# Patient Record
Sex: FEMALE | Race: WHITE | NOT HISPANIC OR LATINO | Employment: OTHER | URBAN - NONMETROPOLITAN AREA
[De-identification: names, ages, dates, MRNs, and addresses within clinical notes are randomized per-mention and may not be internally consistent; named-entity substitution may affect disease eponyms.]

---

## 2023-01-31 PROBLEM — R06.02 SOB (SHORTNESS OF BREATH) ON EXERTION: Status: ACTIVE | Noted: 2023-01-31

## 2023-01-31 PROBLEM — M47.816 ARTHRITIS OF LUMBAR SPINE: Status: ACTIVE | Noted: 2023-01-31

## 2023-01-31 PROBLEM — I35.0 MODERATE AORTIC STENOSIS: Status: ACTIVE | Noted: 2023-01-31

## 2023-01-31 PROBLEM — E11.9 TYPE 2 DIABETES MELLITUS (MULTI): Status: ACTIVE | Noted: 2023-01-31

## 2023-01-31 PROBLEM — E78.5 DYSLIPIDEMIA: Status: ACTIVE | Noted: 2023-01-31

## 2023-01-31 PROBLEM — E66.9 CLASS 2 OBESITY WITH BODY MASS INDEX (BMI) OF 37.0 TO 37.9 IN ADULT: Status: ACTIVE | Noted: 2023-01-31

## 2023-01-31 PROBLEM — L30.0 NUMMULAR ECZEMATOUS DERMATITIS: Status: ACTIVE | Noted: 2023-01-31

## 2023-01-31 PROBLEM — R93.1 ABNORMAL SCREENING CARDIAC CT: Status: ACTIVE | Noted: 2023-01-31

## 2023-01-31 PROBLEM — M16.11 OSTEOARTHRITIS OF RIGHT HIP: Status: ACTIVE | Noted: 2023-01-31

## 2023-01-31 PROBLEM — I50.9 CHF (CONGESTIVE HEART FAILURE) (MULTI): Status: ACTIVE | Noted: 2023-01-31

## 2023-01-31 PROBLEM — M75.21 RIGHT BICIPITAL TENOSYNOVITIS: Status: ACTIVE | Noted: 2023-01-31

## 2023-01-31 PROBLEM — M54.16 ACUTE LUMBAR RADICULOPATHY: Status: ACTIVE | Noted: 2023-01-31

## 2023-01-31 PROBLEM — M48.061 SPINAL STENOSIS, LUMBAR: Status: ACTIVE | Noted: 2023-01-31

## 2023-01-31 PROBLEM — Z90.13 HISTORY OF BILATERAL MASTECTOMY: Status: ACTIVE | Noted: 2023-01-31

## 2023-01-31 PROBLEM — I50.32 CHRONIC DIASTOLIC HEART FAILURE (MULTI): Status: ACTIVE | Noted: 2023-01-31

## 2023-01-31 PROBLEM — Z90.11 HISTORY OF TOTAL MASTECTOMY OF RIGHT BREAST: Status: ACTIVE | Noted: 2023-01-31

## 2023-01-31 PROBLEM — R06.81 WITNESSED EPISODE OF APNEA: Status: ACTIVE | Noted: 2023-01-31

## 2023-01-31 PROBLEM — M79.18 MUSCULOSKELETAL PAIN: Status: ACTIVE | Noted: 2023-01-31

## 2023-01-31 PROBLEM — M75.51 SUBDELTOID BURSITIS OF RIGHT SHOULDER JOINT: Status: ACTIVE | Noted: 2023-01-31

## 2023-01-31 PROBLEM — M25.511 RIGHT SHOULDER PAIN: Status: ACTIVE | Noted: 2023-01-31

## 2023-01-31 PROBLEM — N39.0 UTI (URINARY TRACT INFECTION): Status: ACTIVE | Noted: 2023-01-31

## 2023-01-31 PROBLEM — M19.90 DJD (DEGENERATIVE JOINT DISEASE): Status: ACTIVE | Noted: 2023-01-31

## 2023-01-31 PROBLEM — I20.9 ANGINA PECTORIS (CMS-HCC): Status: ACTIVE | Noted: 2023-01-31

## 2023-01-31 PROBLEM — M25.551 RIGHT HIP PAIN: Status: ACTIVE | Noted: 2023-01-31

## 2023-01-31 PROBLEM — K21.9 GERD (GASTROESOPHAGEAL REFLUX DISEASE): Status: ACTIVE | Noted: 2023-01-31

## 2023-01-31 PROBLEM — E11.9 DIABETES MELLITUS TYPE II, CONTROLLED (MULTI): Status: ACTIVE | Noted: 2023-01-31

## 2023-01-31 PROBLEM — M16.10 ARTHRITIS OF HIP: Status: ACTIVE | Noted: 2023-01-31

## 2023-01-31 PROBLEM — M54.16 LUMBAR RADICULITIS: Status: ACTIVE | Noted: 2023-01-31

## 2023-01-31 PROBLEM — M54.2 NECK PAIN: Status: ACTIVE | Noted: 2023-01-31

## 2023-01-31 PROBLEM — K25.9 GASTRIC ULCER: Status: ACTIVE | Noted: 2023-01-31

## 2023-01-31 PROBLEM — J06.9 VIRAL URI WITH COUGH: Status: ACTIVE | Noted: 2023-01-31

## 2023-01-31 PROBLEM — I35.0 AORTIC STENOSIS, SEVERE: Status: ACTIVE | Noted: 2023-01-31

## 2023-01-31 PROBLEM — I25.10 CORONARY ATHEROSCLEROSIS: Status: ACTIVE | Noted: 2023-01-31

## 2023-01-31 PROBLEM — D17.20 LIPOMA OF FOREARM: Status: ACTIVE | Noted: 2023-01-31

## 2023-01-31 PROBLEM — L30.9 DERMATITIS OF PERIANAL REGION: Status: ACTIVE | Noted: 2023-01-31

## 2023-01-31 PROBLEM — M70.71 BURSITIS OF RIGHT HIP: Status: ACTIVE | Noted: 2023-01-31

## 2023-01-31 PROBLEM — E11.11: Status: ACTIVE | Noted: 2023-01-31

## 2023-01-31 PROBLEM — K63.5 COLON POLYPS: Status: ACTIVE | Noted: 2023-01-31

## 2023-01-31 PROBLEM — M10.9 GOUT: Status: ACTIVE | Noted: 2023-01-31

## 2023-01-31 PROBLEM — J32.9 SINUSITIS: Status: ACTIVE | Noted: 2023-01-31

## 2023-01-31 PROBLEM — M19.019 ARTHRITIS OF SHOULDER: Status: ACTIVE | Noted: 2023-01-31

## 2023-01-31 PROBLEM — M17.11 ARTHRITIS OF KNEE, RIGHT: Status: ACTIVE | Noted: 2023-01-31

## 2023-01-31 PROBLEM — R60.0 LEG EDEMA: Status: ACTIVE | Noted: 2023-01-31

## 2023-01-31 PROBLEM — R73.09 ABNORMAL BLOOD SUGAR: Status: ACTIVE | Noted: 2023-01-31

## 2023-01-31 PROBLEM — Z20.822 SUSPECTED COVID-19 VIRUS INFECTION: Status: ACTIVE | Noted: 2023-01-31

## 2023-01-31 PROBLEM — E66.9 CLASS 2 OBESITY WITH BODY MASS INDEX (BMI) OF 35.0 TO 35.9 IN ADULT: Status: ACTIVE | Noted: 2023-01-31

## 2023-01-31 PROBLEM — E66.9 CLASS 2 OBESITY WITH BODY MASS INDEX (BMI) OF 36.0 TO 36.9 IN ADULT: Status: ACTIVE | Noted: 2023-01-31

## 2023-01-31 PROBLEM — Z96.641 STATUS POST TOTAL REPLACEMENT OF RIGHT HIP: Status: ACTIVE | Noted: 2023-01-31

## 2023-01-31 PROBLEM — M70.72 BURSITIS OF LEFT HIP: Status: ACTIVE | Noted: 2023-01-31

## 2023-01-31 PROBLEM — E66.9 OBESITY: Status: ACTIVE | Noted: 2023-01-31

## 2023-01-31 PROBLEM — M65.30 ACQUIRED TRIGGER FINGER: Status: ACTIVE | Noted: 2023-01-31

## 2023-01-31 PROBLEM — E55.9 MILD VITAMIN D DEFICIENCY: Status: ACTIVE | Noted: 2023-01-31

## 2023-01-31 PROBLEM — I95.9 HYPOTENSION: Status: ACTIVE | Noted: 2023-01-31

## 2023-01-31 PROBLEM — I25.10 CORONARY ARTERY CALCIFICATION: Status: ACTIVE | Noted: 2023-01-31

## 2023-01-31 PROBLEM — N18.31 STAGE 3A CHRONIC KIDNEY DISEASE (MULTI): Status: ACTIVE | Noted: 2023-01-31

## 2023-01-31 PROBLEM — M19.90 ARTHRITIS: Status: ACTIVE | Noted: 2023-01-31

## 2023-01-31 PROBLEM — G89.29 HIP PAIN, CHRONIC: Status: ACTIVE | Noted: 2023-01-31

## 2023-01-31 PROBLEM — E66.812 CLASS 2 OBESITY WITH BODY MASS INDEX (BMI) OF 37.0 TO 37.9 IN ADULT: Status: ACTIVE | Noted: 2023-01-31

## 2023-01-31 PROBLEM — I25.84 CORONARY ARTERY CALCIFICATION: Status: ACTIVE | Noted: 2023-01-31

## 2023-01-31 PROBLEM — I35.0 AORTIC STENOSIS: Status: ACTIVE | Noted: 2023-01-31

## 2023-01-31 PROBLEM — I22.2 SUBSEQUENT NON-ST ELEVATION (NSTEMI) MYOCARDIAL INFARCTION (MULTI): Status: ACTIVE | Noted: 2023-01-31

## 2023-01-31 PROBLEM — E66.812 CLASS 2 OBESITY WITH BODY MASS INDEX (BMI) OF 35.0 TO 35.9 IN ADULT: Status: ACTIVE | Noted: 2023-01-31

## 2023-01-31 PROBLEM — Z90.13 STATUS POST BILATERAL MASTECTOMY: Status: ACTIVE | Noted: 2023-01-31

## 2023-01-31 PROBLEM — E66.812 CLASS 2 OBESITY WITH BODY MASS INDEX (BMI) OF 36.0 TO 36.9 IN ADULT: Status: ACTIVE | Noted: 2023-01-31

## 2023-01-31 PROBLEM — C50.919 BREAST CA (MULTI): Status: ACTIVE | Noted: 2023-01-31

## 2023-01-31 PROBLEM — R05.9 COUGH: Status: ACTIVE | Noted: 2023-01-31

## 2023-01-31 PROBLEM — L89.42: Status: ACTIVE | Noted: 2023-01-31

## 2023-01-31 PROBLEM — R07.9 CHEST PAIN: Status: ACTIVE | Noted: 2023-01-31

## 2023-01-31 PROBLEM — E78.00 ELEVATED LDL CHOLESTEROL LEVEL: Status: ACTIVE | Noted: 2023-01-31

## 2023-01-31 PROBLEM — M16.10 ARTHRITIS PAIN, HIP: Status: ACTIVE | Noted: 2023-01-31

## 2023-01-31 PROBLEM — M25.559 HIP PAIN, CHRONIC: Status: ACTIVE | Noted: 2023-01-31

## 2023-01-31 PROBLEM — I10 BENIGN ESSENTIAL HYPERTENSION: Status: ACTIVE | Noted: 2023-01-31

## 2023-01-31 PROBLEM — Z90.12 HISTORY OF TOTAL MASTECTOMY OF LEFT BREAST: Status: ACTIVE | Noted: 2023-01-31

## 2023-01-31 RX ORDER — PANTOPRAZOLE SODIUM 40 MG/1
40 TABLET, DELAYED RELEASE ORAL DAILY
COMMUNITY
Start: 2021-03-04 | End: 2023-05-19 | Stop reason: SDUPTHER

## 2023-01-31 RX ORDER — ALLOPURINOL 100 MG/1
2 TABLET ORAL DAILY
COMMUNITY
Start: 2015-10-22 | End: 2023-05-19 | Stop reason: SDUPTHER

## 2023-01-31 RX ORDER — METFORMIN HYDROCHLORIDE 500 MG/1
1000 TABLET, EXTENDED RELEASE ORAL DAILY
COMMUNITY
Start: 2021-12-01 | End: 2023-05-22

## 2023-01-31 RX ORDER — FUROSEMIDE 40 MG/1
1 TABLET ORAL DAILY
COMMUNITY
Start: 2021-03-17 | End: 2023-05-22

## 2023-01-31 RX ORDER — ROSUVASTATIN CALCIUM 40 MG/1
1 TABLET, COATED ORAL DAILY
COMMUNITY
Start: 2016-09-06 | End: 2023-05-19 | Stop reason: SDUPTHER

## 2023-01-31 RX ORDER — BISOPROLOL FUMARATE 5 MG/1
2.5 TABLET, FILM COATED ORAL DAILY
Status: ON HOLD | COMMUNITY
Start: 2018-07-18 | End: 2024-03-29 | Stop reason: WASHOUT

## 2023-01-31 RX ORDER — LOSARTAN POTASSIUM 25 MG/1
1 TABLET ORAL DAILY
COMMUNITY
Start: 2021-03-18 | End: 2023-05-19 | Stop reason: SDUPTHER

## 2023-01-31 RX ORDER — CLOPIDOGREL BISULFATE 75 MG/1
1 TABLET ORAL DAILY
COMMUNITY
Start: 2022-02-23 | End: 2023-05-19 | Stop reason: SDUPTHER

## 2023-01-31 RX ORDER — ASPIRIN 81 MG/1
1 TABLET ORAL DAILY
COMMUNITY
Start: 2020-09-17 | End: 2023-05-19 | Stop reason: SDUPTHER

## 2023-01-31 RX ORDER — CLOTRIMAZOLE AND BETAMETHASONE DIPROPIONATE 10; .64 MG/G; MG/G
CREAM TOPICAL 3 TIMES DAILY
Status: ON HOLD | COMMUNITY
Start: 2021-03-04 | End: 2024-03-29 | Stop reason: WASHOUT

## 2023-01-31 RX ORDER — GLUCOSAMINE/CHONDR SU A SOD 750-600 MG
TABLET ORAL
Status: ON HOLD | COMMUNITY
End: 2024-03-29 | Stop reason: WASHOUT

## 2023-01-31 RX ORDER — FERROUS GLUCONATE 324(38)MG
1 TABLET ORAL 2 TIMES DAILY
COMMUNITY
Start: 2021-03-18 | End: 2023-05-19 | Stop reason: SDUPTHER

## 2023-01-31 RX ORDER — NITROGLYCERIN 0.4 MG/1
0.4 TABLET SUBLINGUAL AS NEEDED
COMMUNITY
Start: 2018-07-18 | End: 2023-03-14 | Stop reason: SDUPTHER

## 2023-01-31 RX ORDER — CHOLECALCIFEROL (VITAMIN D3) 125 MCG
125 CAPSULE ORAL DAILY
COMMUNITY
Start: 2022-08-11 | End: 2023-05-22

## 2023-03-14 ENCOUNTER — OFFICE VISIT (OUTPATIENT)
Dept: PRIMARY CARE | Facility: CLINIC | Age: 73
End: 2023-03-14
Payer: MEDICARE

## 2023-03-14 VITALS
WEIGHT: 215.8 LBS | BODY MASS INDEX: 40.11 KG/M2 | DIASTOLIC BLOOD PRESSURE: 58 MMHG | OXYGEN SATURATION: 94 % | HEART RATE: 80 BPM | SYSTOLIC BLOOD PRESSURE: 98 MMHG | TEMPERATURE: 96.9 F

## 2023-03-14 DIAGNOSIS — E11.11 TYPE 2 DIABETES MELLITUS WITH KETOACIDOTIC COMA, WITHOUT LONG-TERM CURRENT USE OF INSULIN (MULTI): ICD-10-CM

## 2023-03-14 DIAGNOSIS — I10 BENIGN ESSENTIAL HYPERTENSION: ICD-10-CM

## 2023-03-14 DIAGNOSIS — S90.812A: ICD-10-CM

## 2023-03-14 DIAGNOSIS — E11.8 CONTROLLED DIABETES MELLITUS TYPE 2 WITH COMPLICATIONS, UNSPECIFIED WHETHER LONG TERM INSULIN USE (MULTI): ICD-10-CM

## 2023-03-14 DIAGNOSIS — E78.5 DYSLIPIDEMIA: ICD-10-CM

## 2023-03-14 DIAGNOSIS — I20.9 ANGINA PECTORIS (CMS-HCC): ICD-10-CM

## 2023-03-14 DIAGNOSIS — I50.32 CHRONIC DIASTOLIC HEART FAILURE (MULTI): Primary | ICD-10-CM

## 2023-03-14 LAB
POC FINGERSTICK BLOOD GLUCOSE: 127 MG/DL (ref 70–100)
POC HEMOGLOBIN A1C: 6.8 % (ref 4.2–6.5)

## 2023-03-14 PROCEDURE — 3074F SYST BP LT 130 MM HG: CPT | Performed by: INTERNAL MEDICINE

## 2023-03-14 PROCEDURE — 3066F NEPHROPATHY DOC TX: CPT | Performed by: INTERNAL MEDICINE

## 2023-03-14 PROCEDURE — 99214 OFFICE O/P EST MOD 30 MIN: CPT | Performed by: INTERNAL MEDICINE

## 2023-03-14 PROCEDURE — 83036 HEMOGLOBIN GLYCOSYLATED A1C: CPT | Performed by: INTERNAL MEDICINE

## 2023-03-14 PROCEDURE — 4010F ACE/ARB THERAPY RXD/TAKEN: CPT | Performed by: INTERNAL MEDICINE

## 2023-03-14 PROCEDURE — 82962 GLUCOSE BLOOD TEST: CPT | Performed by: INTERNAL MEDICINE

## 2023-03-14 PROCEDURE — 3078F DIAST BP <80 MM HG: CPT | Performed by: INTERNAL MEDICINE

## 2023-03-14 PROCEDURE — 1036F TOBACCO NON-USER: CPT | Performed by: INTERNAL MEDICINE

## 2023-03-14 RX ORDER — NITROGLYCERIN 0.4 MG/1
0.4 TABLET SUBLINGUAL AS NEEDED
Qty: 25 TABLET | Refills: 0 | Status: SHIPPED | OUTPATIENT
Start: 2023-03-14 | End: 2024-05-01 | Stop reason: HOSPADM

## 2023-03-14 ASSESSMENT — ENCOUNTER SYMPTOMS
SORE THROAT: 0
FEVER: 0
UNEXPECTED WEIGHT CHANGE: 0
WHEEZING: 0
COUGH: 0
ARTHRALGIAS: 0
BRUISES/BLEEDS EASILY: 0
SINUS PAIN: 0
FATIGUE: 0
HEADACHES: 0
WOUND: 1
DIZZINESS: 0
BLOOD IN STOOL: 0
ABDOMINAL PAIN: 0
DIFFICULTY URINATING: 0
PALPITATIONS: 0
DIARRHEA: 0

## 2023-03-14 NOTE — PROGRESS NOTES
Subjective   Patient ID: Saritha Cormier is a 72 y.o. female who presents for Hypertension, Diabetes, Puncture Wound (Lt heel x 1 week, possibly from broken glass), and Chest Pain.    HPI  -  cracking dry skin of the left heel, with superficial cellulitis counseled about daily dressing  Dry skin apply Eucerin cream  -Diabetes controlled counseled about hemoglobin A1c results and sugar results continue on low-carb diet  Counseled about weight loss  - Coronary artery disease angina no recurrence compensated  - Hypertension controlled  - Hyperlipidemia controlled continue with current medication  Review of Systems   Constitutional:  Negative for fatigue, fever and unexpected weight change.   HENT:  Negative for congestion, ear discharge, ear pain, mouth sores, sinus pain and sore throat.    Eyes:  Negative for visual disturbance.   Respiratory:  Negative for cough and wheezing.    Cardiovascular:  Negative for chest pain, palpitations and leg swelling.   Gastrointestinal:  Negative for abdominal pain, blood in stool and diarrhea.   Genitourinary:  Negative for difficulty urinating.   Musculoskeletal:  Negative for arthralgias.   Skin:  Positive for wound. Negative for rash.   Neurological:  Negative for dizziness and headaches.   Hematological:  Does not bruise/bleed easily.   Psychiatric/Behavioral:  Negative for behavioral problems.    All other systems reviewed and are negative.      Objective   BP 98/58   Pulse 80   Temp 36.1 °C (96.9 °F)   Wt 97.9 kg (215 lb 12.8 oz)   SpO2 94%   BMI 40.11 kg/m²     Physical Exam  Vitals and nursing note reviewed.   Constitutional:       Appearance: Normal appearance.   HENT:      Head: Normocephalic.      Nose: Nose normal.   Eyes:      Conjunctiva/sclera: Conjunctivae normal.      Pupils: Pupils are equal, round, and reactive to light.   Cardiovascular:      Rate and Rhythm: Regular rhythm.   Pulmonary:      Effort: Pulmonary effort is normal.      Breath sounds: Normal  breath sounds.   Abdominal:      General: Abdomen is flat.      Palpations: Abdomen is soft.   Musculoskeletal:      Cervical back: Neck supple.   Skin:     General: Skin is warm.      Findings: Lesion present.      Comments: Left heel dry skin and small skin crack with superficial cellulitis severe dry skin   Neurological:      General: No focal deficit present.      Mental Status: She is oriented to person, place, and time.   Psychiatric:         Mood and Affect: Mood normal.         Assessment/Plan   Problem List Items Addressed This Visit          Nervous    Type 2 diabetes mellitus with ketoacidotic coma, without long-term current use of insulin (CMS/[facility])    Relevant Orders    POCT glycosylated hemoglobin (Hb A1C) manually resulted (Completed)       Circulatory    Angina pectoris (CMS/[facility])    Relevant Medications    nitroglycerin (Nitrostat) 0.4 mg SL tablet    Benign essential hypertension    Chronic diastolic heart failure (CMS/[facility]) - Primary    Relevant Medications    nitroglycerin (Nitrostat) 0.4 mg SL tablet       Endocrine/Metabolic    Diabetes mellitus type II, controlled (CMS/[facility])    Relevant Orders    POCT fingerstick glucose manually resulted (Completed)       Other    Dyslipidemia     Other Visit Diagnoses       Heel abrasion, left, initial encounter        Relevant Medications    eucerin cream        -  cracking dry skin of the left heel, with superficial cellulitis counseled about daily dressing  Dry skin apply Eucerin cream  -Diabetes controlled counseled about hemoglobin A1c results and sugar results continue on low-carb diet  Counseled about weight loss  - Coronary artery disease angina no recurrence compensated  - Hypertension controlled  - Hyperlipidemia controlled continue with current medication

## 2023-05-19 DIAGNOSIS — D64.89 OTHER SPECIFIED ANEMIAS: ICD-10-CM

## 2023-05-19 DIAGNOSIS — I50.9 HEART FAILURE, UNSPECIFIED (MULTI): ICD-10-CM

## 2023-05-19 DIAGNOSIS — K21.9 GASTRO-ESOPHAGEAL REFLUX DISEASE WITHOUT ESOPHAGITIS: ICD-10-CM

## 2023-05-19 DIAGNOSIS — M10.9 GOUT, UNSPECIFIED: ICD-10-CM

## 2023-05-19 DIAGNOSIS — E78.00 PURE HYPERCHOLESTEROLEMIA, UNSPECIFIED: ICD-10-CM

## 2023-05-19 DIAGNOSIS — Z00.00 ENCOUNTER FOR GENERAL ADULT MEDICAL EXAMINATION WITHOUT ABNORMAL FINDINGS: ICD-10-CM

## 2023-05-19 RX ORDER — PANTOPRAZOLE SODIUM 40 MG/1
TABLET, DELAYED RELEASE ORAL
Qty: 90 TABLET | Refills: 0 | Status: SHIPPED | OUTPATIENT
Start: 2023-05-19 | End: 2023-08-15 | Stop reason: SDUPTHER

## 2023-05-19 RX ORDER — ROSUVASTATIN CALCIUM 40 MG/1
TABLET, COATED ORAL
Qty: 90 TABLET | Refills: 0 | Status: SHIPPED | OUTPATIENT
Start: 2023-05-19 | End: 2023-08-15 | Stop reason: SDUPTHER

## 2023-05-19 RX ORDER — ALLOPURINOL 100 MG/1
TABLET ORAL
Qty: 180 TABLET | Refills: 0 | Status: SHIPPED | OUTPATIENT
Start: 2023-05-19 | End: 2023-08-15 | Stop reason: SDUPTHER

## 2023-05-19 RX ORDER — FERROUS GLUCONATE 324(38)MG
TABLET ORAL
Qty: 180 TABLET | Refills: 0 | Status: SHIPPED | OUTPATIENT
Start: 2023-05-19 | End: 2023-08-15 | Stop reason: SDUPTHER

## 2023-05-19 RX ORDER — CLOPIDOGREL BISULFATE 75 MG/1
TABLET ORAL
Qty: 90 TABLET | Refills: 0 | Status: SHIPPED | OUTPATIENT
Start: 2023-05-19 | End: 2023-06-14 | Stop reason: SDUPTHER

## 2023-05-19 RX ORDER — LOSARTAN POTASSIUM 25 MG/1
TABLET ORAL
Qty: 90 TABLET | Refills: 0 | Status: SHIPPED | OUTPATIENT
Start: 2023-05-19 | End: 2023-08-15 | Stop reason: SDUPTHER

## 2023-05-19 RX ORDER — ASPIRIN 81 MG/1
TABLET ORAL
Qty: 90 TABLET | Refills: 0 | Status: SHIPPED | OUTPATIENT
Start: 2023-05-19 | End: 2023-08-15 | Stop reason: SDUPTHER

## 2023-05-20 DIAGNOSIS — I25.84 CORONARY ATHEROSCLEROSIS DUE TO CALCIFIED CORONARY LESION (CODE): ICD-10-CM

## 2023-05-20 DIAGNOSIS — E55.9 MILD VITAMIN D DEFICIENCY: ICD-10-CM

## 2023-05-20 DIAGNOSIS — E11.9 TYPE 2 DIABETES MELLITUS WITHOUT COMPLICATIONS (MULTI): ICD-10-CM

## 2023-05-20 DIAGNOSIS — I25.10 ATHEROSCLEROTIC HEART DISEASE OF NATIVE CORONARY ARTERY WITHOUT ANGINA PECTORIS: ICD-10-CM

## 2023-05-22 RX ORDER — CHOLECALCIFEROL (VITAMIN D3) 125 MCG
CAPSULE ORAL
Qty: 23 CAPSULE | Refills: 0 | Status: SHIPPED | OUTPATIENT
Start: 2023-05-22 | End: 2023-06-14 | Stop reason: SDUPTHER

## 2023-05-22 RX ORDER — FUROSEMIDE 40 MG/1
TABLET ORAL
Qty: 23 TABLET | Refills: 0 | Status: SHIPPED | OUTPATIENT
Start: 2023-05-22 | End: 2023-07-06

## 2023-05-22 RX ORDER — METFORMIN HYDROCHLORIDE 500 MG/1
TABLET, EXTENDED RELEASE ORAL
Qty: 46 TABLET | Refills: 0 | Status: SHIPPED | OUTPATIENT
Start: 2023-05-22 | End: 2023-07-10

## 2023-06-14 ENCOUNTER — OFFICE VISIT (OUTPATIENT)
Dept: PRIMARY CARE | Facility: CLINIC | Age: 73
End: 2023-06-14
Payer: MEDICARE

## 2023-06-14 ENCOUNTER — LAB (OUTPATIENT)
Dept: LAB | Facility: LAB | Age: 73
End: 2023-06-14
Payer: MEDICARE

## 2023-06-14 VITALS
SYSTOLIC BLOOD PRESSURE: 126 MMHG | DIASTOLIC BLOOD PRESSURE: 82 MMHG | HEART RATE: 72 BPM | HEIGHT: 62 IN | BODY MASS INDEX: 39.47 KG/M2 | WEIGHT: 214.5 LBS | OXYGEN SATURATION: 95 % | TEMPERATURE: 98.6 F

## 2023-06-14 DIAGNOSIS — C50.912 BILATERAL MALIGNANT NEOPLASM OF BREAST IN FEMALE, UNSPECIFIED ESTROGEN RECEPTOR STATUS, UNSPECIFIED SITE OF BREAST (MULTI): ICD-10-CM

## 2023-06-14 DIAGNOSIS — G89.29 CHRONIC PAIN OF RIGHT HEEL: ICD-10-CM

## 2023-06-14 DIAGNOSIS — I10 BENIGN ESSENTIAL HYPERTENSION: Primary | ICD-10-CM

## 2023-06-14 DIAGNOSIS — N18.31 STAGE 3A CHRONIC KIDNEY DISEASE (MULTI): ICD-10-CM

## 2023-06-14 DIAGNOSIS — Z12.31 ENCOUNTER FOR SCREENING MAMMOGRAM FOR BREAST CANCER: ICD-10-CM

## 2023-06-14 DIAGNOSIS — I50.9 HEART FAILURE, UNSPECIFIED (MULTI): ICD-10-CM

## 2023-06-14 DIAGNOSIS — C50.911 BILATERAL MALIGNANT NEOPLASM OF BREAST IN FEMALE, UNSPECIFIED ESTROGEN RECEPTOR STATUS, UNSPECIFIED SITE OF BREAST (MULTI): ICD-10-CM

## 2023-06-14 DIAGNOSIS — Z11.59 NEED FOR HEPATITIS C SCREENING TEST: ICD-10-CM

## 2023-06-14 DIAGNOSIS — Z90.13 H/O BILATERAL MASTECTOMY: Chronic | ICD-10-CM

## 2023-06-14 DIAGNOSIS — E55.9 MILD VITAMIN D DEFICIENCY: ICD-10-CM

## 2023-06-14 DIAGNOSIS — L89.42 PRESSURE INJURY OF CONTIGUOUS REGION INVOLVING BUTTOCK AND HIP, STAGE 2, UNSPECIFIED LATERALITY (MULTI): ICD-10-CM

## 2023-06-14 DIAGNOSIS — I50.32 CHRONIC DIASTOLIC HEART FAILURE (MULTI): ICD-10-CM

## 2023-06-14 DIAGNOSIS — I35.0 AORTIC STENOSIS, SEVERE: ICD-10-CM

## 2023-06-14 DIAGNOSIS — Z00.00 ROUTINE GENERAL MEDICAL EXAMINATION AT HEALTH CARE FACILITY: ICD-10-CM

## 2023-06-14 DIAGNOSIS — E11.11 TYPE 2 DIABETES MELLITUS WITH KETOACIDOTIC COMA, WITHOUT LONG-TERM CURRENT USE OF INSULIN (MULTI): ICD-10-CM

## 2023-06-14 DIAGNOSIS — M79.671 CHRONIC PAIN OF RIGHT HEEL: ICD-10-CM

## 2023-06-14 DIAGNOSIS — Z13.89 SCREENING FOR MULTIPLE CONDITIONS: ICD-10-CM

## 2023-06-14 DIAGNOSIS — M10.9 GOUT OF RIGHT ANKLE, UNSPECIFIED CAUSE, UNSPECIFIED CHRONICITY: ICD-10-CM

## 2023-06-14 DIAGNOSIS — M16.10 ARTHRITIS OF HIP: ICD-10-CM

## 2023-06-14 DIAGNOSIS — Z12.11 SCREENING FOR COLON CANCER: ICD-10-CM

## 2023-06-14 DIAGNOSIS — Z78.0 ASYMPTOMATIC MENOPAUSAL STATE: ICD-10-CM

## 2023-06-14 PROBLEM — M79.18 MUSCULOSKELETAL PAIN: Status: RESOLVED | Noted: 2023-01-31 | Resolved: 2023-06-14

## 2023-06-14 PROBLEM — M25.551 RIGHT HIP PAIN: Status: RESOLVED | Noted: 2023-01-31 | Resolved: 2023-06-14

## 2023-06-14 PROBLEM — E66.9 OBESITY: Status: RESOLVED | Noted: 2023-01-31 | Resolved: 2023-06-14

## 2023-06-14 PROBLEM — R73.09 ABNORMAL BLOOD SUGAR: Status: RESOLVED | Noted: 2023-01-31 | Resolved: 2023-06-14

## 2023-06-14 PROBLEM — J32.9 SINUSITIS: Status: RESOLVED | Noted: 2023-01-31 | Resolved: 2023-06-14

## 2023-06-14 PROBLEM — L30.0 NUMMULAR ECZEMATOUS DERMATITIS: Status: RESOLVED | Noted: 2023-01-31 | Resolved: 2023-06-14

## 2023-06-14 PROBLEM — I22.2 SUBSEQUENT NON-ST ELEVATION (NSTEMI) MYOCARDIAL INFARCTION (MULTI): Status: RESOLVED | Noted: 2023-01-31 | Resolved: 2023-06-14

## 2023-06-14 PROBLEM — M75.21 RIGHT BICIPITAL TENOSYNOVITIS: Status: RESOLVED | Noted: 2023-01-31 | Resolved: 2023-06-14

## 2023-06-14 PROBLEM — E66.9 CLASS 2 OBESITY WITH BODY MASS INDEX (BMI) OF 36.0 TO 36.9 IN ADULT: Status: RESOLVED | Noted: 2023-01-31 | Resolved: 2023-06-14

## 2023-06-14 PROBLEM — M16.11 OSTEOARTHRITIS OF RIGHT HIP: Status: RESOLVED | Noted: 2023-01-31 | Resolved: 2023-06-14

## 2023-06-14 PROBLEM — M75.51 SUBDELTOID BURSITIS OF RIGHT SHOULDER JOINT: Status: RESOLVED | Noted: 2023-01-31 | Resolved: 2023-06-14

## 2023-06-14 PROBLEM — N39.0 UTI (URINARY TRACT INFECTION): Status: RESOLVED | Noted: 2023-01-31 | Resolved: 2023-06-14

## 2023-06-14 PROBLEM — C50.919 BREAST CA (MULTI): Status: RESOLVED | Noted: 2023-01-31 | Resolved: 2023-06-14

## 2023-06-14 PROBLEM — M65.30 ACQUIRED TRIGGER FINGER: Status: RESOLVED | Noted: 2023-01-31 | Resolved: 2023-06-14

## 2023-06-14 PROBLEM — M17.11 ARTHRITIS OF KNEE, RIGHT: Status: RESOLVED | Noted: 2023-01-31 | Resolved: 2023-06-14

## 2023-06-14 PROBLEM — J06.9 VIRAL URI WITH COUGH: Status: RESOLVED | Noted: 2023-01-31 | Resolved: 2023-06-14

## 2023-06-14 PROBLEM — R60.0 LEG EDEMA: Status: RESOLVED | Noted: 2023-01-31 | Resolved: 2023-06-14

## 2023-06-14 PROBLEM — Z90.11 HISTORY OF TOTAL MASTECTOMY OF RIGHT BREAST: Status: RESOLVED | Noted: 2023-01-31 | Resolved: 2023-06-14

## 2023-06-14 PROBLEM — M25.559 HIP PAIN, CHRONIC: Status: RESOLVED | Noted: 2023-01-31 | Resolved: 2023-06-14

## 2023-06-14 PROBLEM — R06.02 SOB (SHORTNESS OF BREATH) ON EXERTION: Status: RESOLVED | Noted: 2023-01-31 | Resolved: 2023-06-14

## 2023-06-14 PROBLEM — E66.812 CLASS 2 OBESITY WITH BODY MASS INDEX (BMI) OF 35.0 TO 35.9 IN ADULT: Status: RESOLVED | Noted: 2023-01-31 | Resolved: 2023-06-14

## 2023-06-14 PROBLEM — M70.72 BURSITIS OF LEFT HIP: Status: RESOLVED | Noted: 2023-01-31 | Resolved: 2023-06-14

## 2023-06-14 PROBLEM — I25.10 CORONARY ATHEROSCLEROSIS: Status: RESOLVED | Noted: 2023-01-31 | Resolved: 2023-06-14

## 2023-06-14 PROBLEM — R05.9 COUGH: Status: RESOLVED | Noted: 2023-01-31 | Resolved: 2023-06-14

## 2023-06-14 PROBLEM — M70.71 BURSITIS OF RIGHT HIP: Status: RESOLVED | Noted: 2023-01-31 | Resolved: 2023-06-14

## 2023-06-14 PROBLEM — E66.812 CLASS 2 OBESITY WITH BODY MASS INDEX (BMI) OF 36.0 TO 36.9 IN ADULT: Status: RESOLVED | Noted: 2023-01-31 | Resolved: 2023-06-14

## 2023-06-14 PROBLEM — M25.511 RIGHT SHOULDER PAIN: Status: RESOLVED | Noted: 2023-01-31 | Resolved: 2023-06-14

## 2023-06-14 PROBLEM — M19.019 ARTHRITIS OF SHOULDER: Status: RESOLVED | Noted: 2023-01-31 | Resolved: 2023-06-14

## 2023-06-14 PROBLEM — M47.816 ARTHRITIS OF LUMBAR SPINE: Status: RESOLVED | Noted: 2023-01-31 | Resolved: 2023-06-14

## 2023-06-14 PROBLEM — M54.2 NECK PAIN: Status: RESOLVED | Noted: 2023-01-31 | Resolved: 2023-06-14

## 2023-06-14 PROBLEM — E66.9 CLASS 2 OBESITY WITH BODY MASS INDEX (BMI) OF 35.0 TO 35.9 IN ADULT: Status: RESOLVED | Noted: 2023-01-31 | Resolved: 2023-06-14

## 2023-06-14 PROBLEM — E11.9 TYPE 2 DIABETES MELLITUS (MULTI): Status: RESOLVED | Noted: 2023-01-31 | Resolved: 2023-06-14

## 2023-06-14 PROBLEM — Z90.12 HISTORY OF TOTAL MASTECTOMY OF LEFT BREAST: Status: RESOLVED | Noted: 2023-01-31 | Resolved: 2023-06-14

## 2023-06-14 LAB
HEPATITIS C VIRUS AB PRESENCE IN SERUM: NONREACTIVE
URATE (MG/DL) IN SER/PLAS: 5.5 MG/DL (ref 2.3–6.7)

## 2023-06-14 PROCEDURE — 3008F BODY MASS INDEX DOCD: CPT | Performed by: INTERNAL MEDICINE

## 2023-06-14 PROCEDURE — 86803 HEPATITIS C AB TEST: CPT

## 2023-06-14 PROCEDURE — 3079F DIAST BP 80-89 MM HG: CPT | Performed by: INTERNAL MEDICINE

## 2023-06-14 PROCEDURE — 4010F ACE/ARB THERAPY RXD/TAKEN: CPT | Performed by: INTERNAL MEDICINE

## 2023-06-14 PROCEDURE — G0439 PPPS, SUBSEQ VISIT: HCPCS | Performed by: INTERNAL MEDICINE

## 2023-06-14 PROCEDURE — 1036F TOBACCO NON-USER: CPT | Performed by: INTERNAL MEDICINE

## 2023-06-14 PROCEDURE — 3074F SYST BP LT 130 MM HG: CPT | Performed by: INTERNAL MEDICINE

## 2023-06-14 PROCEDURE — 1170F FXNL STATUS ASSESSED: CPT | Performed by: INTERNAL MEDICINE

## 2023-06-14 PROCEDURE — 1160F RVW MEDS BY RX/DR IN RCRD: CPT | Performed by: INTERNAL MEDICINE

## 2023-06-14 PROCEDURE — G0444 DEPRESSION SCREEN ANNUAL: HCPCS | Performed by: INTERNAL MEDICINE

## 2023-06-14 PROCEDURE — 1159F MED LIST DOCD IN RCRD: CPT | Performed by: INTERNAL MEDICINE

## 2023-06-14 PROCEDURE — 99214 OFFICE O/P EST MOD 30 MIN: CPT | Performed by: INTERNAL MEDICINE

## 2023-06-14 PROCEDURE — 84550 ASSAY OF BLOOD/URIC ACID: CPT

## 2023-06-14 PROCEDURE — 36415 COLL VENOUS BLD VENIPUNCTURE: CPT

## 2023-06-14 PROCEDURE — 3066F NEPHROPATHY DOC TX: CPT | Performed by: INTERNAL MEDICINE

## 2023-06-14 RX ORDER — CLOPIDOGREL BISULFATE 75 MG/1
75 TABLET ORAL DAILY
Qty: 90 TABLET | Refills: 0 | Status: SHIPPED | OUTPATIENT
Start: 2023-06-14 | End: 2023-10-16

## 2023-06-14 RX ORDER — CHOLECALCIFEROL (VITAMIN D3) 125 MCG
125 CAPSULE ORAL DAILY
Qty: 23 CAPSULE | Refills: 0 | Status: SHIPPED | OUTPATIENT
Start: 2023-06-14 | End: 2023-07-24

## 2023-06-14 ASSESSMENT — ENCOUNTER SYMPTOMS
BRUISES/BLEEDS EASILY: 0
WHEEZING: 0
ARTHRALGIAS: 1
DIFFICULTY URINATING: 0
ABDOMINAL PAIN: 0
FEVER: 0
BLOOD IN STOOL: 0
BACK PAIN: 1
UNEXPECTED WEIGHT CHANGE: 0
DIZZINESS: 0
DIARRHEA: 0
PALPITATIONS: 0
COUGH: 0
FATIGUE: 0
SORE THROAT: 0
SINUS PAIN: 0
HEADACHES: 0

## 2023-06-14 ASSESSMENT — ACTIVITIES OF DAILY LIVING (ADL)
GROCERY_SHOPPING: INDEPENDENT
TAKING_MEDICATION: INDEPENDENT
DRESSING: INDEPENDENT
BATHING: INDEPENDENT
DOING_HOUSEWORK: INDEPENDENT
MANAGING_FINANCES: INDEPENDENT

## 2023-06-14 ASSESSMENT — PATIENT HEALTH QUESTIONNAIRE - PHQ9
SUM OF ALL RESPONSES TO PHQ9 QUESTIONS 1 AND 2: 0
2. FEELING DOWN, DEPRESSED OR HOPELESS: NOT AT ALL
1. LITTLE INTEREST OR PLEASURE IN DOING THINGS: NOT AT ALL

## 2023-06-14 NOTE — PROGRESS NOTES
Subjective   Reason for Visit: Saritha Cormier is an 72 y.o. female here for a Medicare Wellness visit.     Past Medical, Surgical, and Family History reviewed and updated in chart.    Reviewed all medications by prescribing practitioner or clinical pharmacist (such as prescriptions, OTCs, herbal therapies and supplements) and documented in the medical record.    Annual preventive visit  - Vaccinations reviewed and up-to-date  -Screening for colon cancer history of colon polyps need to repeat screening colon cancer refer patient to gastroenterology in [place]  -Needs to complete blood work  -History of breast cancer status post bilateral mastectomy no need for mammogram  -Screening for depression  I spent 15 minutes obtaining and discussing depression screening using PHQ-2 questions with results documented in the chart.    Medicare screening reviewed    Follow-up  - Right heel pain history of gout need to obtain x-ray of the right calcaneus obtain uric acid for further recommendation follow-up results closely  -Status post right hip replacement doing well now left hip pain worsening proceed with x-ray left hip refer patient to Dr. Angulo orthopedic surgery  -Aortic valve disease stenosis compensated asymptomatic follow-up cardiology  - Diastolic heart failure compensated continue with current medication low-salt diet  - Hypertension controlled continue with current meds  - Chronic gout continue with allopurinol follow-up uric acid level  -Chronic kidney disease compensated follow-up BMP continue low-salt diet maximize blood pressure management  - Hypercholesteremia controlled  Follow-up 3 months        Patient Care Team:  Alva Prince MD as PCP - General  Alva Prince MD as PCP [facility]     Review of Systems   Constitutional:  Negative for fatigue, fever and unexpected weight change.   HENT:  Negative for congestion, ear discharge, ear pain, mouth sores, sinus pain and sore throat.  "   Eyes:  Negative for visual disturbance.   Respiratory:  Negative for cough and wheezing.    Cardiovascular:  Negative for chest pain, palpitations and leg swelling.   Gastrointestinal:  Negative for abdominal pain, blood in stool and diarrhea.   Genitourinary:  Negative for difficulty urinating.   Musculoskeletal:  Positive for arthralgias, back pain and gait problem.   Skin:  Negative for rash.   Neurological:  Negative for dizziness and headaches.   Hematological:  Does not bruise/bleed easily.   Psychiatric/Behavioral:  Negative for behavioral problems.    All other systems reviewed and are negative.      Objective   Vitals:  /82   Pulse 72   Temp 37 °C (98.6 °F)   Ht 1.575 m (5' 2\")   Wt 97.3 kg (214 lb 8 oz)   SpO2 95%   BMI 39.23 kg/m²       Physical Exam  Vitals and nursing note reviewed.   Constitutional:       Appearance: Normal appearance.   HENT:      Head: Normocephalic.      Nose: Nose normal.   Eyes:      Conjunctiva/sclera: Conjunctivae normal.      Pupils: Pupils are equal, round, and reactive to light.   Cardiovascular:      Rate and Rhythm: Regular rhythm.   Pulmonary:      Effort: Pulmonary effort is normal.      Breath sounds: Normal breath sounds.   Abdominal:      General: Abdomen is flat.      Palpations: Abdomen is soft.   Musculoskeletal:         General: Tenderness (Left hip arthritis right heel pain and tenderness) present.      Cervical back: Neck supple.   Skin:     General: Skin is warm.   Neurological:      General: No focal deficit present.      Mental Status: She is oriented to person, place, and time.   Psychiatric:         Mood and Affect: Mood normal.         Assessment/Plan   Problem List Items Addressed This Visit       Aortic stenosis, severe    Relevant Medications    clopidogrel (Plavix) 75 mg tablet    Arthritis of hip    Relevant Orders    XR hip left 2 or 3 views    Benign essential hypertension - Primary    RESOLVED: Breast CA (CMS/HCC)    Chronic diastolic " heart failure (CMS/HCC)    Relevant Medications    clopidogrel (Plavix) 75 mg tablet    Gout    Mild vitamin D deficiency    Relevant Medications    cholecalciferol (Vitamin D-3) 125 MCG (5000 UT) capsule    RESOLVED: Pressure injury of contiguous region involving buttock and hip, stage 2 (CMS/HCC)    Stage 3a chronic kidney disease    Type 2 diabetes mellitus with ketoacidotic coma, without long-term current use of insulin (CMS/HCC)    H/O bilateral mastectomy (Chronic)     Other Visit Diagnoses       Heart failure, unspecified (CMS/HCC)        Relevant Medications    clopidogrel (Plavix) 75 mg tablet    Screening for multiple conditions        Asymptomatic menopausal state        Relevant Orders    XR DEXA bone density    Encounter for screening mammogram for breast cancer        Relevant Orders    BI mammo bilateral screening tomosynthesis    Need for hepatitis C screening test        Relevant Orders    Hepatitis C antibody    Routine general medical examination at health care facility        Chronic pain of right heel        Relevant Orders    XR calcaneus right 2 views    Uric acid    Screening for colon cancer        Relevant Orders    Referral to Gastroenterology        Annual preventive visit  - Vaccinations reviewed and up-to-date  -Screening for colon cancer history of colon polyps need to repeat screening colon cancer refer patient to gastroenterology in [place]  -Needs to complete blood work  -History of breast cancer status post bilateral mastectomy no need for mammogram  -Screening for depression  I spent 15 minutes obtaining and discussing depression screening using PHQ-2 questions with results documented in the chart.    Medicare screening reviewed    Follow-up  - Right heel pain history of gout need to obtain x-ray of the right calcaneus obtain uric acid for further recommendation follow-up results closely  -Status post right hip replacement doing well now left hip pain worsening proceed with x-ray  left hip refer patient to Dr. Angulo orthopedic surgery  -Aortic valve disease stenosis compensated asymptomatic follow-up cardiology  - Diastolic heart failure compensated continue with current medication low-salt diet  - Hypertension controlled continue with current meds  - Chronic gout continue with allopurinol follow-up uric acid level  -Chronic kidney disease compensated follow-up BMP continue low-salt diet maximize blood pressure management  - Hypercholesteremia controlled  Follow-up 3 months

## 2023-06-19 DIAGNOSIS — M79.671 RIGHT FOOT PAIN: ICD-10-CM

## 2023-07-06 DIAGNOSIS — I25.10 ATHEROSCLEROTIC HEART DISEASE OF NATIVE CORONARY ARTERY WITHOUT ANGINA PECTORIS: ICD-10-CM

## 2023-07-06 DIAGNOSIS — I25.84 CORONARY ATHEROSCLEROSIS DUE TO CALCIFIED CORONARY LESION (CODE): ICD-10-CM

## 2023-07-06 RX ORDER — FUROSEMIDE 40 MG/1
TABLET ORAL
Qty: 90 TABLET | Refills: 0 | Status: SHIPPED | OUTPATIENT
Start: 2023-07-06 | End: 2023-10-11

## 2023-07-07 DIAGNOSIS — E11.9 TYPE 2 DIABETES MELLITUS WITHOUT COMPLICATIONS (MULTI): ICD-10-CM

## 2023-07-10 RX ORDER — METFORMIN HYDROCHLORIDE 500 MG/1
TABLET, EXTENDED RELEASE ORAL
Qty: 180 TABLET | Refills: 0 | Status: SHIPPED | OUTPATIENT
Start: 2023-07-10 | End: 2023-10-12

## 2023-07-22 DIAGNOSIS — E55.9 MILD VITAMIN D DEFICIENCY: ICD-10-CM

## 2023-07-24 RX ORDER — CHOLECALCIFEROL (VITAMIN D3) 125 MCG
125 CAPSULE ORAL DAILY
Qty: 90 CAPSULE | Refills: 1 | Status: SHIPPED | OUTPATIENT
Start: 2023-07-24 | End: 2024-01-25

## 2023-08-14 DIAGNOSIS — E78.00 PURE HYPERCHOLESTEROLEMIA, UNSPECIFIED: ICD-10-CM

## 2023-08-14 DIAGNOSIS — K21.9 GASTRO-ESOPHAGEAL REFLUX DISEASE WITHOUT ESOPHAGITIS: ICD-10-CM

## 2023-08-14 DIAGNOSIS — I50.9 HEART FAILURE, UNSPECIFIED (MULTI): ICD-10-CM

## 2023-08-14 DIAGNOSIS — Z00.00 ENCOUNTER FOR GENERAL ADULT MEDICAL EXAMINATION WITHOUT ABNORMAL FINDINGS: ICD-10-CM

## 2023-08-14 DIAGNOSIS — D64.89 OTHER SPECIFIED ANEMIAS: ICD-10-CM

## 2023-08-14 DIAGNOSIS — M10.9 GOUT, UNSPECIFIED: ICD-10-CM

## 2023-08-15 RX ORDER — ASPIRIN 81 MG/1
TABLET ORAL
Qty: 90 TABLET | Refills: 1 | Status: SHIPPED | OUTPATIENT
Start: 2023-08-15 | End: 2024-02-12

## 2023-08-15 RX ORDER — LOSARTAN POTASSIUM 25 MG/1
TABLET ORAL
Qty: 90 TABLET | Refills: 1 | Status: SHIPPED | OUTPATIENT
Start: 2023-08-15 | End: 2024-01-30 | Stop reason: SDUPTHER

## 2023-08-15 RX ORDER — FERROUS GLUCONATE 324(38)MG
TABLET ORAL
Qty: 180 TABLET | Refills: 1 | Status: SHIPPED | OUTPATIENT
Start: 2023-08-15 | End: 2024-01-30 | Stop reason: SDUPTHER

## 2023-08-15 RX ORDER — ALLOPURINOL 100 MG/1
TABLET ORAL
Qty: 180 TABLET | Refills: 1 | Status: SHIPPED | OUTPATIENT
Start: 2023-08-15 | End: 2024-01-30 | Stop reason: SDUPTHER

## 2023-08-15 RX ORDER — ROSUVASTATIN CALCIUM 40 MG/1
TABLET, COATED ORAL
Qty: 90 TABLET | Refills: 1 | Status: SHIPPED | OUTPATIENT
Start: 2023-08-15 | End: 2024-01-30 | Stop reason: SDUPTHER

## 2023-08-15 RX ORDER — PANTOPRAZOLE SODIUM 40 MG/1
40 TABLET, DELAYED RELEASE ORAL DAILY
Qty: 90 TABLET | Refills: 1 | Status: SHIPPED | OUTPATIENT
Start: 2023-08-15 | End: 2023-12-26 | Stop reason: SDUPTHER

## 2023-09-12 PROBLEM — Z90.13 ACQUIRED ABSENCE OF BILATERAL BREASTS AND NIPPLES: Status: ACTIVE | Noted: 2021-01-01

## 2023-09-12 PROBLEM — Z79.84 LONG TERM (CURRENT) USE OF ORAL HYPOGLYCEMIC DRUGS: Status: ACTIVE | Noted: 2021-01-01

## 2023-09-12 PROBLEM — Z95.3 PRESENCE OF XENOGENIC HEART VALVE: Status: ACTIVE | Noted: 2021-01-01

## 2023-09-12 PROBLEM — Z79.891 LONG TERM (CURRENT) USE OF OPIATE ANALGESIC: Status: ACTIVE | Noted: 2021-06-16

## 2023-09-12 PROBLEM — I11.9 HYPERTENSIVE HEART DISEASE WITHOUT HEART FAILURE: Status: ACTIVE | Noted: 2021-01-01

## 2023-09-12 PROBLEM — D64.9 ANEMIA: Status: ACTIVE | Noted: 2023-09-12

## 2023-09-12 PROBLEM — Z99.81 DEPENDENCE ON SUPPLEMENTAL OXYGEN: Status: ACTIVE | Noted: 2021-01-01

## 2023-09-12 PROBLEM — Z96.641 PRESENCE OF RIGHT ARTIFICIAL HIP JOINT: Status: ACTIVE | Noted: 2021-06-16

## 2023-09-12 PROBLEM — K27.9 PEPTIC ULCER WITHOUT HEMORRHAGE OR PERFORATION: Status: ACTIVE | Noted: 2021-01-01

## 2023-09-12 PROBLEM — Z47.1 AFTERCARE FOLLOWING JOINT REPLACEMENT SURGERY: Status: ACTIVE | Noted: 2021-06-16

## 2023-09-12 PROBLEM — C50.919 BREAST CANCER (MULTI): Status: ACTIVE | Noted: 2021-01-01

## 2023-09-12 RX ORDER — CIPROFLOXACIN 500 MG/1
TABLET ORAL
COMMUNITY
End: 2023-09-14 | Stop reason: ALTCHOICE

## 2023-09-14 ENCOUNTER — OFFICE VISIT (OUTPATIENT)
Dept: PRIMARY CARE | Facility: CLINIC | Age: 73
End: 2023-09-14
Payer: MEDICARE

## 2023-09-14 VITALS
DIASTOLIC BLOOD PRESSURE: 72 MMHG | TEMPERATURE: 97 F | SYSTOLIC BLOOD PRESSURE: 114 MMHG | WEIGHT: 210 LBS | HEART RATE: 76 BPM | BODY MASS INDEX: 38.41 KG/M2 | OXYGEN SATURATION: 94 %

## 2023-09-14 DIAGNOSIS — E11.8 CONTROLLED DIABETES MELLITUS TYPE 2 WITH COMPLICATIONS, UNSPECIFIED WHETHER LONG TERM INSULIN USE (MULTI): ICD-10-CM

## 2023-09-14 DIAGNOSIS — Z23 FLU VACCINE NEED: ICD-10-CM

## 2023-09-14 DIAGNOSIS — I70.0 ATHEROSCLEROSIS OF AORTA (CMS-HCC): Primary | ICD-10-CM

## 2023-09-14 DIAGNOSIS — J96.02 ACUTE RESPIRATORY FAILURE WITH HYPERCAPNIA (MULTI): ICD-10-CM

## 2023-09-14 LAB
POC FINGERSTICK BLOOD GLUCOSE: 129 MG/DL (ref 70–100)
POC HEMOGLOBIN A1C: 6.6 % (ref 4.2–6.5)

## 2023-09-14 PROCEDURE — G0008 ADMIN INFLUENZA VIRUS VAC: HCPCS | Performed by: INTERNAL MEDICINE

## 2023-09-14 PROCEDURE — 83036 HEMOGLOBIN GLYCOSYLATED A1C: CPT | Performed by: INTERNAL MEDICINE

## 2023-09-14 PROCEDURE — 99214 OFFICE O/P EST MOD 30 MIN: CPT | Performed by: INTERNAL MEDICINE

## 2023-09-14 PROCEDURE — 3074F SYST BP LT 130 MM HG: CPT | Performed by: INTERNAL MEDICINE

## 2023-09-14 PROCEDURE — 3066F NEPHROPATHY DOC TX: CPT | Performed by: INTERNAL MEDICINE

## 2023-09-14 PROCEDURE — 3008F BODY MASS INDEX DOCD: CPT | Performed by: INTERNAL MEDICINE

## 2023-09-14 PROCEDURE — 1159F MED LIST DOCD IN RCRD: CPT | Performed by: INTERNAL MEDICINE

## 2023-09-14 PROCEDURE — 4010F ACE/ARB THERAPY RXD/TAKEN: CPT | Performed by: INTERNAL MEDICINE

## 2023-09-14 PROCEDURE — 1160F RVW MEDS BY RX/DR IN RCRD: CPT | Performed by: INTERNAL MEDICINE

## 2023-09-14 PROCEDURE — 1036F TOBACCO NON-USER: CPT | Performed by: INTERNAL MEDICINE

## 2023-09-14 PROCEDURE — 82962 GLUCOSE BLOOD TEST: CPT | Performed by: INTERNAL MEDICINE

## 2023-09-14 PROCEDURE — 90662 IIV NO PRSV INCREASED AG IM: CPT | Performed by: INTERNAL MEDICINE

## 2023-09-14 PROCEDURE — 3078F DIAST BP <80 MM HG: CPT | Performed by: INTERNAL MEDICINE

## 2023-09-14 ASSESSMENT — ENCOUNTER SYMPTOMS
WHEEZING: 0
BRUISES/BLEEDS EASILY: 0
FATIGUE: 0
SORE THROAT: 0
ABDOMINAL PAIN: 0
FEVER: 0
DIARRHEA: 0
HEADACHES: 0
DIZZINESS: 0
PALPITATIONS: 0
SINUS PAIN: 0
DIFFICULTY URINATING: 0
COUGH: 0
UNEXPECTED WEIGHT CHANGE: 0
ARTHRALGIAS: 0
HYPERTENSION: 1
BLOOD IN STOOL: 0

## 2023-09-14 NOTE — PROGRESS NOTES
Subjective   Patient ID: Saritha Cormier is a 72 y.o. female who presents for Chronic Kidney Disease, Diabetes, Hypertension, Hypothyroidism, Hyperlipidemia, Shoulder Pain (Back x 2-3 weeks), and Flu Vaccine (given).    - Right heel pain, improved seen by podiatry steroid injection helped with the pain  -Left hip arthritis continue monitoring status post right hip arthroplasty doing well  -Aortic valve disease stenosis compensated asymptomatic follow-up cardiology no palpitation or chest pain  - Diastolic heart failure compensated continue with current medication low-salt diet continue low-salt diet  - Hypertension controlled continue with current meds, controlled  - Chronic gout no flareup continue low uric acid diet  - Diabetes controlled hemoglobin A1c 6.6 continue with diet controlled low-carb diet  - History of respiratory failure no recurrence  -Chronic kidney disease compensated avoid salt food  - Hypercholesteremia controlled continue with lipid profile  Follow-up 3 months         Diabetes  Pertinent negatives for hypoglycemia include no dizziness or headaches. Pertinent negatives for diabetes include no chest pain and no fatigue.   Hypertension  Pertinent negatives include no chest pain, headaches or palpitations.   Hyperlipidemia  Pertinent negatives include no chest pain.   Shoulder Pain   Pertinent negatives include no fever.          Review of Systems   Constitutional:  Negative for fatigue, fever and unexpected weight change.   HENT:  Negative for congestion, ear discharge, ear pain, mouth sores, sinus pain and sore throat.    Eyes:  Negative for visual disturbance.   Respiratory:  Negative for cough and wheezing.    Cardiovascular:  Negative for chest pain, palpitations and leg swelling.   Gastrointestinal:  Negative for abdominal pain, blood in stool and diarrhea.   Genitourinary:  Negative for difficulty urinating.   Musculoskeletal:  Negative for arthralgias.   Skin:  Negative for rash.    Neurological:  Negative for dizziness and headaches.   Hematological:  Does not bruise/bleed easily.   Psychiatric/Behavioral:  Negative for behavioral problems.    All other systems reviewed and are negative.      Objective   Lab Results   Component Value Date    HGBA1C 6.6 (A) 09/14/2023      /72   Pulse 76   Temp 36.1 °C (97 °F)   Wt 95.3 kg (210 lb)   SpO2 94%   BMI 38.41 kg/m²     Physical Exam  Vitals and nursing note reviewed.   Constitutional:       Appearance: Normal appearance.   HENT:      Head: Normocephalic.      Nose: Nose normal.   Eyes:      Conjunctiva/sclera: Conjunctivae normal.      Pupils: Pupils are equal, round, and reactive to light.   Cardiovascular:      Rate and Rhythm: Regular rhythm.   Pulmonary:      Effort: Pulmonary effort is normal.      Breath sounds: Normal breath sounds.   Abdominal:      General: Abdomen is flat.      Palpations: Abdomen is soft.   Musculoskeletal:      Cervical back: Neck supple.   Skin:     General: Skin is warm.   Neurological:      General: No focal deficit present.      Mental Status: She is oriented to person, place, and time.   Psychiatric:         Mood and Affect: Mood normal.         Assessment/Plan   Saritha was seen today for chronic kidney disease, diabetes, hypertension, hypothyroidism, hyperlipidemia, shoulder pain and flu vaccine.  Diagnoses and all orders for this visit:  Atherosclerosis of aorta (CMS/[facility]) (Primary)  Controlled diabetes mellitus type 2 with complications, unspecified whether long term insulin use (CMS/[facility])  -     POCT fingerstick glucose manually resulted  -     POCT glycosylated hemoglobin (Hb A1C) manually resulted  Flu vaccine need  -     Flu vaccine, quadrivalent, high-dose, preservative free, age 65y+ (FLUZONE)  Acute respiratory failure with hypercapnia (CMS/[facility])  Other orders  -     Follow Up In Primary Care   - Right heel pain, improved seen by podiatry steroid injection helped with the pain  -Left hip  arthritis continue monitoring status post right hip arthroplasty doing well  -Aortic valve disease stenosis compensated asymptomatic follow-up cardiology no palpitation or chest pain  - Diastolic heart failure compensated continue with current medication low-salt diet continue low-salt diet  - Hypertension controlled continue with current meds, controlled  - Chronic gout no flareup continue low uric acid diet  - Diabetes controlled hemoglobin A1c 6.6 continue with diet controlled low-carb diet  - History of respiratory failure no recurrence  -Chronic kidney disease compensated avoid salt food  - Hypercholesteremia controlled continue with lipid profile  Follow-up 3 months

## 2023-10-11 DIAGNOSIS — I25.84 CORONARY ATHEROSCLEROSIS DUE TO CALCIFIED CORONARY LESION (CODE): ICD-10-CM

## 2023-10-11 DIAGNOSIS — I25.10 ATHEROSCLEROTIC HEART DISEASE OF NATIVE CORONARY ARTERY WITHOUT ANGINA PECTORIS: ICD-10-CM

## 2023-10-11 RX ORDER — FUROSEMIDE 40 MG/1
TABLET ORAL
Qty: 90 TABLET | Refills: 0 | Status: SHIPPED | OUTPATIENT
Start: 2023-10-11 | End: 2024-01-09

## 2023-10-12 DIAGNOSIS — E11.9 TYPE 2 DIABETES MELLITUS WITHOUT COMPLICATIONS (MULTI): ICD-10-CM

## 2023-10-12 RX ORDER — METFORMIN HYDROCHLORIDE 500 MG/1
TABLET, EXTENDED RELEASE ORAL
Qty: 180 TABLET | Refills: 0 | Status: SHIPPED | OUTPATIENT
Start: 2023-10-12 | End: 2024-01-10

## 2023-10-16 ENCOUNTER — APPOINTMENT (OUTPATIENT)
Dept: CARDIOLOGY | Facility: CLINIC | Age: 73
End: 2023-10-16
Payer: MEDICARE

## 2023-10-16 DIAGNOSIS — I50.9 HEART FAILURE, UNSPECIFIED (MULTI): ICD-10-CM

## 2023-10-16 RX ORDER — CLOPIDOGREL BISULFATE 75 MG/1
75 TABLET ORAL DAILY
Qty: 90 TABLET | Refills: 0 | Status: SHIPPED | OUTPATIENT
Start: 2023-10-16 | End: 2024-01-15

## 2023-10-30 ENCOUNTER — TELEMEDICINE (OUTPATIENT)
Dept: CARDIOLOGY | Facility: CLINIC | Age: 73
End: 2023-10-30
Payer: MEDICARE

## 2023-10-30 DIAGNOSIS — I50.32 CHRONIC DIASTOLIC HEART FAILURE (MULTI): ICD-10-CM

## 2023-10-30 DIAGNOSIS — E11.11 TYPE 2 DIABETES MELLITUS WITH KETOACIDOTIC COMA, WITHOUT LONG-TERM CURRENT USE OF INSULIN (MULTI): ICD-10-CM

## 2023-10-30 DIAGNOSIS — I35.0 NONRHEUMATIC AORTIC VALVE STENOSIS: Primary | ICD-10-CM

## 2023-10-30 DIAGNOSIS — I10 BENIGN ESSENTIAL HYPERTENSION: ICD-10-CM

## 2023-10-30 DIAGNOSIS — Z90.13 H/O BILATERAL MASTECTOMY: Chronic | ICD-10-CM

## 2023-10-30 DIAGNOSIS — E78.5 DYSLIPIDEMIA: ICD-10-CM

## 2023-10-30 PROCEDURE — 99215 OFFICE O/P EST HI 40 MIN: CPT | Performed by: INTERNAL MEDICINE

## 2023-10-30 NOTE — PROGRESS NOTES
DIAGNOSES: AS -Moderate-Severe. Nonobstructive coronary atherosclerosis. HFpEF with volume overload -compensated. NSAID induced GI ulcer with bleed and anemia. HTN. DM. DLP. Obesity. S/p B/L mastectomy/Oophorectomy for CA breast BRCA+ DJD S/p Hip replacement surgery    Dear Alva:    Ms Cormier 73F had a virtual follow up. She continues to feel great after her hip replacement surgery and has been quite active, though she continues to have baseline exertional SOB. She has a tough home situation with her 's neurological status and behavior, taking care of her grandchildren, her daughter having passed away unfortunately due to breast cancer    As you know, she is BRCA gene +ve with CA breast, having undergone B/L mastectomy one after the other, as well as oophorectomy in the remote past. 12 system review was negative except as stated above.         Active Problems   · Abnormal blood sugar (790.29) (R73.09)   · Abnormal screening cardiac CT (793.2) (R93.1)   · Acquired trigger finger (727.03) (M65.30)   · Acute lumbar radiculopathy (724.4) (M54.16)   · Anemia due to other cause (285.8) (D64.89)   · Angina pectoris (413.9) (I20.9)   · Aortic stenosis (424.1) (I35.0)   · Aortic stenosis, severe (424.1) (I35.0)   · Arthritis (716.90) (M19.90)   · Arthritis of hip (716.95) (M16.10)   · Arthritis of knee, right (716.96) (M17.11)   · Arthritis of lumbar spine (721.3) (M47.816)   · Arthritis of shoulder (716.91) (M19.019)   · Arthritis pain, hip (716.95) (M16.10)   · Benign essential hypertension (401.1) (I10)   · Body mass index (BMI) of 37.0 to 37.9 in adult (V85.37) (Z68.37)   · Body mass index (BMI) of 40.0 to 44.9 in adult (V85.41) (Z68.41)   · Breast CA (174.9) (C50.919)   · Bursitis of left hip (726.5) (M70.72)   · Bursitis of right hip (726.5) (M70.71)   · Chest pain (786.50) (R07.9)   · CHF (congestive heart failure) (428.0) (I50.9)   · Chronic diastolic heart failure (428.32) (I50.32)   · Class 2 obesity  with body mass index (BMI) of 35.0 to 35.9 in adult (278.00,V85.35)  (E66.9,Z68.35)   · Class 2 obesity with body mass index (BMI) of 36.0 to 36.9 in adult (278.00,V85.36)  (E66.9,Z68.36)   · Class 2 obesity with body mass index (BMI) of 37.0 to 37.9 in adult (278.00,V85.37)  (E66.9,Z68.37)   · Colon polyps (211.3) (K63.5)   · Coronary artery calcification (414.00,414.4) (I25.10,I25.84)   · Coronary atherosclerosis (414.00) (I25.10)   · Cough (786.2) (R05.9)   · Dermatitis of perianal region (692.9) (L30.9)   · Diabetes mellitus type II, controlled (250.00) (E11.9)   · DJD (degenerative joint disease) (715.90) (M19.90)   · Dyslipidemia (272.4) (E78.5)   · Elevated LDL cholesterol level (272.0) (E78.00)   · Gastric ulcer (531.90) (K25.9)   · GERD (gastroesophageal reflux disease) (530.81) (K21.9)   · Gout (274.9) (M10.9)   · Hip pain, chronic (719.45,338.29) (M25.559,G89.29)   · History of bilateral mastectomy (V45.71) (Z90.13)   · History of total mastectomy of left breast (V15.29) (Z90.12)   · History of total mastectomy of right breast (V15.29) (Z90.11)   · Hypotension (458.9) (I95.9)   · Leg edema (782.3) (R60.0)   · Lipoma of forearm (214.1) (D17.20)   · Lumbar radiculitis (724.4) (M54.16)   · Medicare annual wellness visit, subsequent (V70.0) (Z00.00)   · Mild vitamin D deficiency (268.9) (E55.9)   · Moderate aortic stenosis (424.1) (I35.0)   · Musculoskeletal pain (729.1) (M79.18)   · Neck pain (723.1) (M54.2)   · Nummular eczematous dermatitis (692.9) (L30.0)   · Obesity (278.00) (E66.9)   · Osteoarthritis of right hip (715.95) (M16.11)   · Pre-operative cardiovascular examination (V72.81) (Z01.810)   · Pressure injury of contiguous region involving buttock and hip, stage 2, unspecified  laterality (707.09,707.22) (L89.42)   · Right bicipital tenosynovitis (726.12) (M75.21)   · Right hip pain (719.45) (M25.551)   · Right shoulder pain (719.41) (M25.511)   · Sinusitis (473.9) (J32.9)   · SOB (shortness of  breath) on exertion (786.05) (R06.02)   · Spinal stenosis, lumbar (724.02) (M48.061)   · Stage 3a chronic kidney disease (585.3) (N18.31)   · Status post bilateral mastectomy (V45.71) (Z90.13)   · Status post total replacement of right hip (V43.64) (Z96.641)   · Subdeltoid bursitis of right shoulder joint (726.19) (M75.51)   · Subsequent non-ST elevation (NSTEMI) myocardial infarction (410.70) (I22.2)   · Suspected COVID-19 virus infection (V01.79) (Z20.822)   · Type 2 diabetes mellitus (250.00) (E11.9)   · Type 2 diabetes mellitus with ketoacidotic coma, without long-term current use of insulin  (250.30) (E11.11)   · UTI (urinary tract infection) (599.0) (N39.0)   · Viral URI with cough (465.9) (J06.9)   · Welcome to Medicare preventive visit (V70.0) (Z00.00)   · Witnessed episode of apnea (786.03) (R06.81)    Benign essential hypertension (401.1) (I10)      Arthritis of hip (716.95) (M16.10)      Aortic stenosis (424.1) (I35.0)      Anemia due to other cause (285.8) (D64.89)      Coronary atherosclerosis (414.00) (I25.10)      Benign essential hypertension (401.1) (I10)      Pure hypercholesterolemia (272.0) (E78.00)      Angina pectoris (413.9) (I20.9)      Aortic stenosis, severe (424.1) (I35.0)      Anemia due to other cause (285.8) (D64.89)      Dermatitis of perianal region (692.9) (L30.9)      Gastric ulcer (531.90) (K25.9)        Benign essential hypertension (401.1) (I10)      Arthritis of hip (716.95) (M16.10)      Aortic stenosis (424.1) (I35.0)      Anemia due to other cause (285.8) (D64.89)      Coronary atherosclerosis (414.00) (I25.10)      Benign essential hypertension (401.1) (I10)      Pure hypercholesterolemia (272.0) (E78.00)      Angina pectoris (413.9) (I20.9)      Aortic stenosis, severe (424.1) (I35.0)      Anemia due to other cause (285.8) (D64.89)      Dermatitis of perianal region (692.9) (L30.9)      Gastric ulcer (531.90) (K25.9)     Past Medical History   · History of Acquired trigger  finger (727.03) (M65.30)   · Resolved Date: 22 Jun 2018   · Arthritis of knee, right (716.96) (M17.11)   · History of Bursitis of right hip, unspecified bursa (726.5) (M70.71)   · History of bilateral mastectomy (V45.71) (Z90.13)   · Resolved Date: 31 Jan 2022   · History of chronic sinusitis (V12.69) (Z87.09)   · Resolved Date: 29 Mar 2016   · History of Skin growth (239.2) (D49.2)   · Resolved Date: 22 Dec 2015    Surgical History   · History of Breast Surgery Mastectomy   · History of Breast Surgery Reconstruction   · History of Hysterectomy    Social History   · Denied: History of Alcohol use (V49.89) (Z78.9)   · Denied: History of Caffeine use   · Employed in professional specialty position   · teacher    Allergies   · No Known Drug Allergies  Recorded By: Mitali Silva; 9/21/2015 10:33:08 AM    Current Meds    Medication Name Instruction   Allopurinol 100 MG Oral Tablet take 2 tablets by mouth every day   Aspirin Low Dose 81 MG Oral Tablet Delayed Release TAKE 1 TABLET BY MOUTH EVERY DAY   Azithromycin 250 MG Oral Tablet TAKE 2 TABLETS ON DAY 1 THEN TAKE 1 TABLET A DAY FOR 4 DAYS.   Bisoprolol Fumarate 5 MG Oral Tablet TAKE 0.5 TABLET Daily   Centrum TABS    Clopidogrel Bisulfate 75 MG Oral Tablet TAKE 1 TABLET BY MOUTH EVERY DAY   Clotrimazole-Betamethasone 1-0.05 % External Cream APPLY SPARINGLY TO AFFECTED AREA(S) 3 TIMES A DAY   Ferrous Gluconate 324 (38 Fe) MG Oral Tablet take 1 tablet by mouth twice a day   Furosemide 40 MG Oral Tablet TAKE 1 TABLET BY MOUTH EVERY DAY   Losartan Potassium 25 MG Oral Tablet TAKE 1 TABLET BY MOUTH EVERY DAY AS DIRECTED   metFORMIN HCl  MG Oral Tablet Extended Release 24 Hour TAKE 2 TABLETS BY MOUTH EVERY DAY**REPLACES OSM   methylPREDNISolone 4 MG Oral Tablet Therapy Pack Take as directed Qty 1 x therapy pack   Milk Thistle 175 MG Oral Capsule    Mupirocin 2 % External Ointment Apply to nares 2x daily for 3 days prior to joint replacement surgery QTY: 22 GM  DO NOT  APPLY BEFORE COVID TESTING   Nitroglycerin 0.4 MG Sublingual Tablet Sublingual DISSOLVE 1 TABLET UNDER THE TONGUE AS NEEDED FOR CHEST PAIN.   Pantoprazole Sodium 40 MG Oral Tablet Delayed Release THEN 1 TAB ORALLY ONCE DAILY   Rosuvastatin Calcium 40 MG Oral Tablet TAKE 1 TABLET BY MOUTH EVERY DAY   Shingrix 50 MCG Intramuscular Suspension Reconstituted INJECT 50 MCG Once   Triamcinolone Acetonide 40 MG/ML Injection Suspension IMPLANT 1  ML Intra-articular   Triamcinolone Acetonide 40 MG/ML Injection Suspension INJECT 0.5  ML Intra-articular   Triamcinolone Acetonide 40 MG/ML Injection Suspension INJECT 0.5  ML Intra-articular   Vitamin D3 125 MCG (5000 UT) Oral Capsule TAKE 1 CAPSULE Daily     Results/Data    All available data were personally reviewed by me    DIAGNOSES: AS -Moderate-Severe. Nonobstructive coronary atherosclerosis. HFpEF with volume overload -compensated. NSAID induced GI ulcer with bleed and anemia. HTN. DM. DLP. Obesity. S/p B/L mastectomy/Oophorectomy for CA breast BRCA+ DJD S/p Hip replacement surgery      I am pleased to see that she has been remaining well and active.  We will continue to follow her conservatively with another echocardiogram in 6 months time.  I advised her to continue with activities as tolerated, with positive lifestyle changes and dietary modifications as well as medications as prescribed.      Thank you so much for the opportunity to participate in the care of this very pleasant lady. Please do not hesitate to contact me if I could be of any assistance in her future care.    Sincerely        Frank Reilly M.D.

## 2023-12-23 DIAGNOSIS — K21.9 GASTRO-ESOPHAGEAL REFLUX DISEASE WITHOUT ESOPHAGITIS: ICD-10-CM

## 2023-12-26 RX ORDER — PANTOPRAZOLE SODIUM 40 MG/1
40 TABLET, DELAYED RELEASE ORAL DAILY
Qty: 90 TABLET | Refills: 0 | Status: SHIPPED | OUTPATIENT
Start: 2023-12-26 | End: 2024-01-30 | Stop reason: SDUPTHER

## 2024-01-09 DIAGNOSIS — I25.10 ATHEROSCLEROTIC HEART DISEASE OF NATIVE CORONARY ARTERY WITHOUT ANGINA PECTORIS: ICD-10-CM

## 2024-01-09 DIAGNOSIS — I25.84 CORONARY ATHEROSCLEROSIS DUE TO CALCIFIED CORONARY LESION (CODE): ICD-10-CM

## 2024-01-09 RX ORDER — FUROSEMIDE 40 MG/1
TABLET ORAL
Qty: 90 TABLET | Refills: 0 | Status: SHIPPED | OUTPATIENT
Start: 2024-01-09 | End: 2024-04-16

## 2024-01-10 DIAGNOSIS — E11.9 TYPE 2 DIABETES MELLITUS WITHOUT COMPLICATIONS (MULTI): ICD-10-CM

## 2024-01-10 RX ORDER — METFORMIN HYDROCHLORIDE 500 MG/1
TABLET, EXTENDED RELEASE ORAL
Qty: 180 TABLET | Refills: 0 | Status: SHIPPED | OUTPATIENT
Start: 2024-01-10 | End: 2024-04-15

## 2024-01-15 ENCOUNTER — APPOINTMENT (OUTPATIENT)
Dept: PRIMARY CARE | Facility: CLINIC | Age: 74
End: 2024-01-15
Payer: MEDICARE

## 2024-01-15 DIAGNOSIS — I50.9 HEART FAILURE, UNSPECIFIED (MULTI): ICD-10-CM

## 2024-01-15 RX ORDER — CLOPIDOGREL BISULFATE 75 MG/1
75 TABLET ORAL DAILY
Qty: 90 TABLET | Refills: 0 | Status: SHIPPED | OUTPATIENT
Start: 2024-01-15 | End: 2024-04-16

## 2024-01-25 DIAGNOSIS — E55.9 MILD VITAMIN D DEFICIENCY: ICD-10-CM

## 2024-01-25 RX ORDER — CHOLECALCIFEROL (VITAMIN D3) 125 MCG
125 CAPSULE ORAL DAILY
Qty: 90 CAPSULE | Refills: 1 | Status: SHIPPED | OUTPATIENT
Start: 2024-01-25

## 2024-01-30 ENCOUNTER — OFFICE VISIT (OUTPATIENT)
Dept: PRIMARY CARE | Facility: CLINIC | Age: 74
End: 2024-01-30
Payer: MEDICARE

## 2024-01-30 VITALS
OXYGEN SATURATION: 98 % | WEIGHT: 205 LBS | TEMPERATURE: 96.2 F | BODY MASS INDEX: 37.49 KG/M2 | HEART RATE: 84 BPM | SYSTOLIC BLOOD PRESSURE: 110 MMHG | DIASTOLIC BLOOD PRESSURE: 60 MMHG

## 2024-01-30 DIAGNOSIS — J96.02 ACUTE RESPIRATORY FAILURE WITH HYPERCAPNIA (MULTI): ICD-10-CM

## 2024-01-30 DIAGNOSIS — I50.33 ACUTE ON CHRONIC DIASTOLIC (CONGESTIVE) HEART FAILURE (MULTI): ICD-10-CM

## 2024-01-30 DIAGNOSIS — C50.911 BILATERAL MALIGNANT NEOPLASM OF BREAST IN FEMALE, UNSPECIFIED ESTROGEN RECEPTOR STATUS, UNSPECIFIED SITE OF BREAST (MULTI): ICD-10-CM

## 2024-01-30 DIAGNOSIS — Z78.0 MENOPAUSE: ICD-10-CM

## 2024-01-30 DIAGNOSIS — E55.9 VITAMIN D DEFICIENCY, UNSPECIFIED: ICD-10-CM

## 2024-01-30 DIAGNOSIS — N18.31 STAGE 3A CHRONIC KIDNEY DISEASE (MULTI): ICD-10-CM

## 2024-01-30 DIAGNOSIS — M17.12 ARTHRITIS OF KNEE, LEFT: Primary | ICD-10-CM

## 2024-01-30 DIAGNOSIS — E78.00 PURE HYPERCHOLESTEROLEMIA, UNSPECIFIED: ICD-10-CM

## 2024-01-30 DIAGNOSIS — I50.9 HEART FAILURE, UNSPECIFIED (MULTI): ICD-10-CM

## 2024-01-30 DIAGNOSIS — I10 HYPERTENSION, UNSPECIFIED TYPE: ICD-10-CM

## 2024-01-30 DIAGNOSIS — R53.83 OTHER FATIGUE: ICD-10-CM

## 2024-01-30 DIAGNOSIS — C50.912 BILATERAL MALIGNANT NEOPLASM OF BREAST IN FEMALE, UNSPECIFIED ESTROGEN RECEPTOR STATUS, UNSPECIFIED SITE OF BREAST (MULTI): ICD-10-CM

## 2024-01-30 DIAGNOSIS — E53.8 VITAMIN B12 DEFICIENCY: ICD-10-CM

## 2024-01-30 DIAGNOSIS — Z79.899 HIGH RISK MEDICATION USE: ICD-10-CM

## 2024-01-30 DIAGNOSIS — E78.00 HYPERCHOLESTEREMIA: ICD-10-CM

## 2024-01-30 DIAGNOSIS — E11.8 CONTROLLED DIABETES MELLITUS TYPE 2 WITH COMPLICATIONS, UNSPECIFIED WHETHER LONG TERM INSULIN USE (MULTI): ICD-10-CM

## 2024-01-30 DIAGNOSIS — N18.31 TYPE 2 DIABETES MELLITUS WITH STAGE 3A CHRONIC KIDNEY DISEASE, WITHOUT LONG-TERM CURRENT USE OF INSULIN (MULTI): ICD-10-CM

## 2024-01-30 DIAGNOSIS — I70.0 ATHEROSCLEROSIS OF AORTA (CMS-HCC): ICD-10-CM

## 2024-01-30 DIAGNOSIS — K21.9 GASTRO-ESOPHAGEAL REFLUX DISEASE WITHOUT ESOPHAGITIS: ICD-10-CM

## 2024-01-30 DIAGNOSIS — M10.9 GOUT, UNSPECIFIED: ICD-10-CM

## 2024-01-30 DIAGNOSIS — D64.89 OTHER SPECIFIED ANEMIAS: ICD-10-CM

## 2024-01-30 DIAGNOSIS — E11.22 TYPE 2 DIABETES MELLITUS WITH STAGE 3A CHRONIC KIDNEY DISEASE, WITHOUT LONG-TERM CURRENT USE OF INSULIN (MULTI): ICD-10-CM

## 2024-01-30 DIAGNOSIS — I20.9 ANGINA PECTORIS (CMS-HCC): ICD-10-CM

## 2024-01-30 LAB
POC FINGERSTICK BLOOD GLUCOSE: 125 MG/DL (ref 70–100)
POC HEMOGLOBIN A1C: 7.2 % (ref 4.2–6.5)

## 2024-01-30 PROCEDURE — 1159F MED LIST DOCD IN RCRD: CPT | Performed by: INTERNAL MEDICINE

## 2024-01-30 PROCEDURE — 1036F TOBACCO NON-USER: CPT | Performed by: INTERNAL MEDICINE

## 2024-01-30 PROCEDURE — 3078F DIAST BP <80 MM HG: CPT | Performed by: INTERNAL MEDICINE

## 2024-01-30 PROCEDURE — 20610 DRAIN/INJ JOINT/BURSA W/O US: CPT | Performed by: INTERNAL MEDICINE

## 2024-01-30 PROCEDURE — 82962 GLUCOSE BLOOD TEST: CPT | Performed by: INTERNAL MEDICINE

## 2024-01-30 PROCEDURE — 3008F BODY MASS INDEX DOCD: CPT | Performed by: INTERNAL MEDICINE

## 2024-01-30 PROCEDURE — 99214 OFFICE O/P EST MOD 30 MIN: CPT | Performed by: INTERNAL MEDICINE

## 2024-01-30 PROCEDURE — 83036 HEMOGLOBIN GLYCOSYLATED A1C: CPT | Performed by: INTERNAL MEDICINE

## 2024-01-30 PROCEDURE — 3074F SYST BP LT 130 MM HG: CPT | Performed by: INTERNAL MEDICINE

## 2024-01-30 PROCEDURE — 1160F RVW MEDS BY RX/DR IN RCRD: CPT | Performed by: INTERNAL MEDICINE

## 2024-01-30 PROCEDURE — 3066F NEPHROPATHY DOC TX: CPT | Performed by: INTERNAL MEDICINE

## 2024-01-30 PROCEDURE — 4010F ACE/ARB THERAPY RXD/TAKEN: CPT | Performed by: INTERNAL MEDICINE

## 2024-01-30 RX ORDER — ALLOPURINOL 100 MG/1
200 TABLET ORAL DAILY
Qty: 180 TABLET | Refills: 1 | Status: SHIPPED | OUTPATIENT
Start: 2024-01-30

## 2024-01-30 RX ORDER — TRIAMCINOLONE ACETONIDE 40 MG/ML
40 INJECTION, SUSPENSION INTRA-ARTICULAR; INTRAMUSCULAR ONCE
Status: COMPLETED | OUTPATIENT
Start: 2024-01-30 | End: 2024-01-30

## 2024-01-30 RX ORDER — PANTOPRAZOLE SODIUM 40 MG/1
40 TABLET, DELAYED RELEASE ORAL DAILY
Qty: 90 TABLET | Refills: 1 | Status: SHIPPED | OUTPATIENT
Start: 2024-01-30

## 2024-01-30 RX ORDER — FERROUS GLUCONATE 324(38)MG
1 TABLET ORAL 2 TIMES DAILY
Qty: 180 TABLET | Refills: 1 | Status: SHIPPED | OUTPATIENT
Start: 2024-01-30

## 2024-01-30 RX ORDER — ROSUVASTATIN CALCIUM 40 MG/1
40 TABLET, COATED ORAL DAILY
Qty: 90 TABLET | Refills: 1 | Status: SHIPPED | OUTPATIENT
Start: 2024-01-30

## 2024-01-30 RX ORDER — LOSARTAN POTASSIUM 25 MG/1
25 TABLET ORAL DAILY
Qty: 90 TABLET | Refills: 1 | Status: SHIPPED | OUTPATIENT
Start: 2024-01-30

## 2024-01-30 RX ADMIN — TRIAMCINOLONE ACETONIDE 40 MG: 40 INJECTION, SUSPENSION INTRA-ARTICULAR; INTRAMUSCULAR at 10:37

## 2024-01-30 ASSESSMENT — ENCOUNTER SYMPTOMS
WHEEZING: 0
SINUS PAIN: 0
FATIGUE: 0
BRUISES/BLEEDS EASILY: 0
ABDOMINAL PAIN: 0
BLOOD IN STOOL: 0
HYPERTENSION: 1
UNEXPECTED WEIGHT CHANGE: 0
FEVER: 0
DIZZINESS: 0
DIFFICULTY URINATING: 0
PALPITATIONS: 0
SORE THROAT: 0
COUGH: 0
HEADACHES: 0
ARTHRALGIAS: 1
DIARRHEA: 0

## 2024-01-30 NOTE — PROGRESS NOTES
Subjective   Patient ID: Saritha Cormier is a 73 y.o. female who presents for GERD, Diabetes, Hypertension, Hyperlipidemia, Med Refill, Anemia, Gout, and Knee Pain (Lt knee pain, requests cortisone inj).     - Diabetes controlled hemoglobin A1c 7.2 counseled about low-carb diet exercise and weight loss  Left knee pain arthritis patient given steroid injection today follow-up results closely  - Need to proceed with blood work before next appointment and bone densitometry  -Left hip arthritis continue monitoring status post right hip arthroplasty doing well  -Aortic valve disease stenosis compensated asymptomatic follow-up cardiology no palpitation or chest pain  - Diastolic heart failure compensated continue with current medication low-salt diet continue low-salt diet  - Hypertension controlled continue with current meds, controlled  - Chronic gout no flareup continue low uric acid diet  - History of respiratory failure no recurrence  -Chronic kidney disease compensated avoid salt food repeat renal function albumin urine spot  - Hypercholesteremia controlled continue with lipid profile  Follow-up 6 months blood work and Medicare physical      GERD  She reports no abdominal pain, no chest pain, no coughing, no sore throat or no wheezing. Pertinent negatives include no fatigue.   Diabetes  Pertinent negatives for hypoglycemia include no dizziness or headaches. Pertinent negatives for diabetes include no chest pain and no fatigue.   Hypertension  Pertinent negatives include no chest pain, headaches or palpitations.   Hyperlipidemia  Pertinent negatives include no chest pain.   Med Refill  Associated symptoms include arthralgias. Pertinent negatives include no abdominal pain, chest pain, congestion, coughing, fatigue, fever, headaches, rash or sore throat.   Anemia  There has been no abdominal pain, bruising/bleeding easily, fever or palpitations.   Knee Pain            Review of Systems   Constitutional:  Negative  for fatigue, fever and unexpected weight change.   HENT:  Negative for congestion, ear discharge, ear pain, mouth sores, sinus pain and sore throat.    Eyes:  Negative for visual disturbance.   Respiratory:  Negative for cough and wheezing.    Cardiovascular:  Negative for chest pain, palpitations and leg swelling.   Gastrointestinal:  Negative for abdominal pain, blood in stool and diarrhea.   Genitourinary:  Negative for difficulty urinating.   Musculoskeletal:  Positive for arthralgias.   Skin:  Negative for rash.   Neurological:  Negative for dizziness and headaches.   Hematological:  Does not bruise/bleed easily.   Psychiatric/Behavioral:  Negative for behavioral problems.    All other systems reviewed and are negative.      Objective   Lab Results   Component Value Date    HGBA1C 7.2 (A) 01/30/2024      /60   Pulse 84   Temp 35.7 °C (96.2 °F)   Wt 93 kg (205 lb)   SpO2 98%   BMI 37.49 kg/m²   Lab Results   Component Value Date    WBC 8.8 08/08/2022    HGB 11.2 (L) 08/08/2022    HCT 38.7 08/08/2022     08/08/2022    CHOL 165 08/08/2022    TRIG 176 (H) 08/08/2022    HDL 40.0 08/08/2022    ALT 11 08/08/2022    AST 17 08/08/2022     08/08/2022    K 4.2 08/08/2022     08/08/2022    CREATININE 1.29 (H) 08/08/2022    BUN 23 08/08/2022    CO2 32 08/08/2022    TSH 3.24 08/08/2022    INR 1.2 (H) 03/16/2021    HGBA1C 7.2 (A) 01/30/2024     par   Physical Exam  Vitals and nursing note reviewed.   Constitutional:       Appearance: Normal appearance.   HENT:      Head: Normocephalic.      Nose: Nose normal.   Eyes:      Conjunctiva/sclera: Conjunctivae normal.      Pupils: Pupils are equal, round, and reactive to light.   Cardiovascular:      Rate and Rhythm: Regular rhythm.   Pulmonary:      Effort: Pulmonary effort is normal.      Breath sounds: Normal breath sounds.   Abdominal:      General: Abdomen is flat.      Palpations: Abdomen is soft.   Musculoskeletal:         General: Tenderness  (Left knee arthritis and swelling) present.      Cervical back: Neck supple.   Skin:     General: Skin is warm.   Neurological:      General: No focal deficit present.      Mental Status: She is oriented to person, place, and time.   Psychiatric:         Mood and Affect: Mood normal.     Procedure note  Left knee steroid injection  After patient verbal consent, risks and benefits of procedure explained, including infection bleeding, patient understood  Ethyl alcohol used for sterilization  Ethyl chloride spray used for  local anesthesia  40 mg Kenalog  and 1 cc lidocaine injected to         left knee medial approach     , used  1 inch needle  Patient tolerated procedure,   No complications   Band aid used for dressing  Patient instructed no heavy exertion or exercise for the next 48 hours    Assessment/Plan   Saritha was seen today for gerd, diabetes, hypertension, hyperlipidemia, med refill, anemia, gout and knee pain.  Diagnoses and all orders for this visit:  Arthritis of knee, left (Primary)  Gout, unspecified  -     allopurinol (Zyloprim) 100 mg tablet; Take 2 tablets (200 mg) by mouth once daily.  Heart failure, unspecified (CMS/[facility])  -     losartan (Cozaar) 25 mg tablet; Take 1 tablet (25 mg) by mouth once daily. as directed  Other specified anemias  -     ferrous gluconate 324 (38 Fe) mg tablet; Take 1 tablet (324 mg) by mouth 2 times a day.  Gastro-esophageal reflux disease without esophagitis  -     pantoprazole (ProtoNix) 40 mg EC tablet; Take 1 tablet (40 mg) by mouth once daily.  Pure hypercholesterolemia, unspecified  -     rosuvastatin (Crestor) 40 mg tablet; Take 1 tablet (40 mg) by mouth once daily.  Controlled diabetes mellitus type 2 with complications, unspecified whether long term insulin use (CMS/[facility])  -     POCT fingerstick glucose manually resulted  -     POCT glycosylated hemoglobin (Hb A1C) manually resulted  Acute respiratory failure with hypercapnia (CMS/[facility])  Atherosclerosis of aorta  (CMS/[facility])  Body mass index (BMI) 40.0-44.9, adult (CMS/[facility])  Bilateral malignant neoplasm of breast in female, unspecified estrogen receptor status, unspecified site of breast (CMS/[facility])  Angina pectoris (CMS/[facility])  Stage 3a chronic kidney disease (CMS/[facility])  Acute on chronic diastolic (congestive) heart failure (CMS/[facility])  Type 2 diabetes mellitus with stage 3a chronic kidney disease, without long-term current use of insulin (CMS/[facility])  -     Albumin , Urine Random; Future  High risk medication use  -     CBC and Auto Differential; Future  Hypertension, unspecified type  -     Comprehensive Metabolic Panel; Future  Hypercholesteremia  -     Lipid Panel; Future  Other fatigue  -     TSH with reflex to Free T4 if abnormal; Future  Vitamin B12 deficiency  -     Vitamin B12; Future  Vitamin D deficiency, unspecified  -     Vitamin D 25-Hydroxy,Total (for eval of Vitamin D levels); Future  Menopause  -     XR DEXA bone density; Future    Diabetes controlled hemoglobin A1c 7.2 counseled about low-carb diet exercise and weight loss  Left knee pain arthritis patient given steroid injection today follow-up results closely  - Need to proceed with blood work before next appointment and bone densitometry  -Left hip arthritis continue monitoring status post right hip arthroplasty doing well  -Aortic valve disease stenosis compensated asymptomatic follow-up cardiology no palpitation or chest pain  - Diastolic heart failure compensated continue with current medication low-salt diet continue low-salt diet  - Hypertension controlled continue with current meds, controlled  - Chronic gout no flareup continue low uric acid diet  - History of respiratory failure no recurrence  -Chronic kidney disease compensated avoid salt food repeat renal function albumin urine spot  - Hypercholesteremia controlled continue with lipid profile  Follow-up 6 months blood work and Medicare physical

## 2024-02-10 DIAGNOSIS — Z00.00 ENCOUNTER FOR GENERAL ADULT MEDICAL EXAMINATION WITHOUT ABNORMAL FINDINGS: ICD-10-CM

## 2024-02-12 RX ORDER — ASPIRIN 81 MG/1
TABLET ORAL
Qty: 90 TABLET | Refills: 1 | Status: SHIPPED | OUTPATIENT
Start: 2024-02-12

## 2024-03-06 ENCOUNTER — OFFICE VISIT (OUTPATIENT)
Dept: CARDIOLOGY | Facility: CLINIC | Age: 74
End: 2024-03-06
Payer: MEDICARE

## 2024-03-06 ENCOUNTER — HOSPITAL ENCOUNTER (OUTPATIENT)
Dept: CARDIOLOGY | Facility: HOSPITAL | Age: 74
Discharge: HOME | End: 2024-03-06
Payer: MEDICARE

## 2024-03-06 VITALS — HEIGHT: 62 IN | BODY MASS INDEX: 36.8 KG/M2 | WEIGHT: 200 LBS

## 2024-03-06 DIAGNOSIS — Z01.810 PREOP CARDIOVASCULAR EXAM: ICD-10-CM

## 2024-03-06 DIAGNOSIS — I50.32 CHRONIC DIASTOLIC HEART FAILURE (MULTI): ICD-10-CM

## 2024-03-06 DIAGNOSIS — I25.10 CORONARY ARTERY CALCIFICATION: ICD-10-CM

## 2024-03-06 DIAGNOSIS — I35.0 NONRHEUMATIC AORTIC VALVE STENOSIS: ICD-10-CM

## 2024-03-06 DIAGNOSIS — Z01.810 PREOP CARDIOVASCULAR EXAM: Primary | ICD-10-CM

## 2024-03-06 DIAGNOSIS — I25.84 CORONARY ARTERY CALCIFICATION: ICD-10-CM

## 2024-03-06 PROCEDURE — 3008F BODY MASS INDEX DOCD: CPT | Performed by: STUDENT IN AN ORGANIZED HEALTH CARE EDUCATION/TRAINING PROGRAM

## 2024-03-06 PROCEDURE — 93005 ELECTROCARDIOGRAM TRACING: CPT

## 2024-03-06 PROCEDURE — 1159F MED LIST DOCD IN RCRD: CPT | Performed by: STUDENT IN AN ORGANIZED HEALTH CARE EDUCATION/TRAINING PROGRAM

## 2024-03-06 PROCEDURE — 4010F ACE/ARB THERAPY RXD/TAKEN: CPT | Performed by: STUDENT IN AN ORGANIZED HEALTH CARE EDUCATION/TRAINING PROGRAM

## 2024-03-06 PROCEDURE — 1160F RVW MEDS BY RX/DR IN RCRD: CPT | Performed by: STUDENT IN AN ORGANIZED HEALTH CARE EDUCATION/TRAINING PROGRAM

## 2024-03-06 PROCEDURE — 93010 ELECTROCARDIOGRAM REPORT: CPT | Performed by: STUDENT IN AN ORGANIZED HEALTH CARE EDUCATION/TRAINING PROGRAM

## 2024-03-06 PROCEDURE — 1036F TOBACCO NON-USER: CPT | Performed by: STUDENT IN AN ORGANIZED HEALTH CARE EDUCATION/TRAINING PROGRAM

## 2024-03-06 PROCEDURE — 99215 OFFICE O/P EST HI 40 MIN: CPT | Performed by: STUDENT IN AN ORGANIZED HEALTH CARE EDUCATION/TRAINING PROGRAM

## 2024-03-06 NOTE — PROGRESS NOTES
Primary Care Physician: Alva Prince MD   Date of Visit: 03/06/2024 11:00 AM EST  Type of Visit: follow up       Chief Complaint:  No chief complaint on file.       HPI  Saritha Cormier 73 y.o. female with AS -Moderate-Severe. Nonobstructive coronary atherosclerosis. HFpEF with volume overload -compensated. NSAID induced GI ulcer with bleed and anemia. HTN. DM. DLP. Obesity. S/p B/L mastectomy/Oophorectomy for CA breast BRCA+ DJD S/p Hip replacement surgery   Here today for preop risk assessment prior to hip replacement  She follows with Dr. Reilly, new to me    She had hip replacement 2 years ago with no issues   No dizziness or syncope  No chest pain, angina  She gets mild sob with exertion. She walks slow given her hip.  Nothing in her symptoms have changed since last time she saw Dr. Reilly  Does not measure her bp at home  No le edema  Never used SLN        Review of Systems   Review of Systems   12 points review of systems are negative expect for the above    Social History:  Social History     Socioeconomic History    Marital status:      Spouse name: Not on file    Number of children: Not on file    Years of education: Not on file    Highest education level: Not on file   Occupational History    Not on file   Tobacco Use    Smoking status: Never    Smokeless tobacco: Never   Vaping Use    Vaping Use: Never used   Substance and Sexual Activity    Alcohol use: Never    Drug use: Never    Sexual activity: Not on file   Other Topics Concern    Not on file   Social History Narrative    Not on file     Social Determinants of Health     Financial Resource Strain: Not on file   Food Insecurity: Not on file   Transportation Needs: Not on file   Physical Activity: Not on file   Stress: Not on file   Social Connections: Not on file   Intimate Partner Violence: Not on file   Housing Stability: Not on file        Past Medical History:  Past Medical History:   Diagnosis Date    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 01/31/2022    History of bilateral mastectomy    Neoplasm of unspecified behavior of bone, soft tissue, 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/2015    Skin growth    Other bursitis of hip, right hip     Bursitis of right hip, unspecified bursa    Personal history of other diseases of the respiratory system 03/14/2016    History of chronic sinusitis    Trigger finger, unspecified finger 06/22/2018    Acquired trigger finger    Unilateral primary osteoarthritis, right knee 02/19/2021    Arthritis of knee, right       Past Surgical History:  Past Surgical History:   Procedure Laterality Date    HYSTERECTOMY  09/21/2015    Hysterectomy    MASTECTOMY  09/21/2015    Breast Surgery Mastectomy    OTHER SURGICAL HISTORY  09/21/2015    Breast Surgery Reconstruction       Family History:  Family History   Problem Relation Name Age of Onset    Gout Mother      Hypertension Mother      Arthritis Father      Other (cardiac disorder) Father      Cancer Father      Tongue cancer Brother      Other (jaw neoplasm) Brother      Breast cancer Sibling      Pancreatic cancer Sibling          Objective:       8/11/2022    10:28 AM 12/7/2022     2:10 PM 3/14/2023    11:13 AM 3/20/2023    12:17 PM 6/14/2023    10:28 AM 9/14/2023    10:20 AM 1/30/2024     9:40 AM   Vitals   Systolic 116 114 98  126 114 110   Diastolic 76 64 58  82 72 60   Heart Rate 78 83 80  72 76 84   Temp 36.1 °C (97 °F) 35.9 °C (96.6 °F) 36.1 °C (96.9 °F)  37 °C (98.6 °F) 36.1 °C (97 °F) 35.7 °C (96.2 °F)   Height (in) 1.562 m (5' 1.5\")   1.574 m (5' 1.97\") 1.575 m (5' 2\")     Weight (lb) 203.5 211.25 215.8 210.32 214.5 210 205   BMI 37.83 kg/m2 39.27 kg/m2 40.11 kg/m2 38.51 kg/m2 39.23 kg/m2 38.41 kg/m2 37.49 kg/m2   BSA (m2) 2 m2 2.04 m2 2.06 m2 2.04 m2 2.06 m2 2.04 m2 2.02 m2   Visit Report   Report  Report Report Report      Constitutional:       Appearance: Healthy appearance. Not in distress.   Neck:      Vascular: No JVR. JVD normal.   Pulmonary:      Effort: " Pulmonary effort is normal.      Breath sounds: Normal breath sounds. No wheezing. No rhonchi. No rales.   Chest:      Chest wall: Not tender to palpatation.   Cardiovascular:      PMI at left midclavicular line. Normal rate. Regular rhythm. Normal S1. Normal S2.       Murmurs: There is +ve ESM      No gallop.  No click. No rub.   Pulses:     Intact distal pulses.   Edema:     Peripheral edema absent.   Abdominal:      General: Bowel sounds are normal.      Palpations: Abdomen is soft.      Tenderness: There is no abdominal tenderness.   Musculoskeletal: Normal range of motion.         General: No tenderness.   Skin:     General: Skin is warm and dry.   Neurological:      General: No focal deficit present.      Mental Status: Alert and oriented to person, place and time.     Allergies:  No Known Allergies    Medications:       Labs and Imaging:     Lab Results   Component Value Date    WBC 8.8 08/08/2022    HGB 11.2 (L) 08/08/2022    HCT 38.7 08/08/2022     08/08/2022    CHOL 165 08/08/2022    TRIG 176 (H) 08/08/2022    HDL 40.0 08/08/2022    ALT 11 08/08/2022    AST 17 08/08/2022     08/08/2022    K 4.2 08/08/2022     08/08/2022    CREATININE 1.29 (H) 08/08/2022    BUN 23 08/08/2022    CO2 32 08/08/2022    TSH 3.24 08/08/2022    INR 1.2 (H) 03/16/2021    HGBA1C 7.2 (A) 01/30/2024         Echocardiogram:   ECHOCARDIOGRAM     Narrative  Ordered by an unspecified provider.    Stress Testing: No results found for this or any previous visit from the past 1825 days.    Cardiac Catheterization:   Adult Cath     Narrative  [facility], Cath Lab, [address] [place] [address]    Cardiovascular Catheterization Report    Patient Name:     RAVIN FUENTES Performing Physician:  [id number] Javier Reilly MD  Study Date:       2/18/2022         Verifying Physician:   [id number] Reilly MD  MRN/PID:          [id number]          Cardiologist/Co-scrub:  Accession/Order#:  [id number]         Fellow:                Roberto Cox MD  YOB: 1950        Fellow:                [id number] Kael Mayen MD  Gender:           F                 Referring Physician:   Yuliana Rolon MD  Admit Date:                         Referring Physician:   [id number] Javier Reilly MD  Surgeon:                            Referring Physician:   [id number] Javier Reilly MD      Study: Left Heart Catheterization      Indications:  RAVIN FUENTES is a 71 year old female who presents with dyslipidemia and hypertension. Acute coronary syndrome <=24 hrs, with a chest pain assessment of typical angina. Study performed as an elective cath procedure.    Medical History:  Stress test performed: No. CTA performed: No. Agatston accessed: No. LVEF Assessed: Yes. LVEF = 60%.    Procedure Description:  After infiltration with 2% Lidocaine, the right radial artery was cannulated with a modified Seldinger technique. Subsequently a 6 English sheath was placed in the right radial artery. Selective coronary catheterization was performed using a 5 Fr catheter(s) exchanged over a guide wire to cannulate the coronary arteries.  Multiple injections of contrast were made into the left and right coronary arteries with angiograms recorded in multiple projections.  LV-Ao pull back showed 45 mm Hg peak to peak gradient.    Coronary Angiography:  The coronary circulation is right dominant.    Left Main Coronary Artery:  The left main coronary artery is a normal caliber vessel. The left main arises normally from the left coronary sinus of Valsalva and bifurcates into the LAD and circumflex coronary arteries. The left main coronary artery showed no significant disease or stenosis greater than 30%.    Left Anterior Descending Coronary Artery Distribution:  The left anterior descending coronary artery is a normal caliber vessel. The LAD arises normally from the left main coronary artery. The LAD demonstrated no  significant disease or stenosis greater than 30%.    Circumflex Coronary Artery Distribution:  The circumflex coronary artery is a normal caliber vessel. The circumflex arises normally from the left main coronary artery and terminates in the AV groove. The circumflex revealed no significant disease or stenosis greater than 30%. The 2nd obtuse marginal branch is a small caliber vessel. This lesion was thrombotic.    Right Coronary Artery Distribution:    The right coronary artery is a normal caliber vessel. The RCA arises normally from the right sinus of Valsalva. The RCA showed no significant disease or stenosis greater than 30%.  The right posterolateral branch is a normal caliber vessel. The right posterolateral branch showed moderate atherosclerotic disease.    Coronary Interventions:    Coronary Lesion Summary:  Vessel   Stenosis  OM 2   100% stenosis  RPL    50% stenosis      Hemo Personnel:  +--------------------------+-------------+  Name                      Duty           +--------------------------+-------------+  Frank Reilly MD, MD 1  +--------------------------+-------------+  Rin Clement RN         PROC SCRUB 1  +--------------------------+-------------+  Arnaldo Tyler Staff Nurse  PROC CIRC 1  +--------------------------+-------------+  Kapil Guadarrama RN         PROC RECORD 1  +--------------------------+-------------+  Arnaldo Tyler Staff Nurse PROC NURSE 1  +--------------------------+-------------+  Kael Mayen MD        FELLOW PHYS 1  +--------------------------+-------------+  Roberto Cox MD         FELLOW PHYS 2  +--------------------------+-------------+      Sedation Time:  +------------------------+----------------------------------------+  Sedation Start/End TimesTime                                      +------------------------+----------------------------------------+  Start                   2/18/2022 08:13:27                         +------------------------+----------------------------------------+  Drugs                   Fentanyl 50 mcg IV per physician for sed  +------------------------+----------------------------------------+  End                     2/18/2022 08:48:23                        +------------------------+----------------------------------------+      Equipment Used:  +--------------------+---------------------------------------------------------+             Date/Time                                              Description  +--------------------+---------------------------------------------------------+  2/18/2022 8:15:19 AM - Omnipaque 350 mgl/ml 100ml Bottle Contrast -                                                        Qty: 1 Each Part #: 840  +--------------------+---------------------------------------------------------+  2/18/2022 8:15:27 AM   - Glidesheath Radial Sheath 6 Fr x 10cm -                                                        Qty: 1 Each Part #: 254  +--------------------+---------------------------------------------------------+  2/18/2022 8:15:36 AM [facility] InQwire Guidewire .035mm                                        x 210cm J-Tip - Qty: 1 Each Part #: 941  +--------------------+---------------------------------------------------------+  2/18/2022 8:15:40 AM           Terumo TR Band Radial Artery                              Compression Device 24cm - Qty: 1 Each Part #: 828  +--------------------+---------------------------------------------------------+  2/18/2022 8:15:49 AM  Terumo 5 Fr Tig Radial 4.0 x 110 cm -                                                    Qty: 1 Each Part #:   +--------------------+---------------------------------------------------------+  2/18/2022 8:20:25 AM     Teleflex Guardian - Qty: 1 Each Part #:                                                                            3245  +--------------------+---------------------------------------------------------+  2/18/2022 8:20:27 AM   - Fast Patches - Qty: 1 Each Part #: 2700  +--------------------+---------------------------------------------------------+  2/18/2022 8:20:33 AM   - Rad rest foam pad - Qty: 1 Each Part #:                                                                           [id number]  +--------------------+---------------------------------------------------------+  2/18/2022 8:28:40 AM Terumo 5 Fr Ikari Left 3.5 x 100cm Heartrail                                       III Guider - Qty: 1 Each Part #:   +--------------------+---------------------------------------------------------+  2/18/2022 8:41:28 AM  - Inflation kit - Qty: 1 Each Part #: [id number]  +--------------------+---------------------------------------------------------+  2/18/2022 8:44:39 AM   - RunThrough NS .014mm x 180cm -                                                       Qty: 1 Each Part #: [id number]  +--------------------+---------------------------------------------------------+      Fluoroscopy Time:  +--------------------------+--------+  X-Ray Summary Fluoro Time:7.90 min  +--------------------------+--------+      +----------+--------+  Contrast: Dose:     +----------+--------+  Omnipaque:50.00 ml  +----------+--------+      Hemodynamic Pressures:    +----+-------------------+---------+------------+-------------+------+---------+  Site     Date Time       Phase    Systolic    Diastolic    ED  Mean mmHg                           Name       mmHg        mmHg      mmHg            +----+-------------------+---------+------------+-------------+------+---------+    AO  2/18/2022 8:21:12     Rest         118           68             89                       AM                                                   +----+-------------------+---------+------------+-------------+------+---------+     LV  2/18/2022 8:27:01     Rest         187            0    27                                AM                                                   +----+-------------------+---------+------------+-------------+------+---------+    LV  2/18/2022 8:27:09     Rest         185            4    30                                AM                                                   +----+-------------------+---------+------------+-------------+------+---------+   LVp  2/18/2022 8:27:33     Rest         198            9    33                                AM                                                   +----+-------------------+---------+------------+-------------+------+---------+   AOp  2/18/2022 8:27:40     Rest         139           76            104                       AM                                                   +----+-------------------+---------+------------+-------------+------+---------+        Oxygen Saturation %:  +-----------+------------+  Sample SiteHB (g/100ml)  +-----------+------------+      SYS ART        11.2  +-----------+------------+      SYS ANJALI        11.2  +-----------+------------+      PUL ART        11.2  +-----------+------------+      PUL ANJALI        11.2  +-----------+------------+      Cardiac Cath Transition of Care Summary:  Post Procedure Diagnosis: Small Single vessel occlusion  non obstructive coronary artery diseae in LM, LAD, RCA  and LCX.  Findings:                 Severe single vessel coronary disease.  Blood Loss:               Estimated blood loss during the procedure was 0 mls.  Specimens Removed:        Number of specimen(s) removed: 0.      Recommendations:  Optimize medical therapy.  Agressive risk factor modification efforts.  Telemetry monitoring.  Follow up with Cardiology outpatient - periodic assessment of AoV disease  GI consultation for Upper and Lower endoscopy to rule out GI  bleeding.    ____________________________________________________________________________________  CONCLUSIONS:  1. ACS -NSTEMI.  2. Culprit vessel: Small caliber OM2  3. Rest of Coronary arteries show non obstructive disease.  4. Moderate to severe Aortic stenosis with 45 mm Hg peak to peak pullback gradient    ____________________________________________________________________________________  CPT Codes:  Right Heart Cath O2/Cardiac output without biopsy ([facility])-50263    ICD 10 Codes:  I21.4-Non ST elevation (NSTEMI) myocardial infarction    [id number] Javier Reilly MD  Performing Physician  Electronically signed by [id number] Javier Reilly MD on 2/18/2022 at 3:58:00  PM      cc Report to: Yuliana Rolon MD    cc Report to: [id number] Javier Reilly MD    cc Report to: [id number] Javier Reilly MD           Final     Cardiac Scoring: No results found for this or any previous visit from the past 1825 days.    AAA : No results found for this or any previous visit from the past 1825 days.    OTHER: No results found for this or any previous visit from the past 1825 days.      The ASCVD Risk score (Humberto DK, et al., 2019) failed to calculate for the following reasons:    The patient has a prior MI or stroke diagnosis     Assessment/Plan:   No diagnosis found.   Saritha Cormier 73 y.o. female ith AS -Moderate-Severe. Nonobstructive coronary atherosclerosis. HFpEF with volume overload -compensated. NSAID induced GI ulcer with bleed and anemia. HTN. DM. DLP. Obesity. S/p B/L mastectomy/Oophorectomy for CA breast BRCA+ DJD S/p Hip replacement surgery   Here today for preop risk assessment prior to hip replacement  Asymptomatic and euvolemic  NYHA class II, limited given her hip  EKG with NSR, incomplete RBBB, LAFB  She is intermediate risk     Plan  Get an echo for AS progression, last one > a year ago   Plavix can be interrupted for surgery, can be held 7 days before surgery and restart once safe from  surgical perspective   Continue ASA and statin, uninterrupted   Avoid hypotension   Follow up with Dr. Reilly 6 m, if echo shows severe aortic stenosis, she will need to follow up with him prior to that for TAVR eval      Time Spent: I spent 40 minutes reviewing medical testing, obtaining medical history and counselling and educating on diagnosis and documenting clinical encounter.         ____________________________________________________________  Kim Mari MD   of Medicine  Division of Cardiovascular Medicine   [facility] [place]  [facility]

## 2024-03-12 ENCOUNTER — APPOINTMENT (OUTPATIENT)
Dept: RADIOLOGY | Facility: HOSPITAL | Age: 74
End: 2024-03-12
Payer: MEDICARE

## 2024-03-15 ENCOUNTER — HOSPITAL ENCOUNTER (OUTPATIENT)
Dept: RADIOLOGY | Facility: HOSPITAL | Age: 74
Discharge: HOME | End: 2024-03-15
Payer: MEDICARE

## 2024-03-15 ENCOUNTER — HOSPITAL ENCOUNTER (OUTPATIENT)
Dept: CARDIOLOGY | Facility: HOSPITAL | Age: 74
Discharge: HOME | End: 2024-03-15
Payer: MEDICARE

## 2024-03-15 DIAGNOSIS — I25.84 CORONARY ARTERY CALCIFICATION: ICD-10-CM

## 2024-03-15 DIAGNOSIS — I35.0 NONRHEUMATIC AORTIC VALVE STENOSIS: ICD-10-CM

## 2024-03-15 DIAGNOSIS — Z78.0 MENOPAUSE: ICD-10-CM

## 2024-03-15 DIAGNOSIS — I25.10 CORONARY ARTERY CALCIFICATION: ICD-10-CM

## 2024-03-15 DIAGNOSIS — Z01.810 PREOP CARDIOVASCULAR EXAM: ICD-10-CM

## 2024-03-15 PROCEDURE — 2500000004 HC RX 250 GENERAL PHARMACY W/ HCPCS (ALT 636 FOR OP/ED): Performed by: STUDENT IN AN ORGANIZED HEALTH CARE EDUCATION/TRAINING PROGRAM

## 2024-03-15 PROCEDURE — 93306 TTE W/DOPPLER COMPLETE: CPT

## 2024-03-15 PROCEDURE — 77080 DXA BONE DENSITY AXIAL: CPT | Performed by: RADIOLOGY

## 2024-03-15 PROCEDURE — 93306 TTE W/DOPPLER COMPLETE: CPT | Performed by: STUDENT IN AN ORGANIZED HEALTH CARE EDUCATION/TRAINING PROGRAM

## 2024-03-15 PROCEDURE — 77080 DXA BONE DENSITY AXIAL: CPT

## 2024-03-15 RX ADMIN — PERFLUTREN 2 ML OF DILUTION: 6.52 INJECTION, SUSPENSION INTRAVENOUS at 11:48

## 2024-03-18 LAB
AORTIC VALVE MEAN GRADIENT: 51 MMHG
AORTIC VALVE PEAK VELOCITY: 4.77 M/S
AV PEAK GRADIENT: 91 MMHG
AVA (PEAK VEL): 0.59 CM2
AVA (VTI): 0.61 CM2
EJECTION FRACTION APICAL 4 CHAMBER: 68.8
EJECTION FRACTION: 69 %
LEFT ATRIUM VOLUME AREA LENGTH INDEX BSA: 22.9 ML/M2
LEFT VENTRICLE INTERNAL DIMENSION DIASTOLE: 4.25 CM (ref 3.5–6)
LEFT VENTRICULAR OUTFLOW TRACT DIAMETER: 1.9 CM
MITRAL VALVE E/A RATIO: 0.65
MITRAL VALVE E/E' RATIO: 11.52
RIGHT VENTRICLE FREE WALL PEAK S': 12.6 CM/S
TRICUSPID ANNULAR PLANE SYSTOLIC EXCURSION: 1.3 CM

## 2024-03-19 ENCOUNTER — TELEMEDICINE (OUTPATIENT)
Dept: CARDIOLOGY | Facility: CLINIC | Age: 74
End: 2024-03-19
Payer: MEDICARE

## 2024-03-19 DIAGNOSIS — R94.31 ABNORMAL EKG: ICD-10-CM

## 2024-03-19 DIAGNOSIS — R94.31 ABNORMAL EKG: Primary | ICD-10-CM

## 2024-03-19 DIAGNOSIS — E11.11 TYPE 2 DIABETES MELLITUS WITH KETOACIDOTIC COMA, WITHOUT LONG-TERM CURRENT USE OF INSULIN (MULTI): ICD-10-CM

## 2024-03-19 DIAGNOSIS — I10 BENIGN ESSENTIAL HYPERTENSION: ICD-10-CM

## 2024-03-19 DIAGNOSIS — I50.32 CHRONIC DIASTOLIC HEART FAILURE (MULTI): ICD-10-CM

## 2024-03-19 DIAGNOSIS — R06.02 SHORTNESS OF BREATH: ICD-10-CM

## 2024-03-19 DIAGNOSIS — I35.0 NONRHEUMATIC AORTIC VALVE STENOSIS: ICD-10-CM

## 2024-03-19 DIAGNOSIS — I35.0 AORTIC STENOSIS, SEVERE: Primary | ICD-10-CM

## 2024-03-19 PROCEDURE — 1160F RVW MEDS BY RX/DR IN RCRD: CPT | Performed by: INTERNAL MEDICINE

## 2024-03-19 PROCEDURE — 4010F ACE/ARB THERAPY RXD/TAKEN: CPT | Performed by: INTERNAL MEDICINE

## 2024-03-19 PROCEDURE — 99215 OFFICE O/P EST HI 40 MIN: CPT | Performed by: INTERNAL MEDICINE

## 2024-03-19 PROCEDURE — 1036F TOBACCO NON-USER: CPT | Performed by: INTERNAL MEDICINE

## 2024-03-19 PROCEDURE — 3008F BODY MASS INDEX DOCD: CPT | Performed by: INTERNAL MEDICINE

## 2024-03-19 PROCEDURE — 1159F MED LIST DOCD IN RCRD: CPT | Performed by: INTERNAL MEDICINE

## 2024-03-19 NOTE — PROGRESS NOTES
DIAGNOSES: AS -Severe. Nonobstructive coronary atherosclerosis. HFpEF with volume overload -compensated. NSAID induced GI ulcer with bleed and anemia. HTN. DM. DLP. Obesity. S/p B/L mastectomy/Oophorectomy for CA breast BRCA+ DJD S/p Hip replacement surgery. Awaiting TKR - Preop evluation.      Dear Alva:     Ms Cormier 73F had a virtual follow up, after her echo had shown clear progression of AS. She is bothered by knee pain currently, after she had felt. She has a tough home situation with her 's neurological status and behavior, taking care of her grandchildren, her daughter having passed away unfortunately due to breast cancer     As you know, she is BRCA gene +ve with CA breast, having undergone B/L mastectomy one after the other, as well as oophorectomy in the remote past. 12 system review was negative except as stated above.           Active Problems   · Abnormal blood sugar (790.29) (R73.09)   · Abnormal screening cardiac CT (793.2) (R93.1)   · Acquired trigger finger (727.03) (M65.30)   · Acute lumbar radiculopathy (724.4) (M54.16)   · Anemia due to other cause (285.8) (D64.89)   · Angina pectoris (413.9) (I20.9)   · Aortic stenosis (424.1) (I35.0)   · Aortic stenosis, severe (424.1) (I35.0)   · Arthritis (716.90) (M19.90)   · Arthritis of hip (716.95) (M16.10)   · Arthritis of knee, right (716.96) (M17.11)   · Arthritis of lumbar spine (721.3) (M47.816)   · Arthritis of shoulder (716.91) (M19.019)   · Arthritis pain, hip (716.95) (M16.10)   · Benign essential hypertension (401.1) (I10)   · Body mass index (BMI) of 37.0 to 37.9 in adult (V85.37) (Z68.37)   · Body mass index (BMI) of 40.0 to 44.9 in adult (V85.41) (Z68.41)   · Breast CA (174.9) (C50.919)   · Bursitis of left hip (726.5) (M70.72)   · Bursitis of right hip (726.5) (M70.71)   · Chest pain (786.50) (R07.9)   · CHF (congestive heart failure) (428.0) (I50.9)   · Chronic diastolic heart failure (428.32) (I50.32)   · Class 2 obesity with  body mass index (BMI) of 35.0 to 35.9 in adult (278.00,V85.35)  (E66.9,Z68.35)   · Class 2 obesity with body mass index (BMI) of 36.0 to 36.9 in adult (278.00,V85.36)  (E66.9,Z68.36)   · Class 2 obesity with body mass index (BMI) of 37.0 to 37.9 in adult (278.00,V85.37)  (E66.9,Z68.37)   · Colon polyps (211.3) (K63.5)   · Coronary artery calcification (414.00,414.4) (I25.10,I25.84)   · Coronary atherosclerosis (414.00) (I25.10)   · Cough (786.2) (R05.9)   · Dermatitis of perianal region (692.9) (L30.9)   · Diabetes mellitus type II, controlled (250.00) (E11.9)   · DJD (degenerative joint disease) (715.90) (M19.90)   · Dyslipidemia (272.4) (E78.5)   · Elevated LDL cholesterol level (272.0) (E78.00)   · Gastric ulcer (531.90) (K25.9)   · GERD (gastroesophageal reflux disease) (530.81) (K21.9)   · Gout (274.9) (M10.9)   · Hip pain, chronic (719.45,338.29) (M25.559,G89.29)   · History of bilateral mastectomy (V45.71) (Z90.13)   · History of total mastectomy of left breast (V15.29) (Z90.12)   · History of total mastectomy of right breast (V15.29) (Z90.11)   · Hypotension (458.9) (I95.9)   · Leg edema (782.3) (R60.0)   · Lipoma of forearm (214.1) (D17.20)   · Lumbar radiculitis (724.4) (M54.16)   · Medicare annual wellness visit, subsequent (V70.0) (Z00.00)   · Mild vitamin D deficiency (268.9) (E55.9)   · Moderate aortic stenosis (424.1) (I35.0)   · Musculoskeletal pain (729.1) (M79.18)   · Neck pain (723.1) (M54.2)   · Nummular eczematous dermatitis (692.9) (L30.0)   · Obesity (278.00) (E66.9)   · Osteoarthritis of right hip (715.95) (M16.11)   · Pre-operative cardiovascular examination (V72.81) (Z01.810)   · Pressure injury of contiguous region involving buttock and hip, stage 2, unspecified  laterality (707.09,707.22) (L89.42)   · Right bicipital tenosynovitis (726.12) (M75.21)   · Right hip pain (719.45) (M25.551)   · Right shoulder pain (719.41) (M25.511)   · Sinusitis (473.9) (J32.9)   · SOB (shortness of breath)  on exertion (786.05) (R06.02)   · Spinal stenosis, lumbar (724.02) (M48.061)   · Stage 3a chronic kidney disease (585.3) (N18.31)   · Status post bilateral mastectomy (V45.71) (Z90.13)   · Status post total replacement of right hip (V43.64) (Z96.641)   · Subdeltoid bursitis of right shoulder joint (726.19) (M75.51)   · Subsequent non-ST elevation (NSTEMI) myocardial infarction (410.70) (I22.2)   · Suspected COVID-19 virus infection (V01.79) (Z20.822)   · Type 2 diabetes mellitus (250.00) (E11.9)   · Type 2 diabetes mellitus with ketoacidotic coma, without long-term current use of insulin  (250.30) (E11.11)   · UTI (urinary tract infection) (599.0) (N39.0)   · Viral URI with cough (465.9) (J06.9)   · Welcome to Medicare preventive visit (V70.0) (Z00.00)   · Witnessed episode of apnea (786.03) (R06.81)     Benign essential hypertension (401.1) (I10)      Arthritis of hip (716.95) (M16.10)      Aortic stenosis (424.1) (I35.0)      Anemia due to other cause (285.8) (D64.89)      Coronary atherosclerosis (414.00) (I25.10)      Benign essential hypertension (401.1) (I10)      Pure hypercholesterolemia (272.0) (E78.00)      Angina pectoris (413.9) (I20.9)      Aortic stenosis, severe (424.1) (I35.0)      Anemia due to other cause (285.8) (D64.89)      Dermatitis of perianal region (692.9) (L30.9)      Gastric ulcer (531.90) (K25.9)         Benign essential hypertension (401.1) (I10)      Arthritis of hip (716.95) (M16.10)      Aortic stenosis (424.1) (I35.0)      Anemia due to other cause (285.8) (D64.89)      Coronary atherosclerosis (414.00) (I25.10)      Benign essential hypertension (401.1) (I10)      Pure hypercholesterolemia (272.0) (E78.00)      Angina pectoris (413.9) (I20.9)      Aortic stenosis, severe (424.1) (I35.0)      Anemia due to other cause (285.8) (D64.89)      Dermatitis of perianal region (692.9) (L30.9)      Gastric ulcer (531.90) (K25.9)      Past Medical History   · History of Acquired trigger  finger (727.03) (M65.30)   · Resolved Date: 22 Jun 2018   · Arthritis of knee, right (716.96) (M17.11)   · History of Bursitis of right hip, unspecified bursa (726.5) (M70.71)   · History of bilateral mastectomy (V45.71) (Z90.13)   · Resolved Date: 31 Jan 2022   · History of chronic sinusitis (V12.69) (Z87.09)   · Resolved Date: 29 Mar 2016   · History of Skin growth (239.2) (D49.2)   · Resolved Date: 22 Dec 2015     Surgical History   · History of Breast Surgery Mastectomy   · History of Breast Surgery Reconstruction   · History of Hysterectomy     Social History   · Denied: History of Alcohol use (V49.89) (Z78.9)   · Denied: History of Caffeine use   · Employed in professional specialty position   · teacher     Allergies   · No Known Drug Allergies  Recorded By: Mitali Silva; 9/21/2015 10:33:08 AM     Current Meds     Medication Name Instruction   Allopurinol 100 MG Oral Tablet take 2 tablets by mouth every day   Aspirin Low Dose 81 MG Oral Tablet Delayed Release TAKE 1 TABLET BY MOUTH EVERY DAY   Azithromycin 250 MG Oral Tablet TAKE 2 TABLETS ON DAY 1 THEN TAKE 1 TABLET A DAY FOR 4 DAYS.   Bisoprolol Fumarate 5 MG Oral Tablet TAKE 0.5 TABLET Daily   Centrum TABS     Clopidogrel Bisulfate 75 MG Oral Tablet TAKE 1 TABLET BY MOUTH EVERY DAY   Clotrimazole-Betamethasone 1-0.05 % External Cream APPLY SPARINGLY TO AFFECTED AREA(S) 3 TIMES A DAY   Ferrous Gluconate 324 (38 Fe) MG Oral Tablet take 1 tablet by mouth twice a day   Furosemide 40 MG Oral Tablet TAKE 1 TABLET BY MOUTH EVERY DAY   Losartan Potassium 25 MG Oral Tablet TAKE 1 TABLET BY MOUTH EVERY DAY AS DIRECTED   metFORMIN HCl  MG Oral Tablet Extended Release 24 Hour TAKE 2 TABLETS BY MOUTH EVERY DAY**REPLACES OSM   methylPREDNISolone 4 MG Oral Tablet Therapy Pack Take as directed Qty 1 x therapy pack   Milk Thistle 175 MG Oral Capsule     Mupirocin 2 % External Ointment Apply to nares 2x daily for 3 days prior to joint replacement surgery QTY: 22  GM  DO NOT APPLY BEFORE COVID TESTING   Nitroglycerin 0.4 MG Sublingual Tablet Sublingual DISSOLVE 1 TABLET UNDER THE TONGUE AS NEEDED FOR CHEST PAIN.   Pantoprazole Sodium 40 MG Oral Tablet Delayed Release THEN 1 TAB ORALLY ONCE DAILY   Rosuvastatin Calcium 40 MG Oral Tablet TAKE 1 TABLET BY MOUTH EVERY DAY   Shingrix 50 MCG Intramuscular Suspension Reconstituted INJECT 50 MCG Once   Triamcinolone Acetonide 40 MG/ML Injection Suspension IMPLANT 1  ML Intra-articular   Triamcinolone Acetonide 40 MG/ML Injection Suspension INJECT 0.5  ML Intra-articular   Triamcinolone Acetonide 40 MG/ML Injection Suspension INJECT 0.5  ML Intra-articular   Vitamin D3 125 MCG (5000 UT) Oral Capsule TAKE 1 CAPSULE Daily      Results/Data     All available data were personally reviewed by me     DIAGNOSES: AS -Severe. Nonobstructive coronary atherosclerosis. HFpEF with volume overload -compensated. NSAID induced GI ulcer with bleed and anemia. HTN. DM. DLP. Obesity. S/p B/L mastectomy/Oophorectomy for CA breast BRCA+ DJD S/p Hip replacement surgery. Awaiting TKR - Preop evluation.     In view of her worsening artery stenosis with a dimensionless index of 0.21 and peak aortic valve velocity reaching 4.5 m/s, it would be prudent to proceed with TAVR procedure first before going for TKR.  We will make the necessary arrangements for the same and keep you updated, regarding the clearance for surgery.        Thank you so much for the opportunity to participate in the care of this very pleasant lady. Please do not hesitate to contact me if I could be of any assistance in her future care.     Sincerely           Frank Reilly M.D.

## 2024-03-20 PROBLEM — R06.02 SHORTNESS OF BREATH: Status: ACTIVE | Noted: 2024-03-19

## 2024-03-20 PROBLEM — R94.31 ABNORMAL EKG: Status: ACTIVE | Noted: 2024-03-19

## 2024-03-20 LAB
ATRIAL RATE: 83 BPM
P AXIS: 30 DEGREES
P OFFSET: 170 MS
P ONSET: 123 MS
PR INTERVAL: 166 MS
Q ONSET: 206 MS
QRS COUNT: 14 BEATS
QRS DURATION: 96 MS
QT INTERVAL: 394 MS
QTC CALCULATION(BAZETT): 462 MS
QTC FREDERICIA: 439 MS
R AXIS: -66 DEGREES
T AXIS: 80 DEGREES
T OFFSET: 403 MS
VENTRICULAR RATE: 83 BPM

## 2024-03-28 ENCOUNTER — HOSPITAL ENCOUNTER (OUTPATIENT)
Dept: RADIOLOGY | Facility: CLINIC | Age: 74
Discharge: HOME | End: 2024-03-28
Payer: MEDICARE

## 2024-03-28 ENCOUNTER — OFFICE VISIT (OUTPATIENT)
Dept: ORTHOPEDIC SURGERY | Facility: CLINIC | Age: 74
End: 2024-03-28
Payer: MEDICARE

## 2024-03-28 DIAGNOSIS — G89.29 CHRONIC PAIN OF LEFT KNEE: ICD-10-CM

## 2024-03-28 DIAGNOSIS — M16.10 ARTHRITIS OF HIP: ICD-10-CM

## 2024-03-28 DIAGNOSIS — M16.10 ARTHRITIS OF HIP: Primary | ICD-10-CM

## 2024-03-28 DIAGNOSIS — M25.562 CHRONIC PAIN OF LEFT KNEE: ICD-10-CM

## 2024-03-28 PROCEDURE — 1159F MED LIST DOCD IN RCRD: CPT | Performed by: ORTHOPAEDIC SURGERY

## 2024-03-28 PROCEDURE — 73502 X-RAY EXAM HIP UNI 2-3 VIEWS: CPT | Mod: LEFT SIDE | Performed by: STUDENT IN AN ORGANIZED HEALTH CARE EDUCATION/TRAINING PROGRAM

## 2024-03-28 PROCEDURE — 99214 OFFICE O/P EST MOD 30 MIN: CPT | Performed by: ORTHOPAEDIC SURGERY

## 2024-03-28 PROCEDURE — 73564 X-RAY EXAM KNEE 4 OR MORE: CPT | Mod: LT

## 2024-03-28 PROCEDURE — 4010F ACE/ARB THERAPY RXD/TAKEN: CPT | Performed by: ORTHOPAEDIC SURGERY

## 2024-03-28 PROCEDURE — 1036F TOBACCO NON-USER: CPT | Performed by: ORTHOPAEDIC SURGERY

## 2024-03-28 PROCEDURE — 1160F RVW MEDS BY RX/DR IN RCRD: CPT | Performed by: ORTHOPAEDIC SURGERY

## 2024-03-28 PROCEDURE — 3008F BODY MASS INDEX DOCD: CPT | Performed by: ORTHOPAEDIC SURGERY

## 2024-03-28 PROCEDURE — 73502 X-RAY EXAM HIP UNI 2-3 VIEWS: CPT | Mod: LT

## 2024-03-28 NOTE — PROGRESS NOTES
This is a consultation from Dr. Alva Prince MD for left hip pain    This is a 73 y.o. female who presents for left hip pain.  Patient has had left hip pain over the last couple years, this is chronic issue but is been recently exacerbated.  Is gradual getting worse, she complains of sharp pain over the anterior hip and groin into the anterior thigh.  She uses a lot of pain around the knee.  No numbness or tingling no fevers no chills no shooting pain down the leg.  She is never had surgery or injections on the left hip.  I did a right total hip a few years ago which is doing great.    Physical Exam    There has been no interval change in this patient's past medical, surgical, medications, allergies, family history or social history since the most recent visit to a provider within our department. 14 point review of systems was performed, reviewed, and negative except for pertinent positives documented in the history of present illness.     Constitutional: well developed, well nourished female in no acute distress  Psychiatric: normal mood, appropriate affect  Eyes: sclera anicteric  HENT: normocephalic/atraumatic  CV: regular rate and rhythm   Respiratory: non labored breathing  Integumentary: no rash  Neurological: moves all extremities    Left hip exam: skin normal, no abrasions, wounds, or lacerations. nttp over greater trochanter. negative log roll, negative norma's test. flexion to 90 degrees without pain. no pain with flexion abduction and external rotation, reproduction of hip and thigh pain with flexion adduction and internal rotation. neurovascularly intact distally        Xrays were ordered by me, they were reviewed and independently interpreted by me today, they show severe degenerative disease bone-on-bone arthritis left hip    Procedures      Impression/Plan: This is a 73 y.o. female with severe left hip arthritis.  I had an in depth discussion with the patient regarding treatment options for  "arthritis and their relative risks and benefits. We reviewed surgical and nonsurgical option for treatment. Treatments include anti inflammatory medications, physical therapy, weight loss, activity modification, use of assistive devices, injection therapies. We discussed current prescriptions and risks and benefits of continuation of prescription medication as apporpriate. We discussed that arthritis is often progressive over time, an in end stage arthritis surgical interventions can be considered, including arthroplasty. All questions were answered and the patient voiced their understanding.  Although she is having some pain around the knee, I think this is mainly related to her hip.  Her knee x-rays are benign.  She is scheduled to undergo an aortic valve procedure for aortic stenosis, we will plan to treat nonsurgically in the meantime.  Will plan for guided cortisone injection I will see her back as she is recovering from her other surgery    BMI Readings from Last 1 Encounters:   03/06/24 36.58 kg/m²      Lab Results   Component Value Date    CREATININE 1.29 (H) 08/08/2022     Tobacco Use: Low Risk  (3/28/2024)    Patient History     Smoking Tobacco Use: Never     Smokeless Tobacco Use: Never     Passive Exposure: Not on file      Computed MELD 3.0 unavailable. Necessary lab results were not found in the last year.  Computed MELD-Na unavailable. Necessary lab results were not found in the last year.       Lab Results   Component Value Date    HGBA1C 7.2 (A) 01/30/2024     No results found for: \"STAPHMRSASCR\"  "

## 2024-03-29 ENCOUNTER — HOSPITAL ENCOUNTER (OUTPATIENT)
Facility: HOSPITAL | Age: 74
Setting detail: OBSERVATION
Discharge: HOME | End: 2024-03-30
Attending: INTERNAL MEDICINE | Admitting: INTERNAL MEDICINE
Payer: MEDICARE

## 2024-03-29 DIAGNOSIS — I21.9 MYOCARDIAL INFARCTION, UNSPECIFIED MI TYPE, UNSPECIFIED ARTERY (MULTI): ICD-10-CM

## 2024-03-29 DIAGNOSIS — I10 HYPERTENSION, UNSPECIFIED TYPE: ICD-10-CM

## 2024-03-29 DIAGNOSIS — K21.9 GASTROESOPHAGEAL REFLUX DISEASE, UNSPECIFIED WHETHER ESOPHAGITIS PRESENT: ICD-10-CM

## 2024-03-29 DIAGNOSIS — I35.0 AORTIC STENOSIS, SEVERE: ICD-10-CM

## 2024-03-29 DIAGNOSIS — E78.00 ELEVATED LDL CHOLESTEROL LEVEL: ICD-10-CM

## 2024-03-29 DIAGNOSIS — R94.31 ABNORMAL EKG: ICD-10-CM

## 2024-03-29 DIAGNOSIS — M16.12 OSTEOARTHRITIS OF LEFT HIP, UNSPECIFIED OSTEOARTHRITIS TYPE: ICD-10-CM

## 2024-03-29 DIAGNOSIS — R06.02 SHORTNESS OF BREATH: Primary | ICD-10-CM

## 2024-03-29 LAB
ANION GAP SERPL CALC-SCNC: 19 MMOL/L (ref 10–20)
BUN SERPL-MCNC: 29 MG/DL (ref 6–23)
CALCIUM SERPL-MCNC: 10.1 MG/DL (ref 8.6–10.6)
CHLORIDE SERPL-SCNC: 100 MMOL/L (ref 98–107)
CO2 SERPL-SCNC: 30 MMOL/L (ref 21–32)
CREAT SERPL-MCNC: 1.58 MG/DL (ref 0.5–1.05)
EGFRCR SERPLBLD CKD-EPI 2021: 35 ML/MIN/1.73M*2
ERYTHROCYTE [DISTWIDTH] IN BLOOD BY AUTOMATED COUNT: 15.1 % (ref 11.5–14.5)
GLUCOSE BLD MANUAL STRIP-MCNC: 108 MG/DL (ref 74–99)
GLUCOSE SERPL-MCNC: 97 MG/DL (ref 74–99)
HCT VFR BLD AUTO: 46.2 % (ref 36–46)
HGB BLD-MCNC: 14 G/DL (ref 12–16)
MCH RBC QN AUTO: 28.5 PG (ref 26–34)
MCHC RBC AUTO-ENTMCNC: 30.3 G/DL (ref 32–36)
MCV RBC AUTO: 94 FL (ref 80–100)
NRBC BLD-RTO: 0 /100 WBCS (ref 0–0)
PLATELET # BLD AUTO: 164 X10*3/UL (ref 150–450)
POTASSIUM SERPL-SCNC: 4.5 MMOL/L (ref 3.5–5.3)
RBC # BLD AUTO: 4.91 X10*6/UL (ref 4–5.2)
SODIUM SERPL-SCNC: 144 MMOL/L (ref 136–145)
WBC # BLD AUTO: 8.3 X10*3/UL (ref 4.4–11.3)

## 2024-03-29 PROCEDURE — 2500000004 HC RX 250 GENERAL PHARMACY W/ HCPCS (ALT 636 FOR OP/ED): Performed by: INTERNAL MEDICINE

## 2024-03-29 PROCEDURE — 85027 COMPLETE CBC AUTOMATED: CPT | Performed by: INTERNAL MEDICINE

## 2024-03-29 PROCEDURE — C1894 INTRO/SHEATH, NON-LASER: HCPCS | Performed by: INTERNAL MEDICINE

## 2024-03-29 PROCEDURE — 99153 MOD SED SAME PHYS/QHP EA: CPT | Performed by: INTERNAL MEDICINE

## 2024-03-29 PROCEDURE — C1887 CATHETER, GUIDING: HCPCS | Performed by: INTERNAL MEDICINE

## 2024-03-29 PROCEDURE — 80048 BASIC METABOLIC PNL TOTAL CA: CPT | Performed by: INTERNAL MEDICINE

## 2024-03-29 PROCEDURE — C1760 CLOSURE DEV, VASC: HCPCS | Performed by: INTERNAL MEDICINE

## 2024-03-29 PROCEDURE — 93454 CORONARY ARTERY ANGIO S&I: CPT | Performed by: INTERNAL MEDICINE

## 2024-03-29 PROCEDURE — 99152 MOD SED SAME PHYS/QHP 5/>YRS: CPT | Performed by: INTERNAL MEDICINE

## 2024-03-29 PROCEDURE — 36415 COLL VENOUS BLD VENIPUNCTURE: CPT | Performed by: INTERNAL MEDICINE

## 2024-03-29 PROCEDURE — 2720000007 HC OR 272 NO HCPCS: Performed by: INTERNAL MEDICINE

## 2024-03-29 PROCEDURE — 2550000001 HC RX 255 CONTRASTS: Performed by: INTERNAL MEDICINE

## 2024-03-29 PROCEDURE — 2780000003 HC OR 278 NO HCPCS: Performed by: INTERNAL MEDICINE

## 2024-03-29 PROCEDURE — G0378 HOSPITAL OBSERVATION PER HR: HCPCS

## 2024-03-29 PROCEDURE — 96373 THER/PROPH/DIAG INJ IA: CPT | Mod: 59 | Performed by: INTERNAL MEDICINE

## 2024-03-29 PROCEDURE — 2500000005 HC RX 250 GENERAL PHARMACY W/O HCPCS: Performed by: INTERNAL MEDICINE

## 2024-03-29 PROCEDURE — 82947 ASSAY GLUCOSE BLOOD QUANT: CPT | Mod: 59

## 2024-03-29 RX ORDER — VERAPAMIL HYDROCHLORIDE 2.5 MG/ML
INJECTION, SOLUTION INTRAVENOUS AS NEEDED
Status: DISCONTINUED | OUTPATIENT
Start: 2024-03-29 | End: 2024-03-29 | Stop reason: HOSPADM

## 2024-03-29 RX ORDER — ACETAMINOPHEN 325 MG/1
650 TABLET ORAL EVERY 4 HOURS PRN
Status: CANCELLED | OUTPATIENT
Start: 2024-03-29

## 2024-03-29 RX ORDER — NITROGLYCERIN 40 MG/100ML
INJECTION INTRAVENOUS AS NEEDED
Status: DISCONTINUED | OUTPATIENT
Start: 2024-03-29 | End: 2024-03-29 | Stop reason: HOSPADM

## 2024-03-29 RX ORDER — MIDAZOLAM HYDROCHLORIDE 1 MG/ML
INJECTION INTRAMUSCULAR; INTRAVENOUS AS NEEDED
Status: DISCONTINUED | OUTPATIENT
Start: 2024-03-29 | End: 2024-03-29 | Stop reason: HOSPADM

## 2024-03-29 RX ORDER — SODIUM CHLORIDE 9 MG/ML
75 INJECTION, SOLUTION INTRAVENOUS CONTINUOUS
Status: ACTIVE | OUTPATIENT
Start: 2024-03-29 | End: 2024-03-30

## 2024-03-29 RX ORDER — HEPARIN SODIUM 5000 [USP'U]/ML
5000 INJECTION, SOLUTION INTRAVENOUS; SUBCUTANEOUS EVERY 8 HOURS
Status: DISCONTINUED | OUTPATIENT
Start: 2024-03-30 | End: 2024-03-30 | Stop reason: HOSPADM

## 2024-03-29 RX ORDER — HEPARIN SODIUM 1000 [USP'U]/ML
INJECTION, SOLUTION INTRAVENOUS; SUBCUTANEOUS AS NEEDED
Status: DISCONTINUED | OUTPATIENT
Start: 2024-03-29 | End: 2024-03-29 | Stop reason: HOSPADM

## 2024-03-29 RX ORDER — PANTOPRAZOLE SODIUM 40 MG/1
40 TABLET, DELAYED RELEASE ORAL DAILY
Status: DISCONTINUED | OUTPATIENT
Start: 2024-03-30 | End: 2024-03-30 | Stop reason: HOSPADM

## 2024-03-29 RX ORDER — ROSUVASTATIN CALCIUM 20 MG/1
40 TABLET, COATED ORAL DAILY
Status: DISCONTINUED | OUTPATIENT
Start: 2024-03-30 | End: 2024-03-30 | Stop reason: HOSPADM

## 2024-03-29 RX ORDER — ASPIRIN 81 MG/1
81 TABLET ORAL DAILY
Status: DISCONTINUED | OUTPATIENT
Start: 2024-03-30 | End: 2024-03-30 | Stop reason: HOSPADM

## 2024-03-29 RX ORDER — ACETAMINOPHEN 650 MG/1
650 SUPPOSITORY RECTAL EVERY 4 HOURS PRN
Status: CANCELLED | OUTPATIENT
Start: 2024-03-29

## 2024-03-29 RX ORDER — FENTANYL CITRATE 50 UG/ML
INJECTION, SOLUTION INTRAMUSCULAR; INTRAVENOUS AS NEEDED
Status: DISCONTINUED | OUTPATIENT
Start: 2024-03-29 | End: 2024-03-29 | Stop reason: HOSPADM

## 2024-03-29 RX ORDER — CLOPIDOGREL BISULFATE 75 MG/1
75 TABLET ORAL DAILY
Status: DISCONTINUED | OUTPATIENT
Start: 2024-03-30 | End: 2024-03-30 | Stop reason: HOSPADM

## 2024-03-29 RX ORDER — LIDOCAINE HYDROCHLORIDE 10 MG/ML
INJECTION, SOLUTION EPIDURAL; INFILTRATION; INTRACAUDAL; PERINEURAL AS NEEDED
Status: DISCONTINUED | OUTPATIENT
Start: 2024-03-29 | End: 2024-03-29 | Stop reason: HOSPADM

## 2024-03-29 RX ORDER — ACETAMINOPHEN 325 MG/1
650 TABLET ORAL EVERY 6 HOURS PRN
Status: DISCONTINUED | OUTPATIENT
Start: 2024-03-29 | End: 2024-03-30 | Stop reason: HOSPADM

## 2024-03-29 RX ORDER — ACETAMINOPHEN 160 MG/5ML
650 SOLUTION ORAL EVERY 4 HOURS PRN
Status: CANCELLED | OUTPATIENT
Start: 2024-03-29

## 2024-03-29 RX ORDER — POLYETHYLENE GLYCOL 3350 17 G/17G
17 POWDER, FOR SOLUTION ORAL DAILY
Status: DISCONTINUED | OUTPATIENT
Start: 2024-03-30 | End: 2024-03-30 | Stop reason: HOSPADM

## 2024-03-29 RX ORDER — ENOXAPARIN SODIUM 100 MG/ML
40 INJECTION SUBCUTANEOUS EVERY 24 HOURS
Status: CANCELLED | OUTPATIENT
Start: 2024-03-29

## 2024-03-29 RX ORDER — LOSARTAN POTASSIUM 25 MG/1
25 TABLET ORAL DAILY
Status: DISCONTINUED | OUTPATIENT
Start: 2024-03-30 | End: 2024-03-30 | Stop reason: HOSPADM

## 2024-03-29 RX ADMIN — ACETAMINOPHEN 650 MG: 325 TABLET ORAL at 22:00

## 2024-03-29 SDOH — SOCIAL STABILITY: SOCIAL INSECURITY: ARE THERE ANY APPARENT SIGNS OF INJURIES/BEHAVIORS THAT COULD BE RELATED TO ABUSE/NEGLECT?: NO

## 2024-03-29 SDOH — SOCIAL STABILITY: SOCIAL INSECURITY: DO YOU FEEL ANYONE HAS EXPLOITED OR TAKEN ADVANTAGE OF YOU FINANCIALLY OR OF YOUR PERSONAL PROPERTY?: NO

## 2024-03-29 SDOH — SOCIAL STABILITY: SOCIAL INSECURITY: ABUSE: ADULT

## 2024-03-29 SDOH — SOCIAL STABILITY: SOCIAL INSECURITY: DOES ANYONE TRY TO KEEP YOU FROM HAVING/CONTACTING OTHER FRIENDS OR DOING THINGS OUTSIDE YOUR HOME?: NO

## 2024-03-29 SDOH — SOCIAL STABILITY: SOCIAL INSECURITY: ARE YOU OR HAVE YOU BEEN THREATENED OR ABUSED PHYSICALLY, EMOTIONALLY, OR SEXUALLY BY ANYONE?: NO

## 2024-03-29 SDOH — SOCIAL STABILITY: SOCIAL INSECURITY: HAS ANYONE EVER THREATENED TO HURT YOUR FAMILY OR YOUR PETS?: NO

## 2024-03-29 SDOH — SOCIAL STABILITY: SOCIAL INSECURITY: DO YOU FEEL UNSAFE GOING BACK TO THE PLACE WHERE YOU ARE LIVING?: NO

## 2024-03-29 SDOH — SOCIAL STABILITY: SOCIAL INSECURITY: HAVE YOU HAD THOUGHTS OF HARMING ANYONE ELSE?: NO

## 2024-03-29 SDOH — SOCIAL STABILITY: SOCIAL INSECURITY: WERE YOU ABLE TO COMPLETE ALL THE BEHAVIORAL HEALTH SCREENINGS?: YES

## 2024-03-29 ASSESSMENT — ENCOUNTER SYMPTOMS
FEVER: 0
DIAPHORESIS: 0
NAUSEA: 0
DYSURIA: 0
LIGHT-HEADEDNESS: 0
SORE THROAT: 0
CHEST TIGHTNESS: 0
VOMITING: 0
WHEEZING: 0
SHORTNESS OF BREATH: 0
DIZZINESS: 0
DIARRHEA: 0
PALPITATIONS: 0
AGITATION: 0
CHILLS: 0
CONSTIPATION: 0

## 2024-03-29 ASSESSMENT — COGNITIVE AND FUNCTIONAL STATUS - GENERAL
CLIMB 3 TO 5 STEPS WITH RAILING: A LITTLE
CLIMB 3 TO 5 STEPS WITH RAILING: A LITTLE
MOVING TO AND FROM BED TO CHAIR: A LITTLE
MOVING FROM LYING ON BACK TO SITTING ON SIDE OF FLAT BED WITH BEDRAILS: A LITTLE
STANDING UP FROM CHAIR USING ARMS: A LITTLE
WALKING IN HOSPITAL ROOM: A LITTLE
MOVING TO AND FROM BED TO CHAIR: A LITTLE
MOBILITY SCORE: 18
MOBILITY SCORE: 18
WALKING IN HOSPITAL ROOM: A LITTLE
PATIENT BASELINE BEDBOUND: NO
MOVING FROM LYING ON BACK TO SITTING ON SIDE OF FLAT BED WITH BEDRAILS: A LITTLE
DAILY ACTIVITIY SCORE: 24
STANDING UP FROM CHAIR USING ARMS: A LITTLE
TURNING FROM BACK TO SIDE WHILE IN FLAT BAD: A LITTLE
DAILY ACTIVITIY SCORE: 24
TURNING FROM BACK TO SIDE WHILE IN FLAT BAD: A LITTLE

## 2024-03-29 ASSESSMENT — ACTIVITIES OF DAILY LIVING (ADL)
HEARING - RIGHT EAR: FUNCTIONAL
GROOMING: INDEPENDENT
ADEQUATE_TO_COMPLETE_ADL: YES
FEEDING YOURSELF: INDEPENDENT
PATIENT'S MEMORY ADEQUATE TO SAFELY COMPLETE DAILY ACTIVITIES?: YES
DRESSING YOURSELF: INDEPENDENT
BATHING: INDEPENDENT
WALKS IN HOME: INDEPENDENT
TOILETING: INDEPENDENT
HEARING - LEFT EAR: FUNCTIONAL
LACK_OF_TRANSPORTATION: NO
JUDGMENT_ADEQUATE_SAFELY_COMPLETE_DAILY_ACTIVITIES: YES

## 2024-03-29 ASSESSMENT — COLUMBIA-SUICIDE SEVERITY RATING SCALE - C-SSRS
6. HAVE YOU EVER DONE ANYTHING, STARTED TO DO ANYTHING, OR PREPARED TO DO ANYTHING TO END YOUR LIFE?: NO
1. IN THE PAST MONTH, HAVE YOU WISHED YOU WERE DEAD OR WISHED YOU COULD GO TO SLEEP AND NOT WAKE UP?: NO
2. HAVE YOU ACTUALLY HAD ANY THOUGHTS OF KILLING YOURSELF?: NO

## 2024-03-29 ASSESSMENT — LIFESTYLE VARIABLES
AUDIT-C TOTAL SCORE: 0
AUDIT-C TOTAL SCORE: 0
HOW OFTEN DO YOU HAVE 6 OR MORE DRINKS ON ONE OCCASION: NEVER
SKIP TO QUESTIONS 9-10: 1
HOW MANY STANDARD DRINKS CONTAINING ALCOHOL DO YOU HAVE ON A TYPICAL DAY: PATIENT DOES NOT DRINK
HOW OFTEN DO YOU HAVE A DRINK CONTAINING ALCOHOL: NEVER

## 2024-03-29 ASSESSMENT — PATIENT HEALTH QUESTIONNAIRE - PHQ9
2. FEELING DOWN, DEPRESSED OR HOPELESS: NOT AT ALL
SUM OF ALL RESPONSES TO PHQ9 QUESTIONS 1 & 2: 0
1. LITTLE INTEREST OR PLEASURE IN DOING THINGS: NOT AT ALL

## 2024-03-29 ASSESSMENT — PAIN SCALES - GENERAL
PAINLEVEL_OUTOF10: 5 - MODERATE PAIN
PAINLEVEL_OUTOF10: 0 - NO PAIN
PAINLEVEL_OUTOF10: 5 - MODERATE PAIN

## 2024-03-29 ASSESSMENT — PAIN - FUNCTIONAL ASSESSMENT
PAIN_FUNCTIONAL_ASSESSMENT: 0-10
PAIN_FUNCTIONAL_ASSESSMENT: 0-10

## 2024-03-29 NOTE — PROGRESS NOTES
Pharmacy Medication History Review    Saritha Cormier is a 73 y.o. female admitted for Shortness of breath. Pharmacy reviewed the patient's [id number] medications and allergies for accuracy.          The list below reflects the updated PTA list. Comments regarding how patient may be taking medications differently can be found in the Admit Orders Activity  Prior to Admission Medications   Prescriptions Last Dose Informant Patient Reported?   allopurinol (Zyloprim) 100 mg tablet 3/29/2024 Self No   Sig: Take 2 tablets (200 mg) by mouth once daily.   aspirin 81 mg EC tablet 3/28/2024 Self No   Sig: TAKE 1 TABLET BY MOUTH EVERY DAY   cholecalciferol (Vitamin D-3) 125 MCG (5000 UT) capsule 3/28/2024 Self No   Sig: TAKE 1 CAPSULE (125 MCG) BY MOUTH ONCE DAILY.   clopidogrel (Plavix) 75 mg tablet 3/28/2024 Self No   Sig: TAKE 1 TABLET BY MOUTH EVERY DAY   ferrous gluconate 324 (38 Fe) mg tablet 3/29/2024 Self No   Sig: Take 1 tablet (324 mg) by mouth 2 times a day.   furosemide (Lasix) 40 mg tablet 3/28/2024 Self No   Sig: TAKE 1 TABLET BY MOUTH EVERY DAY   losartan (Cozaar) 25 mg tablet 3/29/2024 Self No   Sig: Take 1 tablet (25 mg) by mouth once daily. as directed   metFORMIN  mg 24 hr tablet 3/28/2024 Self No   Sig: TAKE 2 TABLETS BY MOUTH EVERY DAY   nitroglycerin (Nitrostat) 0.4 mg SL tablet Unknown Self No   Sig: Place 1 tablet (0.4 mg) under the tongue if needed for chest pain.   pantoprazole (ProtoNix) 40 mg EC tablet 3/29/2024 Self No   Sig: Take 1 tablet (40 mg) by mouth once daily.   rosuvastatin (Crestor) 40 mg tablet 3/29/2024 Self No   Sig: Take 1 tablet (40 mg) by mouth once daily.      Facility-Administered Medications: None        The list below reflects the updated allergy list. Please review each documented allergy for additional clarification and justification.  Allergies  Reviewed by Carl B Gillombardo, MD on 3/29/2024   Not on File         Patient declines M2B at  discharge.    Sources used to complete the med history include out patient fill history, OARRS, and patient interview along with 3/28/24 office visit Dr. PATTI Angulo in orthopedic surgery.       Below are additional concerns with the patient's PTA list.      Jose Perrin [facility]  Transitions of Care Clinical Pharmacist  Please reach out via Epic Chat for questions, if no response call  [phone] or [facility]

## 2024-03-29 NOTE — PRE-SEDATION DOCUMENTATION
Sedation Plan    ASA 3     Mallampati class: III.    Risks, benefits, and alternatives discussed with patient.       36.8

## 2024-03-29 NOTE — H&P
History Of Present Illness  Saritha Cormier is a 73 y.o. female with PMH of HFpEF, Aortic stenosis, breast CA who is being admitted overnight for observation to complete CT-TAVR in the morning.     Patient being evaluated for a hip replacement and was found to have aortic stenosis so she is undergoing the workup currently. Patient denies any shortness of breath, chest pains, lightheadedness or dizziness however, she is unable to move around much due to the pain in her hip.     Presented from home for elective R/LHC as part of the TAVR eval; to have CT TAVR done in AM.     Full Code     Past Medical History  Past Medical History:   Diagnosis Date    Acquired absence of bilateral breasts and nipples 01/31/2022    History of bilateral mastectomy    Neoplasm of unspecified behavior of bone, soft tissue, 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/2015    Skin growth    Other bursitis of hip, right hip     Bursitis of right hip, unspecified bursa    Personal history of other diseases of the respiratory system 03/14/2016    History of chronic sinusitis    Trigger finger, unspecified finger 06/22/2018    Acquired trigger finger    Unilateral primary osteoarthritis, right knee 02/19/2021    Arthritis of knee, right       Surgical History  Past Surgical History:   Procedure Laterality Date    HYSTERECTOMY  09/21/2015    Hysterectomy    MASTECTOMY  09/21/2015    Breast Surgery Mastectomy    OTHER SURGICAL HISTORY  09/21/2015    Breast Surgery Reconstruction        Social History  She reports that she has never smoked. She has never used smokeless tobacco. She reports that she does not drink alcohol and does not use drugs.    Family History  Family History   Problem Relation Name Age of Onset    Gout Mother      Hypertension Mother      Arthritis Father      Other (cardiac disorder) Father      Cancer Father      Tongue cancer Brother      Other (jaw neoplasm) Brother      Breast cancer Sibling      Pancreatic cancer Sibling         "  Allergies  Patient has no known allergies.    Review of Systems   Constitutional:  Negative for chills, diaphoresis and fever.   HENT:  Negative for ear pain and sore throat.    Respiratory:  Negative for chest tightness, shortness of breath and wheezing.    Cardiovascular:  Negative for chest pain, palpitations and leg swelling.   Gastrointestinal:  Negative for constipation, diarrhea, nausea and vomiting.   Genitourinary:  Negative for dysuria and urgency.   Skin:  Negative for rash.   Neurological:  Negative for dizziness and light-headedness.   Psychiatric/Behavioral:  Negative for agitation.         Physical Exam  Constitutional:       Appearance: Normal appearance.   HENT:      Mouth/Throat:      Mouth: Mucous membranes are moist.   Cardiovascular:      Rate and Rhythm: Normal rate and regular rhythm.      Heart sounds: Murmur heard.   Pulmonary:      Effort: Pulmonary effort is normal.      Breath sounds: Normal breath sounds.   Abdominal:      Palpations: Abdomen is soft.   Musculoskeletal:      Comments: Trace bilateral edema    Skin:     General: Skin is warm and dry.   Neurological:      General: No focal deficit present.      Mental Status: She is alert and oriented to person, place, and time.   Psychiatric:         Mood and Affect: Mood normal.          Last Recorded Vitals  Blood pressure 138/72, pulse 76, temperature 36.4 °C (97.5 °F), resp. rate 18, height 1.575 m (5' 2\"), weight 94.4 kg (208 lb 1.8 oz), SpO2 94 %.    Relevant Results             Assessment/Plan   Principal Problem:    Shortness of breath  Active Problems:    Aortic stenosis, severe    Abnormal EKG      Saritha Cormier is a 73 y.o. female with PMH of HFpEF, Aortic stenosis, breast CA who is being admitted overnight for observation after L/RHC to complete CT-TAVR in the morning.      Severe AS  - R/LHC as part of TAVR workup  - CT-MILVIA in the morning   - cont home medications     Full Code  I spent 45 minutes in the professional " and overall care of this patient.    Lolis Limon CNP  Interventional Cardiology     General Cardiology Consult Pager: [phone] (weekday 7AM-6PM and weekend 7AM-2PM)and other: [phone]  EP Consult Pager: [phone] (weekday 7AM-6PM and weekend 7AM-2PM) and other: [phone]  EP Device Nurse Pager: [phone] (weekday 7AM-4PM)  Advanced Heart Failure Consult Pager: [phone] anytime  CICU Fellow Pager: [phone] anytime  Endovascular /Limb Salvage Team Pager: [phone] for day coverage (8am-5pm)  Interventional Cardiology Fellow Pager: [phone] (7AM-5PM) Night coverage: [facility] Cross Cover [id number]  Structural Heart Team Pager: [phone] anytime  Night coverage: [facility] [id number]     Addendum by  Liliana Levine MD

## 2024-03-29 NOTE — POST-PROCEDURE NOTE
Physician Transition of Care Summary  Invasive Cardiovascular Lab    Procedure Date: 3/29/2024  Attending:    * Javier Reilly - Primary  Resident/Fellow/Other Assistant: Surgeon(s) and Role:     * Carl B Gillombardo, MD - Fellow    Indications:   Pre-op Diagnosis     * Shortness of breath [R06.02]     * Abnormal EKG [R94.31]     * Aortic stenosis, severe [I35.0]    Post-procedure diagnosis:   Post-op Diagnosis     * Shortness of breath [R06.02]     * Abnormal EKG [R94.31]     * Aortic stenosis, severe [I35.0]    Procedure(s):   Left And Right Heart Cath, With LV  41484 - OK R & L HRT CATH WINJX HRT ART& L VENTR IMG        Procedure Findings:   Coronary angiogram:   LM: Patent  LAD with no evidence of obstructive CAD  OM1 with prox 60% stenosis  LCX with no evidence of obstructive CAD  RCA no evidence of obstructive CAD    Description of the Procedure:   Access: Right radial artery; 6 Fr  Closure: TR band 15    Complications:   None    Stents/Implants:   N/A    Anticoagulation/Antiplatelet Plan:   Continue home Aspirin.    Estimated Blood Loss:   5 mL    Anesthesia: Moderate Sedation Anesthesia Staff: No anesthesia staff entered.    Any Specimen(s) Removed:   Order Name Source Comment Collection Info Order Time   BASIC METABOLIC PANEL Blood, Venous  Collected By: Mag Blair RN 3/29/2024  4:36 PM     Release result to [facility]   Immediate        CBC Blood, Venous  Collected By: Mag Blair RN 3/29/2024  4:36 PM     Release result to MyChart   Immediate            Disposition:   Home      Electronically signed by: Chava Chambers MD, 3/29/2024 6:42 PM

## 2024-03-30 VITALS
BODY MASS INDEX: 38.3 KG/M2 | TEMPERATURE: 97.5 F | OXYGEN SATURATION: 93 % | WEIGHT: 208.11 LBS | SYSTOLIC BLOOD PRESSURE: 152 MMHG | HEART RATE: 74 BPM | RESPIRATION RATE: 18 BRPM | HEIGHT: 62 IN | DIASTOLIC BLOOD PRESSURE: 95 MMHG

## 2024-03-30 LAB
ANION GAP SERPL CALC-SCNC: 14 MMOL/L (ref 10–20)
BUN SERPL-MCNC: 25 MG/DL (ref 6–23)
CALCIUM SERPL-MCNC: 9 MG/DL (ref 8.6–10.6)
CHLORIDE SERPL-SCNC: 105 MMOL/L (ref 98–107)
CO2 SERPL-SCNC: 27 MMOL/L (ref 21–32)
CREAT SERPL-MCNC: 1.27 MG/DL (ref 0.5–1.05)
EGFRCR SERPLBLD CKD-EPI 2021: 45 ML/MIN/1.73M*2
ERYTHROCYTE [DISTWIDTH] IN BLOOD BY AUTOMATED COUNT: 14.8 % (ref 11.5–14.5)
GLUCOSE SERPL-MCNC: 103 MG/DL (ref 74–99)
HCT VFR BLD AUTO: 39.6 % (ref 36–46)
HGB BLD-MCNC: 11.9 G/DL (ref 12–16)
MAGNESIUM SERPL-MCNC: 2.1 MG/DL (ref 1.6–2.4)
MCH RBC QN AUTO: 28.7 PG (ref 26–34)
MCHC RBC AUTO-ENTMCNC: 30.1 G/DL (ref 32–36)
MCV RBC AUTO: 96 FL (ref 80–100)
NRBC BLD-RTO: 0 /100 WBCS (ref 0–0)
PLATELET # BLD AUTO: 141 X10*3/UL (ref 150–450)
POTASSIUM SERPL-SCNC: 4.4 MMOL/L (ref 3.5–5.3)
RBC # BLD AUTO: 4.14 X10*6/UL (ref 4–5.2)
SODIUM SERPL-SCNC: 142 MMOL/L (ref 136–145)
WBC # BLD AUTO: 7.4 X10*3/UL (ref 4.4–11.3)

## 2024-03-30 PROCEDURE — 99239 HOSP IP/OBS DSCHRG MGMT >30: CPT | Performed by: INTERNAL MEDICINE

## 2024-03-30 PROCEDURE — 2500000004 HC RX 250 GENERAL PHARMACY W/ HCPCS (ALT 636 FOR OP/ED): Performed by: STUDENT IN AN ORGANIZED HEALTH CARE EDUCATION/TRAINING PROGRAM

## 2024-03-30 PROCEDURE — 36415 COLL VENOUS BLD VENIPUNCTURE: CPT | Performed by: STUDENT IN AN ORGANIZED HEALTH CARE EDUCATION/TRAINING PROGRAM

## 2024-03-30 PROCEDURE — 80048 BASIC METABOLIC PNL TOTAL CA: CPT | Performed by: STUDENT IN AN ORGANIZED HEALTH CARE EDUCATION/TRAINING PROGRAM

## 2024-03-30 PROCEDURE — 2500000002 HC RX 250 W HCPCS SELF ADMINISTERED DRUGS (ALT 637 FOR MEDICARE OP, ALT 636 FOR OP/ED): Performed by: STUDENT IN AN ORGANIZED HEALTH CARE EDUCATION/TRAINING PROGRAM

## 2024-03-30 PROCEDURE — 96372 THER/PROPH/DIAG INJ SC/IM: CPT | Performed by: STUDENT IN AN ORGANIZED HEALTH CARE EDUCATION/TRAINING PROGRAM

## 2024-03-30 PROCEDURE — 85027 COMPLETE CBC AUTOMATED: CPT | Performed by: STUDENT IN AN ORGANIZED HEALTH CARE EDUCATION/TRAINING PROGRAM

## 2024-03-30 PROCEDURE — 2500000001 HC RX 250 WO HCPCS SELF ADMINISTERED DRUGS (ALT 637 FOR MEDICARE OP): Performed by: STUDENT IN AN ORGANIZED HEALTH CARE EDUCATION/TRAINING PROGRAM

## 2024-03-30 PROCEDURE — 83735 ASSAY OF MAGNESIUM: CPT | Performed by: STUDENT IN AN ORGANIZED HEALTH CARE EDUCATION/TRAINING PROGRAM

## 2024-03-30 PROCEDURE — G0378 HOSPITAL OBSERVATION PER HR: HCPCS

## 2024-03-30 RX ORDER — ROSUVASTATIN CALCIUM 40 MG/1
40 TABLET, COATED ORAL DAILY
Start: 2024-03-31 | End: 2024-03-30 | Stop reason: HOSPADM

## 2024-03-30 RX ORDER — LOSARTAN POTASSIUM 25 MG/1
25 TABLET ORAL DAILY
Start: 2024-03-30 | End: 2024-03-30 | Stop reason: HOSPADM

## 2024-03-30 RX ORDER — ACETAMINOPHEN 325 MG/1
650 TABLET ORAL EVERY 6 HOURS PRN
Qty: 30 TABLET | Refills: 0 | Status: SHIPPED | OUTPATIENT
Start: 2024-03-30

## 2024-03-30 RX ORDER — CLOPIDOGREL BISULFATE 75 MG/1
75 TABLET ORAL DAILY
Start: 2024-03-30 | End: 2024-03-30 | Stop reason: HOSPADM

## 2024-03-30 RX ORDER — PANTOPRAZOLE SODIUM 40 MG/1
40 TABLET, DELAYED RELEASE ORAL DAILY
Start: 2024-03-31 | End: 2024-03-30 | Stop reason: HOSPADM

## 2024-03-30 RX ORDER — ASPIRIN 81 MG/1
81 TABLET ORAL DAILY
Start: 2024-03-31 | End: 2024-03-30 | Stop reason: HOSPADM

## 2024-03-30 RX ADMIN — ASPIRIN 81 MG: 81 TABLET, COATED ORAL at 08:42

## 2024-03-30 RX ADMIN — ACETAMINOPHEN 650 MG: 325 TABLET ORAL at 14:06

## 2024-03-30 RX ADMIN — ROSUVASTATIN CALCIUM 40 MG: 20 TABLET, COATED ORAL at 08:57

## 2024-03-30 RX ADMIN — HEPARIN SODIUM 5000 UNITS: 5000 INJECTION INTRAVENOUS; SUBCUTANEOUS at 08:42

## 2024-03-30 RX ADMIN — SODIUM CHLORIDE 75 ML/HR: 9 INJECTION, SOLUTION INTRAVENOUS at 02:03

## 2024-03-30 RX ADMIN — HEPARIN SODIUM 5000 UNITS: 5000 INJECTION INTRAVENOUS; SUBCUTANEOUS at 17:05

## 2024-03-30 RX ADMIN — ACETAMINOPHEN 650 MG: 325 TABLET ORAL at 06:06

## 2024-03-30 RX ADMIN — PANTOPRAZOLE SODIUM 40 MG: 40 TABLET, DELAYED RELEASE ORAL at 08:42

## 2024-03-30 ASSESSMENT — COGNITIVE AND FUNCTIONAL STATUS - GENERAL
WALKING IN HOSPITAL ROOM: A LITTLE
CLIMB 3 TO 5 STEPS WITH RAILING: A LITTLE
DAILY ACTIVITIY SCORE: 24
MOVING FROM LYING ON BACK TO SITTING ON SIDE OF FLAT BED WITH BEDRAILS: A LITTLE
MOVING TO AND FROM BED TO CHAIR: A LITTLE
TURNING FROM BACK TO SIDE WHILE IN FLAT BAD: A LITTLE
STANDING UP FROM CHAIR USING ARMS: A LITTLE
MOBILITY SCORE: 18

## 2024-03-30 ASSESSMENT — PAIN SCALES - GENERAL
PAINLEVEL_OUTOF10: 6
PAINLEVEL_OUTOF10: 1
PAINLEVEL_OUTOF10: 3
PAINLEVEL_OUTOF10: 3

## 2024-03-30 ASSESSMENT — PAIN - FUNCTIONAL ASSESSMENT: PAIN_FUNCTIONAL_ASSESSMENT: 0-10

## 2024-03-30 NOTE — HOSPITAL COURSE
Saritha Cormier is a 73 y.o. female with PMH of HFpEF, HTN, T2DM,  aortic stenosis, MI?, breast CA s/p lumpectomy followed by double mastectomy, and left hip replacement who is being admitted overnight for observation to complete CT-TAVR in the morning.   Patient is currently being evaluated for a hip replacement and was found to have worsening now severe aortic stenosis so she is undergoing the workup currently for TAVR.  She has not experienced SOB, lightheadness, dizziness, chest pain or edema.     On 3/29 she presented from home for elective R/LHC as part of the TAVR evaluation.  After RHC/LHC pt had some bleeding at left radial site which was contained overnight.  LHC did not show obstructive CAD except OM1 (prox 60% stenosis).  She denied any shortness of breath, chest pains, lightheadedness or dizziness,   Her activity though has been limited due to the pain in her hip.  She was ordered CT TAVR but specialty imaging is not done over the weekend so she is being discharged home.    Her VS are stable; PE unremarkable      She is being discharged home today with plan for continuation of outpatient TAVR workup.

## 2024-03-30 NOTE — CARE PLAN
The patient's goals for the shift include      The clinical goals for the shift include pt will remain HDS      Problem: Pain  Goal: My pain/discomfort is manageable  Outcome: Progressing     Problem: Safety  Goal: Patient will be injury free during hospitalization  Outcome: Progressing  Goal: I will remain free of falls  Outcome: Progressing     Problem: Daily Care  Goal: Daily care needs are met  Outcome: Progressing     Problem: Psychosocial Needs  Goal: Demonstrates ability to cope with hospitalization/illness  Outcome: Progressing  Goal: Collaborate with me, my family, and caregiver to identify my specific goals  Outcome: Progressing  Flowsheets (Taken 3/29/2024 2001)  Cultural Requests During Hospitalization: none  Spiritual Requests During Hospitalization: none     Problem: Discharge Barriers  Goal: My discharge needs are met  Outcome: Progressing

## 2024-03-30 NOTE — NURSING NOTE
Nursing Discharge Note: Pt discharging to home from [facility]. Discharge instructions reviewed, pt verbalizes understanding. IV and tele removed.

## 2024-03-30 NOTE — DISCHARGE SUMMARY
Discharge Diagnosis  Shortness of breath    Issues Requiring Follow-Up  Continued TAVR workup for severe AS    Hospital Course  Saritha Cormier is a 73 y.o. female with PMH of HFpEF, HTN, T2DM,  aortic stenosis, MI?, breast CA s/p lumpectomy followed by double mastectomy, and left hip replacement who is being admitted overnight for observation to complete CT-TAVR in the morning.   Patient is currently being evaluated for a hip replacement and was found to have worsening now severe aortic stenosis so she is undergoing the workup currently for TAVR.  She has not experienced SOB, lightheadness, dizziness, chest pain or edema.     On 3/29 she presented from home for elective R/LHC as part of the TAVR evaluation.  After RHC/LHC pt had some bleeding at left radial site which was contained overnight.  She denied any shortness of breath, chest pains, lightheadedness or dizziness,   Her activity though has been limited due to the pain in her hip.  She was ordered CT TAVR but specialty imaging is not done over the weekend so she is being discharged home.    Her VS are stable; PE unremarkable      She is being discharged home today with plan for continuation of outpatient TAVR workup.        Home Medications  Allopurinol 200 mg po daily  ASA 81 ng po daily  Vit D 125 mcg po daily  Plavix 75 mg po daily  Ferrous gluconate 324 mg po BID  Lasix 40 mg po daily  Losartan 25 mg po daily  Metformin  mg po daily  Protonix 40 mg po daily  Rosuvastatin 40 mg po daily       PRN medications  PRN medications: acetaminophen   NTG 0.4 SL as needed    Outpatient Follow-Up  Future Appointments   Date Time Provider Department Center   6/17/2024 10:30 AM Alva Prince MD [id number] [facility]   9/9/2024 10:20 AM Javier Reilly MD [id number] [facility]       Nivia Joshi, APRN-CNP

## 2024-03-30 NOTE — DISCHARGE INSTRUCTIONS
DISCHARGE INSTRUCTIONS FOR RADIAL (WRIST) ACCESS AFTER HEART CATHETERIZATION    Next 24 hrs.  -DO NOT drive a car, operate machinery or power tools.  It is recommended that a responsible adult be with you for the first 24 hours.   -DO NOT drink any alcoholic drinks or take any non-prescriptive medications that contain alcohol for the first 24 hours.   -DO NOT make any important decisions for the first 24 hours.  -You are advised to go directly home from the hospital    Wound Care:  -You may experience some tenderness, bruising or minimal inflammation.  If you have any concerns, you may contact the Cath Lab or if any of these symptoms become excessive, contact your cardiologist or go to the emergency room.   -No tub baths, soaking, or swimming for one week.   -May shower the next day after your procedure.    Radial site Care:   -DO NOT lift anything with your affected arm for the next 24 hours, then no lifting greater than 5 pounds with your affected arm for 5 more days. No bending or flexing the affected wrist.   -Avoid immersion in water of the affected site for the next 3 days.   -Apply manual pressure and call physician immediately if bleeding or hematoma occurs at the site.   -Remove dressing the next day and keep site clean, dry, and covered with a new band aid daily until healed.   -Report any symptoms other than slight tenderness at the site or tingling of the fingers and hand for up to 3 days.    Diet:   -Heart healthy diet; Low cholesterol, fat and sodium    Other Instructions:  -If you develop difficulty breathing, rash, hives, fever, chills, severe nausea, vomiting, light-headedness or any signs of infection, immediately contact your doctor and go to the nearest emergency room.      You will be contacted by cardiology regarding scheduling an outpatient CT TAVR

## 2024-04-02 ENCOUNTER — TELEPHONE (OUTPATIENT)
Dept: PRIMARY CARE | Facility: CLINIC | Age: 74
End: 2024-04-02
Payer: MEDICARE

## 2024-04-02 ENCOUNTER — APPOINTMENT (OUTPATIENT)
Dept: RADIOLOGY | Facility: HOSPITAL | Age: 74
End: 2024-04-02
Payer: COMMERCIAL

## 2024-04-02 DIAGNOSIS — M16.10 ARTHRITIS OF HIP: Primary | ICD-10-CM

## 2024-04-02 NOTE — TELEPHONE ENCOUNTER
Transition of Care    Inpatient facility: [facility]  Discharge diagnosis: Shortness of breath  Discharged to: home  Discharge date: 3/30/2024  Initial Call date: 4/2/2024  Spoke with patient/caregiver: patient                                                                     Do you need assistance  visits prior to your PCP visit: No  Home health care ordered: No  Have you been contacted by home care and have a start of care date: No  Are you taking medications as prescribed at discharge: Yes    Referral to [facility] Pharmacist: No  Patient advised to bring all medications to PCP follow-up appointment.  Patient advised to follow discharge instructions until provider follow-up.  TCM visit date: 2/4/2024  TCM provider visit with: Dr. Alva Prince    TCM visit must be scheduled to occur within 14 days of discharge (included DC date).  When possible TCM visit should be scheduled within 7 days.: visit scheduled within the 7 days

## 2024-04-03 ENCOUNTER — APPOINTMENT (OUTPATIENT)
Dept: RADIOLOGY | Facility: HOSPITAL | Age: 74
End: 2024-04-03
Payer: COMMERCIAL

## 2024-04-03 ENCOUNTER — HOSPITAL ENCOUNTER (OUTPATIENT)
Dept: RADIOLOGY | Facility: HOSPITAL | Age: 74
Discharge: HOME | End: 2024-04-03
Payer: MEDICARE

## 2024-04-03 VITALS
SYSTOLIC BLOOD PRESSURE: 131 MMHG | DIASTOLIC BLOOD PRESSURE: 74 MMHG | RESPIRATION RATE: 18 BRPM | HEART RATE: 84 BPM | OXYGEN SATURATION: 94 %

## 2024-04-03 DIAGNOSIS — M16.10 ARTHRITIS OF HIP: ICD-10-CM

## 2024-04-03 PROCEDURE — 2500000004 HC RX 250 GENERAL PHARMACY W/ HCPCS (ALT 636 FOR OP/ED): Performed by: STUDENT IN AN ORGANIZED HEALTH CARE EDUCATION/TRAINING PROGRAM

## 2024-04-03 PROCEDURE — 20610 DRAIN/INJ JOINT/BURSA W/O US: CPT | Mod: LEFT SIDE | Performed by: STUDENT IN AN ORGANIZED HEALTH CARE EDUCATION/TRAINING PROGRAM

## 2024-04-03 PROCEDURE — 77002 NEEDLE LOCALIZATION BY XRAY: CPT | Mod: LEFT SIDE | Performed by: STUDENT IN AN ORGANIZED HEALTH CARE EDUCATION/TRAINING PROGRAM

## 2024-04-03 PROCEDURE — 2550000001 HC RX 255 CONTRASTS: Performed by: ORTHOPAEDIC SURGERY

## 2024-04-03 PROCEDURE — 77002 NEEDLE LOCALIZATION BY XRAY: CPT | Mod: LT

## 2024-04-03 RX ORDER — BUPIVACAINE HYDROCHLORIDE 2.5 MG/ML
INJECTION, SOLUTION EPIDURAL; INFILTRATION; INTRACAUDAL
Status: DISCONTINUED
Start: 2024-04-03 | End: 2024-04-04 | Stop reason: HOSPADM

## 2024-04-03 RX ORDER — TRIAMCINOLONE ACETONIDE 40 MG/ML
INJECTION, SUSPENSION INTRA-ARTICULAR; INTRAMUSCULAR
Status: COMPLETED | OUTPATIENT
Start: 2024-04-03 | End: 2024-04-03

## 2024-04-03 RX ORDER — BUPIVACAINE HYDROCHLORIDE 2.5 MG/ML
INJECTION, SOLUTION EPIDURAL; INFILTRATION; INTRACAUDAL
Status: COMPLETED | OUTPATIENT
Start: 2024-04-03 | End: 2024-04-03

## 2024-04-03 RX ORDER — TRIAMCINOLONE ACETONIDE 40 MG/ML
INJECTION, SUSPENSION INTRA-ARTICULAR; INTRAMUSCULAR
Status: DISCONTINUED
Start: 2024-04-03 | End: 2024-04-04 | Stop reason: HOSPADM

## 2024-04-03 RX ORDER — LIDOCAINE HYDROCHLORIDE 10 MG/ML
4 INJECTION, SOLUTION EPIDURAL; INFILTRATION; INTRACAUDAL; PERINEURAL ONCE
Status: CANCELLED | OUTPATIENT
Start: 2024-04-03 | End: 2024-04-03

## 2024-04-03 RX ADMIN — TRIAMCINOLONE ACETONIDE 40 MG: 40 INJECTION, SUSPENSION INTRA-ARTICULAR; INTRAMUSCULAR at 13:21

## 2024-04-03 RX ADMIN — TRIAMCINOLONE ACETONIDE 40 MG: 40 INJECTION, SUSPENSION INTRA-ARTICULAR; INTRAMUSCULAR at 13:22

## 2024-04-03 RX ADMIN — IOHEXOL 1 ML: 240 INJECTION, SOLUTION INTRATHECAL; INTRAVASCULAR; INTRAVENOUS; ORAL at 13:32

## 2024-04-03 RX ADMIN — BUPIVACAINE HYDROCHLORIDE 5 ML: 2.5 INJECTION, SOLUTION EPIDURAL; INFILTRATION; INTRACAUDAL; PERINEURAL at 13:22

## 2024-04-04 ENCOUNTER — OFFICE VISIT (OUTPATIENT)
Dept: PRIMARY CARE | Facility: CLINIC | Age: 74
End: 2024-04-04
Payer: MEDICARE

## 2024-04-04 VITALS — HEART RATE: 101 BPM | OXYGEN SATURATION: 94 % | TEMPERATURE: 96.9 F

## 2024-04-04 DIAGNOSIS — N18.31 STAGE 3A CHRONIC KIDNEY DISEASE (MULTI): ICD-10-CM

## 2024-04-04 DIAGNOSIS — M17.12 ARTHRITIS OF KNEE, LEFT: ICD-10-CM

## 2024-04-04 DIAGNOSIS — M16.12 ARTHRITIS OF LEFT HIP: Primary | ICD-10-CM

## 2024-04-04 DIAGNOSIS — N18.31 TYPE 2 DIABETES MELLITUS WITH STAGE 3A CHRONIC KIDNEY DISEASE, WITHOUT LONG-TERM CURRENT USE OF INSULIN (MULTI): ICD-10-CM

## 2024-04-04 DIAGNOSIS — E11.22 TYPE 2 DIABETES MELLITUS WITH STAGE 3A CHRONIC KIDNEY DISEASE, WITHOUT LONG-TERM CURRENT USE OF INSULIN (MULTI): ICD-10-CM

## 2024-04-04 DIAGNOSIS — I10 HYPERTENSION, UNSPECIFIED TYPE: ICD-10-CM

## 2024-04-04 DIAGNOSIS — I35.0 AORTIC VALVE STENOSIS, ETIOLOGY OF CARDIAC VALVE DISEASE UNSPECIFIED: ICD-10-CM

## 2024-04-04 PROCEDURE — 1159F MED LIST DOCD IN RCRD: CPT | Performed by: INTERNAL MEDICINE

## 2024-04-04 PROCEDURE — 1036F TOBACCO NON-USER: CPT | Performed by: INTERNAL MEDICINE

## 2024-04-04 PROCEDURE — 99496 TRANSJ CARE MGMT HIGH F2F 7D: CPT | Performed by: INTERNAL MEDICINE

## 2024-04-04 PROCEDURE — 1160F RVW MEDS BY RX/DR IN RCRD: CPT | Performed by: INTERNAL MEDICINE

## 2024-04-04 PROCEDURE — 3008F BODY MASS INDEX DOCD: CPT | Performed by: INTERNAL MEDICINE

## 2024-04-04 PROCEDURE — 4010F ACE/ARB THERAPY RXD/TAKEN: CPT | Performed by: INTERNAL MEDICINE

## 2024-04-04 RX ORDER — TRAMADOL HYDROCHLORIDE 50 MG/1
50 TABLET ORAL 2 TIMES DAILY
Qty: 28 TABLET | Refills: 0 | Status: SHIPPED | OUTPATIENT
Start: 2024-04-04 | End: 2024-04-18 | Stop reason: SDUPTHER

## 2024-04-04 ASSESSMENT — ENCOUNTER SYMPTOMS
SORE THROAT: 0
COUGH: 0
BLOOD IN STOOL: 0
PALPITATIONS: 0
ABDOMINAL PAIN: 0
HEADACHES: 0
DIZZINESS: 0
FATIGUE: 0
BRUISES/BLEEDS EASILY: 0
SINUS PAIN: 0
ARTHRALGIAS: 1
DIARRHEA: 0
FEVER: 0
WHEEZING: 0
DIFFICULTY URINATING: 0
UNEXPECTED WEIGHT CHANGE: 0
BACK PAIN: 1

## 2024-04-04 NOTE — PROGRESS NOTES
Subjective   Patient ID: Saritha Cormier is a 73 y.o. female who presents for Hospital Follow-up (TCM).    Transition of care  Patient admission history and physical, hospital course, medications, verified and reviewed  Patient contacted after discharge, medications verified comes today for follow-up    Inpatient facility: [facility]  Discharge diagnosis: Shortness of breath  Discharged to: home  Discharge date: 3/30/2024  Initial Call date: 4/2/2024  Spoke with patient/caregiver: patient                                                                      Do you need assistance  visits prior to your PCP visit: No  Home health care ordered: No  Have you been contacted by home care and have a start of care date: No  Are you taking medications as prescribed at discharge: Yes     Referral to [facility] Pharmacist: No  Patient advised to bring all medications to PCP follow-up appointment.  Patient advised to follow discharge instructions until provider follow-up.  TCM visit date: 2/4/2024  TCM provider visit with: Dr. Alva Prince       Hospital Course  Saritha Cormier is a 73 y.o. female with PMH of HFpEF, HTN, T2DM,  aortic stenosis, MI?, breast CA s/p lumpectomy followed by double mastectomy, and left hip replacement who is being admitted overnight for observation to complete CT-TAVR in the morning.   Patient is currently being evaluated for a hip replacement and was found to have worsening now severe aortic stenosis so she is undergoing the workup currently for TAVR.  She has not experienced SOB, lightheadness, dizziness, chest pain or edema.     On 3/29 she presented from home for elective R/LHC as part of the TAVR evaluation.  After RHC/LHC pt had some bleeding at left radial site which was contained overnight.  She denied any shortness of breath, chest pains, lightheadedness or dizziness,   Her activity though has been limited due to the pain in her hip.  She was ordered  CT TAVR but specialty imaging is not done over the weekend so she is being discharged home    -April 3 patient underwent guided steroid injection to the left hip    - Status post left hip steroid injection severe pain patient will be started on tramadol 50 mg twice a day and reevaluate patient in 2 weeks for further recommendation  Patient will need hip arthroplasty after 6 weeks from the TAVR  Patient still in severe pain will monitor patient pain closely until surgical intervention  -Severe aortic stenosis patient evaluated by cardiac cath for TAVR awaiting surgical appointment  -Diabetes controlled hemoglobin A1c 7.2 counseled about low-carb diet exercise and weight loss  -Left knee pain arthritis secondary to hip arthritis continue monitor conservatively  -- Diastolic heart failure compensated continue with current medication low-salt diet continue low-salt diet  - Hypertension controlled continue with current meds, controlled  - Chronic gout no flareup continue low uric acid diet  - History of respiratory failure no recurrence  -Chronic kidney disease compensated avoid salt food repeat renal function albumin urine spot  - Hypercholesteremia controlled continue with lipid profile  Follow-up 2 weeks                Review of Systems   Constitutional:  Negative for fatigue, fever and unexpected weight change.   HENT:  Negative for congestion, ear discharge, ear pain, mouth sores, sinus pain and sore throat.    Eyes:  Negative for visual disturbance.   Respiratory:  Negative for cough and wheezing.    Cardiovascular:  Negative for chest pain, palpitations and leg swelling.   Gastrointestinal:  Negative for abdominal pain, blood in stool and diarrhea.   Genitourinary:  Negative for difficulty urinating.   Musculoskeletal:  Positive for arthralgias, back pain and gait problem.   Skin:  Negative for rash.   Neurological:  Negative for dizziness and headaches.   Hematological:  Does not bruise/bleed easily.    Psychiatric/Behavioral:  Negative for behavioral problems.    All other systems reviewed and are negative.      Objective   Lab Results   Component Value Date    HGBA1C 7.2 (A) 01/30/2024      Pulse 101   Temp 36.1 °C (96.9 °F)   SpO2 94%     Physical Exam  Vitals and nursing note reviewed.   Constitutional:       Appearance: Normal appearance.   HENT:      Head: Normocephalic.      Nose: Nose normal.   Eyes:      Conjunctiva/sclera: Conjunctivae normal.      Pupils: Pupils are equal, round, and reactive to light.   Cardiovascular:      Rate and Rhythm: Regular rhythm.   Pulmonary:      Effort: Pulmonary effort is normal.      Breath sounds: Normal breath sounds.   Abdominal:      General: Abdomen is flat.      Palpations: Abdomen is soft.   Musculoskeletal:         General: Tenderness (Severe left hip tenderness) present.      Cervical back: Neck supple.   Skin:     General: Skin is warm.   Neurological:      General: No focal deficit present.      Mental Status: She is oriented to person, place, and time.   Psychiatric:         Mood and Affect: Mood normal.         Assessment/Plan   Saritha was seen today for hospital follow-up.  Diagnoses and all orders for this visit:  Arthritis of left hip (Primary)  Arthritis of knee, left  Aortic valve stenosis, etiology of cardiac valve disease unspecified  Stage 3a chronic kidney disease (CMS/[facility])  Type 2 diabetes mellitus with stage 3a chronic kidney disease, without long-term current use of insulin (CMS/[facility])  Hypertension, unspecified type   Transition of care  Patient admission history and physical, hospital course, medications, verified and reviewed  Patient contacted after discharge, medications verified comes today for follow-up    Inpatient facility: [facility]  Discharge diagnosis: Shortness of breath  Discharged to: home  Discharge date: 3/30/2024  Initial Call date: 4/2/2024  Spoke with patient/caregiver: patient                                                                       Do you need assistance  visits prior to your PCP visit: No  Home health care ordered: No  Have you been contacted by home care and have a start of care date: No  Are you taking medications as prescribed at discharge: Yes     Referral to [facility] Pharmacist: No  Patient advised to bring all medications to PCP follow-up appointment.  Patient advised to follow discharge instructions until provider follow-up.  TCM visit date: 2/4/2024  TCM provider visit with: Dr. Alva Prince       Hospital Course  Saritha Cormier is a 73 y.o. female with PMH of HFpEF, HTN, T2DM,  aortic stenosis, MI?, breast CA s/p lumpectomy followed by double mastectomy, and left hip replacement who is being admitted overnight for observation to complete CT-TAVR in the morning.   Patient is currently being evaluated for a hip replacement and was found to have worsening now severe aortic stenosis so she is undergoing the workup currently for TAVR.  She has not experienced SOB, lightheadness, dizziness, chest pain or edema.     On 3/29 she presented from home for elective R/LHC as part of the TAVR evaluation.  After RHC/LHC pt had some bleeding at left radial site which was contained overnight.  She denied any shortness of breath, chest pains, lightheadedness or dizziness,   Her activity though has been limited due to the pain in her hip.  She was ordered CT TAVR but specialty imaging is not done over the weekend so she is being discharged home  OARRS:  Alva Prince MD on 4/4/2024  1:57 PM  I have personally reviewed the OARRS report for Saritha Cormier. I have considered the risks of abuse, dependence, addiction and diversion    Is the patient prescribed a combination of a benzodiazepine and opioid?  Yes, I feel it is clincially indicated to continue the medication and have discussed with the patient risks/benefits/alternatives.    Last Urine Drug Screen / ordered today: No  No  results found for this or any previous visit (from the past 8760 hour(s)).  N/A    Clinical rationale for not completing a Urine Drug Screen: Acute pain      Controlled Substance Agreement:  Date of the Last Agreement: 4/4/2024  Reviewed Controlled Substance Agreement including but not limited to the benefits, risks, and alternatives to treatment with a Controlled Substance medication(s).    Opioids:  What is the patient's goal of therapy?  Pain control until surgery  Is this being achieved with current treatment?  No    I have calculated the patient's Morphine Dose Equivalent (MED):   I have considered referral to Pain Management and/or a specialist, and do not feel it is necessary at this time.    I feel that it is clinically indicated to continue this current medication regimen after consideration of alternative therapies, and other non-opioid treatment.    Opioid Risk Screening:  No data recorded    Pain Assessment:  No data recorded

## 2024-04-08 DIAGNOSIS — I35.0 AORTIC STENOSIS, SEVERE: Primary | ICD-10-CM

## 2024-04-08 DIAGNOSIS — E11.22 TYPE 2 DIABETES MELLITUS WITH STAGE 3A CHRONIC KIDNEY DISEASE, WITHOUT LONG-TERM CURRENT USE OF INSULIN (MULTI): ICD-10-CM

## 2024-04-08 DIAGNOSIS — N18.31 TYPE 2 DIABETES MELLITUS WITH STAGE 3A CHRONIC KIDNEY DISEASE, WITHOUT LONG-TERM CURRENT USE OF INSULIN (MULTI): ICD-10-CM

## 2024-04-08 DIAGNOSIS — I35.0 AORTIC VALVE STENOSIS, ETIOLOGY OF CARDIAC VALVE DISEASE UNSPECIFIED: Primary | ICD-10-CM

## 2024-04-08 DIAGNOSIS — Z01.810 PREOP CARDIOVASCULAR EXAM: Primary | ICD-10-CM

## 2024-04-08 DIAGNOSIS — I50.32 CHRONIC DIASTOLIC HEART FAILURE (MULTI): Primary | ICD-10-CM

## 2024-04-09 ENCOUNTER — LAB (OUTPATIENT)
Dept: LAB | Facility: LAB | Age: 74
End: 2024-04-09
Payer: MEDICARE

## 2024-04-09 DIAGNOSIS — I35.0 AORTIC STENOSIS, SEVERE: ICD-10-CM

## 2024-04-09 DIAGNOSIS — E11.22 TYPE 2 DIABETES MELLITUS WITH STAGE 3A CHRONIC KIDNEY DISEASE, WITHOUT LONG-TERM CURRENT USE OF INSULIN (MULTI): ICD-10-CM

## 2024-04-09 DIAGNOSIS — R06.02 SHORTNESS OF BREATH: ICD-10-CM

## 2024-04-09 DIAGNOSIS — R53.83 OTHER FATIGUE: ICD-10-CM

## 2024-04-09 DIAGNOSIS — E53.8 VITAMIN B12 DEFICIENCY: ICD-10-CM

## 2024-04-09 DIAGNOSIS — Z79.899 HIGH RISK MEDICATION USE: ICD-10-CM

## 2024-04-09 DIAGNOSIS — I10 HYPERTENSION, UNSPECIFIED TYPE: ICD-10-CM

## 2024-04-09 DIAGNOSIS — N18.31 TYPE 2 DIABETES MELLITUS WITH STAGE 3A CHRONIC KIDNEY DISEASE, WITHOUT LONG-TERM CURRENT USE OF INSULIN (MULTI): ICD-10-CM

## 2024-04-09 DIAGNOSIS — E55.9 VITAMIN D DEFICIENCY, UNSPECIFIED: ICD-10-CM

## 2024-04-09 DIAGNOSIS — E78.00 HYPERCHOLESTEREMIA: ICD-10-CM

## 2024-04-09 LAB
ANION GAP SERPL CALC-SCNC: 15 MMOL/L (ref 10–20)
BASOPHILS # BLD AUTO: 0.01 X10*3/UL (ref 0–0.1)
BASOPHILS NFR BLD AUTO: 0.1 %
BUN SERPL-MCNC: 36 MG/DL (ref 6–23)
CALCIUM SERPL-MCNC: 10 MG/DL (ref 8.6–10.3)
CHLORIDE SERPL-SCNC: 101 MMOL/L (ref 98–107)
CO2 SERPL-SCNC: 25 MMOL/L (ref 21–32)
CREAT SERPL-MCNC: 1.59 MG/DL (ref 0.5–1.05)
EGFRCR SERPLBLD CKD-EPI 2021: 35 ML/MIN/1.73M*2
EOSINOPHIL # BLD AUTO: 0.13 X10*3/UL (ref 0–0.4)
EOSINOPHIL NFR BLD AUTO: 1.2 %
ERYTHROCYTE [DISTWIDTH] IN BLOOD BY AUTOMATED COUNT: 15.3 % (ref 11.5–14.5)
GLUCOSE SERPL-MCNC: 123 MG/DL (ref 74–99)
HCT VFR BLD AUTO: 46 % (ref 36–46)
HGB BLD-MCNC: 14.2 G/DL (ref 12–16)
IMM GRANULOCYTES # BLD AUTO: 0.03 X10*3/UL (ref 0–0.5)
IMM GRANULOCYTES NFR BLD AUTO: 0.3 % (ref 0–0.9)
LYMPHOCYTES # BLD AUTO: 1.31 X10*3/UL (ref 0.8–3)
LYMPHOCYTES NFR BLD AUTO: 11.7 %
MCH RBC QN AUTO: 29.2 PG (ref 26–34)
MCHC RBC AUTO-ENTMCNC: 30.9 G/DL (ref 32–36)
MCV RBC AUTO: 95 FL (ref 80–100)
MONOCYTES # BLD AUTO: 0.7 X10*3/UL (ref 0.05–0.8)
MONOCYTES NFR BLD AUTO: 6.2 %
NEUTROPHILS # BLD AUTO: 9.04 X10*3/UL (ref 1.6–5.5)
NEUTROPHILS NFR BLD AUTO: 80.5 %
NRBC BLD-RTO: 0 /100 WBCS (ref 0–0)
PLATELET # BLD AUTO: 175 X10*3/UL (ref 150–450)
POTASSIUM SERPL-SCNC: 4.2 MMOL/L (ref 3.5–5.3)
RBC # BLD AUTO: 4.86 X10*6/UL (ref 4–5.2)
SODIUM SERPL-SCNC: 137 MMOL/L (ref 136–145)
WBC # BLD AUTO: 11.2 X10*3/UL (ref 4.4–11.3)

## 2024-04-09 PROCEDURE — 36415 COLL VENOUS BLD VENIPUNCTURE: CPT

## 2024-04-12 ENCOUNTER — HOSPITAL ENCOUNTER (OUTPATIENT)
Dept: RADIOLOGY | Facility: HOSPITAL | Age: 74
Discharge: HOME | End: 2024-04-12
Payer: MEDICARE

## 2024-04-12 DIAGNOSIS — E11.9 TYPE 2 DIABETES MELLITUS WITHOUT COMPLICATIONS (MULTI): ICD-10-CM

## 2024-04-12 DIAGNOSIS — Z01.810 PREOP CARDIOVASCULAR EXAM: ICD-10-CM

## 2024-04-12 PROCEDURE — 71275 CT ANGIOGRAPHY CHEST: CPT | Performed by: RADIOLOGY

## 2024-04-12 PROCEDURE — 74174 CTA ABD&PLVS W/CONTRAST: CPT

## 2024-04-12 PROCEDURE — 2550000001 HC RX 255 CONTRASTS: Performed by: INTERNAL MEDICINE

## 2024-04-12 PROCEDURE — 2500000004 HC RX 250 GENERAL PHARMACY W/ HCPCS (ALT 636 FOR OP/ED): Performed by: NURSE PRACTITIONER

## 2024-04-12 PROCEDURE — 74174 CTA ABD&PLVS W/CONTRAST: CPT | Performed by: RADIOLOGY

## 2024-04-12 RX ADMIN — SODIUM CHLORIDE 300 ML: 9 INJECTION, SOLUTION INTRAVENOUS at 11:30

## 2024-04-12 RX ADMIN — IOHEXOL 50 ML: 350 INJECTION, SOLUTION INTRAVENOUS at 14:34

## 2024-04-15 RX ORDER — METFORMIN HYDROCHLORIDE 500 MG/1
TABLET, EXTENDED RELEASE ORAL
Qty: 180 TABLET | Refills: 0 | Status: SHIPPED | OUTPATIENT
Start: 2024-04-15 | End: 2024-04-22

## 2024-04-16 ENCOUNTER — TELEPHONE (OUTPATIENT)
Dept: PRIMARY CARE | Facility: CLINIC | Age: 74
End: 2024-04-16
Payer: MEDICARE

## 2024-04-16 ENCOUNTER — APPOINTMENT (OUTPATIENT)
Dept: CARDIOLOGY | Facility: HOSPITAL | Age: 74
End: 2024-04-16
Payer: MEDICARE

## 2024-04-16 DIAGNOSIS — I25.84 CORONARY ATHEROSCLEROSIS DUE TO CALCIFIED CORONARY LESION (CODE): ICD-10-CM

## 2024-04-16 DIAGNOSIS — I50.9 HEART FAILURE, UNSPECIFIED (MULTI): ICD-10-CM

## 2024-04-16 DIAGNOSIS — I25.10 ATHEROSCLEROTIC HEART DISEASE OF NATIVE CORONARY ARTERY WITHOUT ANGINA PECTORIS: ICD-10-CM

## 2024-04-16 RX ORDER — FUROSEMIDE 40 MG/1
TABLET ORAL
Qty: 90 TABLET | Refills: 0 | Status: SHIPPED | OUTPATIENT
Start: 2024-04-16

## 2024-04-16 RX ORDER — CLOPIDOGREL BISULFATE 75 MG/1
75 TABLET ORAL DAILY
Qty: 90 TABLET | Refills: 0 | Status: SHIPPED | OUTPATIENT
Start: 2024-04-16

## 2024-04-17 NOTE — PROGRESS NOTES
Referred by Dr. Frank Reilly     History Of Present Illness:    Saritha Cormier is a 73 y.o. female presenting with severe aortic stenosis    Being evaluated for hip replacement, noted to have severe aortic stenosis which represented a progression from previous known moderate. Although her mobility is limited by her hip degenerative joint disease, she reports increased fatigue that affects her ability to care for her grandkids who she has to care for after the     Takes care of grandkids after daughter passed from breast cancer.      NYHA II  EKG  Frailty 1/5 - Hgb 14.2, Alb, cognition, mobility impaired by degenerative joint disease  TTE 3/15/2024 -Normal LVEF, peak velocity 4.8m/s , MG 51mmHg, CARLOS 0.6, DI 0.21. No other significant valve abnormalities  CTA TAVR - Anatomically suitable for 26 Evolut FX. 62.5/29.7, 28.2 x 27, STJ 23.6 x 22.3. Adequate iliofemoral access bilaterally L>R  Coronary angio 3/29/2024 - No obstructive CAD      PAST MEDICAL HISTORY  Severe aortic stenosis  HFpEF  Breast cancer BRCA + s/p lumpectomy and double mastectomy and oophorectomy  NSAID induced gastric ulcer  HTN  Bilateral hip arthroplasty s/p R. Hemiarthroplasty 2 years ago. 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    Past Surgical History:  She has a past surgical history that includes Mastectomy (09/21/2015); Hysterectomy (09/21/2015); Other surgical history (09/21/2015); and Cardiac catheterization (N/A, 3/29/2024).      Social History:  She reports that she has never smoked. She has never used smokeless tobacco. She reports that  she does not drink alcohol and does not use drugs.    Family History:  Family History   Problem Relation Name Age of Onset    Gout Mother      Hypertension Mother      Arthritis Father      Other (cardiac disorder) Father      Cancer Father      Tongue cancer Brother      Other (jaw neoplasm) Brother      Breast cancer Sibling      Pancreatic cancer Sibling          Allergies:  Patient has no known allergies.    Last Recorded Vitals:  There were no vitals filed for this visit.    Physical Exam:  VIRTUAL    Last Labs:  CBC -  Lab Results   Component Value Date    WBC 11.2 04/09/2024    HGB 14.2 04/09/2024    HCT 46.0 04/09/2024    MCV 95 04/09/2024     04/09/2024       CMP -  Lab Results   Component Value Date    CALCIUM 10.0 04/09/2024    PHOS 4.1 02/19/2022    PROT 7.6 08/08/2022    ALBUMIN 3.9 08/08/2022    AST 17 08/08/2022    ALT 11 08/08/2022    ALKPHOS 103 08/08/2022    BILITOT 0.4 08/08/2022       LIPID PANEL -   Lab Results   Component Value Date    CHOL 165 08/08/2022    TRIG 176 (H) 08/08/2022    HDL 40.0 08/08/2022    CHHDL 4.1 08/08/2022    LDLF 90 08/08/2022    VLDL 35 08/08/2022       RENAL FUNCTION PANEL -   Lab Results   Component Value Date    GLUCOSE 123 (H) 04/09/2024     04/09/2024    K 4.2 04/09/2024     04/09/2024    CO2 25 04/09/2024    ANIONGAP 15 04/09/2024    BUN 36 (H) 04/09/2024    CREATININE 1.59 (H) 04/09/2024    CALCIUM 10.0 04/09/2024    PHOS 4.1 02/19/2022    ALBUMIN 3.9 08/08/2022        Lab Results   Component Value Date    HGBA1C 7.2 (A) 01/30/2024     ASSESSMENT AND PLAN  Severe aortic stenosis  Degenerative joint disease   DM II on OHAs    Mrs Cormier has symptomatic severe aortic stenosis. The rationale for replacement and risks were discussed. She is anatomically suitable for transfemoral transcatheter aortic replacement.    This will be planned to precede her hip replacement.    Thanks for the opportunity to participate in her care.          Jessica BANDA  MD Bas[facility]

## 2024-04-18 ENCOUNTER — OFFICE VISIT (OUTPATIENT)
Dept: PRIMARY CARE | Facility: CLINIC | Age: 74
End: 2024-04-18
Payer: MEDICARE

## 2024-04-18 VITALS
SYSTOLIC BLOOD PRESSURE: 130 MMHG | HEART RATE: 106 BPM | OXYGEN SATURATION: 94 % | DIASTOLIC BLOOD PRESSURE: 78 MMHG | TEMPERATURE: 96.6 F

## 2024-04-18 DIAGNOSIS — I10 HYPERTENSION, UNSPECIFIED TYPE: ICD-10-CM

## 2024-04-18 DIAGNOSIS — N18.31 STAGE 3A CHRONIC KIDNEY DISEASE (MULTI): ICD-10-CM

## 2024-04-18 DIAGNOSIS — N18.31 TYPE 2 DIABETES MELLITUS WITH STAGE 3A CHRONIC KIDNEY DISEASE, WITHOUT LONG-TERM CURRENT USE OF INSULIN (MULTI): ICD-10-CM

## 2024-04-18 DIAGNOSIS — E11.22 TYPE 2 DIABETES MELLITUS WITH STAGE 3A CHRONIC KIDNEY DISEASE, WITHOUT LONG-TERM CURRENT USE OF INSULIN (MULTI): ICD-10-CM

## 2024-04-18 DIAGNOSIS — M16.12 ARTHRITIS OF LEFT HIP: Primary | ICD-10-CM

## 2024-04-18 PROCEDURE — 99214 OFFICE O/P EST MOD 30 MIN: CPT | Performed by: INTERNAL MEDICINE

## 2024-04-18 PROCEDURE — 3008F BODY MASS INDEX DOCD: CPT | Performed by: INTERNAL MEDICINE

## 2024-04-18 PROCEDURE — 4010F ACE/ARB THERAPY RXD/TAKEN: CPT | Performed by: INTERNAL MEDICINE

## 2024-04-18 PROCEDURE — 1159F MED LIST DOCD IN RCRD: CPT | Performed by: INTERNAL MEDICINE

## 2024-04-18 PROCEDURE — 3075F SYST BP GE 130 - 139MM HG: CPT | Performed by: INTERNAL MEDICINE

## 2024-04-18 PROCEDURE — 1160F RVW MEDS BY RX/DR IN RCRD: CPT | Performed by: INTERNAL MEDICINE

## 2024-04-18 PROCEDURE — 1036F TOBACCO NON-USER: CPT | Performed by: INTERNAL MEDICINE

## 2024-04-18 PROCEDURE — 3078F DIAST BP <80 MM HG: CPT | Performed by: INTERNAL MEDICINE

## 2024-04-18 RX ORDER — TRAMADOL HYDROCHLORIDE 50 MG/1
50 TABLET ORAL 2 TIMES DAILY
Qty: 28 TABLET | Refills: 0 | Status: SHIPPED | OUTPATIENT
Start: 2024-04-18 | End: 2024-05-07 | Stop reason: ALTCHOICE

## 2024-04-18 ASSESSMENT — ENCOUNTER SYMPTOMS
BLOOD IN STOOL: 0
UNEXPECTED WEIGHT CHANGE: 0
DIARRHEA: 0
ARTHRALGIAS: 1
DIZZINESS: 0
HIP PAIN: 1
WHEEZING: 0
ABDOMINAL PAIN: 0
PALPITATIONS: 0
HEADACHES: 0
HYPERTENSION: 1
BRUISES/BLEEDS EASILY: 0
SINUS PAIN: 0
DIFFICULTY URINATING: 0
COUGH: 0
FEVER: 0
FATIGUE: 0
SORE THROAT: 0

## 2024-04-18 NOTE — PROGRESS NOTES
Subjective   Patient ID: Saritha Cormier is a 73 y.o. female who presents for Hypertension, Hyperlipidemia, and Hip Pain (Tramadol is not helping, constant pain ).    - Severe uncontrolled left hip pain and left knee pain unable to ambulate given tramadol twice a day last visit for prior did not touch her pain  Patient counseled about the need to follow-up pain medicines since pain is not controlled and she may need to stay on painkillers until TAVR surgery and then rehab until to get to hip replacement unknown time possible 3 to 4 months  Patient will be referred to pain medicine for better pain control further recommendation  Refer patient to [facility]  - Given another prescription for 14 days 28th of tramadol.  From pain medicine  I will follow-up with patient closely as needed  - PMH of HFpEF, HTN, T2DM,  aortic stenosis, MI?, breast CA s/p lumpectomy followed by double mastectomy, and left hip replacement who is being admitted overnight for observation to complete CT-TAVR in the morning.   Patient is currently being evaluated for a hip replacement and was found to have worsening now severe aortic stenosis so she is undergoing the workup currently for TAVR.  She has not experienced SOB, lightheadness, dizziness, chest pain or edema.-Diabetes controlled  - Chronic kidney disease avoid any NSAIDs  -Hypertension controlled  Follow-up closely as needed    Hypertension  Pertinent negatives include no chest pain, headaches or palpitations.   Hyperlipidemia  Pertinent negatives include no chest pain.   Hip Pain            Review of Systems   Constitutional:  Negative for fatigue, fever and unexpected weight change.   HENT:  Negative for congestion, ear discharge, ear pain, mouth sores, sinus pain and sore throat.    Eyes:  Negative for visual disturbance.   Respiratory:  Negative for cough and wheezing.    Cardiovascular:  Negative for chest pain, palpitations and leg swelling.   Gastrointestinal:   Negative for abdominal pain, blood in stool and diarrhea.   Genitourinary:  Negative for difficulty urinating.   Musculoskeletal:  Positive for arthralgias.   Skin:  Negative for rash.   Neurological:  Negative for dizziness and headaches.   Hematological:  Does not bruise/bleed easily.   Psychiatric/Behavioral:  Negative for behavioral problems.    All other systems reviewed and are negative.      Objective   Lab Results   Component Value Date    HGBA1C 7.2 (A) 01/30/2024      /78   Pulse 106   Temp 35.9 °C (96.6 °F)   SpO2 94%     Physical Exam  Vitals and nursing note reviewed.   Constitutional:       Appearance: Normal appearance.   HENT:      Head: Normocephalic.      Nose: Nose normal.   Eyes:      Conjunctiva/sclera: Conjunctivae normal.      Pupils: Pupils are equal, round, and reactive to light.   Cardiovascular:      Rate and Rhythm: Regular rhythm.   Pulmonary:      Effort: Pulmonary effort is normal.      Breath sounds: Normal breath sounds.   Abdominal:      General: Abdomen is flat.      Palpations: Abdomen is soft.   Musculoskeletal:         General: Tenderness (Severe left hip pain) present.      Cervical back: Neck supple.   Skin:     General: Skin is warm.   Neurological:      General: No focal deficit present.      Mental Status: She is oriented to person, place, and time.   Psychiatric:         Mood and Affect: Mood normal.         Assessment/Plan   Saritha was seen today for hypertension, hyperlipidemia and hip pain.  Diagnoses and all orders for this visit:  Arthritis of left hip (Primary)  -     Referral to Pain Medicine; Future  -     traMADol (Ultram) 50 mg tablet; Take 1 tablet (50 mg) by mouth 2 times a day for 14 days.  Type 2 diabetes mellitus with stage 3a chronic kidney disease, without long-term current use of insulin (Multi)  Hypertension, unspecified type  Stage 3a chronic kidney disease (Multi)  Other orders  -     Follow Up In Primary Care - Established   - Severe  uncontrolled left hip pain and left knee pain unable to ambulate given tramadol twice a day last visit for prior did not touch her pain  Patient counseled about the need to follow-up pain medicines since pain is not controlled and she may need to stay on painkillers until TAVR surgery and then rehab until to get to hip replacement unknown time possible 3 to 4 months  Patient will be referred to pain medicine for better pain control further recommendation  Refer patient to [facility]  - Given another prescription for 14 days 28th of tramadol.  From pain medicine  I will follow-up with patient closely as needed  - PMH of HFpEF, HTN, T2DM,  aortic stenosis, MI?, breast CA s/p lumpectomy followed by double mastectomy, and left hip replacement who is being admitted overnight for observation to complete CT-TAVR in the morning.   Patient is currently being evaluated for a hip replacement and was found to have worsening now severe aortic stenosis so she is undergoing the workup currently for TAVR.  She has not experienced SOB, lightheadness, dizziness, chest pain or edema.-Diabetes controlled  - Chronic kidney disease avoid any NSAIDs  -Hypertension controlled  Follow-up closely as needed

## 2024-04-19 ENCOUNTER — TELEMEDICINE (OUTPATIENT)
Dept: CARDIOLOGY | Facility: HOSPITAL | Age: 74
End: 2024-04-19
Payer: MEDICARE

## 2024-04-19 ENCOUNTER — TELEMEDICINE (OUTPATIENT)
Dept: CARDIAC SURGERY | Facility: HOSPITAL | Age: 74
End: 2024-04-19
Payer: MEDICARE

## 2024-04-19 DIAGNOSIS — M16.9 HIP OSTEOARTHRITIS: ICD-10-CM

## 2024-04-19 DIAGNOSIS — I35.0 NONRHEUMATIC AORTIC VALVE STENOSIS: Primary | ICD-10-CM

## 2024-04-19 DIAGNOSIS — I35.0 SEVERE AORTIC STENOSIS: Primary | ICD-10-CM

## 2024-04-19 PROCEDURE — 4010F ACE/ARB THERAPY RXD/TAKEN: CPT | Performed by: THORACIC SURGERY (CARDIOTHORACIC VASCULAR SURGERY)

## 2024-04-19 PROCEDURE — 1160F RVW MEDS BY RX/DR IN RCRD: CPT | Performed by: THORACIC SURGERY (CARDIOTHORACIC VASCULAR SURGERY)

## 2024-04-19 PROCEDURE — 3008F BODY MASS INDEX DOCD: CPT | Performed by: THORACIC SURGERY (CARDIOTHORACIC VASCULAR SURGERY)

## 2024-04-19 PROCEDURE — 1159F MED LIST DOCD IN RCRD: CPT | Performed by: THORACIC SURGERY (CARDIOTHORACIC VASCULAR SURGERY)

## 2024-04-19 PROCEDURE — 1160F RVW MEDS BY RX/DR IN RCRD: CPT | Performed by: INTERNAL MEDICINE

## 2024-04-19 PROCEDURE — 99213 OFFICE O/P EST LOW 20 MIN: CPT | Performed by: INTERNAL MEDICINE

## 2024-04-19 PROCEDURE — 4010F ACE/ARB THERAPY RXD/TAKEN: CPT | Performed by: INTERNAL MEDICINE

## 2024-04-19 PROCEDURE — 1159F MED LIST DOCD IN RCRD: CPT | Performed by: INTERNAL MEDICINE

## 2024-04-19 PROCEDURE — 99204 OFFICE O/P NEW MOD 45 MIN: CPT | Performed by: THORACIC SURGERY (CARDIOTHORACIC VASCULAR SURGERY)

## 2024-04-19 PROCEDURE — 3008F BODY MASS INDEX DOCD: CPT | Performed by: INTERNAL MEDICINE

## 2024-04-22 ENCOUNTER — OFFICE VISIT (OUTPATIENT)
Dept: PAIN MEDICINE | Facility: HOSPITAL | Age: 74
End: 2024-04-22
Payer: MEDICARE

## 2024-04-22 VITALS
SYSTOLIC BLOOD PRESSURE: 121 MMHG | HEIGHT: 62 IN | WEIGHT: 186 LBS | DIASTOLIC BLOOD PRESSURE: 85 MMHG | HEART RATE: 105 BPM | TEMPERATURE: 97.8 F | BODY MASS INDEX: 34.23 KG/M2 | RESPIRATION RATE: 16 BRPM

## 2024-04-22 DIAGNOSIS — M25.552 PAIN OF LEFT HIP: ICD-10-CM

## 2024-04-22 DIAGNOSIS — M70.62 TROCHANTERIC BURSITIS OF LEFT HIP: ICD-10-CM

## 2024-04-22 DIAGNOSIS — M47.817 FACET ARTHROPATHY, LUMBOSACRAL: ICD-10-CM

## 2024-04-22 DIAGNOSIS — E11.9 TYPE 2 DIABETES MELLITUS WITHOUT COMPLICATIONS (MULTI): ICD-10-CM

## 2024-04-22 DIAGNOSIS — Z79.899 MEDICATION MANAGEMENT: ICD-10-CM

## 2024-04-22 DIAGNOSIS — M79.18 MYOFASCIAL PAIN: Primary | ICD-10-CM

## 2024-04-22 PROCEDURE — 3060F POS MICROALBUMINURIA REV: CPT | Performed by: NURSE PRACTITIONER

## 2024-04-22 PROCEDURE — 1036F TOBACCO NON-USER: CPT | Performed by: NURSE PRACTITIONER

## 2024-04-22 PROCEDURE — 3079F DIAST BP 80-89 MM HG: CPT | Performed by: NURSE PRACTITIONER

## 2024-04-22 PROCEDURE — 4010F ACE/ARB THERAPY RXD/TAKEN: CPT | Performed by: NURSE PRACTITIONER

## 2024-04-22 PROCEDURE — 1125F AMNT PAIN NOTED PAIN PRSNT: CPT | Performed by: NURSE PRACTITIONER

## 2024-04-22 PROCEDURE — 3008F BODY MASS INDEX DOCD: CPT | Performed by: NURSE PRACTITIONER

## 2024-04-22 PROCEDURE — 80307 DRUG TEST PRSMV CHEM ANLYZR: CPT | Mod: MUE | Performed by: NURSE PRACTITIONER

## 2024-04-22 PROCEDURE — 80346 BENZODIAZEPINES1-12: CPT | Mod: 91 | Performed by: NURSE PRACTITIONER

## 2024-04-22 PROCEDURE — 1159F MED LIST DOCD IN RCRD: CPT | Performed by: NURSE PRACTITIONER

## 2024-04-22 PROCEDURE — 3074F SYST BP LT 130 MM HG: CPT | Performed by: NURSE PRACTITIONER

## 2024-04-22 PROCEDURE — 99204 OFFICE O/P NEW MOD 45 MIN: CPT | Performed by: NURSE PRACTITIONER

## 2024-04-22 PROCEDURE — 99214 OFFICE O/P EST MOD 30 MIN: CPT | Mod: ZK | Performed by: NURSE PRACTITIONER

## 2024-04-22 PROCEDURE — 1160F RVW MEDS BY RX/DR IN RCRD: CPT | Performed by: NURSE PRACTITIONER

## 2024-04-22 RX ORDER — METFORMIN HYDROCHLORIDE 500 MG/1
TABLET, EXTENDED RELEASE ORAL
Qty: 180 TABLET | Refills: 0 | Status: SHIPPED | OUTPATIENT
Start: 2024-04-22

## 2024-04-22 RX ORDER — GABAPENTIN 300 MG/1
300 CAPSULE ORAL NIGHTLY
Qty: 30 CAPSULE | Refills: 1 | Status: SHIPPED | OUTPATIENT
Start: 2024-04-22 | End: 2024-05-07 | Stop reason: SDUPTHER

## 2024-04-22 ASSESSMENT — ENCOUNTER SYMPTOMS
RESPIRATORY NEGATIVE: 1
CONSTITUTIONAL NEGATIVE: 1
GASTROINTESTINAL NEGATIVE: 1
CARDIOVASCULAR NEGATIVE: 1
NEUROLOGICAL NEGATIVE: 1
MYALGIAS: 1
NECK STIFFNESS: 0
BACK PAIN: 1
EYES NEGATIVE: 1
ARTHRALGIAS: 1
ALLERGIC/IMMUNOLOGIC NEGATIVE: 1
PSYCHIATRIC NEGATIVE: 1
NECK PAIN: 0
JOINT SWELLING: 0
ENDOCRINE NEGATIVE: 1

## 2024-04-22 ASSESSMENT — PAIN SCALES - GENERAL: PAINLEVEL: 10-WORST PAIN EVER

## 2024-04-22 NOTE — PROGRESS NOTES
Seen with Dr Stallworth    In summary she is a 73 y.o. female presenting with severe aortic stenosis.  She was being evaluated for hip replacement, and noted to have severe aortic stenosis which represented a progression from previous known moderate. Although her mobility is limited by her hip degenerative joint disease, she reports increased fatigue that affects her ability to care for her grandkids     TTE 3/15/2024 -Normal LVEF, peak velocity 4.8m/s , MG 51mmHg, CARLOS 0.6, DI 0.21. No other significant valve abnormalities    CTA TAVR - Anatomically suitable for 26 Evolut FX. 62.5/29.7, 28.2 x 27, STJ 23.6 x 22.3. Adequate iliofemoral access bilaterally L>R    Coronary angio 3/29/2024 - No obstructive CAD    PAST MEDICAL HISTORY  Severe aortic stenosis  HFpEF  Breast cancer BRCA + s/p lumpectomy and double mastectomy and oophorectomy  NSAID induced gastric ulcer  HTN  Bilateral hip arthroplasty s/p R. Hemiarthroplasty 2 years ago. left is pending       PLAN  Severe aortic stenosis  We discussed the risks and benefits and the different approaches with her in detail at the clinic.  She is at anatomically suitable for transfemoral transcatheter approaches.  The structural team will be in contact with her in due course.    Thank you for involving me in her care    Damian Cedeno MD

## 2024-04-22 NOTE — PROGRESS NOTES
Subjective   Patient ID: Saritha Cormier is a 73 y.o. female who presents for New Patient Visit (Patient states she is here for left hip pain).    Saritha is a pleasant 73-year-old  female who is here to establish care with pain management.  Patient presents in a wheelchair.  She is accompanied by her neighbor.  Patient has extreme left hip pain prompting her to be seen sooner.  She was referred to us by her PCP.    Patient has chronic pain to her left hip.  She rates her pain as 10 out of 10.  Pain is constant.  She describes it as aching, burning, sharp, shooting, stabbing and throbbing kind of pain.  Pain to the left hip travels down to the front of her thigh affecting the knee.  She reports striking electric jolt kind of pain to the left leg.  She denies numbness or tingling sensation.  She has no pain, numbness or tingling sensation to her right leg.   She denies leg weakness or change in balance.  However left leg pain is aggravated with standing, walking or sitting position.  She is in acute distress due to this pain.  She denies bowel or bladder incontinence.  She denies recent falls, injuries, or inciting events.  She denies back injury or surgery.    Patient had left hip injection under fluoroscopy done by Dr. Angulo on 4/2/2024.  She reports it did not work as she continues to have pain.  She was prescribed tramadol and is taking this twice a day and last dose was this morning.  She reports tramadol is not touching her pain.      Patient had heart catheterization 3 weeks ago and she reports left hip pain was aggravated from that.  She is taking Plavix and aspirin.  She has history of right total hip done by Dr. Angulo.    Patient had x-ray to her lumbar spine that was done on 6/8/2021.  She had left hip x-ray on 3/8/2024 which shows severe osteoarthritis.  I discussed the plan of care including pharmacologic and joint interventional procedure.  She is in agreement to proceed with left  femoral trochanteric trigger point injection.  She would like this done as soon as possible.  Questions were answered during this encounter.        OARRS:  No data recorded  I have personally reviewed the OARRS report for Saritha oCrmier. I have considered the risks of abuse, dependence, addiction and diversion    Past Medical History  She has a past medical history of Acquired absence of bilateral breasts and nipples (01/31/2022), Neoplasm of unspecified behavior of bone, soft tissue, and skin (12/22/2015), Other bursitis of hip, right hip, Personal history of other diseases of the respiratory system (03/14/2016), Trigger finger, unspecified finger (06/22/2018), and Unilateral primary osteoarthritis, right knee (02/19/2021).      Past Surgical History:   Procedure Laterality Date    CARDIAC CATHETERIZATION N/A 03/29/2024    Procedure: Left And Right Heart Cath, With LV;  Surgeon: Javier Reilly MD;  Location: [place] [address] [facility];  Service: Cardiovascular;  Laterality: N/A;    HYSTERECTOMY  09/21/2015    Hysterectomy    JOINT REPLACEMENT Right     Right total hip    MASTECTOMY  09/21/2015    Breast Surgery Mastectomy    OTHER SURGICAL HISTORY  09/21/2015    Breast Surgery Reconstruction        Social History  She reports that she has never smoked. She has never used smokeless tobacco. She reports that she does not drink alcohol and does not use drugs.    Family History  Family History   Problem Relation Name Age of Onset    Gout Mother      Hypertension Mother      Arthritis Father      Other (cardiac disorder) Father      Cancer Father      Tongue cancer Brother      Other (jaw neoplasm) Brother      Breast cancer Sibling      Pancreatic cancer Sibling          Allergies  Patient has no known allergies.        Current Outpatient Medications:     acetaminophen (Tylenol) 325 mg tablet, Take 2 tablets (650 mg) by mouth every 6 hours if needed for mild pain (1 - 3) or moderate pain (4 - 6)., Disp: 30  tablet, Rfl: 0    allopurinol (Zyloprim) 100 mg tablet, Take 2 tablets (200 mg) by mouth once daily., Disp: 180 tablet, Rfl: 1    aspirin 81 mg EC tablet, TAKE 1 TABLET BY MOUTH EVERY DAY, Disp: 90 tablet, Rfl: 1    cholecalciferol (Vitamin D-3) 125 MCG (5000 UT) capsule, TAKE 1 CAPSULE (125 MCG) BY MOUTH ONCE DAILY., Disp: 90 capsule, Rfl: 1    clopidogrel (Plavix) 75 mg tablet, TAKE 1 TABLET BY MOUTH EVERY DAY, Disp: 90 tablet, Rfl: 0    ferrous gluconate 324 (38 Fe) mg tablet, Take 1 tablet (324 mg) by mouth 2 times a day., Disp: 180 tablet, Rfl: 1    furosemide (Lasix) 40 mg tablet, TAKE 1 TABLET BY MOUTH EVERY DAY, Disp: 90 tablet, Rfl: 0    losartan (Cozaar) 25 mg tablet, Take 1 tablet (25 mg) by mouth once daily. as directed, Disp: 90 tablet, Rfl: 1    nitroglycerin (Nitrostat) 0.4 mg SL tablet, Place 1 tablet (0.4 mg) under the tongue if needed for chest pain., Disp: 25 tablet, Rfl: 0    pantoprazole (ProtoNix) 40 mg EC tablet, Take 1 tablet (40 mg) by mouth once daily., Disp: 90 tablet, Rfl: 1    rosuvastatin (Crestor) 40 mg tablet, Take 1 tablet (40 mg) by mouth once daily., Disp: 90 tablet, Rfl: 1    traMADol (Ultram) 50 mg tablet, Take 1 tablet (50 mg) by mouth 2 times a day for 14 days., Disp: 28 tablet, Rfl: 0    gabapentin (Neurontin) 300 mg capsule, Take 1 capsule (300 mg) by mouth once daily at bedtime., Disp: 30 capsule, Rfl: 1    metFORMIN  mg 24 hr tablet, TAKE 2 TABLETS BY MOUTH EVERY DAY**REPLACES OSM, Disp: 180 tablet, Rfl: 0         Review of Systems   Constitutional: Negative.    HENT: Negative.     Eyes: Negative.    Respiratory: Negative.     Cardiovascular: Negative.    Gastrointestinal: Negative.    Endocrine: Negative.    Genitourinary: Negative.    Musculoskeletal:  Positive for arthralgias, back pain and myalgias. Negative for gait problem, joint swelling, neck pain and neck stiffness.   Skin: Negative.    Allergic/Immunologic: Negative.    Neurological: Negative.     Psychiatric/Behavioral: Negative.          Physical Exam  Vitals and nursing note reviewed.   HENT:      Head: Normocephalic.      Nose: Nose normal.   Eyes:      Extraocular Movements: Extraocular movements intact.      Conjunctiva/sclera: Conjunctivae normal.      Pupils: Pupils are equal, round, and reactive to light.   Cardiovascular:      Rate and Rhythm: Normal rate and regular rhythm.   Pulmonary:      Effort: Pulmonary effort is normal.      Breath sounds: Normal breath sounds.   Musculoskeletal:         General: Tenderness present. No swelling, deformity or signs of injury.      Cervical back: No rigidity or tenderness.      Lumbar back: Tenderness present.      Right lower leg: No edema.      Left lower leg: No edema.      Comments: No neck rigidity.  Full range of motion with lateral flexion.  Negative Spurling test bilaterally.  Negative for paraspinal tenderness at the cervical region.  Positive for minimal paraspinal tenderness at the lumbar region bilaterally at L4-L5, L5-S1 with rotation.  No radicular symptoms.  Positive for diffuse pain to left hip.  Limited exam due to the pain.  Positive left leg raise eliciting hip pain.  BUE 4/5, RLE 4/5, LLE 3/5.   Skin:     General: Skin is warm and dry.   Neurological:      General: No focal deficit present.      Mental Status: She is alert and oriented to person, place, and time.   Psychiatric:         Mood and Affect: Mood normal.         Behavior: Behavior normal.          Last Recorded Vitals  /85   Pulse 105   Temp 36.6 °C (97.8 °F) (Temporal)   Resp 16   Wt 84.4 kg (186 lb)     Relevant Results      Pain Management Panel           No data to display                 Narrative & Impression   Interpreted By:  Moustapha Spears,   STUDY:  XR HIP LEFT WITH PELVIS WHEN PERFORMED 2 OR 3 VIEWS; ;  3/28/2024  11:11 am      INDICATION:  Signs/Symptoms:hip pain.      COMPARISON:  None.      ACCESSION NUMBER(S):  [id number]      ORDERING  CLINICIAN:  BAHMAN HAYWARD      FINDINGS:  AP pelvis along with two views of the left hip.      Status post total right hip arthroplasty. Severe left hip  osteoarthrosis with joint space narrowing and subchondral sclerosis.  Lower lumbar degenerative change. The soft tissues are  unremarkable.No acute fracture.      IMPRESSION:  Severe left hip osteoarthrosis.                1. Myofascial pain  - Inject Trigger Point, 1 or 2; Future    2. Trochanteric bursitis of left hip  - Inject Trigger Point, 1 or 2; Future    3. Pain of left hip  - gabapentin (Neurontin) 300 mg capsule; Take 1 capsule (300 mg) by mouth once daily at bedtime.  Dispense: 30 capsule; Refill: 1  - Inject Trigger Point, 1 or 2; Future    4. Facet arthropathy, lumbosacral  - gabapentin (Neurontin) 300 mg capsule; Take 1 capsule (300 mg) by mouth once daily at bedtime.  Dispense: 30 capsule; Refill: 1    5. Medication management  - Opiate/Opioid/Benzo Prescription Compliance; Future  - Opiate/Opioid/Benzo Prescription Compliance  - OOB Internal Tracking       Plan/Follow-up Instructions:     I started you on gabapentin and you may take 300 mg at bedtime.  Do not take sedating medications together.    You are scheduled for left femoral trochanteric trigger point injection on 4/30/2024 in [place] with Dr. Bautista.  Stop Plavix for 5 days.  Please call your cardiologist for clearance and have that note faxed to our office at [phone].  Continue taking aspirin and stop on 4/30/2024.  If getting sedation then you must not eat or drink 6 hours before the procedure and you must have a  with you.  The office will call you for further instructions.  You will receive Cipro before the procedure due to your history of right total hip replacement.  You will then be seen for a postprocedure follow-up in 4 to 6 weeks.      Disclaimer: This note was created using voice recognition software. It was not corrected for typographical or grammatical errors,  inadvertent word insertion, or any unintended errors. Please feel free to contact me for clarification.        Debra Christian DNP, APRN, FNP-C    [facility]  Office #: [phone]  Fax # [phone]

## 2024-04-22 NOTE — PROGRESS NOTES
I have personally reviewed the OARRS report for Saritha Cormier    Last urine drug screening date/ordered today: 04/22/24  Results of last screen: Results as expected.       Opioid Risk Screening:   THE OPIOID RISK TOOL (ORT)                                                                      Female                     Male     1. Family History of Substance Abuse:                              Alcohol                                                   [1]=0                           [3]  =     Illicit Drugs                                             [2] = 0                         [3]   =    Prescription Drugs                                [4]= 0                          [4]   =    2. Personal History of Substance Abuse:     Alcohol                                                    [3] = 0                         [3] =     Illegal Drugs                                           [4] =0                           [4]  =     Prescription Drugs                                [5]= 0                           [5]   =    3. Age (If between 16 to 45):               [1]= 0                          [1]   =    4. History of Preadolescent Sexual Abuse                                                                  [3]= 0                           [0]   =    5. Psychological Disease:    ADD, OCD, Bipolar, Schizophrenia    [2]=  0                          [2]   =    Depression                                          [1]=  0                           [1]   =      TOTAL Score =  0     Last opioid risk screening date/ordered today: 4/22/2024  Patient's total score is 0    Reference :  Low Score = 0 to 3  Moderate Score = 4 to 7  High Score = =8       Pain Scale Screening:   Pain Assessment and Documentation Tool (PADT)   Date of Assessment: 4/22/2024  Analgesia:   Patient reports her pain level on average during the past week is 10on a 0 - 10 scale.

## 2024-04-22 NOTE — PATIENT INSTRUCTIONS
I started you on gabapentin and you may take 300 mg at bedtime.  Do not take sedating medications together.    You are scheduled for left femoral trochanteric trigger point injection on 4/30/2024 in [place] with Dr. Bautista.  Stop Plavix for 5 days.  Please call your cardiologist for clearance and have that note faxed to our office at [phone].  Continue taking aspirin and stop on 4/30/2024.  If getting sedation then you must not eat or drink 6 hours before the procedure and you must have a  with you.  The office will call you for further instructions.  You will receive Cipro before the procedure due to your history of right total hip replacement.  You will then be seen for a postprocedure follow-up in 4 to 6 weeks.

## 2024-04-23 ENCOUNTER — CARDIOLOGY CONFERENCE (OUTPATIENT)
Dept: CARDIOLOGY | Facility: HOSPITAL | Age: 74
End: 2024-04-23
Payer: MEDICARE

## 2024-04-23 ENCOUNTER — TELEPHONE (OUTPATIENT)
Dept: CARDIOLOGY | Facility: HOSPITAL | Age: 74
End: 2024-04-23
Payer: MEDICARE

## 2024-04-23 DIAGNOSIS — I35.0 NONRHEUMATIC AORTIC VALVE STENOSIS: Primary | ICD-10-CM

## 2024-04-23 PROBLEM — M70.62 TROCHANTERIC BURSITIS OF LEFT HIP: Status: ACTIVE | Noted: 2023-01-31

## 2024-04-23 LAB
AMPHETAMINES UR QL SCN: NORMAL
BARBITURATES UR QL SCN: NORMAL
BZE UR QL SCN: NORMAL
CANNABINOIDS UR QL SCN: NORMAL
CREAT UR-MCNC: 173.2 MG/DL (ref 20–320)
PCP UR QL SCN: NORMAL

## 2024-04-23 NOTE — TELEPHONE ENCOUNTER
[facility]Q Questionnaire      1  Heart failure affects different people in different ways. Some feel shortness of breath while others feel fatigue. Please indicate how much you are limited by heart failure (shortness of breath or fatigue) in your ability to do the following activities over the past 2 weeks. 1 month Structural Heart NP follow-up     A.) Showering/bathing  5. Not at All  B.) Walking 1 block on level ground 6. Limited for other reastons  C.) Hurrying or Jogging   6. Limited for other reastons    2.  Over the past 2 weeks, how many times did you have swelling in your feet, ankles or legs when you woke up in the morning? 5. Never    3.  Over the past 2 weeks, on average, how many times has fatigue limited your ability to do what you wanted? 6. Less than once a week    4.  Over the past 2 weeks, on average, how many times has shortness of breath limited your ability to do what you wanted? 7. Never    5.  Over the past 2 weeks, on average, how many times have you been forced to sleep sitting up in a chair or with at least 3 pillows to prop you up because of shortness of breath? Never    6. Over the past 2 weeks, how much has your heart failure limited your enjoyment of life? It has not limited my enjoyment of life    7. If you had to spend the rest of your life with your heart failure the way it is right now, how would you feel about this? 3. Somewhat satisfied    8. How much does your heart failure affect your lifestyle? Please indicate how your heart failure may have limited yourparticipation in the following activities over the past 2 weeks    A.)  Hobbies, recreational activities  6. Does not apply for other reasons    B.) Working or doing household chores  6. Does not apply for other reasons    C.) Visiting family or friends out of your home  6. Does not apply for other reasons    5 meter walk completed: 10.2 seconds

## 2024-04-23 NOTE — PROGRESS NOTES
Patient's case reviewed with Valve and Structural Heart team.  Proceed with TAVR.  CT read:  Area/Perimeter - 313/64.9  LVOT diameter- min 16.4, Ave 19.1. Isaac under LCC SOV d (L/R/N) - 27.3 / 25.6 / 28.9 STJ h - all > 15 (17.6/19.3/16.2) SOV d - 20.4 x 19.3 (calcified) CH - L 14.1, R 15    Coplanar Ecuadorean 14, CRA 2  Cusp overlap LYNN 13, CAU 23  Near Cusp overlap LYNN 4 CAU 16    Evolut 26 FX, Predil 18mm Balloon  Access LCFA primary, RCFA secondary. Pacer RIJV.

## 2024-04-25 ENCOUNTER — APPOINTMENT (OUTPATIENT)
Dept: RADIOLOGY | Facility: HOSPITAL | Age: 74
End: 2024-04-25
Payer: COMMERCIAL

## 2024-04-25 ENCOUNTER — TELEPHONE (OUTPATIENT)
Dept: PAIN MEDICINE | Facility: HOSPITAL | Age: 74
End: 2024-04-25
Payer: MEDICARE

## 2024-04-29 PROBLEM — I35.0 NONRHEUMATIC AORTIC VALVE STENOSIS: Status: ACTIVE | Noted: 2024-04-29

## 2024-04-30 ENCOUNTER — APPOINTMENT (OUTPATIENT)
Dept: CARDIOLOGY | Facility: HOSPITAL | Age: 74
DRG: 267 | End: 2024-04-30
Payer: MEDICARE

## 2024-04-30 ENCOUNTER — APPOINTMENT (OUTPATIENT)
Dept: PAIN MEDICINE | Facility: HOSPITAL | Age: 74
End: 2024-04-30
Payer: MEDICARE

## 2024-04-30 ENCOUNTER — HOSPITAL ENCOUNTER (INPATIENT)
Facility: HOSPITAL | Age: 74
LOS: 1 days | Discharge: HOME | DRG: 267 | End: 2024-05-01
Attending: INTERNAL MEDICINE | Admitting: INTERNAL MEDICINE
Payer: MEDICARE

## 2024-04-30 DIAGNOSIS — I35.0 NONRHEUMATIC AORTIC VALVE STENOSIS: Primary | ICD-10-CM

## 2024-04-30 DIAGNOSIS — I35.0 AORTIC STENOSIS, SEVERE: ICD-10-CM

## 2024-04-30 DIAGNOSIS — Z95.2 S/P TAVR (TRANSCATHETER AORTIC VALVE REPLACEMENT): ICD-10-CM

## 2024-04-30 DIAGNOSIS — I97.89 AV BLOCK, POSTOPERATIVE: ICD-10-CM

## 2024-04-30 DIAGNOSIS — I44.30 AV BLOCK, POSTOPERATIVE: ICD-10-CM

## 2024-04-30 LAB
ABO GROUP (TYPE) IN BLOOD: NORMAL
ACT BLD: 294 SEC (ref 83–199)
ANION GAP SERPL CALC-SCNC: 13 MMOL/L (ref 10–20)
ANTIBODY SCREEN: NORMAL
AORTIC VALVE MEAN GRADIENT: 35.8 MMHG
AORTIC VALVE PEAK VELOCITY: 3.75 M/S
AV PEAK GRADIENT: 56.3 MMHG
AVA (PEAK VEL): 0.67 CM2
AVA (VTI): 0.59 CM2
BASOPHILS # BLD AUTO: 0.01 X10*3/UL (ref 0–0.1)
BASOPHILS NFR BLD AUTO: 0.1 %
BODY SURFACE AREA: 1.98 M2
BUN SERPL-MCNC: 23 MG/DL (ref 6–23)
CALCIUM SERPL-MCNC: 8.8 MG/DL (ref 8.6–10.6)
CHLORIDE SERPL-SCNC: 104 MMOL/L (ref 98–107)
CO2 SERPL-SCNC: 26 MMOL/L (ref 21–32)
CREAT SERPL-MCNC: 1.4 MG/DL (ref 0.5–1.05)
EGFRCR SERPLBLD CKD-EPI 2021: 40 ML/MIN/1.73M*2
EJECTION FRACTION APICAL 4 CHAMBER: 69.9
EOSINOPHIL # BLD AUTO: 0.16 X10*3/UL (ref 0–0.4)
EOSINOPHIL NFR BLD AUTO: 1.9 %
ERYTHROCYTE [DISTWIDTH] IN BLOOD BY AUTOMATED COUNT: 14.5 % (ref 11.5–14.5)
GLUCOSE BLD MANUAL STRIP-MCNC: 108 MG/DL (ref 74–99)
GLUCOSE BLD MANUAL STRIP-MCNC: 138 MG/DL (ref 74–99)
GLUCOSE BLD MANUAL STRIP-MCNC: 178 MG/DL (ref 74–99)
GLUCOSE SERPL-MCNC: 105 MG/DL (ref 74–99)
HCT VFR BLD AUTO: 37.7 % (ref 36–46)
HGB BLD-MCNC: 12.1 G/DL (ref 12–16)
IMM GRANULOCYTES # BLD AUTO: 0.03 X10*3/UL (ref 0–0.5)
IMM GRANULOCYTES NFR BLD AUTO: 0.3 % (ref 0–0.9)
LEFT ATRIUM VOLUME AREA LENGTH INDEX BSA: 35.8 ML/M2
LEFT VENTRICLE INTERNAL DIMENSION DIASTOLE: 4.7 CM (ref 3.5–6)
LEFT VENTRICULAR OUTFLOW TRACT DIAMETER: 1.9 CM
LV EJECTION FRACTION BIPLANE: 67 %
LYMPHOCYTES # BLD AUTO: 1.26 X10*3/UL (ref 0.8–3)
LYMPHOCYTES NFR BLD AUTO: 14.6 %
MAGNESIUM SERPL-MCNC: 2.02 MG/DL (ref 1.6–2.4)
MCH RBC QN AUTO: 29.2 PG (ref 26–34)
MCHC RBC AUTO-ENTMCNC: 32.1 G/DL (ref 32–36)
MCV RBC AUTO: 91 FL (ref 80–100)
MONOCYTES # BLD AUTO: 0.66 X10*3/UL (ref 0.05–0.8)
MONOCYTES NFR BLD AUTO: 7.7 %
NEUTROPHILS # BLD AUTO: 6.49 X10*3/UL (ref 1.6–5.5)
NEUTROPHILS NFR BLD AUTO: 75.4 %
NRBC BLD-RTO: 0 /100 WBCS (ref 0–0)
PLATELET # BLD AUTO: 118 X10*3/UL (ref 150–450)
POTASSIUM SERPL-SCNC: 4.2 MMOL/L (ref 3.5–5.3)
RBC # BLD AUTO: 4.15 X10*6/UL (ref 4–5.2)
RH FACTOR (ANTIGEN D): NORMAL
SODIUM SERPL-SCNC: 139 MMOL/L (ref 136–145)
WBC # BLD AUTO: 8.6 X10*3/UL (ref 4.4–11.3)

## 2024-04-30 PROCEDURE — 33361 REPLACE AORTIC VALVE PERQ: CPT | Performed by: INTERNAL MEDICINE

## 2024-04-30 PROCEDURE — C1760 CLOSURE DEV, VASC: HCPCS | Performed by: INTERNAL MEDICINE

## 2024-04-30 PROCEDURE — 83735 ASSAY OF MAGNESIUM: CPT | Performed by: NURSE PRACTITIONER

## 2024-04-30 PROCEDURE — 99153 MOD SED SAME PHYS/QHP EA: CPT | Performed by: INTERNAL MEDICINE

## 2024-04-30 PROCEDURE — 80048 BASIC METABOLIC PNL TOTAL CA: CPT | Performed by: NURSE PRACTITIONER

## 2024-04-30 PROCEDURE — 85025 COMPLETE CBC W/AUTO DIFF WBC: CPT | Performed by: NURSE PRACTITIONER

## 2024-04-30 PROCEDURE — 99152 MOD SED SAME PHYS/QHP 5/>YRS: CPT | Performed by: INTERNAL MEDICINE

## 2024-04-30 PROCEDURE — 82947 ASSAY GLUCOSE BLOOD QUANT: CPT

## 2024-04-30 PROCEDURE — 93308 TTE F-UP OR LMTD: CPT | Performed by: INTERNAL MEDICINE

## 2024-04-30 PROCEDURE — 2550000001 HC RX 255 CONTRASTS: Performed by: INTERNAL MEDICINE

## 2024-04-30 PROCEDURE — 2500000004 HC RX 250 GENERAL PHARMACY W/ HCPCS (ALT 636 FOR OP/ED): Performed by: INTERNAL MEDICINE

## 2024-04-30 PROCEDURE — 2780000003 HC OR 278 NO HCPCS: Performed by: INTERNAL MEDICINE

## 2024-04-30 PROCEDURE — 93325 DOPPLER ECHO COLOR FLOW MAPG: CPT | Performed by: INTERNAL MEDICINE

## 2024-04-30 PROCEDURE — 93321 DOPPLER ECHO F-UP/LMTD STD: CPT | Performed by: INTERNAL MEDICINE

## 2024-04-30 PROCEDURE — 93325 DOPPLER ECHO COLOR FLOW MAPG: CPT

## 2024-04-30 PROCEDURE — 36415 COLL VENOUS BLD VENIPUNCTURE: CPT | Performed by: NURSE PRACTITIONER

## 2024-04-30 PROCEDURE — 2500000005 HC RX 250 GENERAL PHARMACY W/O HCPCS: Performed by: NURSE PRACTITIONER

## 2024-04-30 PROCEDURE — 2500000006 HC RX 250 W HCPCS SELF ADMINISTERED DRUGS (ALT 637 FOR ALL PAYERS): Performed by: NURSE PRACTITIONER

## 2024-04-30 PROCEDURE — 02RF38Z REPLACEMENT OF AORTIC VALVE WITH ZOOPLASTIC TISSUE, PERCUTANEOUS APPROACH: ICD-10-PCS | Performed by: THORACIC SURGERY (CARDIOTHORACIC VASCULAR SURGERY)

## 2024-04-30 PROCEDURE — 2720000007 HC OR 272 NO HCPCS: Performed by: INTERNAL MEDICINE

## 2024-04-30 PROCEDURE — 86901 BLOOD TYPING SEROLOGIC RH(D): CPT | Performed by: NURSE PRACTITIONER

## 2024-04-30 PROCEDURE — C1894 INTRO/SHEATH, NON-LASER: HCPCS | Performed by: INTERNAL MEDICINE

## 2024-04-30 PROCEDURE — 93005 ELECTROCARDIOGRAM TRACING: CPT

## 2024-04-30 PROCEDURE — 33361 REPLACE AORTIC VALVE PERQ: CPT | Performed by: THORACIC SURGERY (CARDIOTHORACIC VASCULAR SURGERY)

## 2024-04-30 PROCEDURE — 85347 COAGULATION TIME ACTIVATED: CPT | Performed by: INTERNAL MEDICINE

## 2024-04-30 PROCEDURE — 2500000001 HC RX 250 WO HCPCS SELF ADMINISTERED DRUGS (ALT 637 FOR MEDICARE OP): Performed by: NURSE PRACTITIONER

## 2024-04-30 PROCEDURE — 2500000001 HC RX 250 WO HCPCS SELF ADMINISTERED DRUGS (ALT 637 FOR MEDICARE OP): Performed by: STUDENT IN AN ORGANIZED HEALTH CARE EDUCATION/TRAINING PROGRAM

## 2024-04-30 PROCEDURE — C1769 GUIDE WIRE: HCPCS | Performed by: INTERNAL MEDICINE

## 2024-04-30 PROCEDURE — 85347 COAGULATION TIME ACTIVATED: CPT

## 2024-04-30 PROCEDURE — C1756 CATH, PACING, TRANSESOPH: HCPCS | Performed by: INTERNAL MEDICINE

## 2024-04-30 PROCEDURE — 2500000005 HC RX 250 GENERAL PHARMACY W/O HCPCS: Performed by: INTERNAL MEDICINE

## 2024-04-30 PROCEDURE — 1200000002 HC GENERAL ROOM WITH TELEMETRY DAILY

## 2024-04-30 PROCEDURE — 2500000004 HC RX 250 GENERAL PHARMACY W/ HCPCS (ALT 636 FOR OP/ED): Mod: JZ | Performed by: NURSE PRACTITIONER

## 2024-04-30 PROCEDURE — G0269 OCCLUSIVE DEVICE IN VEIN ART: HCPCS | Mod: 50,TC,59 | Performed by: INTERNAL MEDICINE

## 2024-04-30 PROCEDURE — C1725 CATH, TRANSLUMIN NON-LASER: HCPCS | Performed by: INTERNAL MEDICINE

## 2024-04-30 PROCEDURE — C1889 IMPLANT/INSERT DEVICE, NOC: HCPCS | Performed by: INTERNAL MEDICINE

## 2024-04-30 DEVICE — VLV EVOLUTFX-26 COMM US
Type: IMPLANTABLE DEVICE | Site: HEART | Status: FUNCTIONAL
Brand: EVOLUT™ FX

## 2024-04-30 DEVICE — LOADING SYS L-EVOLUTFX-2329
Type: IMPLANTABLE DEVICE | Site: HEART | Status: NON-FUNCTIONAL
Brand: EVOLUT™ FX

## 2024-04-30 RX ORDER — PROTAMINE SULFATE 10 MG/ML
INJECTION, SOLUTION INTRAVENOUS CONTINUOUS PRN
Status: COMPLETED | OUTPATIENT
Start: 2024-04-30 | End: 2024-04-30

## 2024-04-30 RX ORDER — NAPROXEN SODIUM 220 MG/1
81 TABLET, FILM COATED ORAL DAILY
Status: DISCONTINUED | OUTPATIENT
Start: 2024-05-01 | End: 2024-05-01 | Stop reason: HOSPADM

## 2024-04-30 RX ORDER — PANTOPRAZOLE SODIUM 40 MG/10ML
40 INJECTION, POWDER, LYOPHILIZED, FOR SOLUTION INTRAVENOUS
Status: DISCONTINUED | OUTPATIENT
Start: 2024-05-01 | End: 2024-05-01 | Stop reason: HOSPADM

## 2024-04-30 RX ORDER — ROSUVASTATIN CALCIUM 40 MG/1
40 TABLET, COATED ORAL NIGHTLY
Status: DISCONTINUED | OUTPATIENT
Start: 2024-04-30 | End: 2024-05-01 | Stop reason: HOSPADM

## 2024-04-30 RX ORDER — HEPARIN SODIUM 1000 [USP'U]/ML
INJECTION, SOLUTION INTRAVENOUS; SUBCUTANEOUS AS NEEDED
Status: DISCONTINUED | OUTPATIENT
Start: 2024-04-30 | End: 2024-04-30 | Stop reason: HOSPADM

## 2024-04-30 RX ORDER — OXYCODONE HYDROCHLORIDE 5 MG/1
5 TABLET ORAL EVERY 6 HOURS PRN
Status: DISCONTINUED | OUTPATIENT
Start: 2024-04-30 | End: 2024-05-01 | Stop reason: HOSPADM

## 2024-04-30 RX ORDER — FENTANYL CITRATE 50 UG/ML
INJECTION, SOLUTION INTRAMUSCULAR; INTRAVENOUS AS NEEDED
Status: DISCONTINUED | OUTPATIENT
Start: 2024-04-30 | End: 2024-04-30 | Stop reason: HOSPADM

## 2024-04-30 RX ORDER — DEXTROSE 50 % IN WATER (D50W) INTRAVENOUS SYRINGE
25
Status: DISCONTINUED | OUTPATIENT
Start: 2024-04-30 | End: 2024-05-01 | Stop reason: HOSPADM

## 2024-04-30 RX ORDER — SENNOSIDES 8.6 MG/1
2 TABLET ORAL 2 TIMES DAILY
Status: DISCONTINUED | OUTPATIENT
Start: 2024-04-30 | End: 2024-05-01 | Stop reason: HOSPADM

## 2024-04-30 RX ORDER — ONDANSETRON 4 MG/1
4 TABLET, FILM COATED ORAL EVERY 8 HOURS PRN
Status: DISCONTINUED | OUTPATIENT
Start: 2024-04-30 | End: 2024-05-01 | Stop reason: HOSPADM

## 2024-04-30 RX ORDER — MIDAZOLAM HYDROCHLORIDE 1 MG/ML
INJECTION INTRAMUSCULAR; INTRAVENOUS AS NEEDED
Status: DISCONTINUED | OUTPATIENT
Start: 2024-04-30 | End: 2024-04-30 | Stop reason: HOSPADM

## 2024-04-30 RX ORDER — CEFAZOLIN SODIUM 2 G/100ML
2 INJECTION, SOLUTION INTRAVENOUS ONCE
Status: COMPLETED | OUTPATIENT
Start: 2024-04-30 | End: 2024-04-30

## 2024-04-30 RX ORDER — CLOPIDOGREL BISULFATE 75 MG/1
75 TABLET ORAL DAILY
Status: DISCONTINUED | OUTPATIENT
Start: 2024-04-30 | End: 2024-05-01 | Stop reason: HOSPADM

## 2024-04-30 RX ORDER — TRAMADOL HYDROCHLORIDE 50 MG/1
50 TABLET ORAL EVERY 6 HOURS PRN
Status: DISCONTINUED | OUTPATIENT
Start: 2024-04-30 | End: 2024-05-01 | Stop reason: HOSPADM

## 2024-04-30 RX ORDER — DEXTROSE 50 % IN WATER (D50W) INTRAVENOUS SYRINGE
12.5
Status: DISCONTINUED | OUTPATIENT
Start: 2024-04-30 | End: 2024-05-01 | Stop reason: HOSPADM

## 2024-04-30 RX ORDER — SODIUM CHLORIDE, SODIUM LACTATE, POTASSIUM CHLORIDE, CALCIUM CHLORIDE 600; 310; 30; 20 MG/100ML; MG/100ML; MG/100ML; MG/100ML
75 INJECTION, SOLUTION INTRAVENOUS CONTINUOUS
Status: ACTIVE | OUTPATIENT
Start: 2024-04-30 | End: 2024-04-30

## 2024-04-30 RX ORDER — INSULIN LISPRO 100 [IU]/ML
0-10 INJECTION, SOLUTION INTRAVENOUS; SUBCUTANEOUS
Status: DISCONTINUED | OUTPATIENT
Start: 2024-04-30 | End: 2024-05-01 | Stop reason: HOSPADM

## 2024-04-30 RX ORDER — LIDOCAINE HYDROCHLORIDE 10 MG/ML
INJECTION, SOLUTION EPIDURAL; INFILTRATION; INTRACAUDAL; PERINEURAL AS NEEDED
Status: DISCONTINUED | OUTPATIENT
Start: 2024-04-30 | End: 2024-04-30 | Stop reason: HOSPADM

## 2024-04-30 RX ORDER — ACETAMINOPHEN 325 MG/1
650 TABLET ORAL EVERY 6 HOURS PRN
Status: DISCONTINUED | OUTPATIENT
Start: 2024-04-30 | End: 2024-05-01 | Stop reason: HOSPADM

## 2024-04-30 RX ORDER — ONDANSETRON HYDROCHLORIDE 2 MG/ML
4 INJECTION, SOLUTION INTRAVENOUS EVERY 8 HOURS PRN
Status: DISCONTINUED | OUTPATIENT
Start: 2024-04-30 | End: 2024-05-01 | Stop reason: HOSPADM

## 2024-04-30 RX ORDER — NAPROXEN SODIUM 220 MG/1
324 TABLET, FILM COATED ORAL ONCE
Status: COMPLETED | OUTPATIENT
Start: 2024-04-30 | End: 2024-04-30

## 2024-04-30 RX ORDER — PANTOPRAZOLE SODIUM 40 MG/1
40 TABLET, DELAYED RELEASE ORAL
Status: DISCONTINUED | OUTPATIENT
Start: 2024-05-01 | End: 2024-05-01 | Stop reason: HOSPADM

## 2024-04-30 RX ORDER — GABAPENTIN 100 MG/1
200 CAPSULE ORAL ONCE
Status: COMPLETED | OUTPATIENT
Start: 2024-04-30 | End: 2024-04-30

## 2024-04-30 RX ADMIN — CEFAZOLIN SODIUM 2 G: 2 INJECTION, SOLUTION INTRAVENOUS at 07:30

## 2024-04-30 RX ADMIN — SODIUM CHLORIDE, POTASSIUM CHLORIDE, SODIUM LACTATE AND CALCIUM CHLORIDE 75 ML/HR: 600; 310; 30; 20 INJECTION, SOLUTION INTRAVENOUS at 07:30

## 2024-04-30 RX ADMIN — Medication 1 L/MIN: at 10:15

## 2024-04-30 RX ADMIN — GABAPENTIN 200 MG: 100 CAPSULE ORAL at 23:19

## 2024-04-30 RX ADMIN — OXYCODONE HYDROCHLORIDE 5 MG: 5 TABLET ORAL at 21:06

## 2024-04-30 RX ADMIN — TRAMADOL HYDROCHLORIDE 50 MG: 50 TABLET, COATED ORAL at 19:12

## 2024-04-30 RX ADMIN — ACETAMINOPHEN 650 MG: 325 TABLET ORAL at 19:12

## 2024-04-30 RX ADMIN — ACETAMINOPHEN 650 MG: 325 TABLET ORAL at 13:38

## 2024-04-30 RX ADMIN — ASPIRIN 81 MG CHEWABLE TABLET 324 MG: 81 TABLET CHEWABLE at 09:35

## 2024-04-30 RX ADMIN — Medication 2 L/MIN: at 20:00

## 2024-04-30 RX ADMIN — CLOPIDOGREL BISULFATE 75 MG: 75 TABLET ORAL at 11:03

## 2024-04-30 RX ADMIN — ROSUVASTATIN 40 MG: 40 TABLET, FILM COATED ORAL at 21:06

## 2024-04-30 SDOH — SOCIAL STABILITY: SOCIAL INSECURITY: ARE YOU OR HAVE YOU BEEN THREATENED OR ABUSED PHYSICALLY, EMOTIONALLY, OR SEXUALLY BY ANYONE?: NO

## 2024-04-30 SDOH — SOCIAL STABILITY: SOCIAL INSECURITY: DO YOU FEEL UNSAFE GOING BACK TO THE PLACE WHERE YOU ARE LIVING?: NO

## 2024-04-30 SDOH — SOCIAL STABILITY: SOCIAL INSECURITY: HAS ANYONE EVER THREATENED TO HURT YOUR FAMILY OR YOUR PETS?: NO

## 2024-04-30 SDOH — SOCIAL STABILITY: SOCIAL INSECURITY: WERE YOU ABLE TO COMPLETE ALL THE BEHAVIORAL HEALTH SCREENINGS?: YES

## 2024-04-30 SDOH — SOCIAL STABILITY: SOCIAL INSECURITY: ARE THERE ANY APPARENT SIGNS OF INJURIES/BEHAVIORS THAT COULD BE RELATED TO ABUSE/NEGLECT?: NO

## 2024-04-30 SDOH — SOCIAL STABILITY: SOCIAL INSECURITY: ABUSE: ADULT

## 2024-04-30 SDOH — SOCIAL STABILITY: SOCIAL INSECURITY: DO YOU FEEL ANYONE HAS EXPLOITED OR TAKEN ADVANTAGE OF YOU FINANCIALLY OR OF YOUR PERSONAL PROPERTY?: NO

## 2024-04-30 SDOH — SOCIAL STABILITY: SOCIAL INSECURITY: HAVE YOU HAD ANY THOUGHTS OF HARMING ANYONE ELSE?: NO

## 2024-04-30 SDOH — SOCIAL STABILITY: SOCIAL INSECURITY: HAVE YOU HAD THOUGHTS OF HARMING ANYONE ELSE?: NO

## 2024-04-30 SDOH — SOCIAL STABILITY: SOCIAL INSECURITY: DOES ANYONE TRY TO KEEP YOU FROM HAVING/CONTACTING OTHER FRIENDS OR DOING THINGS OUTSIDE YOUR HOME?: NO

## 2024-04-30 ASSESSMENT — COLUMBIA-SUICIDE SEVERITY RATING SCALE - C-SSRS
2. HAVE YOU ACTUALLY HAD ANY THOUGHTS OF KILLING YOURSELF?: NO
1. IN THE PAST MONTH, HAVE YOU WISHED YOU WERE DEAD OR WISHED YOU COULD GO TO SLEEP AND NOT WAKE UP?: NO
6. HAVE YOU EVER DONE ANYTHING, STARTED TO DO ANYTHING, OR PREPARED TO DO ANYTHING TO END YOUR LIFE?: NO

## 2024-04-30 ASSESSMENT — ACTIVITIES OF DAILY LIVING (ADL)
HEARING - LEFT EAR: FUNCTIONAL
JUDGMENT_ADEQUATE_SAFELY_COMPLETE_DAILY_ACTIVITIES: YES
PATIENT'S MEMORY ADEQUATE TO SAFELY COMPLETE DAILY ACTIVITIES?: YES
DRESSING YOURSELF: INDEPENDENT
LACK_OF_TRANSPORTATION: NO
BATHING: INDEPENDENT
WALKS IN HOME: INDEPENDENT
FEEDING YOURSELF: INDEPENDENT
ADEQUATE_TO_COMPLETE_ADL: YES
TOILETING: INDEPENDENT
GROOMING: INDEPENDENT
HEARING - RIGHT EAR: FUNCTIONAL

## 2024-04-30 ASSESSMENT — COGNITIVE AND FUNCTIONAL STATUS - GENERAL
MOBILITY SCORE: 24
PATIENT BASELINE BEDBOUND: NO
DAILY ACTIVITIY SCORE: 24
MOBILITY SCORE: 24
DAILY ACTIVITIY SCORE: 24

## 2024-04-30 ASSESSMENT — PAIN - FUNCTIONAL ASSESSMENT
PAIN_FUNCTIONAL_ASSESSMENT: 0-10

## 2024-04-30 ASSESSMENT — PATIENT HEALTH QUESTIONNAIRE - PHQ9
1. LITTLE INTEREST OR PLEASURE IN DOING THINGS: NOT AT ALL
SUM OF ALL RESPONSES TO PHQ9 QUESTIONS 1 & 2: 0
2. FEELING DOWN, DEPRESSED OR HOPELESS: NOT AT ALL

## 2024-04-30 ASSESSMENT — PAIN DESCRIPTION - LOCATION
LOCATION: HIP
LOCATION: HIP

## 2024-04-30 ASSESSMENT — LIFESTYLE VARIABLES
HOW OFTEN DO YOU HAVE 6 OR MORE DRINKS ON ONE OCCASION: NEVER
HOW OFTEN DO YOU HAVE A DRINK CONTAINING ALCOHOL: NEVER
AUDIT-C TOTAL SCORE: 0
AUDIT-C TOTAL SCORE: 0
HOW MANY STANDARD DRINKS CONTAINING ALCOHOL DO YOU HAVE ON A TYPICAL DAY: PATIENT DOES NOT DRINK
SKIP TO QUESTIONS 9-10: 1

## 2024-04-30 ASSESSMENT — PAIN SCALES - GENERAL
PAINLEVEL_OUTOF10: 0 - NO PAIN
PAINLEVEL_OUTOF10: 7
PAINLEVEL_OUTOF10: 0 - NO PAIN
PAINLEVEL_OUTOF10: 8
PAINLEVEL_OUTOF10: 0 - NO PAIN
PAINLEVEL_OUTOF10: 6
PAINLEVEL_OUTOF10: 3

## 2024-04-30 ASSESSMENT — PAIN DESCRIPTION - DESCRIPTORS: DESCRIPTORS: BURNING;STABBING;SHOOTING

## 2024-04-30 ASSESSMENT — PAIN DESCRIPTION - ORIENTATION
ORIENTATION: LEFT
ORIENTATION: LEFT

## 2024-04-30 NOTE — H&P
HPI  Saritha Cormier is a 73 y.o. female presenting with severe aortic stenosis     Being evaluated for hip replacement, noted to have severe aortic stenosis which represented a progression from previous known moderate. Although her mobility is limited by her hip degenerative joint disease, she reports increased fatigue that affects her ability to care for her grandkids who she has to care for after the      Takes care of grandkids after daughter passed from breast cancer.        NYHA II  EKG  Frailty 1/5 - Hgb 14.2, Alb, cognition, mobility impaired by degenerative joint disease  TTE 3/15/2024 -Normal LVEF, peak velocity 4.8m/s , MG 51mmHg, CARLOS 0.6, DI 0.21. No other significant valve abnormalities  CTA TAVR - Anatomically suitable for 26 Evolut FX. 62.5/29.7, 28.2 x 27, STJ 23.6 x 22.3. Adequate iliofemoral access bilaterally L>R  Coronary angio 3/29/2024 - No obstructive CAD        PAST MEDICAL HISTORY  Severe aortic stenosis  HFpEF  Breast cancer BRCA + s/p lumpectomy and double mastectomy and oophorectomy  NSAID induced gastric ulcer  HTN  Bilateral hip arthroplasty s/p R. Hemiarthroplasty 2 years ago. left is pending        Past Medical History:  She has a past medical history of Acquired absence of bilateral breasts and nipples (01/31/2022), Neoplasm of unspecified behavior of bone, soft tissue, and skin (12/22/2015), Other bursitis of hip, right hip, Personal history of other diseases of the respiratory system (03/14/2016), Trigger finger, unspecified finger (06/22/2018), and Unilateral primary osteoarthritis, right knee (02/19/2021).     Past Surgical History:  She has a past surgical history that includes Mastectomy (09/21/2015); Hysterectomy (09/21/2015); Other surgical history (09/21/2015); and Cardiac catheterization (N/A, 3/29/2024).      Social History:  She reports that she has never smoked. She has never used smokeless tobacco. She reports that she does not drink alcohol and does not use  drugs.     Family History:  Family History          Family History   Problem Relation Name Age of Onset    Gout Mother        Hypertension Mother        Arthritis Father        Other (cardiac disorder) Father        Cancer Father        Tongue cancer Brother        Other (jaw neoplasm) Brother        Breast cancer Sibling        Pancreatic cancer Sibling                Allergies:  Patient has no known allergies.     Last Recorded Vitals:  Vitals   There were no vitals filed for this visit.        Physical Exam:  VS: reviewed  Gen: Alert, awake, O x 3  Head: No evidence of trauma  Neck: No JVD  Heart: RRR, 3/6 HARJINDER RUSB  Lungs: Bilateral CTA  Abd: Soft, NT, ND  Ext: No edema, no cellulitis  Neuro: CN II-XII intact, 5/5 bilateral         -     ASSESSMENT AND PLAN  Severe aortic stenosis  Degenerative joint disease   DM II on OHAs     Mrs Cormier has symptomatic severe aortic stenosis. The rationale for replacement and risks were discussed. She is anatomically suitable for transfemoral transcatheter aortic replacement.     Proceed with TAVR    We discussed all the risks associated with the procedure, including but not limited to stroke, MI, pericardial tamponade, vascular complications, infection and death with the patient. The risk of needing a permament pacemaker was also discussed in detail. The patient verbalized understanding and decided to proceed with the procedure.

## 2024-04-30 NOTE — PROGRESS NOTES
Pharmacy Medication History Review    Saritha Cormier is a 73 y.o. female admitted for Aortic stenosis. Pharmacy reviewed the patient's [id number] medications and allergies for accuracy.    The list below reflects the updated PTA list. Comments regarding how patient may be taking medications differently can be found in the Admit Orders Activity  Prior to Admission Medications   Prescriptions Last Dose Informant   acetaminophen (Tylenol) 325 mg tablet 4/30/2024 Self   Sig: Take 2 tablets (650 mg) by mouth every 6 hours if needed for mild pain (1 - 3) or moderate pain (4 - 6).   allopurinol (Zyloprim) 100 mg tablet 4/29/2024 Self   Sig: Take 2 tablets (200 mg) by mouth once daily.   aspirin 81 mg EC tablet 4/29/2024 Self   Sig: TAKE 1 TABLET BY MOUTH EVERY DAY   cholecalciferol (Vitamin D-3) 125 MCG (5000 UT) capsule 4/29/2024 Self   Sig: TAKE 1 CAPSULE (125 MCG) BY MOUTH ONCE DAILY.   clopidogrel (Plavix) 75 mg tablet 4/29/2024 Self   Sig: TAKE 1 TABLET BY MOUTH EVERY DAY   ferrous gluconate 324 (38 Fe) mg tablet 4/29/2024 Self   Sig: Take 1 tablet (324 mg) by mouth 2 times a day.   furosemide (Lasix) 40 mg tablet 4/28/2024 Self   Sig: TAKE 1 TABLET BY MOUTH EVERY DAY   gabapentin (Neurontin) 300 mg capsule 4/29/2024 Self   Sig: Take 1 capsule (300 mg) by mouth once daily at bedtime.   losartan (Cozaar) 25 mg tablet 4/29/2024 Self   Sig: Take 1 tablet (25 mg) by mouth once daily. as directed   metFORMIN  mg 24 hr tablet 4/28/2024 Self   Sig: TAKE 2 TABLETS BY MOUTH EVERY DAY**REPLACES OSM   nitroglycerin (Nitrostat) 0.4 mg SL tablet  Self   Sig: Place 1 tablet (0.4 mg) under the tongue if needed for chest pain.   pantoprazole (ProtoNix) 40 mg EC tablet 4/29/2024 Self   Sig: Take 1 tablet (40 mg) by mouth once daily.   rosuvastatin (Crestor) 40 mg tablet 4/29/2024 Self   Sig: Take 1 tablet (40 mg) by mouth once daily.   traMADol (Ultram) 50 mg tablet 4/30/2024 Self   Sig: Take 1 tablet (50 mg) by  mouth 2 times a day for 14 days.      Facility-Administered Medications: None        The list below reflects the updated allergy list. Please review each documented allergy for additional clarification and justification.  Allergies  Reviewed by Samantha Purcell, PharmD on 4/30/2024   No Known Allergies         Patient declines M2B at discharge. Pharmacy has been updated to [facility] in [place].    Sources used: Pharmacy dispense history, OARRs, patient interview (ok historian- did not know doses, but knew drug and frequency), and Cardio office visit note from 4/19    Below are additional concerns with the patient's PTA list.  -Pt states that she is almost out of tramadol and would like a refill. Will have a pain management follow up in the near future for her left femoral trochanteric trigger point injection     Samantha Purcell PharmD, [facility]  Transitions of Care Pharmacist  [facility]  Please reach out via Secure Chat for questions, or if no response call [phone] or [facility]era [facility]

## 2024-04-30 NOTE — CARE PLAN
Pt admitted to LT5 s/p TAVR. TVP line kept in d/t RBBB & widening QRS. Pt had no bleeding from bilateral groin site incisions. Pt been off bedrest since 1330. Pt is potential discharge tomorrow.    Problem: Pain  Goal: My pain/discomfort is manageable  Outcome: Progressing     Problem: Safety  Goal: Patient will be injury free during hospitalization  Outcome: Progressing  Goal: I will remain free of falls  Outcome: Progressing     Problem: Daily Care  Goal: Daily care needs are met  Outcome: Progressing     Problem: Psychosocial Needs  Goal: Demonstrates ability to cope with hospitalization/illness  Outcome: Progressing  Goal: Collaborate with me, my family, and caregiver to identify my specific goals  Outcome: Progressing     Problem: Discharge Barriers  Goal: My discharge needs are met  Outcome: Progressing

## 2024-04-30 NOTE — DISCHARGE INSTRUCTIONS
No change in medications      ####  POST VALVE PROCEDURE DISCHARGE INSTRUCTIONS   ####    - Please weigh yourself daily (first thing in the morning) and record reading  - Please take and record your blood pressure 2 times a day (at least 60-90 mins AFTER you take your meds)  PLEASE HAVE THESE READINGS AVAILABLE DURING YOUR FOLLOW-UP APPOINTMENTS!!    - NO DRIVING OR LIFTING/PULLING/PUSHING GREATER THAN 10 POUNDS FOR 1 WEEK FROM YOUR PROCEDURE DATE.    - A lab requisition has been provided for you to draw a CBC, BMP, and PT/INR.  Please take this rec to your local lab to have your labs drawn between 4-7 days post discharge.     - You have been given the order requisition for the 1 month ECHO at the time of your discharge.  Please call and schedule this ECHO at your LOCAL center (no sooner than 23 days after your procedure date).    - You will have 3 appointments that are vital to your post procedure care, these will be scheduled for you IF YOU NEED TO CHANGE YOUR APPOINTMENT TIME/DATE, PLEASE CALL [phone]   - Please follow up with your PCP within 7-14 days of discharge  - You will follow up with your primary cardiologist in 6-10 weeks    **** No elective dental procedures or cleanings for 3 months post procedure - You will need dental prophylaxis (oral antibiotic) prior to dental work/cleanings for life *****    Please call the STRUCTURAL HEART TEAM LINE if you have any questions or concerns - [phone]     ****   CALL PROVIDER IF:   ****  - Breathing faster than normal.   - Breathing harder than normal or having retractions.   - Fever of 100.4 F (38 C) or higher.   - Chills.   - Drinking less than normal.   - Urinating less than normal, over 1 day.   - Acting very sleepy and difficult to awaken.   - Vomiting (throwing up) and not able to eat or drink for 12 hours.   - 3 or more loose, watery bowel movements in 24 hours (diarrhea).    -Any new concerning symptoms.    - If you develop difficulty breathing,  rash, hives, severe nausea, vomiting, light-headedness or any signs of infection, immediately contact your doctor and go to the nearest emergency room.      #####   MISC. HOME-GOING INFO   #####  - DO NOT drink any alcoholic drinks or take any non-prescriptive medications that contain alcohol for the first 24 hours.   - DO NOT make any important decisions for the first 24 hours.      ACTIVITY:  - You are advised to go directly home from the hospital.   - DO NOT lift anything heavier than 10 pounds for one week, this allows for proper healing of the groin.   - No excessive exercise or treadmill use for one week. You may walk and do stairs, slowly.   - No sexual activities for 24 hours after you arrive home.      WOUND CARE:  - If slight bleeding should occur at site, lie down and have someone apply firm pressure just above the puncture site for 5 minutes.  If it continues or is profuse, call 911. Always notify your doctor if bleeding occurs.   - Keep site clean and dry. Let air dry or you may use a simple bandaid.   - Gently cleanse the puncture site in your groin with soap and water only.   - You may experience some tenderness, bruising or minimal inflammation.  If you have any concerns, you may contact the Cath Lab or if any of these symptoms become excessive, contact your cardiologist or go to the emergency room.   - No tub baths, soaking, or swimming for one week.   - May shower the next day after your procedure.      DIET:  - You may resume your normal diet. However, be mindful of your sodium intake.  Ideally you should try to limit your daily intake of sodium to 2-3g a day      HEART FAILURE SPECIFIC INSTRUCTIONS:   - CALL 911 IF YOU HAVE ANY OF THE SIGNS AND SYMPTOMS OF HEART FAILURE:   1. Chest pain   2. Significant Shortness of breath   3. Fainting.   - Notify your physician immediately if you have shortness of breath; weight gain of 3 lbs. or more; fatigue and loss of energy; swelling of lower extremities  or abdomen; dizziness or fainting; change of appetite; and frequent coughing.   - Daily weight on the same scale, same time after voiding and before eating.   - Maintain daily weight log.

## 2024-04-30 NOTE — OP NOTE
Date of the Operation:24   Pt Name:Saritha Cormier   MRN:[id number]   Pre Op D. - Severe aortic valve stenosis  Post Op D. - Severe aortic valve stenosis  Operation/Procedure:  1. - Pre-dilatation aortic Balloon plasty with a 18x45 mm True Balloon.  2. - TAVR: Evolute FX26mm  Surgeon: SOLO Romano  Cardiologist: BÁRBARA Stallworth  Anesthesia Type: Conscious sedation and local 2% lidocaine  Complications: None  Estimated Blood loss: 150 cc  Operative indications: The patient was well diagnosed with severe aortic valve stenosis. The heart team recommended to perform a TAVR.   Operative Findings: Per fluoroscopy severe calcification of the aortic valve.  Operative procedure: The patient was placed on the surgical table in a supine position. Prepped and draped as usual.  2% lidocaine for all the percutaneous access.  TPMW through the RIJ. Secondary access: R femoral artery with a 6Fr introducer sheath. Heparin is given for ACT>300s. Primary access on the L femoral artery was prepared with 2 perclose and a 14 Fr introducer sheath.  Pigtails are placed in the NCS and the LV Mckenna. Pre implant hemodynamics are obtained. GW exchange to a Safari preformed wire. A pre-implant balloon-plasty with a 18 mm balloon. The valve previously chosen and prepared is now pushed under fluoroscopy across the aortic arch and then across the valve. Rapid pacing, and the valve is deployed 80%, we think is too shallow so we partial recaptured, redeployed deeper. Delivery system is removed. Post implants hemodynamics are obtained. Post implant surface echo is done showing minimal to no PVL. The procedure is considered successful. The Protamine is given. Perclose are tied down and an Angioseal is applied to the other FA. Pulses are checked. The patient has no new electrical disturbances so the TPMW is pulled out and the patient is transferred for postoperative management.

## 2024-04-30 NOTE — Clinical Note
Sheath was exchanged with INTRODUCER SHEATH, SENTRANT ENSURE SEAL 14FR 28CM. LFA; over the supracore wire

## 2024-04-30 NOTE — POST-PROCEDURE NOTE
Physician Transition of Care Summary  Invasive Cardiovascular Lab    Procedure Date: 4/30/2024  Attending:    * Jessica Stallworth - Primary  Resident/Fellow/Other Assistant: Surgeons and Role:     * Kemal Romano MD - Assisting     * Malena Evans MD - Fellow    Indications:   Pre-op Diagnosis     * Nonrheumatic aortic valve stenosis [I35.0]    Post-procedure diagnosis:   Post-op Diagnosis     * Nonrheumatic aortic valve stenosis [I35.0]    Procedure(s):   TVP for TAVR    TAVR (Transcatheter AV Replacement)  03674 - NH REPLACE AORTIC VALVE PERQ FEMORAL ARTRY APPROACH    NH REPLACE AORTIC VALVE PERQ FEMORAL ARTRY APPROACH [58775]        Description of the Procedure:   Indication: Severe Aortic Stenosis    Valve used: 26 mm Evolut FX (pre-dil 18 mnm TD)    Access  Primary: Left CFA s/p 2 proglide  Secondary: Right CFA 6 Lao s/p 1 proglide    TVP: right IJ vein, 7 Lao, left in place -- new LBBB    Pre LVEDP: 22  Post LVEDP: 25    Post gradient TTE: 5  mild PVL  No effusion    Conclusion  Monitor in SDU with pacer in place  Monitor access sites for bleeding  TTE tomorrow  Bed rest  Structural team will continue to follow.   page structural team for any concerning clinical events.          Stents/Implants:   Implants       Heart Valve Repair    Loading System, Evolut Fx, 23-29mm - [id number] - Used, Not Implanted        Inventory item: LOADING SYSTEM, EVOLUT FX,  23-29MM Model/Cat number: [id number]    : [employer] Lot number: [id number]    Device identifier: [id number]        As of 4/30/2024       Status: Used, Not Implanted                      Valve, Aortic, 26mm, Fx Transcatheter - [id number] - Implanted        Inventory item: VALVE, AORTIC, 26MM, FX TRANSCATHETER Model/Cat number: [id number]    Serial number: [id number] : [employer]    Lot number: [id number] Device identifier: [id number]      As of 4/30/2024       Status: Implanted                              Estimated  Blood Loss:   10 mL    Anesthesia: Moderate Sedation Anesthesia Staff: No anesthesia staff entered.    Any Specimen(s) Removed:   Order Name Source Comment Collection Info Order Time   TYPE AND SCREEN Blood, Venous  Collected By: Eber Hayes RN 4/30/2024  7:11 AM     Release result to [facility]   Immediate        [facility]OR[facility] Blood, Venous   4/30/2024  7:11 AM     Release result to [facility]   Immediate                  Electronically signed by: Malena Evans MD, 4/30/2024 9:29 AM

## 2024-05-01 ENCOUNTER — APPOINTMENT (OUTPATIENT)
Dept: CARDIOLOGY | Facility: HOSPITAL | Age: 74
DRG: 267 | End: 2024-05-01
Payer: MEDICARE

## 2024-05-01 ENCOUNTER — APPOINTMENT (OUTPATIENT)
Dept: RADIOLOGY | Facility: HOSPITAL | Age: 74
DRG: 267 | End: 2024-05-01
Payer: MEDICARE

## 2024-05-01 VITALS
HEART RATE: 70 BPM | HEIGHT: 66 IN | WEIGHT: 185 LBS | OXYGEN SATURATION: 93 % | SYSTOLIC BLOOD PRESSURE: 109 MMHG | TEMPERATURE: 97 F | BODY MASS INDEX: 29.73 KG/M2 | DIASTOLIC BLOOD PRESSURE: 71 MMHG | RESPIRATION RATE: 19 BRPM

## 2024-05-01 LAB
1OH-MIDAZOLAM UR CFM-MCNC: <25 NG/ML
6MAM UR CFM-MCNC: <25 NG/ML
7AMINOCLONAZEPAM UR CFM-MCNC: <25 NG/ML
A-OH ALPRAZ UR CFM-MCNC: <25 NG/ML
ALPRAZ UR CFM-MCNC: <25 NG/ML
ANION GAP SERPL CALC-SCNC: 11 MMOL/L (ref 10–20)
AORTIC VALVE MEAN GRADIENT: 8 MMHG
AORTIC VALVE PEAK VELOCITY: 2.12 M/S
ATRIAL RATE: 76 BPM
AV PEAK GRADIENT: 18 MMHG
AVA (PEAK VEL): 1.54 CM2
AVA (VTI): 1.57 CM2
BASOPHILS # BLD AUTO: 0.01 X10*3/UL (ref 0–0.1)
BASOPHILS NFR BLD AUTO: 0.1 %
BUN SERPL-MCNC: 24 MG/DL (ref 6–23)
CALCIUM SERPL-MCNC: 8.3 MG/DL (ref 8.6–10.6)
CHLORDIAZEP UR CFM-MCNC: <25 NG/ML
CHLORIDE SERPL-SCNC: 106 MMOL/L (ref 98–107)
CLONAZEPAM UR CFM-MCNC: <25 NG/ML
CO2 SERPL-SCNC: 28 MMOL/L (ref 21–32)
CODEINE UR CFM-MCNC: <50 NG/ML
CREAT SERPL-MCNC: 1.63 MG/DL (ref 0.5–1.05)
DIAZEPAM UR CFM-MCNC: <25 NG/ML
EDDP UR CFM-MCNC: <25 NG/ML
EGFRCR SERPLBLD CKD-EPI 2021: 34 ML/MIN/1.73M*2
EJECTION FRACTION APICAL 4 CHAMBER: 58
EOSINOPHIL # BLD AUTO: 0.15 X10*3/UL (ref 0–0.4)
EOSINOPHIL NFR BLD AUTO: 1.8 %
ERYTHROCYTE [DISTWIDTH] IN BLOOD BY AUTOMATED COUNT: 14.6 % (ref 11.5–14.5)
FENTANYL UR CFM-MCNC: <2.5 NG/ML
GLUCOSE BLD MANUAL STRIP-MCNC: 128 MG/DL (ref 74–99)
GLUCOSE BLD MANUAL STRIP-MCNC: 129 MG/DL (ref 74–99)
GLUCOSE SERPL-MCNC: 128 MG/DL (ref 74–99)
HCT VFR BLD AUTO: 33.9 % (ref 36–46)
HGB BLD-MCNC: 10.4 G/DL (ref 12–16)
HYDROCODONE CTO UR CFM-MCNC: <25 NG/ML
HYDROMORPHONE UR CFM-MCNC: <25 NG/ML
IMM GRANULOCYTES # BLD AUTO: 0.02 X10*3/UL (ref 0–0.5)
IMM GRANULOCYTES NFR BLD AUTO: 0.2 % (ref 0–0.9)
LEFT VENTRICLE INTERNAL DIMENSION DIASTOLE: 4.1 CM (ref 3.5–6)
LEFT VENTRICULAR OUTFLOW TRACT DIAMETER: 1.9 CM
LORAZEPAM UR CFM-MCNC: <25 NG/ML
LV EJECTION FRACTION BIPLANE: 65 %
LYMPHOCYTES # BLD AUTO: 0.9 X10*3/UL (ref 0.8–3)
LYMPHOCYTES NFR BLD AUTO: 10.9 %
MAGNESIUM SERPL-MCNC: 2.1 MG/DL (ref 1.6–2.4)
MCH RBC QN AUTO: 29.1 PG (ref 26–34)
MCHC RBC AUTO-ENTMCNC: 30.7 G/DL (ref 32–36)
MCV RBC AUTO: 95 FL (ref 80–100)
METHADONE UR CFM-MCNC: <25 NG/ML
MIDAZOLAM UR CFM-MCNC: <25 NG/ML
MITRAL VALVE E/A RATIO: 0.85
MITRAL VALVE E/E' RATIO: 17.31
MONOCYTES # BLD AUTO: 0.84 X10*3/UL (ref 0.05–0.8)
MONOCYTES NFR BLD AUTO: 10.2 %
MORPHINE UR CFM-MCNC: <50 NG/ML
NEUTROPHILS # BLD AUTO: 6.33 X10*3/UL (ref 1.6–5.5)
NEUTROPHILS NFR BLD AUTO: 76.8 %
NORDIAZEPAM UR CFM-MCNC: <25 NG/ML
NORFENTANYL UR CFM-MCNC: <2.5 NG/ML
NORHYDROCODONE UR CFM-MCNC: <25 NG/ML
NOROXYCODONE UR CFM-MCNC: <25 NG/ML
NORTRAMADOL UR-MCNC: >1000 NG/ML
NRBC BLD-RTO: 0 /100 WBCS (ref 0–0)
OXAZEPAM UR CFM-MCNC: <25 NG/ML
OXYCODONE UR CFM-MCNC: <25 NG/ML
OXYMORPHONE UR CFM-MCNC: <25 NG/ML
P AXIS: 40 DEGREES
P OFFSET: 166 MS
P ONSET: 116 MS
PLATELET # BLD AUTO: 103 X10*3/UL (ref 150–450)
POTASSIUM SERPL-SCNC: 4.5 MMOL/L (ref 3.5–5.3)
PR INTERVAL: 182 MS
Q ONSET: 207 MS
QRS COUNT: 13 BEATS
QRS DURATION: 94 MS
QT INTERVAL: 404 MS
QTC CALCULATION(BAZETT): 454 MS
QTC FREDERICIA: 436 MS
R AXIS: -39 DEGREES
RBC # BLD AUTO: 3.57 X10*6/UL (ref 4–5.2)
RIGHT VENTRICLE FREE WALL PEAK S': 9.36 CM/S
RIGHT VENTRICLE PEAK SYSTOLIC PRESSURE: 27 MMHG
SODIUM SERPL-SCNC: 140 MMOL/L (ref 136–145)
T AXIS: 103 DEGREES
T OFFSET: 409 MS
TEMAZEPAM UR CFM-MCNC: <25 NG/ML
TRAMADOL UR CFM-MCNC: >1000 NG/ML
TRICUSPID ANNULAR PLANE SYSTOLIC EXCURSION: 1.5 CM
VENTRICULAR RATE: 76 BPM
WBC # BLD AUTO: 8.3 X10*3/UL (ref 4.4–11.3)
ZOLPIDEM UR CFM-MCNC: <25 NG/ML
ZOLPIDEM UR-MCNC: <25 NG/ML

## 2024-05-01 PROCEDURE — 82374 ASSAY BLOOD CARBON DIOXIDE: CPT | Performed by: NURSE PRACTITIONER

## 2024-05-01 PROCEDURE — 93325 DOPPLER ECHO COLOR FLOW MAPG: CPT

## 2024-05-01 PROCEDURE — 93270 REMOTE 30 DAY ECG REV/REPORT: CPT

## 2024-05-01 PROCEDURE — 93308 TTE F-UP OR LMTD: CPT | Performed by: INTERNAL MEDICINE

## 2024-05-01 PROCEDURE — 2500000005 HC RX 250 GENERAL PHARMACY W/O HCPCS: Performed by: NURSE PRACTITIONER

## 2024-05-01 PROCEDURE — 71045 X-RAY EXAM CHEST 1 VIEW: CPT

## 2024-05-01 PROCEDURE — 71045 X-RAY EXAM CHEST 1 VIEW: CPT | Performed by: RADIOLOGY

## 2024-05-01 PROCEDURE — 99238 HOSP IP/OBS DSCHRG MGMT 30/<: CPT | Performed by: NURSE PRACTITIONER

## 2024-05-01 PROCEDURE — 83735 ASSAY OF MAGNESIUM: CPT | Performed by: NURSE PRACTITIONER

## 2024-05-01 PROCEDURE — 82947 ASSAY GLUCOSE BLOOD QUANT: CPT

## 2024-05-01 PROCEDURE — 93321 DOPPLER ECHO F-UP/LMTD STD: CPT | Performed by: INTERNAL MEDICINE

## 2024-05-01 PROCEDURE — 93005 ELECTROCARDIOGRAM TRACING: CPT

## 2024-05-01 PROCEDURE — 2500000004 HC RX 250 GENERAL PHARMACY W/ HCPCS (ALT 636 FOR OP/ED): Performed by: NURSE PRACTITIONER

## 2024-05-01 PROCEDURE — 85025 COMPLETE CBC W/AUTO DIFF WBC: CPT | Performed by: NURSE PRACTITIONER

## 2024-05-01 PROCEDURE — 93325 DOPPLER ECHO COLOR FLOW MAPG: CPT | Performed by: INTERNAL MEDICINE

## 2024-05-01 PROCEDURE — 2500000001 HC RX 250 WO HCPCS SELF ADMINISTERED DRUGS (ALT 637 FOR MEDICARE OP): Performed by: NURSE PRACTITIONER

## 2024-05-01 PROCEDURE — 93010 ELECTROCARDIOGRAM REPORT: CPT | Performed by: INTERNAL MEDICINE

## 2024-05-01 RX ADMIN — CLOPIDOGREL BISULFATE 75 MG: 75 TABLET ORAL at 08:28

## 2024-05-01 RX ADMIN — Medication 1 L/MIN: at 08:00

## 2024-05-01 RX ADMIN — TRAMADOL HYDROCHLORIDE 50 MG: 50 TABLET, COATED ORAL at 06:04

## 2024-05-01 RX ADMIN — ACETAMINOPHEN 650 MG: 325 TABLET ORAL at 13:07

## 2024-05-01 RX ADMIN — PERFLUTREN 2 ML OF DILUTION: 6.52 INJECTION, SUSPENSION INTRAVENOUS at 11:06

## 2024-05-01 RX ADMIN — ACETAMINOPHEN 650 MG: 325 TABLET ORAL at 06:04

## 2024-05-01 RX ADMIN — ASPIRIN 81 MG 81 MG: 81 TABLET ORAL at 08:28

## 2024-05-01 RX ADMIN — PANTOPRAZOLE SODIUM 40 MG: 40 TABLET, DELAYED RELEASE ORAL at 06:04

## 2024-05-01 ASSESSMENT — COGNITIVE AND FUNCTIONAL STATUS - GENERAL
MOBILITY SCORE: 23
CLIMB 3 TO 5 STEPS WITH RAILING: A LITTLE
DAILY ACTIVITIY SCORE: 24

## 2024-05-01 ASSESSMENT — PAIN SCALES - GENERAL
PAINLEVEL_OUTOF10: 0 - NO PAIN
PAINLEVEL_OUTOF10: 3
PAINLEVEL_OUTOF10: 5 - MODERATE PAIN
PAINLEVEL_OUTOF10: 0 - NO PAIN

## 2024-05-01 ASSESSMENT — PAIN - FUNCTIONAL ASSESSMENT
PAIN_FUNCTIONAL_ASSESSMENT: 0-10

## 2024-05-01 ASSESSMENT — PAIN DESCRIPTION - LOCATION: LOCATION: HIP

## 2024-05-01 ASSESSMENT — PAIN DESCRIPTION - ORIENTATION: ORIENTATION: LEFT

## 2024-05-01 ASSESSMENT — ACTIVITIES OF DAILY LIVING (ADL): LACK_OF_TRANSPORTATION: NO

## 2024-05-01 ASSESSMENT — PAIN DESCRIPTION - DESCRIPTORS: DESCRIPTORS: SORE

## 2024-05-01 NOTE — PROGRESS NOTES
05/01/24 1436   Discharge Planning   Living Arrangements Spouse/significant other   Support Systems Spouse/significant other   Assistance Needed None   Type of Residence Private residence   Number of Stairs to Enter Residence 5   Number of Stairs Within Residence 0   Do you have animals or pets at home? No   Who is requesting discharge planning? Provider   Home or Post Acute Services None   Patient expects to be discharged to: Home   Does the patient need discharge transport arranged? No   Financial Resource Strain   How hard is it for you to pay for the very basics like food, housing, medical care, and heating? Not very   Housing Stability   In the last 12 months, was there a time when you were not able to pay the mortgage or rent on time? N   In the last 12 months, how many places have you lived? 1   In the last 12 months, was there a time when you did not have a steady place to sleep or slept in a shelter (including now)? N   Transportation Needs   In the past 12 months, has lack of transportation kept you from medical appointments or from getting medications? no   In the past 12 months, has lack of transportation kept you from meetings, work, or from getting things needed for daily living? No   Patient Choice   Provider Choice list and CMS website (https://medicare.gov/care-compare#search) for post-acute Quality and Resource Measure Data were provided and reviewed with: Family   Patient / Family choosing to utilize agency / facility established prior to hospitalization No

## 2024-05-01 NOTE — DISCHARGE SUMMARY
"Discharge Diagnosis  Aortic stenosis    Issues Requiring Follow-Up  None      Hospital Course   Patient Vitals for the past 24 hrs:   BP Temp Temp src Pulse Resp SpO2 Height Weight   05/01/24 0809 109/71 36.1 °C (97 °F) Temporal 70 -- 93 % -- --   05/01/24 0503 120/73 36.2 °C (97.2 °F) Temporal 73 -- 96 % -- --   05/01/24 0018 123/76 36.5 °C (97.7 °F) Temporal -- 19 -- -- --   04/30/24 2003 103/66 36.6 °C (97.9 °F) Temporal -- 20 -- -- --   04/30/24 1600 102/64 35.9 °C (96.6 °F) Temporal 80 -- 93 % -- --   04/30/24 1500 135/68 -- -- 84 20 94 % -- --   04/30/24 1400 108/72 -- -- 70 18 93 % -- --   04/30/24 1300 124/58 -- -- 82 19 95 % -- --   04/30/24 1200 130/71 -- -- 70 15 92 % -- --   04/30/24 1130 131/60 -- -- 74 14 97 % -- --   04/30/24 1100 108/56 -- -- 66 14 95 % -- --   04/30/24 1045 119/56 -- -- 76 18 94 % -- --   04/30/24 1030 116/78 -- -- 75 21 93 % -- --   04/30/24 1017 -- -- -- -- -- -- 1.676 m (5' 6\") 83.9 kg (185 lb)   04/30/24 1015 122/56 -- -- 73 12 94 % -- --   04/30/24 1000 118/73 36 °C (96.8 °F) Temporal 76 15 95 % -- --   04/30/24 0920 130/67 -- -- 76 14 100 % -- --       Results for orders placed or performed during the hospital encounter of 04/30/24 (from the past 96 hour(s))   ECG 12 lead   Result Value Ref Range    Ventricular Rate 76 BPM    Atrial Rate 76 BPM    NM Interval 182 ms    QRS Duration 94 ms    QT Interval 404 ms    QTC Calculation(Bazett) 454 ms    P Axis 40 degrees    R Axis -39 degrees    T Axis 103 degrees    QRS Count 13 beats    Q Onset 207 ms    P Onset 116 ms    P Offset 166 ms    T Offset 409 ms    QTC Fredericia 436 ms   Type And Screen   Result Value Ref Range    ABO TYPE A     Rh TYPE NEG     ANTIBODY SCREEN NEG    ACTIVATED CLOTTING TIME LOW   Result Value Ref Range    POCT Activated Clotting Time Low Range 294 (H) 83 - 199 sec   Transthoracic Echo (TTE) Limited   Result Value Ref Range    AV pk collins 3.75 m/s    AV mn grad 35.8 mmHg    LVOT diam 1.90 cm    LV Biplane " EF 67 %    LA vol index A/L 35.8 ml/m2    LVIDd 4.70 cm    Aortic Valve Area by Continuity of VTI 0.59 cm2    Aortic Valve Area by Continuity of Peak Velocity 0.67 cm2    AV pk grad 56.3 mmHg    LV A4C EF 69.9     BSA 1.98 m2   Basic metabolic panel   Result Value Ref Range    Glucose 105 (H) 74 - 99 mg/dL    Sodium 139 136 - 145 mmol/L    Potassium 4.2 3.5 - 5.3 mmol/L    Chloride 104 98 - 107 mmol/L    Bicarbonate 26 21 - 32 mmol/L    Anion Gap 13 10 - 20 mmol/L    Urea Nitrogen 23 6 - 23 mg/dL    Creatinine 1.40 (H) 0.50 - 1.05 mg/dL    eGFR 40 (L) >60 mL/min/1.73m*2    Calcium 8.8 8.6 - 10.6 mg/dL   Magnesium   Result Value Ref Range    Magnesium 2.02 1.60 - 2.40 mg/dL   CBC and Auto Differential   Result Value Ref Range    WBC 8.6 4.4 - 11.3 x10*3/uL    nRBC 0.0 0.0 - 0.0 /100 WBCs    RBC 4.15 4.00 - 5.20 x10*6/uL    Hemoglobin 12.1 12.0 - 16.0 g/dL    Hematocrit 37.7 36.0 - 46.0 %    MCV 91 80 - 100 fL    MCH 29.2 26.0 - 34.0 pg    MCHC 32.1 32.0 - 36.0 g/dL    RDW 14.5 11.5 - 14.5 %    Platelets 118 (L) 150 - 450 x10*3/uL    Neutrophils % 75.4 40.0 - 80.0 %    Immature Granulocytes %, Automated 0.3 0.0 - 0.9 %    Lymphocytes % 14.6 13.0 - 44.0 %    Monocytes % 7.7 2.0 - 10.0 %    Eosinophils % 1.9 0.0 - 6.0 %    Basophils % 0.1 0.0 - 2.0 %    Neutrophils Absolute 6.49 (H) 1.60 - 5.50 x10*3/uL    Immature Granulocytes Absolute, Automated 0.03 0.00 - 0.50 x10*3/uL    Lymphocytes Absolute 1.26 0.80 - 3.00 x10*3/uL    Monocytes Absolute 0.66 0.05 - 0.80 x10*3/uL    Eosinophils Absolute 0.16 0.00 - 0.40 x10*3/uL    Basophils Absolute 0.01 0.00 - 0.10 x10*3/uL   POCT GLUCOSE   Result Value Ref Range    POCT Glucose 108 (H) 74 - 99 mg/dL   POCT GLUCOSE   Result Value Ref Range    POCT Glucose 138 (H) 74 - 99 mg/dL   POCT GLUCOSE   Result Value Ref Range    POCT Glucose 178 (H) 74 - 99 mg/dL   CBC and Auto Differential   Result Value Ref Range    WBC 8.3 4.4 - 11.3 x10*3/uL    nRBC 0.0 0.0 - 0.0 /100 WBCs    RBC 3.57  (L) 4.00 - 5.20 x10*6/uL    Hemoglobin 10.4 (L) 12.0 - 16.0 g/dL    Hematocrit 33.9 (L) 36.0 - 46.0 %    MCV 95 80 - 100 fL    MCH 29.1 26.0 - 34.0 pg    MCHC 30.7 (L) 32.0 - 36.0 g/dL    RDW 14.6 (H) 11.5 - 14.5 %    Platelets 103 (L) 150 - 450 x10*3/uL    Neutrophils % 76.8 40.0 - 80.0 %    Immature Granulocytes %, Automated 0.2 0.0 - 0.9 %    Lymphocytes % 10.9 13.0 - 44.0 %    Monocytes % 10.2 2.0 - 10.0 %    Eosinophils % 1.8 0.0 - 6.0 %    Basophils % 0.1 0.0 - 2.0 %    Neutrophils Absolute 6.33 (H) 1.60 - 5.50 x10*3/uL    Immature Granulocytes Absolute, Automated 0.02 0.00 - 0.50 x10*3/uL    Lymphocytes Absolute 0.90 0.80 - 3.00 x10*3/uL    Monocytes Absolute 0.84 (H) 0.05 - 0.80 x10*3/uL    Eosinophils Absolute 0.15 0.00 - 0.40 x10*3/uL    Basophils Absolute 0.01 0.00 - 0.10 x10*3/uL   Basic Metabolic Panel   Result Value Ref Range    Glucose 128 (H) 74 - 99 mg/dL    Sodium 140 136 - 145 mmol/L    Potassium 4.5 3.5 - 5.3 mmol/L    Chloride 106 98 - 107 mmol/L    Bicarbonate 28 21 - 32 mmol/L    Anion Gap 11 10 - 20 mmol/L    Urea Nitrogen 24 (H) 6 - 23 mg/dL    Creatinine 1.63 (H) 0.50 - 1.05 mg/dL    eGFR 34 (L) >60 mL/min/1.73m*2    Calcium 8.3 (L) 8.6 - 10.6 mg/dL   Magnesium   Result Value Ref Range    Magnesium 2.10 1.60 - 2.40 mg/dL   POCT GLUCOSE   Result Value Ref Range    POCT Glucose 129 (H) 74 - 99 mg/dL       Saritha Cormier is a 73 y.o. year old female. PMHx AS -Severe. Nonobstructive coronary atherosclerosis. HFpEF with volume overload -compensated. NSAID induced GI ulcer with bleed and anemia. HTN. DM. DLP. Obesity. S/p B/L mastectomy/Oophorectomy for CA breast BRCA+ DJD S/p Hip replacement surgery. Awaiting TKR - Preop evluation. S/p Evolut FX 26mm (pre-dil with 18mm TD) via LFP on 4/30/24    patient with   Past Medical History:   Diagnosis Date    Acquired absence of bilateral breasts and nipples 01/31/2022    History of bilateral mastectomy    Neoplasm of unspecified behavior of bone,  soft tissue, 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/2015    Skin growth    Other bursitis of hip, right hip     Bursitis of right hip, unspecified bursa    Personal history of other diseases of the respiratory system 03/14/2016    History of chronic sinusitis    Trigger finger, unspecified finger 06/22/2018    Acquired trigger finger    Unilateral primary osteoarthritis, right knee 02/19/2021    Arthritis of knee, right         EKG pre shows NSR iRBBB Uma 182 QRS 94  EKG post shows NSR Uma 196 QRS 94    Doing well clinically, euvolemic on exam. Bilat groins with DSD no evidence of bleeding, oozing, hematoma or ecchymosis.  Developed transient LBBB intra procedure which has resolved.  Will send out with Arrogene monitor.  Patient would likely be able to undergo surgical replacement of hip >30 days post TAVR      Plan  -Ambulate and reassess groins  -Echo per protocol   -Cont DAPT   -Cont home medications   -D/C home   -Follow up with PCP in 1-2 weeks  -Follow up with Primary cards in 6-10 weeks  -Follow up with Structural NP in virtual clinic at 1 week, 1 month and 1 year with echo at 1 mo and year    D/w Dr. Stallworth    I personally spent 55 minutes with this patient, of which >50% of time was spent counseling and coordination of care            Pertinent Physical Exam At Time of Discharge  Physical Exam  Constitutional: Well developed, awake/alert/oriented x3, no distress,  cooperative  Eyes: PERRL, EOMI, clear sclera  ENMT: mucous membranes moist, no apparent injury, no lesions seen  Head/Neck: Neck supple, no apparent injury, thyroid without mass or tenderness, No JVD, trachea midline, no bruits  Respiratory/Thorax: Patent airways,  good chest expansion, thorax symmetric, CTA   Cardiovascular: Regular rate and rhythm, no murmurs, normal S 1and S 2  Gastrointestinal: Nondistended, soft, non-tender, no rebound tenderness or guarding, no masses palpable, no organomegaly, +BS, no bruits  Extremities: normal extremities, no cyanosis,  bilat  groins c/d/i with dsd no s/s of hematoma  Neurological: alert and oriented x3, intact senses, motor, response and reflexes, normal strength  Psychological: Appropriate mood and behavior   Skin: Warm and dry, intact   Home Medications     Medication List      CONTINUE taking these medications     acetaminophen 325 mg tablet; Commonly known as: Tylenol; Take 2 tablets   (650 mg) by mouth every 6 hours if needed for mild pain (1 - 3) or   moderate pain (4 - 6).   allopurinol 100 mg tablet; Commonly known as: Zyloprim; Take 2 tablets   (200 mg) by mouth once daily.   aspirin 81 mg EC tablet; TAKE 1 TABLET BY MOUTH EVERY DAY   cholecalciferol 125 MCG (5000 UT) capsule; Commonly known as: Vitamin   D-3; TAKE 1 CAPSULE (125 MCG) BY MOUTH ONCE DAILY.   clopidogrel 75 mg tablet; Commonly known as: Plavix; TAKE 1 TABLET BY   MOUTH EVERY DAY   ferrous gluconate 324 (38 Fe) mg tablet; Commonly known as: Fergon; Take   1 tablet (324 mg) by mouth 2 times a day.   furosemide 40 mg tablet; Commonly known as: Lasix; TAKE 1 TABLET BY   MOUTH EVERY DAY   gabapentin 300 mg capsule; Commonly known as: Neurontin; Take 1 capsule   (300 mg) by mouth once daily at bedtime.   losartan 25 mg tablet; Commonly known as: Cozaar; Take 1 tablet (25 mg)   by mouth once daily. as directed   metFORMIN  mg 24 hr tablet; Commonly known as: Glucophage-XR; TAKE   2 TABLETS BY MOUTH EVERY DAY**REPLACES OSM   pantoprazole 40 mg EC tablet; Commonly known as: ProtoNix; Take 1 tablet   (40 mg) by mouth once daily.   rosuvastatin 40 mg tablet; Commonly known as: Crestor; Take 1 tablet (40   mg) by mouth once daily.   traMADol 50 mg tablet; Commonly known as: Ultram; Take 1 tablet (50 mg)   by mouth 2 times a day for 14 days.     STOP taking these medications     nitroglycerin 0.4 mg SL tablet; Commonly known as: Nitrostat       Outpatient Follow-Up  Future Appointments   Date Time Provider Department Center   5/1/2024 10:00 AM [facility] ECHO  [id number] [facility]   5/30/2024 10:30 AM  FIORELLA [id number] CARD1 [id number] [facility]   6/17/2024 10:30 AM Alva Prince MD [id number] [facility]   9/9/2024 10:20 AM Javier Reilly MD [id number] [facility]   4/25/2025 10:30 AM  FIORELLA [id number] CARD1 [id number] [facility]       Jose M Pendleton, APRN-CNP

## 2024-05-01 NOTE — CARE PLAN
Pt remained HDS and free of injury this shift. Pt had TVP pulled this morning without any complications. Bilateral groin incisions clean, dry, intact. Pt to be discharged home today with holter monitor. Pt's discharge instructions, medications and follow-up appointments/labs reviewed. Pt has no questions or concerns at this time. Pt awaiting holter monitor placement and transportation.     Problem: Pain  Goal: My pain/discomfort is manageable  Outcome: Progressing     Problem: Safety  Goal: Patient will be injury free during hospitalization  Outcome: Progressing  Goal: I will remain free of falls  Outcome: Progressing     Problem: Daily Care  Goal: Daily care needs are met  Outcome: Progressing     Problem: Psychosocial Needs  Goal: Demonstrates ability to cope with hospitalization/illness  Outcome: Progressing  Goal: Collaborate with me, my family, and caregiver to identify my specific goals  Outcome: Progressing     Problem: Discharge Barriers  Goal: My discharge needs are met  Outcome: Progressing

## 2024-05-01 NOTE — CARE PLAN
Patient remained in stable condition overnight. Vitals within range. No complaints of chest pain, dizziness, or diaphoresis. No bleeding from groin sites or TVP. No reports of pain from sites. Medicated for chronic L hip pain. NSR noted throughout the night. Labs, EKG, CXR completed this AM.    Problem: Pain  Goal: My pain/discomfort is manageable  Outcome: Progressing     Problem: Safety  Goal: Patient will be injury free during hospitalization  Outcome: Progressing  Goal: I will remain free of falls  Outcome: Progressing     Problem: Daily Care  Goal: Daily care needs are met  Outcome: Progressing     Problem: Psychosocial Needs  Goal: Demonstrates ability to cope with hospitalization/illness  Outcome: Progressing  Goal: Collaborate with me, my family, and caregiver to identify my specific goals  Outcome: Progressing     Problem: Discharge Barriers  Goal: My discharge needs are met  Outcome: Progressing

## 2024-05-02 ENCOUNTER — TELEPHONE (OUTPATIENT)
Dept: OPERATING ROOM | Facility: HOSPITAL | Age: 74
End: 2024-05-02
Payer: MEDICARE

## 2024-05-03 ENCOUNTER — TELEPHONE (OUTPATIENT)
Dept: PRIMARY CARE | Facility: CLINIC | Age: 74
End: 2024-05-03
Payer: MEDICARE

## 2024-05-03 NOTE — TELEPHONE ENCOUNTER
Transition of Care    Inpatient facility: [facility]   Discharge diagnosis: Aortic Stenosis   Discharged to: home  Discharge date: 5/1/24  Initial Call date: 5/3/24  Spoke with patient/caregiver: patient                                                                      Do you need assistance  visits prior to your PCP visit: No  Home health care ordered: No  Have you been contacted by home care and have a start of care date: No  Are you taking medications as prescribed at discharge: Yes  If not taking medication as prescribed refer to [facility] Pharmacist: .  Referral to [facility] Pharmacist: No  Patient advised to bring all medications to PCP follow-up appointment.  Patient advised to follow discharge instructions until provider follow-up.  TCM visit date: 5/8/24  TCM provider visit with: Dr. Alva Prince     TCM visit must be scheduled to occur within 14 days of discharge (included DC date).  When possible TCM visit should be scheduled within 7 days.: . patient scheduled within 7 days of discharge

## 2024-05-06 ENCOUNTER — TELEPHONE (OUTPATIENT)
Dept: PRIMARY CARE | Facility: CLINIC | Age: 74
End: 2024-05-06

## 2024-05-06 ENCOUNTER — APPOINTMENT (OUTPATIENT)
Dept: PRIMARY CARE | Facility: CLINIC | Age: 74
End: 2024-05-06
Payer: MEDICARE

## 2024-05-06 RX ORDER — SENNOSIDES 8.6 MG/1
2 TABLET ORAL EVERY 12 HOURS
COMMUNITY
Start: 2024-04-30

## 2024-05-06 RX ORDER — DEXTROSE 50 % IN WATER (D50W) INTRAVENOUS SYRINGE
12.5
COMMUNITY
Start: 2024-04-30

## 2024-05-06 RX ORDER — INSULIN LISPRO 100 [IU]/ML
INJECTION, SOLUTION INTRAVENOUS; SUBCUTANEOUS
COMMUNITY
Start: 2024-04-30

## 2024-05-06 NOTE — TELEPHONE ENCOUNTER
Transition of Care    Inpatient facility: [facility]  Discharge diagnosis: Aortic Stenosis  Discharged to: Home  Discharge date: 5/1/24  Initial Call date: 5/6/24  Spoke with patient/caregiver: patient                                                                     Do you need assistance  visits prior to your PCP visit: No  Home health care ordered: No  Have you been contacted by home care and have a start of care date: No  Are you taking medications as prescribed at discharge: Yes    Referral to [facility] Pharmacist: No  Patient advised to bring all medications to PCP follow-up appointment.  Patient advised to follow discharge instructions until provider follow-up.  TCM visit date: 5/8/24  TCM provider visit with: Alva Prince MD

## 2024-05-07 ENCOUNTER — OFFICE VISIT (OUTPATIENT)
Dept: PAIN MEDICINE | Facility: HOSPITAL | Age: 74
End: 2024-05-07
Payer: MEDICARE

## 2024-05-07 VITALS
DIASTOLIC BLOOD PRESSURE: 69 MMHG | TEMPERATURE: 97.5 F | RESPIRATION RATE: 17 BRPM | SYSTOLIC BLOOD PRESSURE: 116 MMHG | HEART RATE: 81 BPM | HEIGHT: 62 IN | OXYGEN SATURATION: 93 % | WEIGHT: 185 LBS | BODY MASS INDEX: 34.04 KG/M2

## 2024-05-07 DIAGNOSIS — Z79.899 MEDICATION MANAGEMENT: ICD-10-CM

## 2024-05-07 DIAGNOSIS — M25.552 PAIN OF LEFT HIP: ICD-10-CM

## 2024-05-07 DIAGNOSIS — M16.12 ARTHRITIS OF LEFT HIP: ICD-10-CM

## 2024-05-07 DIAGNOSIS — M47.817 FACET ARTHROPATHY, LUMBOSACRAL: Primary | ICD-10-CM

## 2024-05-07 DIAGNOSIS — Z02.9 ENCOUNTER FOR OPIATE ANALGESIC USE AGREEMENT: ICD-10-CM

## 2024-05-07 PROCEDURE — 1160F RVW MEDS BY RX/DR IN RCRD: CPT | Performed by: NURSE PRACTITIONER

## 2024-05-07 PROCEDURE — 1159F MED LIST DOCD IN RCRD: CPT | Performed by: NURSE PRACTITIONER

## 2024-05-07 PROCEDURE — 1036F TOBACCO NON-USER: CPT | Performed by: NURSE PRACTITIONER

## 2024-05-07 PROCEDURE — 3060F POS MICROALBUMINURIA REV: CPT | Performed by: NURSE PRACTITIONER

## 2024-05-07 PROCEDURE — 99214 OFFICE O/P EST MOD 30 MIN: CPT | Performed by: NURSE PRACTITIONER

## 2024-05-07 PROCEDURE — 1111F DSCHRG MED/CURRENT MED MERGE: CPT | Performed by: NURSE PRACTITIONER

## 2024-05-07 PROCEDURE — 1125F AMNT PAIN NOTED PAIN PRSNT: CPT | Performed by: NURSE PRACTITIONER

## 2024-05-07 PROCEDURE — 3008F BODY MASS INDEX DOCD: CPT | Performed by: NURSE PRACTITIONER

## 2024-05-07 PROCEDURE — 3074F SYST BP LT 130 MM HG: CPT | Performed by: NURSE PRACTITIONER

## 2024-05-07 PROCEDURE — 3078F DIAST BP <80 MM HG: CPT | Performed by: NURSE PRACTITIONER

## 2024-05-07 PROCEDURE — 4010F ACE/ARB THERAPY RXD/TAKEN: CPT | Performed by: NURSE PRACTITIONER

## 2024-05-07 RX ORDER — NALOXONE HYDROCHLORIDE 4 MG/.1ML
4 SPRAY NASAL AS NEEDED
Qty: 2 EACH | Refills: 0 | Status: SHIPPED | OUTPATIENT
Start: 2024-05-07 | End: 2025-05-07

## 2024-05-07 RX ORDER — TRAMADOL HYDROCHLORIDE 50 MG/1
50 TABLET ORAL 2 TIMES DAILY
Qty: 60 TABLET | Refills: 1 | Status: SHIPPED | OUTPATIENT
Start: 2024-05-07

## 2024-05-07 RX ORDER — GABAPENTIN 300 MG/1
300 CAPSULE ORAL 2 TIMES DAILY
Qty: 60 CAPSULE | Refills: 1 | Status: SHIPPED | OUTPATIENT
Start: 2024-05-07 | End: 2024-07-06

## 2024-05-07 ASSESSMENT — PAIN SCALES - GENERAL: PAINLEVEL: 9

## 2024-05-07 NOTE — PROGRESS NOTES
Subjective   Saritha Cormier is a pleasant 73 y.o. female who is here for medication refill.  Patient presents in a wheelchair.  She is ambulatory at short distances.  Gait is slow but steady.  Patient ambulates at home with walker when out and about.  She arrives with her friend who is in the waiting area.    Patient continues to have chronic left hip pain.  She rates her pain as 9-10 out of 10.  Although she does not appear to be at this level.  She reports pain is constant.  She describes it as aching, burning, stabbing, tender and throbbing kind of pain.  She has pins-and-needles sensation to the side of her left leg that ends just at the knee.  She denies increasing leg weakness or change in balance.  She denies bowel or bladder incontinence.  She denies recent falls or injuries.    Patient was scheduled for left trochanteric trigger point injection however she canceled this due to surgical procedure.  She had TAVR on 4/30/2024.  Patient reports she is doing well.  Patient also looks much better since I last saw her.    Patient is taking gabapentin 300 mg at night.  She reports that this medication made a difference.  She takes tramadol as prescribed by her PCP and is out of that prescription.  She is here for refill.  She denies side effects from her medications.  I discussed the plan of care.  I will see her for her medication refill.  Questions were answered during this encounter.    I have reviewed OARRS report.  Last fill date of tramadol was on 4/18/2024 where she received 28 tabs from her PCP.  No suspicious activities.  Toxicology reports were consistent with no compliance issues.      OARRS:  Debra Christian, CATHERINE-MISAEL, DNP on 5/7/2024 10:12 AM  I have personally reviewed the OARRS report for Saritha Cormier. I have considered the risks of abuse, dependence, addiction and diversion and I believe that it is clinically appropriate for Saritha Cormier to be prescribed this medication    ROS: No  changes except for what was noted in HPI.      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    Surgical History  Past Surgical History:   Procedure Laterality Date    AORTIC VALVE REPLACEMENT      CARDIAC CATHETERIZATION N/A 03/29/2024    Procedure: Left And Right Heart Cath, With LV;  Surgeon: Javier Reilly MD;  Location: [place] [address] Cardiac [facility];  Service: Cardiovascular;  Laterality: N/A;    HYSTERECTOMY  09/21/2015    Hysterectomy    JOINT REPLACEMENT Right     Right total hip    MASTECTOMY  09/21/2015    Breast Surgery Mastectomy    OTHER SURGICAL HISTORY  09/21/2015    Breast Surgery Reconstruction        Social History  She reports that she has never smoked. She has never used smokeless tobacco. She reports that she does not drink alcohol and does not use drugs.    Family History  Family History   Problem Relation Name Age of Onset    Gout Mother      Hypertension Mother      Arthritis Father      Other (cardiac disorder) Father      Cancer Father      Tongue cancer Brother      Other (jaw neoplasm) Brother      Breast cancer Sibling      Pancreatic cancer Sibling            Current Outpatient Medications:     acetaminophen (Tylenol) 325 mg tablet, Take 2 tablets (650 mg) by mouth every 6 hours if needed for mild pain (1 - 3) or moderate pain (4 - 6)., Disp: 30 tablet, Rfl: 0    allopurinol (Zyloprim) 100 mg tablet, Take 2 tablets (200 mg)  by mouth once daily., Disp: 180 tablet, Rfl: 1    aspirin 81 mg EC tablet, TAKE 1 TABLET BY MOUTH EVERY DAY, Disp: 90 tablet, Rfl: 1    cholecalciferol (Vitamin D-3) 125 MCG (5000 UT) capsule, TAKE 1 CAPSULE (125 MCG) BY MOUTH ONCE DAILY., Disp: 90 capsule, Rfl: 1    clopidogrel (Plavix) 75 mg tablet, TAKE 1 TABLET BY MOUTH EVERY DAY, Disp: 90 tablet, Rfl: 0    ferrous gluconate 324 (38 Fe) mg tablet, Take 1 tablet (324 mg) by mouth 2 times a day., Disp: 180 tablet, Rfl: 1    furosemide (Lasix) 40 mg tablet, TAKE 1 TABLET BY MOUTH EVERY DAY, Disp: 90 tablet, Rfl: 0    losartan (Cozaar) 25 mg tablet, Take 1 tablet (25 mg) by mouth once daily. as directed, Disp: 90 tablet, Rfl: 1    metFORMIN  mg 24 hr tablet, TAKE 2 TABLETS BY MOUTH EVERY DAY**REPLACES OSM, Disp: 180 tablet, Rfl: 0    pantoprazole (ProtoNix) 40 mg EC tablet, Take 1 tablet (40 mg) by mouth once daily., Disp: 90 tablet, Rfl: 1    rosuvastatin (Crestor) 40 mg tablet, Take 1 tablet (40 mg) by mouth once daily., Disp: 90 tablet, Rfl: 1    sennosides (Senokot) 8.6 mg tablet, Take 2 tablets (17.2 mg) by mouth every 12 hours., Disp: , Rfl:     dextrose 50 % injection, Infuse 25 mL (12.5 g) into a venous catheter., Disp: , Rfl:     gabapentin (Neurontin) 300 mg capsule, Take 1 capsule (300 mg) by mouth 2 times a day., Disp: 60 capsule, Rfl: 1    glucagon 1 mg injection, Inject 1 mg into the muscle., Disp: , Rfl:     insulin lispro (HumaLOG) 100 unit/mL injection, Inject under the skin., Disp: , Rfl:     naloxone (Narcan) 4 mg/0.1 mL nasal spray, Administer 1 spray (4 mg) into affected nostril(s) if needed for opioid reversal or respiratory depression. May repeat every 2-3 minutes if needed, alternating nostrils, until medical assistance becomes available., Disp: 2 each, Rfl: 0    traMADol (Ultram) 50 mg tablet, Take 1 tablet (50 mg) by mouth 2 times a day., Disp: 60 tablet, Rfl: 1    Allergies  Patient has no known allergies.      Physical Exam  Vitals  and nursing note reviewed.   HENT:      Head: Normocephalic.      Nose: Nose normal.   Eyes:      Extraocular Movements: Extraocular movements intact.      Conjunctiva/sclera: Conjunctivae normal.      Pupils: Pupils are equal, round, and reactive to light.   Cardiovascular:      Rate and Rhythm: Normal rate and regular rhythm.   Pulmonary:      Effort: Pulmonary effort is normal.      Breath sounds: Normal breath sounds.   Musculoskeletal:         General: Tenderness present. No swelling, deformity or signs of injury.      Cervical back: No rigidity or tenderness.      Lumbar back: Tenderness present.      Right lower leg: No edema.      Left lower leg: No edema.      Comments: Negative leg raise.  Positive for paraspinal tenderness at the lumbar region bilaterally at L4-L5, L5-S1 with rotation.  With nonspecific radicular symptoms.  Positive for left trochanteric hip pain on palpation.  BUE 4/5, RLE 4/5, LLE 3/5.    Skin:     General: Skin is warm and dry.   Neurological:      General: No focal deficit present.      Mental Status: She is alert and oriented to person, place, and time.   Psychiatric:         Mood and Affect: Mood normal.         Behavior: Behavior normal.            Pain Management Panel          Latest Ref Rng & Units 4/22/2024   Pain Management Panel   Amphetamine Screen, Urine Presumptive Negative Presumptive Negative    Barbiturate Screen, Urine Presumptive Negative Presumptive Negative    Codeine IgE <50 ng/mL <50    Hydromorphone Urine <25 ng/mL <25    Morphine  <50 ng/mL <50           Assessment/Plan   Problem List Items Addressed This Visit             ICD-10-CM    Arthritis of left hip M16.12    Relevant Medications    gabapentin (Neurontin) 300 mg capsule    traMADol (Ultram) 50 mg tablet    Pain of left hip M25.552    Relevant Medications    gabapentin (Neurontin) 300 mg capsule    traMADol (Ultram) 50 mg tablet    Facet arthropathy, lumbosacral - Primary M47.817    Relevant Medications     gabapentin (Neurontin) 300 mg capsule    traMADol (Ultram) 50 mg tablet    Encounter for opiate analgesic use agreement Z02.9    Relevant Medications    traMADol (Ultram) 50 mg tablet     Other Visit Diagnoses         Codes    Medication management     Z79.899    Relevant Medications    naloxone (Narcan) 4 mg/0.1 mL nasal spray                Plan/Follow-up Instructions:    I sent 60 tabs of tramadol to your pharmacy with 1 refill.  Continue taking 1 pill twice a day as needed for pain relief.  Do not take sedating medications together.  I also included Narcan to prevent drug overdose.    I increase your gabapentin.  Start taking 300 mg twice a day.  Do not take sedating medications together.    I will see you for your follow-up visit in 2 months.    ---Risks and side effects of chronic opioid therapy, including but not limited to tolerance, dependence, constipation, hyperalgesia, cognitive side effects, addiction, and possible death due to overuse and or misuse, were discussed.   Medications, when co-administered with other sedative agents, including but not limited to alcohol, benzodiazepines, sedatives or hypnotics, and illegal drugs, could pose life-threatening consequences, including death.   Such medication could impact if you have obstructive sleep apnea. I recommend that you continue using apnea devices if ordered by other physicians.   Being compliant with the treatment plan and the terms of the opioid agreement to effectively and safely treat the pain is important.  Being responsible with the medications and taking these only as prescribed, never in excess, and never for reasons other than pain reduction.   Keep the medications safe and locked away from children and other adults, as well as disposal methods and options. The patient understood the risks and instructions. ---      Disclaimer: This note was created using voice recognition software. It was not corrected for typographical or grammatical  errors, inadvertent word insertion, or any unintended errors. Please feel free to contact me for clarification.      Debra Christian DNP, APRN, FNP-C   [facility]  Office #: [phone]  Fax # [phone]

## 2024-05-07 NOTE — PATIENT INSTRUCTIONS
I sent 60 tabs of tramadol to your pharmacy with 1 refill.  Continue taking 1 pill twice a day as needed for pain relief.  Do not take sedating medications together.  I also included Narcan to prevent drug overdose.    I increase your gabapentin.  Start taking 300 mg twice a day.  Do not take sedating medications together.    I will see you for your follow-up visit in 2 months.    ---Risks and side effects of chronic opioid therapy, including but not limited to tolerance, dependence, constipation, hyperalgesia, cognitive side effects, addiction, and possible death due to overuse and or misuse, were discussed.   Medications, when co-administered with other sedative agents, including but not limited to alcohol, benzodiazepines, sedatives or hypnotics, and illegal drugs, could pose life-threatening consequences, including death.   Such medication could impact if you have obstructive sleep apnea. I recommend that you continue using apnea devices if ordered by other physicians.   Being compliant with the treatment plan and the terms of the opioid agreement to effectively and safely treat the pain is important.  Being responsible with the medications and taking these only as prescribed, never in excess, and never for reasons other than pain reduction.   Keep the medications safe and locked away from children and other adults, as well as disposal methods and options. The patient understood the risks and instructions. ---

## 2024-05-07 NOTE — PROGRESS NOTES
I have personally reviewed the OARRS report for Saritha Cormier    Date of the last Controlled Substance Agreement:  5/7/2024       Last urine drug screening date/ordered today: 04/22/2024     Results of last screen:  as expected      Last opioid risk screening date/ordered today: 04/22/2024      Pain Scale Screening:   Pain Assessment and Documentation Tool (PADT)   Date of Assessment: 05/07/2024  Analgesia:   Patient reports her pain level on average during the past week is 9on a 0 - 10 scale.   Patient reports that her pain level at its worst during the past week was 10 on a 0 -10 scale.     Activities of Daily Living:   Physical functioning: unchanged  Family relationships: worse  Social relationships: unchanged  Mood: unchanged  Sleep patterns: unchanged  Overall functioning: unchanged  Adverse Events: N/A, Saritha Cormier is not experiencing side effects from current pain reliever.  Patients overall severity of side effect:none  Specific Analgesic Plan: Continue present regimen.

## 2024-05-08 ENCOUNTER — OFFICE VISIT (OUTPATIENT)
Dept: PRIMARY CARE | Facility: CLINIC | Age: 74
End: 2024-05-08
Payer: MEDICARE

## 2024-05-08 ENCOUNTER — LAB (OUTPATIENT)
Dept: LAB | Facility: LAB | Age: 74
End: 2024-05-08
Payer: MEDICARE

## 2024-05-08 VITALS
BODY MASS INDEX: 34.57 KG/M2 | SYSTOLIC BLOOD PRESSURE: 136 MMHG | OXYGEN SATURATION: 95 % | HEART RATE: 89 BPM | TEMPERATURE: 97 F | WEIGHT: 189 LBS | DIASTOLIC BLOOD PRESSURE: 62 MMHG

## 2024-05-08 DIAGNOSIS — Z95.2 S/P TAVR (TRANSCATHETER AORTIC VALVE REPLACEMENT): Primary | ICD-10-CM

## 2024-05-08 DIAGNOSIS — I10 HYPERTENSION, UNSPECIFIED TYPE: ICD-10-CM

## 2024-05-08 DIAGNOSIS — N18.31 TYPE 2 DIABETES MELLITUS WITH STAGE 3A CHRONIC KIDNEY DISEASE, WITHOUT LONG-TERM CURRENT USE OF INSULIN (MULTI): ICD-10-CM

## 2024-05-08 DIAGNOSIS — I70.0 ATHEROSCLEROSIS OF AORTA (CMS-HCC): ICD-10-CM

## 2024-05-08 DIAGNOSIS — E11.22 TYPE 2 DIABETES MELLITUS WITH STAGE 3A CHRONIC KIDNEY DISEASE, WITHOUT LONG-TERM CURRENT USE OF INSULIN (MULTI): ICD-10-CM

## 2024-05-08 DIAGNOSIS — Z95.2 S/P TAVR (TRANSCATHETER AORTIC VALVE REPLACEMENT): ICD-10-CM

## 2024-05-08 DIAGNOSIS — M16.12 ARTHRITIS OF LEFT HIP: ICD-10-CM

## 2024-05-08 DIAGNOSIS — N18.31 STAGE 3A CHRONIC KIDNEY DISEASE (MULTI): ICD-10-CM

## 2024-05-08 LAB
ANION GAP SERPL CALC-SCNC: 13 MMOL/L (ref 10–20)
BUN SERPL-MCNC: 32 MG/DL (ref 6–23)
CALCIUM SERPL-MCNC: 9.7 MG/DL (ref 8.6–10.3)
CHLORIDE SERPL-SCNC: 101 MMOL/L (ref 98–107)
CO2 SERPL-SCNC: 27 MMOL/L (ref 21–32)
CREAT SERPL-MCNC: 1.65 MG/DL (ref 0.5–1.05)
EGFRCR SERPLBLD CKD-EPI 2021: 33 ML/MIN/1.73M*2
ERYTHROCYTE [DISTWIDTH] IN BLOOD BY AUTOMATED COUNT: 14.6 % (ref 11.5–14.5)
GLUCOSE SERPL-MCNC: 123 MG/DL (ref 74–99)
HCT VFR BLD AUTO: 36.5 % (ref 36–46)
HGB BLD-MCNC: 11.4 G/DL (ref 12–16)
MCH RBC QN AUTO: 30.2 PG (ref 26–34)
MCHC RBC AUTO-ENTMCNC: 31.2 G/DL (ref 32–36)
MCV RBC AUTO: 97 FL (ref 80–100)
NRBC BLD-RTO: 0 /100 WBCS (ref 0–0)
PLATELET # BLD AUTO: 178 X10*3/UL (ref 150–450)
POTASSIUM SERPL-SCNC: 4.3 MMOL/L (ref 3.5–5.3)
RBC # BLD AUTO: 3.78 X10*6/UL (ref 4–5.2)
SODIUM SERPL-SCNC: 137 MMOL/L (ref 136–145)
WBC # BLD AUTO: 10.5 X10*3/UL (ref 4.4–11.3)

## 2024-05-08 PROCEDURE — 3075F SYST BP GE 130 - 139MM HG: CPT | Performed by: INTERNAL MEDICINE

## 2024-05-08 PROCEDURE — 3060F POS MICROALBUMINURIA REV: CPT | Performed by: INTERNAL MEDICINE

## 2024-05-08 PROCEDURE — 1160F RVW MEDS BY RX/DR IN RCRD: CPT | Performed by: INTERNAL MEDICINE

## 2024-05-08 PROCEDURE — 36415 COLL VENOUS BLD VENIPUNCTURE: CPT

## 2024-05-08 PROCEDURE — 1036F TOBACCO NON-USER: CPT | Performed by: INTERNAL MEDICINE

## 2024-05-08 PROCEDURE — 4010F ACE/ARB THERAPY RXD/TAKEN: CPT | Performed by: INTERNAL MEDICINE

## 2024-05-08 PROCEDURE — 3008F BODY MASS INDEX DOCD: CPT | Performed by: INTERNAL MEDICINE

## 2024-05-08 PROCEDURE — 99496 TRANSJ CARE MGMT HIGH F2F 7D: CPT | Performed by: INTERNAL MEDICINE

## 2024-05-08 PROCEDURE — 1159F MED LIST DOCD IN RCRD: CPT | Performed by: INTERNAL MEDICINE

## 2024-05-08 PROCEDURE — 1111F DSCHRG MED/CURRENT MED MERGE: CPT | Performed by: INTERNAL MEDICINE

## 2024-05-08 PROCEDURE — 3078F DIAST BP <80 MM HG: CPT | Performed by: INTERNAL MEDICINE

## 2024-05-08 ASSESSMENT — ENCOUNTER SYMPTOMS
ARTHRALGIAS: 0
ABDOMINAL PAIN: 0
DIFFICULTY URINATING: 0
BLOOD IN STOOL: 0
DIARRHEA: 0
FATIGUE: 0
COUGH: 0
BRUISES/BLEEDS EASILY: 0
PALPITATIONS: 0
SINUS PAIN: 0
HEADACHES: 0
FEVER: 0
UNEXPECTED WEIGHT CHANGE: 0
SORE THROAT: 0
DIZZINESS: 0
WHEEZING: 0

## 2024-05-08 NOTE — PROGRESS NOTES
Subjective   Patient ID: Saritha Cormier is a 73 y.o. female who presents for Hospital Follow-up (TCM, aortic stenosis, discharged on 5/1) and foreign body in toe (Left great toe x 6 weeks).    Transition of care  Patient admission history and physical, hospital course, medications, verified and reviewed  Patient contacted after discharge, medications verified comes today for follow-up    Inpatient facility: [facility]  Discharge diagnosis: Aortic Stenosis  Discharged to: Home  Discharge date: 5/1/24  Initial Call date: 5/6/24  Spoke with patient/caregiver: patient                                                                      Do you need assistance  visits prior to your PCP visit: No  Home health care ordered: No  Have you been contacted by home care and have a start of care date: No  Are you taking medications as prescribed at discharge: Yes     Referral to [facility] Pharmacist: No  Patient advised to bring all medications to PCP follow-up appointment.  Patient advised to follow discharge instructions until provider follow-up.  TCM visit date: 5/8/24  TCM provider visit with: Alva Prince MD  Patient underwent uneventful surgery for TAVR required monitoring postoperatively for possible dysrhythmias otherwise patient doing well continue all medications breathing improving  -Severe aortic stenosis status post TAVR doing well continue with aspirin Plavix  - Severe left hip arthritis patient scheduled surgery now for total hip replacement with Dr. Angulo in June  -Pain control continue with gabapentin and tramadol from pain management  - History of bilateral mastectomy compensated  - Diabetes continue with metformin  - Chronic gout compensated continue with allopurinol  -CHF compensated continue with Lasix  Counseled about low-salt diet  - Coronary artery disease compensated follow-up cardiology  - Dysrhythmia patient having Zio patch monitor follow-up cardiology for further  monitoring recommendation  Will see patient as needed or as scheduled           Review of Systems   Constitutional:  Negative for fatigue, fever and unexpected weight change.   HENT:  Negative for congestion, ear discharge, ear pain, mouth sores, sinus pain and sore throat.    Eyes:  Negative for visual disturbance.   Respiratory:  Negative for cough and wheezing.    Cardiovascular:  Negative for chest pain, palpitations and leg swelling.   Gastrointestinal:  Negative for abdominal pain, blood in stool and diarrhea.   Genitourinary:  Negative for difficulty urinating.   Musculoskeletal:  Negative for arthralgias.   Skin:  Negative for rash.   Neurological:  Negative for dizziness and headaches.   Hematological:  Does not bruise/bleed easily.   Psychiatric/Behavioral:  Negative for behavioral problems.    All other systems reviewed and are negative.      Objective   Lab Results   Component Value Date    HGBA1C 7.2 (A) 01/30/2024      /62   Pulse 89   Temp 36.1 °C (97 °F)   Wt 85.7 kg (189 lb)   SpO2 95%   BMI 34.57 kg/m²     Physical Exam  Vitals and nursing note reviewed.   Constitutional:       Appearance: Normal appearance.   HENT:      Head: Normocephalic.      Nose: Nose normal.   Eyes:      Conjunctiva/sclera: Conjunctivae normal.      Pupils: Pupils are equal, round, and reactive to light.   Cardiovascular:      Rate and Rhythm: Regular rhythm.      Comments: Holter monitor  Pulmonary:      Effort: Pulmonary effort is normal.      Breath sounds: Normal breath sounds.   Abdominal:      General: Abdomen is flat.      Palpations: Abdomen is soft.   Musculoskeletal:         General: Tenderness (Left hip tenderness) present.      Cervical back: Neck supple.   Skin:     General: Skin is warm.   Neurological:      General: No focal deficit present.      Mental Status: She is oriented to person, place, and time.   Psychiatric:         Mood and Affect: Mood normal.         Assessment/Plan   Saritha was seen  today for hospital follow-up and foreign body in toe.  Diagnoses and all orders for this visit:  S/P TAVR (transcatheter aortic valve replacement) (Primary)  Hypertension, unspecified type  Arthritis of left hip  Type 2 diabetes mellitus with stage 3a chronic kidney disease, without long-term current use of insulin (Multi)  Stage 3a chronic kidney disease (Multi)  Atherosclerosis of aorta (CMS-[facility])   Transition of care  Patient admission history and physical, hospital course, medications, verified and reviewed  Patient contacted after discharge, medications verified comes today for follow-up    Inpatient facility: [facility]  Discharge diagnosis: Aortic Stenosis  Discharged to: Home  Discharge date: 5/1/24  Initial Call date: 5/6/24  Spoke with patient/caregiver: patient                                                                      Do you need assistance  visits prior to your PCP visit: No  Home health care ordered: No  Have you been contacted by home care and have a start of care date: No  Are you taking medications as prescribed at discharge: Yes     Referral to [facility] Pharmacist: No  Patient advised to bring all medications to PCP follow-up appointment.  Patient advised to follow discharge instructions until provider follow-up.  TCM visit date: 5/8/24  TCM provider visit with: Alva Prince MD  Patient underwent uneventful surgery for TAVR required monitoring postoperatively for possible dysrhythmias otherwise patient doing well continue all medications breathing improving  -Severe aortic stenosis status post TAVR doing well continue with aspirin Plavix  - Severe left hip arthritis patient scheduled surgery now for total hip replacement with Dr. Angulo in June  -Pain control continue with gabapentin and tramadol from pain management  - History of bilateral mastectomy compensated  - Diabetes continue with metformin  - Chronic gout compensated continue with allopurinol  -CHF  compensated continue with Lasix  Counseled about low-salt diet  - Coronary artery disease compensated follow-up cardiology  - Dysrhythmia patient having Zio patch monitor follow-up cardiology for further monitoring recommendation  Will see patient as needed or as scheduled

## 2024-05-09 LAB
ATRIAL RATE: 73 BPM
P AXIS: 48 DEGREES
P OFFSET: 163 MS
P ONSET: 111 MS
PR INTERVAL: 196 MS
Q ONSET: 209 MS
QRS COUNT: 12 BEATS
QRS DURATION: 94 MS
QT INTERVAL: 406 MS
QTC CALCULATION(BAZETT): 447 MS
QTC FREDERICIA: 433 MS
R AXIS: -28 DEGREES
T AXIS: 108 DEGREES
T OFFSET: 412 MS
VENTRICULAR RATE: 73 BPM

## 2024-05-13 ENCOUNTER — PATIENT OUTREACH (OUTPATIENT)
Dept: CARE COORDINATION | Facility: CLINIC | Age: 74
End: 2024-05-13
Payer: MEDICARE

## 2024-05-18 ENCOUNTER — HOSPITAL ENCOUNTER (EMERGENCY)
Facility: HOSPITAL | Age: 74
Discharge: HOME | End: 2024-05-18
Attending: STUDENT IN AN ORGANIZED HEALTH CARE EDUCATION/TRAINING PROGRAM
Payer: MEDICARE

## 2024-05-18 ENCOUNTER — APPOINTMENT (OUTPATIENT)
Dept: RADIOLOGY | Facility: HOSPITAL | Age: 74
End: 2024-05-18
Payer: MEDICARE

## 2024-05-18 ENCOUNTER — APPOINTMENT (OUTPATIENT)
Dept: CARDIOLOGY | Facility: HOSPITAL | Age: 74
End: 2024-05-18
Payer: MEDICARE

## 2024-05-18 VITALS
HEIGHT: 62 IN | HEART RATE: 69 BPM | TEMPERATURE: 97.3 F | DIASTOLIC BLOOD PRESSURE: 59 MMHG | WEIGHT: 189 LBS | OXYGEN SATURATION: 95 % | RESPIRATION RATE: 18 BRPM | BODY MASS INDEX: 34.78 KG/M2 | SYSTOLIC BLOOD PRESSURE: 119 MMHG

## 2024-05-18 DIAGNOSIS — R21 RASH: Primary | ICD-10-CM

## 2024-05-18 DIAGNOSIS — M79.601 PAIN OF RIGHT UPPER EXTREMITY: ICD-10-CM

## 2024-05-18 LAB
ALBUMIN SERPL BCP-MCNC: 4.1 G/DL (ref 3.4–5)
ALP SERPL-CCNC: 79 U/L (ref 33–136)
ALT SERPL W P-5'-P-CCNC: 7 U/L (ref 7–45)
ANION GAP SERPL CALC-SCNC: 14 MMOL/L (ref 10–20)
AST SERPL W P-5'-P-CCNC: 13 U/L (ref 9–39)
BASOPHILS # BLD AUTO: 0.01 X10*3/UL (ref 0–0.1)
BASOPHILS NFR BLD AUTO: 0.1 %
BILIRUB SERPL-MCNC: 0.7 MG/DL (ref 0–1.2)
BUN SERPL-MCNC: 39 MG/DL (ref 6–23)
CALCIUM SERPL-MCNC: 9.6 MG/DL (ref 8.6–10.3)
CARDIAC TROPONIN I PNL SERPL HS: 21 NG/L (ref 0–13)
CARDIAC TROPONIN I PNL SERPL HS: 24 NG/L (ref 0–13)
CHLORIDE SERPL-SCNC: 98 MMOL/L (ref 98–107)
CO2 SERPL-SCNC: 30 MMOL/L (ref 21–32)
CREAT SERPL-MCNC: 1.77 MG/DL (ref 0.5–1.05)
CRP SERPL-MCNC: 3.25 MG/DL
EGFRCR SERPLBLD CKD-EPI 2021: 30 ML/MIN/1.73M*2
EOSINOPHIL # BLD AUTO: 0.12 X10*3/UL (ref 0–0.4)
EOSINOPHIL NFR BLD AUTO: 1.3 %
ERYTHROCYTE [DISTWIDTH] IN BLOOD BY AUTOMATED COUNT: 15 % (ref 11.5–14.5)
ERYTHROCYTE [SEDIMENTATION RATE] IN BLOOD BY WESTERGREN METHOD: 35 MM/H (ref 0–30)
GLUCOSE SERPL-MCNC: 119 MG/DL (ref 74–99)
HCT VFR BLD AUTO: 37.6 % (ref 36–46)
HGB BLD-MCNC: 11.5 G/DL (ref 12–16)
IMM GRANULOCYTES # BLD AUTO: 0.08 X10*3/UL (ref 0–0.5)
IMM GRANULOCYTES NFR BLD AUTO: 0.9 % (ref 0–0.9)
LACTATE SERPL-SCNC: 1.1 MMOL/L (ref 0.4–2)
LYMPHOCYTES # BLD AUTO: 1.14 X10*3/UL (ref 0.8–3)
LYMPHOCYTES NFR BLD AUTO: 12.7 %
MAGNESIUM SERPL-MCNC: 1.96 MG/DL (ref 1.6–2.4)
MCH RBC QN AUTO: 29.8 PG (ref 26–34)
MCHC RBC AUTO-ENTMCNC: 30.6 G/DL (ref 32–36)
MCV RBC AUTO: 97 FL (ref 80–100)
MONOCYTES # BLD AUTO: 0.88 X10*3/UL (ref 0.05–0.8)
MONOCYTES NFR BLD AUTO: 9.8 %
NEUTROPHILS # BLD AUTO: 6.73 X10*3/UL (ref 1.6–5.5)
NEUTROPHILS NFR BLD AUTO: 75.2 %
NRBC BLD-RTO: 0 /100 WBCS (ref 0–0)
PHOSPHATE SERPL-MCNC: 5 MG/DL (ref 2.5–4.9)
PLATELET # BLD AUTO: 181 X10*3/UL (ref 150–450)
POTASSIUM SERPL-SCNC: 4.1 MMOL/L (ref 3.5–5.3)
PROT SERPL-MCNC: 7.8 G/DL (ref 6.4–8.2)
RBC # BLD AUTO: 3.86 X10*6/UL (ref 4–5.2)
SODIUM SERPL-SCNC: 138 MMOL/L (ref 136–145)
WBC # BLD AUTO: 9 X10*3/UL (ref 4.4–11.3)

## 2024-05-18 PROCEDURE — 83735 ASSAY OF MAGNESIUM: CPT | Performed by: STUDENT IN AN ORGANIZED HEALTH CARE EDUCATION/TRAINING PROGRAM

## 2024-05-18 PROCEDURE — 93971 EXTREMITY STUDY: CPT | Performed by: RADIOLOGY

## 2024-05-18 PROCEDURE — 84484 ASSAY OF TROPONIN QUANT: CPT | Performed by: STUDENT IN AN ORGANIZED HEALTH CARE EDUCATION/TRAINING PROGRAM

## 2024-05-18 PROCEDURE — 80053 COMPREHEN METABOLIC PANEL: CPT | Performed by: STUDENT IN AN ORGANIZED HEALTH CARE EDUCATION/TRAINING PROGRAM

## 2024-05-18 PROCEDURE — 36415 COLL VENOUS BLD VENIPUNCTURE: CPT | Performed by: STUDENT IN AN ORGANIZED HEALTH CARE EDUCATION/TRAINING PROGRAM

## 2024-05-18 PROCEDURE — 93971 EXTREMITY STUDY: CPT

## 2024-05-18 PROCEDURE — 83605 ASSAY OF LACTIC ACID: CPT | Performed by: STUDENT IN AN ORGANIZED HEALTH CARE EDUCATION/TRAINING PROGRAM

## 2024-05-18 PROCEDURE — 85652 RBC SED RATE AUTOMATED: CPT | Performed by: STUDENT IN AN ORGANIZED HEALTH CARE EDUCATION/TRAINING PROGRAM

## 2024-05-18 PROCEDURE — 99285 EMERGENCY DEPT VISIT HI MDM: CPT | Mod: 25

## 2024-05-18 PROCEDURE — 86140 C-REACTIVE PROTEIN: CPT | Performed by: STUDENT IN AN ORGANIZED HEALTH CARE EDUCATION/TRAINING PROGRAM

## 2024-05-18 PROCEDURE — 84100 ASSAY OF PHOSPHORUS: CPT | Performed by: STUDENT IN AN ORGANIZED HEALTH CARE EDUCATION/TRAINING PROGRAM

## 2024-05-18 PROCEDURE — 85025 COMPLETE CBC W/AUTO DIFF WBC: CPT | Performed by: STUDENT IN AN ORGANIZED HEALTH CARE EDUCATION/TRAINING PROGRAM

## 2024-05-18 PROCEDURE — 93005 ELECTROCARDIOGRAM TRACING: CPT

## 2024-05-18 RX ORDER — CEPHALEXIN 500 MG/1
500 CAPSULE ORAL 2 TIMES DAILY
Qty: 14 CAPSULE | Refills: 0 | Status: SHIPPED | OUTPATIENT
Start: 2024-05-18 | End: 2024-05-25

## 2024-05-18 ASSESSMENT — PAIN DESCRIPTION - FREQUENCY: FREQUENCY: INTERMITTENT

## 2024-05-18 ASSESSMENT — PAIN DESCRIPTION - ORIENTATION: ORIENTATION: RIGHT

## 2024-05-18 ASSESSMENT — PAIN SCALES - GENERAL: PAINLEVEL_OUTOF10: 8

## 2024-05-18 ASSESSMENT — PAIN DESCRIPTION - DESCRIPTORS: DESCRIPTORS: DULL

## 2024-05-18 ASSESSMENT — COLUMBIA-SUICIDE SEVERITY RATING SCALE - C-SSRS
1. IN THE PAST MONTH, HAVE YOU WISHED YOU WERE DEAD OR WISHED YOU COULD GO TO SLEEP AND NOT WAKE UP?: NO
2. HAVE YOU ACTUALLY HAD ANY THOUGHTS OF KILLING YOURSELF?: NO
6. HAVE YOU EVER DONE ANYTHING, STARTED TO DO ANYTHING, OR PREPARED TO DO ANYTHING TO END YOUR LIFE?: NO

## 2024-05-18 ASSESSMENT — PAIN - FUNCTIONAL ASSESSMENT: PAIN_FUNCTIONAL_ASSESSMENT: 0-10

## 2024-05-18 ASSESSMENT — PAIN DESCRIPTION - LOCATION: LOCATION: ARM

## 2024-05-18 ASSESSMENT — PAIN DESCRIPTION - PAIN TYPE: TYPE: ACUTE PAIN

## 2024-05-18 ASSESSMENT — PAIN DESCRIPTION - ONSET: ONSET: ONGOING

## 2024-05-18 ASSESSMENT — PAIN DESCRIPTION - PROGRESSION: CLINICAL_PROGRESSION: NOT CHANGED

## 2024-05-18 NOTE — ED PROVIDER NOTES
Chief Complaint: Right arm pain  HPI: This is a 73-year-old female, past medical history significant for recent TAVR procedure, presenting to the emergency department for right arm pain and rash which began last night.  Patient denies any known injury or trauma to her arm.  She states that there are a few knots that are red and painful around her elbow, and there is a rash on her forearm.  She denies any weakness, numbness of the hand or arm.  She denies any chest pain or shortness of breath.    Past Medical History:   Diagnosis Date    Acquired absence of bilateral breasts and nipples 01/31/2022    History of bilateral mastectomy    Arthritis     Cancer (Multi)     Diabetes mellitus (Multi)     Hypertension     Neoplasm of unspecified behavior of bone, soft tissue, 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/2015    Skin growth    Other bursitis of hip, right hip     Bursitis of right hip, unspecified bursa    Personal history of other diseases of the respiratory system 03/14/2016    History of chronic sinusitis    Trigger finger, unspecified finger 06/22/2018    Acquired trigger finger    Unilateral primary osteoarthritis, right knee 02/19/2021    Arthritis of knee, right      Past Surgical History:   Procedure Laterality Date    AORTIC VALVE REPLACEMENT      CARDIAC CATHETERIZATION N/A 03/29/2024    Procedure: Left And Right Heart Cath, With LV;  Surgeon: Javier Reilly MD;  Location: [place] [address] [facility];  Service: Cardiovascular;  Laterality: N/A;    CARDIAC CATHETERIZATION N/A 4/30/2024    Procedure: TAVR (Transcatheter AV Replacement);  Surgeon: Jessica Stallworth MD;  Location: [place] [address] [facility];  Service: Cardiovascular;  Laterality: N/A;  4/30 0730    CARDIAC CATHETERIZATION N/A 4/30/2024    Procedure: TVP for TAVR;  Surgeon: Jessica Stallworth MD;  Location: [place] [address] [facility];  Service: Cardiovascular;  Laterality: N/A;    HYSTERECTOMY  09/21/2015    Hysterectomy    JOINT REPLACEMENT Right     Right  total hip    MASTECTOMY  09/21/2015    Breast Surgery Mastectomy    OTHER SURGICAL HISTORY  09/21/2015    Breast Surgery Reconstruction       Physical Exam  Constitutional:       Appearance: Normal appearance.   HENT:      Head: Normocephalic and atraumatic.      Mouth/Throat:      Mouth: Mucous membranes are moist.   Eyes:      Conjunctiva/sclera: Conjunctivae normal.   Cardiovascular:      Rate and Rhythm: Normal rate.   Pulmonary:      Effort: Pulmonary effort is normal.   Abdominal:      General: Abdomen is flat.      Palpations: Abdomen is soft.   Musculoskeletal:         General: No swelling, tenderness or signs of injury. Normal range of motion.      Cervical back: Normal range of motion.   Skin:     General: Skin is warm and dry.      Findings: Rash present.      Comments: Maculopapular rash to the volar aspect of the right forearm.  There are 3 nodules that are red and swollen, without any significant fluctuance or induration just distal to the right elbow.  Neurovascularly intact distally.   Neurological:      General: No focal deficit present.      Mental Status: She is alert.   Psychiatric:         Mood and Affect: Mood normal.            ED Course/[facility]  Diagnoses as of 05/18/24 1054   Rash   Pain of right upper extremity     EKG interpreted by myself (ED attending physician): Normal sinus rhythm, rate of 91, left axis deviation, slightly prolonged QT interval, nonspecific ST segment changes, nonischemic EKG    This is a 73 y.o. female presenting to the ED for evaluation of right arm pain and rash which began last night.  She denies any known injury or trauma.  On physical exam, the patient is resting comfortably in the bed, no acute distress.  Heart is regular rate and rhythm, lungs clear to auscultation bilaterally.  There is a maculopapular rash to the volar aspect of the right forearm as well as 3 erythematous nodules just distal to the right elbow without any evidence of fluctuance or induration.   Given the patient's recent surgical procedure, I am concerned for possible DVT as a cause of the right arm pain, as such DVT scan was obtained and is negative for evidence of DVT.  Lab work was also obtained, and the troponins were 24 downtrended to 21, however I expect the troponin to still be slightly elevated after her procedure, and as she does not have any acute chest pain at this time, I do not feel that her arm pain is related to an ACS because.  Inflammatory markers are only mildly elevated, and there is no leukocytosis, kidney function appears to be at its baseline.  On repeat evaluation, the patient is resting comfortably in bed, no acute distress, hemodynamically stable.  Is overall unclear the cause of the right arm pain and rash, however I do feel that she is safe and stable for outpatient follow-up.  She was advised to return to the emergency department for any worsening symptoms and was ultimately discharged home in stable condition.    Final Impression  1.  Rash  2.  Right arm pain  Disposition/Plan: Discharge home  Condition at disposition: Stable.     Bianca Rivero DO  Emergency Medicine Physician     Bianca Rivero DO  05/18/24 1050

## 2024-05-20 ENCOUNTER — PATIENT OUTREACH (OUTPATIENT)
Dept: CARE COORDINATION | Facility: CLINIC | Age: 74
End: 2024-05-20
Payer: MEDICARE

## 2024-05-20 NOTE — PROGRESS NOTES
Follow up call to patient after visit to ED room. Patient with ABX at home and has reached out to her PCP. She said  PCP office  at this this time said she does not need to come in for  a post hospital follow up. Rash is not as red, warm , no fever. Has my name and phone number for further questions /concerns.       Final Impression  1.  Rash  2.  Right arm pain    Will outreach again next week.     Renetta Espinoza , RN   Nurse Care Manager   [facility] Department   [phone]

## 2024-05-25 ENCOUNTER — TELEPHONE (OUTPATIENT)
Dept: CARDIOLOGY | Facility: HOSPITAL | Age: 74
End: 2024-05-25
Payer: MEDICARE

## 2024-05-25 ENCOUNTER — DOCUMENTATION (OUTPATIENT)
Dept: CARDIOLOGY | Facility: CLINIC | Age: 74
End: 2024-05-25
Payer: MEDICARE

## 2024-05-25 DIAGNOSIS — I47.20 VENTRICULAR TACHYCARDIA (MULTI): Primary | ICD-10-CM

## 2024-05-25 LAB
ATRIAL RATE: 91 BPM
P AXIS: 34 DEGREES
P OFFSET: 175 MS
P ONSET: 127 MS
PR INTERVAL: 164 MS
Q ONSET: 209 MS
QRS COUNT: 15 BEATS
QRS DURATION: 84 MS
QT INTERVAL: 372 MS
QTC CALCULATION(BAZETT): 457 MS
QTC FREDERICIA: 427 MS
R AXIS: -39 DEGREES
T AXIS: 62 DEGREES
T OFFSET: 395 MS
VENTRICULAR RATE: 91 BPM

## 2024-05-25 RX ORDER — METOPROLOL TARTRATE 25 MG/1
25 TABLET, FILM COATED ORAL 2 TIMES DAILY
Qty: 60 TABLET | Refills: 11 | Status: SHIPPED | OUTPATIENT
Start: 2024-05-25 | End: 2025-05-25

## 2024-05-25 NOTE — TELEPHONE ENCOUNTER
Notified by the fellow of 15 second VT episode. Happened during sleep . Reviewed the strips -  bpm. Patient with preserved EF, non obstructive CAD. Feels better after TAVR ( I feel normal). We will start low dose beta blockers. Advised also the patient to screen herself for sleep apnea.

## 2024-05-28 ENCOUNTER — HOSPITAL ENCOUNTER (OUTPATIENT)
Dept: CARDIOLOGY | Facility: HOSPITAL | Age: 74
Discharge: HOME | End: 2024-05-28
Payer: MEDICARE

## 2024-05-28 DIAGNOSIS — I35.0 NONRHEUMATIC AORTIC VALVE STENOSIS: ICD-10-CM

## 2024-05-28 DIAGNOSIS — Z95.2 S/P TAVR (TRANSCATHETER AORTIC VALVE REPLACEMENT): ICD-10-CM

## 2024-05-28 LAB
AORTIC VALVE MEAN GRADIENT: 8 MMHG
AORTIC VALVE PEAK VELOCITY: 2.01 M/S
AV PEAK GRADIENT: 16.2 MMHG
AVA (PEAK VEL): 1.6 CM2
AVA (VTI): 1.54 CM2
EJECTION FRACTION APICAL 4 CHAMBER: 61.5
LEFT ATRIUM VOLUME AREA LENGTH INDEX BSA: 26.8 ML/M2
LEFT VENTRICLE INTERNAL DIMENSION DIASTOLE: 3.92 CM (ref 3.5–6)
LEFT VENTRICULAR OUTFLOW TRACT DIAMETER: 1.8 CM
LV EJECTION FRACTION BIPLANE: 61 %
MITRAL VALVE E/A RATIO: 1.35
MITRAL VALVE E/E' RATIO: 15.25
RIGHT VENTRICLE FREE WALL PEAK S': 9.68 CM/S
RIGHT VENTRICLE PEAK SYSTOLIC PRESSURE: 44.2 MMHG
TRICUSPID ANNULAR PLANE SYSTOLIC EXCURSION: 1.3 CM

## 2024-05-28 PROCEDURE — 2500000004 HC RX 250 GENERAL PHARMACY W/ HCPCS (ALT 636 FOR OP/ED): Performed by: NURSE PRACTITIONER

## 2024-05-28 PROCEDURE — 93306 TTE W/DOPPLER COMPLETE: CPT

## 2024-05-28 PROCEDURE — 93306 TTE W/DOPPLER COMPLETE: CPT | Performed by: INTERNAL MEDICINE

## 2024-05-28 RX ADMIN — PERFLUTREN 1.5 ML OF DILUTION: 6.52 INJECTION, SUSPENSION INTRAVENOUS at 11:57

## 2024-05-30 ENCOUNTER — TELEMEDICINE (OUTPATIENT)
Dept: CARDIOLOGY | Facility: CLINIC | Age: 74
End: 2024-05-30
Payer: MEDICARE

## 2024-05-30 DIAGNOSIS — Z95.2 S/P TAVR (TRANSCATHETER AORTIC VALVE REPLACEMENT): Primary | ICD-10-CM

## 2024-05-30 PROCEDURE — 1111F DSCHRG MED/CURRENT MED MERGE: CPT | Performed by: NURSE PRACTITIONER

## 2024-05-30 PROCEDURE — 1160F RVW MEDS BY RX/DR IN RCRD: CPT | Performed by: NURSE PRACTITIONER

## 2024-05-30 PROCEDURE — 1159F MED LIST DOCD IN RCRD: CPT | Performed by: NURSE PRACTITIONER

## 2024-05-30 PROCEDURE — 3008F BODY MASS INDEX DOCD: CPT | Performed by: NURSE PRACTITIONER

## 2024-05-30 PROCEDURE — 4010F ACE/ARB THERAPY RXD/TAKEN: CPT | Performed by: NURSE PRACTITIONER

## 2024-05-30 PROCEDURE — 1036F TOBACCO NON-USER: CPT | Performed by: NURSE PRACTITIONER

## 2024-05-30 PROCEDURE — 99214 OFFICE O/P EST MOD 30 MIN: CPT | Performed by: NURSE PRACTITIONER

## 2024-05-30 PROCEDURE — 3060F POS MICROALBUMINURIA REV: CPT | Performed by: NURSE PRACTITIONER

## 2024-05-30 NOTE — PROGRESS NOTES
Structural Heart Follow up visit      Saritha Cormier is a 73 y.o. year old female. PMHx AS -Severe. Nonobstructive coronary atherosclerosis. HFpEF with volume overload -compensated. NSAID induced GI ulcer with bleed and anemia. HTN. DM. DLP. Obesity. S/p B/L mastectomy/Oophorectomy for CA breast BRCA+ DJD S/p Hip replacement surgery. Awaiting hip replacement S/p Evolut FX 26mm (pre-dil with 18mm TD) via LFP on 4/30/24       denies SOB,RUSS, fatigue  Recent Hospitalizations  No    patient with   Past Medical History:   Diagnosis Date    Acquired absence of bilateral breasts and nipples 01/31/2022    History of bilateral mastectomy    Arthritis     Cancer (Multi)     Diabetes mellitus (Multi)     Hypertension     Neoplasm of unspecified behavior of bone, soft tissue, 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/2015    Skin growth    Other bursitis of hip, right hip     Bursitis of right hip, unspecified bursa    Personal history of other diseases of the respiratory system 03/14/2016    History of chronic sinusitis    Trigger finger, unspecified finger 06/22/2018    Acquired trigger finger    Unilateral primary osteoarthritis, right knee 02/19/2021    Arthritis of knee, right       Results for orders placed or performed during the hospital encounter of 05/28/24 (from the past 96 hour(s))   Transthoracic echo (TTE) complete   Result Value Ref Range    AV pk collins 2.01 m/s    AV mn grad 8.0 mmHg    LVOT diam 1.80 cm    LV Biplane EF 61 %    MV avg E/e' ratio 15.25     MV E/A ratio 1.35     LA vol index A/L 26.8 ml/m2    Tricuspid annular plane systolic excursion 1.3 cm    RV free wall pk S' 9.68 cm/s    LVIDd 3.92 cm    RVSP 44.2 mmHg    Aortic Valve Area by Continuity of VTI 1.54 cm2    Aortic Valve Area by Continuity of Peak Velocity 1.60 cm2    AV pk grad 16.2 mmHg    LV A4C EF 61.5         Transthoracic echo (TTE) complete    Result Date: 5/28/2024   [facility], [address] [address]              Tel  [phone] and Fax [phone] TRANSTHORACIC ECHOCARDIOGRAM REPORT  Patient Name:      RAVIN GRANGER ALFREDO  Reading Physician:    [id number] Javier Reilly MD Study Date:        5/28/2024            Ordering Provider:    [id number] LIDIA GRANGER                                                               HAUSER MRN/PID:           [id number]             Fellow: Accession#:        [id number]         Nurse: Date of Birth/Age: 1950 / 73      Sonographer:                    years Gender:            F                    Additional Staff: Height:            157.48 cm            Admit Date: Weight:            85.73 kg             Admission Status:     Outpatient BSA / BMI:         1.87 m2 / 34.57      Encounter#:           [id number]                    kg/m2                                         Department Location:  [facility] Blood Pressure: 106 /63 mmHg Study Type:    TRANSTHORACIC ECHO (TTE) COMPLETE Diagnosis/ICD: Presence of prosthetic heart valve-Z95.2 Indication:    post TAVR CPT Code:      Echo Complete w Full Doppler-38963 Patient History: Pertinent History: CHF, Chest Pain, Dyslipidemia and HTN. s/p TAVR 4/30/24 26mm                    Evolut FX, bilateral mastectomy, GERD, abnormal EKG, SOB,                    CKD. Study Detail: The following Echo studies were performed: 2D, M-Mode, Doppler and               color flow. Technically challenging study due to body habitus.               Definity used as a contrast agent for endocardial border               definition. Total contrast used for this procedure was 1.5 mL via               IV push. The patient was awake.  PHYSICIAN INTERPRETATION: Left Ventricle: The left ventricular systolic function is normal, with an estimated ejection fraction of 60-65%. There are no regional wall motion abnormalities. The left ventricular cavity size is normal. Left ventricular diastolic filling was  indeterminate. Left Atrium: The left atrium is normal in size. Right Ventricle: The right ventricle is normal in size. There is normal right ventricular global systolic function. Right Atrium: The right atrium is normal in size. Aortic Valve: There is a prosthetic aortic valve present. There is mild aortic valve regurgitation. The peak instantaneous gradient of the aortic valve is 16.2 mmHg. The mean gradient of the aortic valve is 8.0 mmHg. There is an Evolut (Evolut FX 26) TAVR valve in aortic position functioning normally, with mild levar-prosthetic regurgitation. Mitral Valve: The mitral valve is normal in structure. There is mild mitral annular calcification. There is mild mitral valve regurgitation. Tricuspid Valve: The tricuspid valve is structurally normal. There is mild tricuspid regurgitation. Pulmonic Valve: The pulmonic valve is not well visualized. There is physiologic pulmonic valve regurgitation. Pericardium: There is no pericardial effusion noted. Aorta: The aortic root was not well visualized. Pulmonary Artery: The tricuspid regurgitant velocity is 3.21 m/s, and with an estimated right atrial pressure of 3 mmHg, the estimated pulmonary artery pressure is mildly elevated with the RVSP at 44.2 mmHg.  CONCLUSIONS:  1. Left ventricular systolic function is normal with a 60-65% estimated ejection fraction.  2. There is an Evolut (Evolut FX 26) TAVR valve in aortic position functioning normally, with mild levar-prosthetic regurgitation.  3. There is mild pulmonary hypertension. QUANTITATIVE DATA SUMMARY: 2D MEASUREMENTS:                          Normal Ranges: Ao Root d:     2.10 cm   (2.0-3.7cm) LAs:           3.40 cm   (2.7-4.0cm) IVSd:          1.42 cm   (0.6-1.1cm) LVPWd:         1.22 cm   (0.6-1.1cm) LVIDd:         3.92 cm   (3.9-5.9cm) LVIDs:         2.75 cm LV Mass Index: 99.3 g/m2 LV % FS        29.8 % LA VOLUME:                               Normal Ranges: LA Vol A4C:        56.3 ml     (22+/-6mL/m2) LA Vol A2C:        39.8 ml LA Vol BP:         50.0 ml LA Vol Index A4C:  30.2ml/m2 LA Vol Index A2C:  21.3 ml/m2 LA Vol Index BP:   26.8 ml/m2 LA Area A4C:       19.6 cm2 LA Area A2C:       15.6 cm2 LA Major Axis A4C: 5.8 cm LA Major Axis A2C: 5.2 cm LA Volume Index:   26.0 ml/m2 RA VOLUME BY A/L METHOD:                               Normal Ranges: RA Vol A4C:        32.4 ml    (8.3-19.5ml) RA Vol Index A4C:  17.3 ml/m2 RA Area A4C:       13.8 cm2 RA Major Axis A4C: 5.0 cm M-MODE MEASUREMENTS:                  Normal Ranges: Ao Root: 2.20 cm (2.0-3.7cm) LAs:     4.10 cm (2.7-4.0cm) AORTA MEASUREMENTS:                    Normal Ranges: Asc Ao, d: 1.80 cm (2.1-3.4cm) LV SYSTOLIC FUNCTION BY 2D PLANIMETRY (MOD):                     Normal Ranges: EF-A4C View: 61.5 % (>=55%) EF-A2C View: 64.5 % EF-Biplane:  60.6 % LV DIASTOLIC FUNCTION:                               Normal Ranges: MV Peak E:        1.22 m/s    (0.7-1.2 m/s) MV Peak A:        0.90 m/s    (0.42-0.7 m/s) E/A Ratio:        1.35        (1.0-2.2) MV e'             0.08 m/s    (>8.0) MV lateral e'     0.10 m/s MV medial e'      0.06 m/s MV A Dur:         129.00 msec E/e' Ratio:       15.25       (<8.0) PulmV Sys Matias:    39.70 cm/s PulmV Ewing Matias:   64.80 cm/s PulmV S/D Matias:    0.60 PulmV A Revs Matias: 32.80 cm/s PulmV A Revs Dur: 129.00 msec MITRAL VALVE:                 Normal Ranges: MV DT: 129 msec (150-240msec) AORTIC VALVE:                                    Normal Ranges: AoV Vmax:                2.01 m/s  (<=1.7m/s) AoV Peak P.2 mmHg (<20mmHg) AoV Mean P.0 mmHg  (1.7-11.5mmHg) LVOT Max Matias:            1.34 m/s  (<=1.1m/s) AoV VTI:                 48.50 cm  (18-25cm) LVOT VTI:                31.10 cm LVOT Diameter:           1.80 cm   (1.8-2.4cm) AoV Area, VTI:           1.54 cm2  (2.5-5.5cm2) AoV Area,Vmax:           1.60 cm2  (2.5-4.5cm2) AoV Dimensionless Index: 0.64 AORTIC INSUFFICIENCY: AI Vmax:        2.63 m/s AI Half-time:  460 msec AI Decel Rate: 144.50 cm/s2  RIGHT VENTRICLE: RV Basal 3.20 cm RV Mid   2.90 cm RV Major 6.9 cm TAPSE:   13.1 mm RV s'    0.10 m/s TRICUSPID VALVE/RVSP:                             Normal Ranges: Peak TR Velocity: 3.21 m/s Est. RA Pressure: 3 mmHg RV Syst Pressure: 44.2 mmHg (< 30mmHg) IVC Diam:         1.50 cm PULMONIC VALVE:                      Normal Ranges: PV Max Matias: 1.0 m/s  (0.6-0.9m/s) PV Max P.1 mmHg Pulmonary Veins: PulmV A Revs Dur: 129.00 msec PulmV A Revs Matias: 32.80 cm/s PulmV Ewing Matias:   64.80 cm/s PulmV S/D Matias:    0.60 PulmV Sys Matias:    39.70 cm/s  [id number] Javier Reilly MD Electronically signed on 2024 at 12:21:12 PM  ** Final **     Transthoracic Echo (TTE) Limited    Result Date: 2024   [facility], [address] [place] [address]                Tel [phone] and Fax [phone] TRANSTHORACIC ECHOCARDIOGRAM REPORT  Patient Name:      RAVIN Ellis Physician:    [id number] Donovan Kingsley MD Study Date:        2024             Ordering Provider:    [id number] LIDIA HAUSER MRN/PID:           [id number]             Fellow: Accession#:        [id number]         Nurse: Date of Birth/Age: 1950 / 73      Sonographer:          Zuly Acosta RDCS                    years Gender:            F                    Additional Staff: Height:            167.64 cm            Admit Date:           2024 Weight:            83.92 kg             Admission Status:     Inpatient -                                                               Routine BSA / BMI:         1.93 m2 / 29.86      Encounter#:           [id number]                    kg/m2                                         Department Location:  [facility] Non                                                               Invasive  Blood Pressure: 109 /71 mmHg Study Type:    TRANSTHORACIC ECHO (TTE) LIMITED Diagnosis/ICD: Presence of prosthetic heart valve-Z95.2 Indication:    s/p TAVR CPT Code:      Echo Limited-81968; Color Doppler-65461; Doppler Limited-45489 Patient History: Pertinent History: HTN. CKD, obesity, bilateral mastectomy, s/p #26 Evolut FX                    TAVR (04/30/2024). Study Detail: The following Echo studies were performed: 2D, M-Mode, Doppler and               color flow. Technically challenging study due to patient lying in               supine position, poor acoustic windows, prominent lung artifact               and body habitus. Definity used as a contrast agent for               endocardial border definition. Total contrast used for this               procedure was 2.0 mL via IV push.  PHYSICIAN INTERPRETATION: Left Ventricle: The left ventricular systolic function is normal, with an estimated ejection fraction of 65%. There are no regional wall motion abnormalities. The left ventricular cavity size is normal. The left ventricular septal wall thickness is mildly increased. Spectral Doppler shows an impaired relaxation pattern of left ventricular diastolic filling. Left Atrium: The left atrium is moderately dilated. Right Ventricle: The right ventricle is upper limits of normal in size. There is normal right ventricular global systolic function. Right Atrium: The right atrium is normal in size. Aortic Valve: The aortic valve was not well visualized. There is transcatheter aortic valve replacement. There is mild levar-prosthetic aortic valve regurgitation. The peak aortic velocity was obtained from the suprasternal view. There is trivial aortic valve regurgitation. The peak instantaneous gradient of the aortic valve is 18.0 mmHg. The mean gradient of the aortic valve is 8.0 mmHg. Mitral Valve: The mitral valve is normal in structure. There is mild to moderate mitral annular calcification. There is trace mitral valve  regurgitation. Tricuspid Valve: The tricuspid valve is structurally normal. There is trace to mild tricuspid regurgitation. Pulmonic Valve: The pulmonic valve is structurally normal. There is trace pulmonic valve regurgitation. Pericardium: There is a trivial pericardial effusion. Aorta: The aortic root was not well visualized. There is no dilatation of the ascending aorta. There is no dilatation of the aortic root. Pulmonary Artery: The tricuspid regurgitant velocity is 2.45 m/s, and with an estimated right atrial pressure of 3 mmHg, the estimated pulmonary artery pressure is normal with the RVSP at 27.0 mmHg. Systemic Veins: The inferior vena cava appears to be of normal size. There is IVC inspiratory collapse greater than 50%.  CONCLUSIONS:  1. Left ventricular systolic function is normal with a 65% estimated ejection fraction.  2. Spectral Doppler shows an impaired relaxation pattern of left ventricular diastolic filling.  3. The left atrium is moderately dilated.  4. There is a transcatheter aortic valve replacement. QUANTITATIVE DATA SUMMARY: 2D MEASUREMENTS:                          Normal Ranges: LAs:           4.90 cm   (2.7-4.0cm) IVSd:          1.30 cm   (0.6-1.1cm) LVPWd:         0.80 cm   (0.6-1.1cm) LVIDd:         4.10 cm   (3.9-5.9cm) LVIDs:         2.90 cm LV Mass Index: 73.2 g/m2 LV % FS        29.3 % AORTA MEASUREMENTS:                      Normal Ranges: Ao Sinus, d: 2.90 cm (2.1-3.5cm) Asc Ao, d:   2.70 cm (2.1-3.4cm) LV SYSTOLIC FUNCTION BY 2D PLANIMETRY (MOD):                     Normal Ranges: EF-A4C View: 58.0 % (>=55%) EF-A2C View: 67.9 % EF-Biplane:  64.5 % LV DIASTOLIC FUNCTION:                           Normal Ranges: MV Peak E:    0.97 m/s    (0.7-1.2 m/s) MV Peak A:    1.14 m/s    (0.42-0.7 m/s) E/A Ratio:    0.85        (1.0-2.2) MV e'         0.06 m/s    (>8.0) MV lateral e' 0.07 m/s MV medial e'  0.05 m/s MV A Dur:     100.00 msec E/e' Ratio:   17.31       (<8.0) a'            0.08  m/s MV DT:        217 msec    (150-240 msec) MITRAL VALVE:                 Normal Ranges: MV DT: 217 msec (150-240msec) AORTIC VALVE:                                    Normal Ranges: AoV Vmax:                2.12 m/s  (<=1.7m/s) AoV Peak P.0 mmHg (<20mmHg) AoV Mean P.0 mmHg  (1.7-11.5mmHg) LVOT Max Matias:            1.15 m/s  (<=1.1m/s) AoV VTI:                 39.60 cm  (18-25cm) LVOT VTI:                21.90 cm LVOT Diameter:           1.90 cm   (1.8-2.4cm) AoV Area, VTI:           1.57 cm2  (2.5-5.5cm2) AoV Area,Vmax:           1.54 cm2  (2.5-4.5cm2) AoV Dimensionless Index: 0.55  RIGHT VENTRICLE: TAPSE: 14.8 mm RV s'  0.09 m/s TRICUSPID VALVE/RVSP:                             Normal Ranges: Peak TR Velocity: 2.45 m/s RV Syst Pressure: 27.0 mmHg (< 30mmHg) IVC Diam:         1.70 cm  [id number] Donovan Kingsley MD Electronically signed on 2024 at 1:08:23 PM  ** Final **     Transthoracic Echo (TTE) Limited    Result Date: 2024   [facility], [address] [place] [address]                Tel [phone] and Fax [phone] TRANSTHORACIC ECHOCARDIOGRAM REPORT  Patient Name:      RAVIN Ellis Physician:    [id number] Telma Alva MD Study Date:        2024            Ordering Provider:    [id number] LIDIA HAUSER MRN/PID:           [id number]             Fellow:               [id number] Javier Ha MD Accession#:        [id number]         Nurse: Date of Birth/Age: 1950 / 73      Sonographer:          Chris dallas                                      [facility] Gender:            F                    Additional Staff: Height:            157.48 cm            Admit Date:           2024 Weight:            84.37 kg              Admission Status:     Inpatient -                                                               Routine BSA / BMI:         1.85 m2 / 34.02      Encounter#:           [id number]                    kg/m2                                         Department Location:  [facility]                                                               Cath Lab Blood Pressure: 126 /68 mmHg Study Type:    TRANSTHORACIC ECHO (TTE) LIMITED Diagnosis/ICD: Nonrheumatic aortic (valve) stenosis-I35.0 Indication:    TAVR periprocedure CPT Code:      Doppler Limited-48314; Color Doppler-60351; Echo Limited-27291 Patient History: Pertinent History: CAD; Aortic valve stenosis; HFpEF; HTN. Study Detail: The following Echo studies were performed: 2D, M-Mode, Doppler and               color flow. Technically challenging study due to body habitus and               patient lying in supine position.  PHYSICIAN INTERPRETATION: Left Ventricle: The left ventricular systolic function is normal, with an estimated ejection fraction of 65-70%. There are no regional wall motion abnormalities. The left ventricular cavity size is normal. The left ventricular septal wall thickness is mildly increased. There is mild concentric left ventricular hypertrophy. Left ventricular diastolic filling was not assessed. Left Atrium: The left atrium is mildly dilated. Right Ventricle: The right ventricle is normal in size. There is normal right ventricular global systolic function. Right Atrium: The right atrium is normal in size. Aortic Valve: The aortic valve was not well visualized. There is severe aortic valve cusp calcification. There is severe aortic valve thickening. There is evidence of severe aortic valve stenosis. There is mild aortic valve regurgitation. The peak instantaneous gradient of the aortic valve is 56.2 mmHg. The mean gradient of the aortic valve is 35.8 mmHg. Baseline: severe calcific AS with gradients of 56.2/35.8mmHg and DI of 0.21 and mild AI.  Proceeded to TAVR. Mitral Valve: The mitral valve is mildly thickened. There is moderate mitral annular calcification. There is trace mitral valve regurgitation. Tricuspid Valve: The tricuspid valve is structurally normal. There is trace tricuspid regurgitation. The right ventricular systolic pressure is unable to be estimated. Pulmonic Valve: The pulmonic valve is structurally normal. There is physiologic pulmonic valve regurgitation. Pericardium: There is a trivial pericardial effusion. There is an anterior clear space. Aorta: The aortic root was not well visualized. Systemic Veins: The inferior vena cava size appears small. In comparison to the previous echocardiogram(s): Compared with study from 3/18/2024,. Compared with the prior exam from 3/15/2024 the prior AV gradients were higher at 91/51mmHg with DI of 0.21 all consistent with severe AS wtih preserved LV function at that time with LVEF 65-70%. There was trace to mild AI at that time. Today the patient underwent successful TAVR for severe AS.  Post Transcatheter Aortic Valve Placement (TAVR): The peak instantaneous gradient of the aortic valve is 13.1 mmHg. The mean gradient of the aortic valve is 6.0 mmHg. There is a Medtronic transcatheter aortic valve replacement, with a 26 mm reported size. The left ventricular systolic function is normal. The left ventricular cavity size is normal. There is mild mitral valve regurgitation. There is no prosthetic aortic valve regurgitation. There is mild levar-prosthetic aortic valve regurgitation.  CONCLUSIONS:  1. Left ventricular systolic function is normal with a 65-70% estimated ejection fraction.  2. There is moderate mitral annular calcification.  3. Baseline: severe calcific AS with gradients of 56.2/35.8mmHg and DI of 0.21 and mild AI.     Proceeded to TAVR.  4. Severe aortic valve stenosis.  5. There is severe aortic valve cusp calcification.  6. There is severe aortic valve thickening.  7. Mild aortic valve  regurgitation.  8. S/p 26mm Medtronic Evolut TAVR with gradients of 13.1/6mmHg and mild periprosthetic AI.  9. Compared with the prior exam from 3/15/2024 the prior AV gradients were higher at 91/51mmHg with DI of 0.21 all consistent with severe AS wtih preserved LV function at that time with LVEF 65-70%. There was trace to mild AI at that time. Today the patient underwent successful TAVR for severe AS. QUANTITATIVE DATA SUMMARY: 2D MEASUREMENTS:                           Normal Ranges: Ao Root d:     3.20 cm    (2.0-3.7cm) LAs:           3.60 cm    (2.7-4.0cm) IVSd:          1.40 cm    (0.6-1.1cm) LVPWd:         1.10 cm    (0.6-1.1cm) LVIDd:         4.70 cm    (3.9-5.9cm) LVIDs:         3.00 cm LV Mass Index: 121.3 g/m2 LV % FS        36.2 % LA VOLUME:                               Normal Ranges: LA Vol A4C:        72.6 ml    (22+/-6mL/m2) LA Vol A2C:        57.8 ml LA Vol BP:         66.4 ml LA Vol Index A4C:  39.2ml/m2 LA Vol Index A2C:  31.2 ml/m2 LA Vol Index BP:   35.8 ml/m2 LA Area A4C:       21.6 cm2 LA Area A2C:       18.8 cm2 LA Major Axis A4C: 5.5 cm LA Major Axis A2C: 5.2 cm LV SYSTOLIC FUNCTION BY 2D PLANIMETRY (MOD):                     Normal Ranges: EF-A4C View: 69.9 % (>=55%) EF-A2C View: 62.5 % EF-Biplane:  66.9 % AORTIC VALVE:                                              Normal Ranges: AoV Vmax:                          3.75 m/s  (<=1.7m/s) AoV Vmax Post TAVR:                1.81 m/s  (<=1.7m/s) AoV Peak P.2 mmHg (<20mmHg) AoV Peak PG Post TAVR:             13.1 mmHg (<20mmHg) AoV Mean P.8 mmHg (1.7-11.5mmHg) AoV Mean PG Post TAVR:             6.0 mmHg  (1.7-11.5mmHg) LVOT Max Matias:                      0.88 m/s  (<=1.1m/s) LVOT Max Matias Post TAVR:            1.11 m/s  (<=1.1m/s) AoV VTI:                           84.00 cm  (18-25cm) AoV VTI Post TAVR:                 32.10 cm  (18-25cm) LVOT VTI:                          17.60 cm LVOT VTI Post  TAVR:                23.40 cm LVOT Diameter:                     1.90 cm   (1.8-2.4cm) LVOT Diameter Post TAVR:           1.90 cm   (1.8-2.4cm) AoV Area, VTI:                     0.59 cm2  (2.5-5.5cm2) AoV Area, VTI Post TAVR:           2.07 cm2  (2.5-5.5cm2) AoV Area,Vmax:                     0.67 cm2  (2.5-4.5cm2) AoV Area,Vmax Post TAVR:           1.74 cm2  (2.5-4.5cm2) AoV Dimensionless Index:           0.21 AoV Dimensionless Index Post TAVR: 0.73  [id number] Telma Alva MD Electronically signed on 4/30/2024 at 11:31:32 AM  ** Final **     Transthoracic Echo Complete    Result Date: 3/18/2024   [place] [address]              Tel [phone] and Fax [phone] TRANSTHORACIC ECHOCARDIOGRAM REPORT  Patient Name:      RAVIN Ellis Physician:    Maicol Montana MD Study Date:        3/15/2024            Ordering Provider:    Maicol MONTANA MRN/PID:           [id number]             Fellow: Accession#:        [id number]         Nurse:                Erika Noriega RN Date of Birth/Age: 1950 / 73      Sonographer:          Kapil Coles RDCS                    years Gender:            F                    Additional Staff: Height:            157.48 cm            Admit Date: Weight:            90.72 kg             Admission Status:     Outpatient BSA / BMI:         1.91 m2 / 36.58      Encounter#:           [id number]                    kg/m2                                         Department Location:  [facility] Blood Pressure: 132 /80 mmHg Study Type:    TRANSTHORACIC ECHO (TTE) COMPLETE Diagnosis/ICD: Nonrheumatic aortic (valve) stenosis-I35.0 Indication:    AS CPT Code:      Echo Complete w Full Doppler-21884 Patient History: Pertinent History: AS (Severe), Hypertensive Heart Disease. Study Detail: The following Echo  studies were performed: 2D, M-Mode, Doppler and               color flow. Definity used as a contrast agent for endocardial               border definition. Total contrast used for this procedure was 2 mL               via IV push.  PHYSICIAN INTERPRETATION: Left Ventricle: The left ventricular systolic function is normal, with an estimated ejection fraction of 65-70%. There are no regional wall motion abnormalities. The left ventricular cavity size is normal. The left ventricular septal wall thickness is mildly increased. Spectral Doppler shows an impaired relaxation pattern of left ventricular diastolic filling. Left Atrium: The left atrium is normal in size. Right Ventricle: The right ventricle is normal in size. There is normal right ventricular global systolic function. Right Atrium: The right atrium is normal in size. Aortic Valve: The aortic valve is trileaflet. There is moderate to severe aortic valve cusp calcification. There is evidence of severe aortic valve stenosis. The aortic valve dimensionless index is 0.21. There is trace to mild aortic valve regurgitation. The peak instantaneous gradient of the aortic valve is 91.0 mmHg. The mean gradient of the aortic valve is 51.0 mmHg. Mitral Valve: The mitral valve is mild to moderately thickened. There is mild to moderate mitral annular calcification. There is trace to mild mitral valve regurgitation. Tricuspid Valve: The tricuspid valve is structurally normal. There is trace tricuspid regurgitation. The right ventricular systolic pressure is unable to be estimated. Pulmonic Valve: The pulmonic valve is structurally normal. There is trace pulmonic valve regurgitation. Pericardium: There is no pericardial effusion noted. Aorta: The aortic root is normal. Systemic Veins: The inferior vena cava size appears small. There is IVC inspiratory collapse greater than 50%. In comparison to the previous echocardiogram(s): Compared with study from 10/14/2022,. Aortic  stenosis is severe.  CONCLUSIONS:  1. Left ventricular systolic function is normal with a 65-70% estimated ejection fraction.  2. Spectral Doppler shows an impaired relaxation pattern of left ventricular diastolic filling.  3. Severe aortic valve stenosis. QUANTITATIVE DATA SUMMARY: 2D MEASUREMENTS:                          Normal Ranges: Ao Root d:     2.90 cm   (2.0-3.7cm) LAs:           4.30 cm   (2.7-4.0cm) IVSd:          1.27 cm   (0.6-1.1cm) LVPWd:         0.94 cm   (0.6-1.1cm) LVIDd:         4.25 cm   (3.9-5.9cm) LVIDs:         2.66 cm LV Mass Index: 84.4 g/m2 LV % FS        37.4 % LA VOLUME:                               Normal Ranges: LA Vol A4C:        45.6 ml    (22+/-6mL/m2) LA Vol A2C:        38.8 ml LA Vol BP:         43.8 ml LA Vol Index A4C:  23.8ml/m2 LA Vol Index A2C:  20.3 ml/m2 LA Vol Index BP:   22.9 ml/m2 LA Area A4C:       16.7 cm2 LA Area A2C:       14.8 cm2 LA Major Axis A4C: 5.2 cm LA Major Axis A2C: 4.8 cm RA VOLUME BY A/L METHOD:                               Normal Ranges: RA Vol A4C:        30.4 ml    (8.3-19.5ml) RA Vol Index A4C:  15.9 ml/m2 RA Area A4C:       12.1 cm2 RA Major Axis A4C: 4.1 cm M-MODE MEASUREMENTS:                  Normal Ranges: Ao Root: 2.70 cm (2.0-3.7cm) LAs:     4.40 cm (2.7-4.0cm) AORTA MEASUREMENTS:                    Normal Ranges: Asc Ao, d: 2.70 cm (2.1-3.4cm) LV SYSTOLIC FUNCTION BY 2D PLANIMETRY (MOD):                     Normal Ranges: EF-A4C View: 68.8 % (>=55%) EF-A2C View: 70.4 % EF-Biplane:  69.5 % LV DIASTOLIC FUNCTION:                               Normal Ranges: MV Peak E:        0.58 m/s    (0.7-1.2 m/s) MV Peak A:        0.88 m/s    (0.42-0.7 m/s) E/A Ratio:        0.65        (1.0-2.2) MV e'             0.05 m/s    (>8.0) MV lateral e'     0.05 m/s MV medial e'      0.05 m/s E/e' Ratio:       11.52       (<8.0) PulmV Sys Matias:    46.50 cm/s PulmV Ewing Matias:   30.70 cm/s PulmV S/D Matias:    1.50 PulmV A Revs Matias: 25.60 cm/s PulmV A Revs Dur: 124.00  msec MITRAL VALVE:                 Normal Ranges: MV DT: 216 msec (150-240msec) AORTIC VALVE:                                    Normal Ranges: AoV Vmax:                4.77 m/s  (<=1.7m/s) AoV Peak P.0 mmHg (<20mmHg) AoV Mean P.0 mmHg (1.7-11.5mmHg) LVOT Max Matias:            0.99 m/s  (<=1.1m/s) AoV VTI:                 102.00 cm (18-25cm) LVOT VTI:                21.90 cm LVOT Diameter:           1.90 cm   (1.8-2.4cm) AoV Area, VTI:           0.61 cm2  (2.5-5.5cm2) AoV Area,Vmax:           0.59 cm2  (2.5-4.5cm2) AoV Dimensionless Index: 0.21  RIGHT VENTRICLE: RV Basal 3.30 cm RV Mid   2.40 cm RV Major 5.4 cm TAPSE:   13.3 mm RV s'    0.13 m/s TRICUSPID VALVE/RVSP:                   Normal Ranges: IVC Diam: 0.75 cm PULMONIC VALVE:                      Normal Ranges: PV Max Matias: 1.0 m/s  (0.6-0.9m/s) PV Max PG:  3.8 mmHg Pulmonary Veins: PulmV A Revs Dur: 124.00 msec PulmV A Revs Matias: 25.60 cm/s PulmV Ewing Matias:   30.70 cm/s PulmV S/D Matias:    1.50 PulmV Sys Matias:    46.50 cm/s  [id number] Kim Mari MD Electronically signed on 3/18/2024 at 9:42:00 AM  ** Final **            Heart Failure Follow up    NYHA class 1    Edema Denies  Dyspnea on Exertion Denies  Fatigue Denies  Exercise Intolerance Denies  Orthopnea Denies  PND Denies    Chest pain No  Syncope No  Palpitations No  Weight gain No  Weight loss No        All organ systems normal           [facility] Questionnaire      1  Heart failure affects different people in different ways. Some feel shortness of breath while others feel fatigue. Please indicate how much you are limited by heart failure (shortness of breath or fatigue) in your ability to do the following activities over the past 2 weeks.     A.) Showering/bathing  5. Not at All  B.) Walking 1 block on level ground 5. Not at All  C.) Hurrying or Jogging   6. Limited for other reastons    2.  Over the past 2 weeks, how many times did you have swelling in your feet, ankles or legs  when you woke up in the morning? 5. Never    3.  Over the past 2 weeks, on average, how many times has fatigue limited your ability to do what you wanted? 7. Never    4.  Over the past 2 weeks, on average, how many times has shortness of breath limited your ability to do what you wanted? 7. Never    5.  Over the past 2 weeks, on average, how many times have you been forced to sleep sitting up in a chair or with at least 3 pillows to prop you up because of shortness of breath? Never    6. Over the past 2 weeks, how much has your heart failure limited your enjoyment of life? It has slightly limited my enjoyment of life    7. If you had to spend the rest of your life with your heart failure the way it is right now, how would you feel about this? 4. Mostly satisfied    8. How much does your heart failure affect your lifestyle? Please indicate how your heart failure may have limited yourparticipation in the following activities over the past 2 weeks    A.)  Hobbies, recreational activities  5. Did not limit at all    B.) Working or doing household chores  5. Did not limit at all    C.) Visiting family or friends out of your home  5. Did not limit at all    Impression    Doing well clinically, states she has no lower extremity edema. Denies overt SOB, RUSS or fatigue.  Does not check BP at home,  Had period of VT noted on [employer] monitor, placed on Metoprolol and seems to be tolerating well      Plan:  - Cont current medication regimen  - Cont DAPT  - Cont to increase activity as tolerated   - f/u with Dr Reilly   - Life-long Dental SBE prophylaxis needed      Time Spent:I spent 30 minutes of a total visit of 10 minutes in counseling/ direct management/discussion/coordination of [facility].

## 2024-06-07 LAB — BODY SURFACE AREA: 1.98 M2

## 2024-06-08 DIAGNOSIS — I25.10 ATHEROSCLEROTIC HEART DISEASE OF NATIVE CORONARY ARTERY WITHOUT ANGINA PECTORIS: ICD-10-CM

## 2024-06-08 DIAGNOSIS — E78.00 PURE HYPERCHOLESTEROLEMIA, UNSPECIFIED: ICD-10-CM

## 2024-06-08 DIAGNOSIS — E55.9 MILD VITAMIN D DEFICIENCY: ICD-10-CM

## 2024-06-08 DIAGNOSIS — D64.89 OTHER SPECIFIED ANEMIAS: ICD-10-CM

## 2024-06-08 DIAGNOSIS — I50.9 HEART FAILURE, UNSPECIFIED (MULTI): ICD-10-CM

## 2024-06-08 DIAGNOSIS — Z00.00 ENCOUNTER FOR GENERAL ADULT MEDICAL EXAMINATION WITHOUT ABNORMAL FINDINGS: ICD-10-CM

## 2024-06-08 DIAGNOSIS — M10.9 GOUT, UNSPECIFIED: ICD-10-CM

## 2024-06-08 DIAGNOSIS — I25.84 CORONARY ATHEROSCLEROSIS DUE TO CALCIFIED CORONARY LESION (CODE): ICD-10-CM

## 2024-06-12 RX ORDER — CLOPIDOGREL BISULFATE 75 MG/1
75 TABLET ORAL DAILY
Qty: 90 TABLET | Refills: 1 | Status: SHIPPED | OUTPATIENT
Start: 2024-06-12

## 2024-06-12 RX ORDER — FERROUS GLUCONATE 324(38)MG
1 TABLET ORAL 2 TIMES DAILY
Qty: 180 TABLET | Refills: 1 | Status: SHIPPED | OUTPATIENT
Start: 2024-06-12

## 2024-06-12 RX ORDER — LOSARTAN POTASSIUM 25 MG/1
25 TABLET ORAL DAILY
Qty: 90 TABLET | Refills: 1 | Status: SHIPPED | OUTPATIENT
Start: 2024-06-12

## 2024-06-12 RX ORDER — ASPIRIN 81 MG/1
TABLET ORAL
Qty: 90 TABLET | Refills: 1 | Status: SHIPPED | OUTPATIENT
Start: 2024-06-12

## 2024-06-12 RX ORDER — FUROSEMIDE 40 MG/1
TABLET ORAL
Qty: 90 TABLET | Refills: 1 | Status: SHIPPED | OUTPATIENT
Start: 2024-06-12

## 2024-06-12 RX ORDER — ROSUVASTATIN CALCIUM 40 MG/1
40 TABLET, COATED ORAL DAILY
Qty: 90 TABLET | Refills: 1 | Status: SHIPPED | OUTPATIENT
Start: 2024-06-12

## 2024-06-12 RX ORDER — ALLOPURINOL 100 MG/1
200 TABLET ORAL DAILY
Qty: 180 TABLET | Refills: 1 | Status: SHIPPED | OUTPATIENT
Start: 2024-06-12

## 2024-06-12 RX ORDER — CHOLECALCIFEROL (VITAMIN D3) 125 MCG
125 CAPSULE ORAL DAILY
Qty: 90 CAPSULE | Refills: 1 | Status: SHIPPED | OUTPATIENT
Start: 2024-06-12

## 2024-06-13 LAB — BODY SURFACE AREA: 1.98 M2

## 2024-06-14 ENCOUNTER — APPOINTMENT (OUTPATIENT)
Dept: ORTHOPEDIC SURGERY | Facility: CLINIC | Age: 74
End: 2024-06-14
Payer: COMMERCIAL

## 2024-06-17 ENCOUNTER — APPOINTMENT (OUTPATIENT)
Dept: PRIMARY CARE | Facility: CLINIC | Age: 74
End: 2024-06-17
Payer: MEDICARE

## 2024-06-17 ENCOUNTER — LAB (OUTPATIENT)
Dept: LAB | Facility: LAB | Age: 74
End: 2024-06-17
Payer: MEDICARE

## 2024-06-17 ENCOUNTER — APPOINTMENT (OUTPATIENT)
Dept: CARDIOLOGY | Facility: CLINIC | Age: 74
End: 2024-06-17
Payer: MEDICARE

## 2024-06-17 VITALS
HEART RATE: 44 BPM | HEIGHT: 62 IN | OXYGEN SATURATION: 97 % | DIASTOLIC BLOOD PRESSURE: 58 MMHG | WEIGHT: 186 LBS | SYSTOLIC BLOOD PRESSURE: 114 MMHG | BODY MASS INDEX: 34.23 KG/M2

## 2024-06-17 DIAGNOSIS — R94.31 ABNORMAL EKG: ICD-10-CM

## 2024-06-17 DIAGNOSIS — I35.0 SEVERE AORTIC STENOSIS: Primary | ICD-10-CM

## 2024-06-17 LAB
25(OH)D3 SERPL-MCNC: 71 NG/ML (ref 30–100)
ALBUMIN SERPL BCP-MCNC: 4 G/DL (ref 3.4–5)
ALP SERPL-CCNC: 85 U/L (ref 33–136)
ALT SERPL W P-5'-P-CCNC: 8 U/L (ref 7–45)
ANION GAP SERPL CALC-SCNC: 12 MMOL/L (ref 10–20)
AST SERPL W P-5'-P-CCNC: 13 U/L (ref 9–39)
BILIRUB SERPL-MCNC: 0.6 MG/DL (ref 0–1.2)
BUN SERPL-MCNC: 31 MG/DL (ref 6–23)
CALCIUM SERPL-MCNC: 9.7 MG/DL (ref 8.6–10.3)
CHLORIDE SERPL-SCNC: 100 MMOL/L (ref 98–107)
CHOLEST SERPL-MCNC: 140 MG/DL (ref 0–199)
CHOLESTEROL/HDL RATIO: 4.2
CO2 SERPL-SCNC: 32 MMOL/L (ref 21–32)
CREAT SERPL-MCNC: 1.77 MG/DL (ref 0.5–1.05)
CREAT UR-MCNC: 116.1 MG/DL (ref 20–320)
EGFRCR SERPLBLD CKD-EPI 2021: 30 ML/MIN/1.73M*2
GLUCOSE SERPL-MCNC: 112 MG/DL (ref 74–99)
HDLC SERPL-MCNC: 33.7 MG/DL
LDLC SERPL CALC-MCNC: 82 MG/DL
MICROALBUMIN UR-MCNC: 78 MG/L
MICROALBUMIN/CREAT UR: 67.2 UG/MG CREAT
NON HDL CHOLESTEROL: 106 MG/DL (ref 0–149)
POTASSIUM SERPL-SCNC: 5.2 MMOL/L (ref 3.5–5.3)
PROT SERPL-MCNC: 7.8 G/DL (ref 6.4–8.2)
SODIUM SERPL-SCNC: 139 MMOL/L (ref 136–145)
TRIGL SERPL-MCNC: 120 MG/DL (ref 0–149)
TSH SERPL-ACNC: 3.28 MIU/L (ref 0.44–3.98)
VLDL: 24 MG/DL (ref 0–40)

## 2024-06-17 PROCEDURE — 82043 UR ALBUMIN QUANTITATIVE: CPT

## 2024-06-17 PROCEDURE — 4010F ACE/ARB THERAPY RXD/TAKEN: CPT | Performed by: INTERNAL MEDICINE

## 2024-06-17 PROCEDURE — 3074F SYST BP LT 130 MM HG: CPT | Performed by: INTERNAL MEDICINE

## 2024-06-17 PROCEDURE — 3078F DIAST BP <80 MM HG: CPT | Performed by: INTERNAL MEDICINE

## 2024-06-17 PROCEDURE — 82607 VITAMIN B-12: CPT

## 2024-06-17 PROCEDURE — 82570 ASSAY OF URINE CREATININE: CPT

## 2024-06-17 PROCEDURE — 1159F MED LIST DOCD IN RCRD: CPT | Performed by: INTERNAL MEDICINE

## 2024-06-17 PROCEDURE — 3008F BODY MASS INDEX DOCD: CPT | Performed by: INTERNAL MEDICINE

## 2024-06-17 PROCEDURE — 82306 VITAMIN D 25 HYDROXY: CPT

## 2024-06-17 PROCEDURE — 3060F POS MICROALBUMINURIA REV: CPT | Performed by: INTERNAL MEDICINE

## 2024-06-17 PROCEDURE — 3048F LDL-C <100 MG/DL: CPT | Performed by: INTERNAL MEDICINE

## 2024-06-17 PROCEDURE — 99215 OFFICE O/P EST HI 40 MIN: CPT | Performed by: INTERNAL MEDICINE

## 2024-06-18 LAB — VIT B12 SERPL-MCNC: 291 PG/ML (ref 211–911)

## 2024-06-19 NOTE — PROGRESS NOTES
DIAGNOSES: AS S/p TAVR (26 Evolut FX 4/30/2024). Nonobstructive coronary atherosclerosis. HFpEF Euvolemic state. NYHA Class II. NSAID induced GI ulcer with bleed and anemia. HTN. DM. DLP. Obesity. S/p B/L mastectomy/Oophorectomy for CA breast BRCA+ DJD S/p Hip replacement surgery. Awaiting TKR - Preop evluation.      Dear Alva:     Ms Cormier 73F had come for follow up, after her recent TAVR, as outlined above.  She feels much better symptomatically, being able to do activities, taking care of her grandchildren.     As you know, she is BRCA gene +ve with CA breast, having undergone B/L mastectomy one after the other, as well as oophorectomy in the remote past.     12 system review was negative except as stated above.           Active Problems   · Abnormal blood sugar (790.29) (R73.09)   · Abnormal screening cardiac CT (793.2) (R93.1)   · Acquired trigger finger (727.03) (M65.30)   · Acute lumbar radiculopathy (724.4) (M54.16)   · Anemia due to other cause (285.8) (D64.89)   · Angina pectoris (413.9) (I20.9)   · Aortic stenosis (424.1) (I35.0)   · Aortic stenosis, severe (424.1) (I35.0)   · Arthritis (716.90) (M19.90)   · Arthritis of hip (716.95) (M16.10)   · Arthritis of knee, right (716.96) (M17.11)   · Arthritis of lumbar spine (721.3) (M47.816)   · Arthritis of shoulder (716.91) (M19.019)   · Arthritis pain, hip (716.95) (M16.10)   · Benign essential hypertension (401.1) (I10)   · Body mass index (BMI) of 37.0 to 37.9 in adult (V85.37) (Z68.37)   · Body mass index (BMI) of 40.0 to 44.9 in adult (V85.41) (Z68.41)   · Breast CA (174.9) (C50.919)   · Bursitis of left hip (726.5) (M70.72)   · Bursitis of right hip (726.5) (M70.71)   · Chest pain (786.50) (R07.9)   · CHF (congestive heart failure) (428.0) (I50.9)   · Chronic diastolic heart failure (428.32) (I50.32)   · Class 2 obesity with body mass index (BMI) of 35.0 to 35.9 in adult (278.00,V85.35)  (E66.9,Z68.35)   · Class 2 obesity with body mass index (BMI)  of 36.0 to 36.9 in adult (278.00,V85.36)  (E66.9,Z68.36)   · Class 2 obesity with body mass index (BMI) of 37.0 to 37.9 in adult (278.00,V85.37)  (E66.9,Z68.37)   · Colon polyps (211.3) (K63.5)   · Coronary artery calcification (414.00,414.4) (I25.10,I25.84)   · Coronary atherosclerosis (414.00) (I25.10)   · Cough (786.2) (R05.9)   · Dermatitis of perianal region (692.9) (L30.9)   · Diabetes mellitus type II, controlled (250.00) (E11.9)   · DJD (degenerative joint disease) (715.90) (M19.90)   · Dyslipidemia (272.4) (E78.5)   · Elevated LDL cholesterol level (272.0) (E78.00)   · Gastric ulcer (531.90) (K25.9)   · GERD (gastroesophageal reflux disease) (530.81) (K21.9)   · Gout (274.9) (M10.9)   · Hip pain, chronic (719.45,338.29) (M25.559,G89.29)   · History of bilateral mastectomy (V45.71) (Z90.13)   · History of total mastectomy of left breast (V15.29) (Z90.12)   · History of total mastectomy of right breast (V15.29) (Z90.11)   · Hypotension (458.9) (I95.9)   · Leg edema (782.3) (R60.0)   · Lipoma of forearm (214.1) (D17.20)   · Lumbar radiculitis (724.4) (M54.16)   · Medicare annual wellness visit, subsequent (V70.0) (Z00.00)   · Mild vitamin D deficiency (268.9) (E55.9)   · Moderate aortic stenosis (424.1) (I35.0)   · Musculoskeletal pain (729.1) (M79.18)   · Neck pain (723.1) (M54.2)   · Nummular eczematous dermatitis (692.9) (L30.0)   · Obesity (278.00) (E66.9)   · Osteoarthritis of right hip (715.95) (M16.11)   · Pre-operative cardiovascular examination (V72.81) (Z01.810)   · Pressure injury of contiguous region involving buttock and hip, stage 2, unspecified  laterality (707.09,707.22) (L89.42)   · Right bicipital tenosynovitis (726.12) (M75.21)   · Right hip pain (719.45) (M25.551)   · Right shoulder pain (719.41) (M25.511)   · Sinusitis (473.9) (J32.9)   · SOB (shortness of breath) on exertion (786.05) (R06.02)   · Spinal stenosis, lumbar (724.02) (M48.061)   · Stage 3a chronic kidney disease (585.3)  (N18.31)   · Status post bilateral mastectomy (V45.71) (Z90.13)   · Status post total replacement of right hip (V43.64) (Z96.641)   · Subdeltoid bursitis of right shoulder joint (726.19) (M75.51)   · Subsequent non-ST elevation (NSTEMI) myocardial infarction (410.70) (I22.2)   · Suspected COVID-19 virus infection (V01.79) (Z20.822)   · Type 2 diabetes mellitus (250.00) (E11.9)   · Type 2 diabetes mellitus with ketoacidotic coma, without long-term current use of insulin  (250.30) (E11.11)   · UTI (urinary tract infection) (599.0) (N39.0)   · Viral URI with cough (465.9) (J06.9)   · Welcome to Medicare preventive visit (V70.0) (Z00.00)   · Witnessed episode of apnea (786.03) (R06.81)     Benign essential hypertension (401.1) (I10)      Arthritis of hip (716.95) (M16.10)      Aortic stenosis (424.1) (I35.0)      Anemia due to other cause (285.8) (D64.89)      Coronary atherosclerosis (414.00) (I25.10)      Benign essential hypertension (401.1) (I10)      Pure hypercholesterolemia (272.0) (E78.00)      Angina pectoris (413.9) (I20.9)      Aortic stenosis, severe (424.1) (I35.0)      Anemia due to other cause (285.8) (D64.89)      Dermatitis of perianal region (692.9) (L30.9)      Gastric ulcer (531.90) (K25.9)              Past Medical History   · History of Acquired trigger finger (727.03) (M65.30)   · Resolved Date: 22 Jun 2018   · Arthritis of knee, right (716.96) (M17.11)   · History of Bursitis of right hip, unspecified bursa (726.5) (M70.71)   · History of bilateral mastectomy (V45.71) (Z90.13)   · Resolved Date: 31 Jan 2022   · History of chronic sinusitis (V12.69) (Z87.09)   · Resolved Date: 29 Mar 2016   · History of Skin growth (239.2) (D49.2)   · Resolved Date: 22 Dec 2015     Surgical History   · History of Breast Surgery Mastectomy   · History of Breast Surgery Reconstruction   · History of Hysterectomy     Social History   · Denied: History of Alcohol use (V49.89) (Z78.9)   · Denied: History of Caffeine  use   · Employed in professional specialty position   · teacher     Allergies   · No Known Drug Allergies  Recorded By: Mitali Silva; 9/21/2015 10:33:08 AM     Current Meds  Please see chart    Vitals  HR 48 bpm /58 Wt 186 lb BMI 34.02    Physical examination  On examination, she was comfortably sitting.  HEENT: normal, Neck: supple, Carotids: brisk upstroke, JVP: normal, CVS: Rate and rhythm regular, S1S2, Chest: CTAB, Abdomen: soft nontender, no organomegaly appreciated, Extremities: without any edema, CNS: HMF normal, no focal neurological deficit.     Results/Data     All available data were personally reviewed by me    ECHO 5/28/2024  PHYSICIAN INTERPRETATION:  Left Ventricle: The left ventricular systolic function is normal, with an estimated ejection fraction of 60-65%. There are no regional wall motion abnormalities. The left ventricular cavity size is normal. Left ventricular diastolic filling was indeterminate.  Left Atrium: The left atrium is normal in size.  Right Ventricle: The right ventricle is normal in size. There is normal right ventricular global systolic function.  Right Atrium: The right atrium is normal in size.  Aortic Valve: There is a prosthetic aortic valve present. There is mild aortic valve regurgitation. The peak instantaneous gradient of the aortic valve is 16.2 mmHg. The mean gradient of the aortic valve is 8.0 mmHg. There is an Evolut (Evolut FX 26) TAVR valve in aortic position functioning normally, with mild levar-prosthetic regurgitation.  Mitral Valve: The mitral valve is normal in structure. There is mild mitral annular calcification. There is mild mitral valve regurgitation.  Tricuspid Valve: The tricuspid valve is structurally normal. There is mild tricuspid regurgitation.  Pulmonic Valve: The pulmonic valve is not well visualized. There is physiologic pulmonic valve regurgitation.  Pericardium: There is no pericardial effusion noted.  Aorta: The aortic root was not well  visualized.  Pulmonary Artery: The tricuspid regurgitant velocity is 3.21 m/s, and with an estimated right atrial pressure of 3 mmHg, the estimated pulmonary artery pressure is mildly elevated with the RVSP at 44.2 mmHg.        CONCLUSIONS:   1. Left ventricular systolic function is normal with a 60-65% estimated ejection fraction.   2. There is an Evolut (Evolut FX 26) TAVR valve in aortic position functioning normally, with mild levar-prosthetic regurgitation.   3. There is mild pulmonary hypertension.     DIAGNOSES: AS S/p TAVR (26 Evolut FX 4/30/2024). Nonobstructive coronary atherosclerosis. HFpEF Euvolemic state. NYHA Class II. NSAID induced GI ulcer with bleed and anemia. HTN. DM. DLP. Obesity. S/p B/L mastectomy/Oophorectomy for CA breast BRCA+ DJD S/p Hip replacement surgery. Awaiting TKR - Preop evluation.      I am so pleased to see that Saritha has been feeling much better symptomatically after her TAVR.  I advised her to continue with medications as prescribed and regular follow-up with you.    She is cleared for orthopedic surgery as planned, with a low risk of cardiovascular complications.        Thank you so much for the opportunity to participate in the care of this very pleasant lady. Please do not hesitate to contact me if I could be of any assistance in her future care.     Sincerely           Frank Reilly M.D.

## 2024-06-21 ENCOUNTER — APPOINTMENT (OUTPATIENT)
Dept: ORTHOPEDIC SURGERY | Facility: CLINIC | Age: 74
End: 2024-06-21
Payer: MEDICARE

## 2024-06-21 ENCOUNTER — HOSPITAL ENCOUNTER (OUTPATIENT)
Dept: RADIOLOGY | Facility: HOSPITAL | Age: 74
Discharge: HOME | End: 2024-06-21
Payer: MEDICARE

## 2024-06-21 DIAGNOSIS — M25.552 LEFT HIP PAIN: ICD-10-CM

## 2024-06-21 DIAGNOSIS — M25.552 LEFT HIP PAIN: Primary | ICD-10-CM

## 2024-06-21 PROCEDURE — 3048F LDL-C <100 MG/DL: CPT | Performed by: ORTHOPAEDIC SURGERY

## 2024-06-21 PROCEDURE — 3060F POS MICROALBUMINURIA REV: CPT | Performed by: ORTHOPAEDIC SURGERY

## 2024-06-21 PROCEDURE — 1159F MED LIST DOCD IN RCRD: CPT | Performed by: ORTHOPAEDIC SURGERY

## 2024-06-21 PROCEDURE — 99215 OFFICE O/P EST HI 40 MIN: CPT | Performed by: ORTHOPAEDIC SURGERY

## 2024-06-21 PROCEDURE — 3008F BODY MASS INDEX DOCD: CPT | Performed by: ORTHOPAEDIC SURGERY

## 2024-06-21 PROCEDURE — 72100 X-RAY EXAM L-S SPINE 2/3 VWS: CPT

## 2024-06-21 PROCEDURE — 1160F RVW MEDS BY RX/DR IN RCRD: CPT | Performed by: ORTHOPAEDIC SURGERY

## 2024-06-21 PROCEDURE — 4010F ACE/ARB THERAPY RXD/TAKEN: CPT | Performed by: ORTHOPAEDIC SURGERY

## 2024-06-21 PROCEDURE — 1036F TOBACCO NON-USER: CPT | Performed by: ORTHOPAEDIC SURGERY

## 2024-06-21 RX ORDER — ACETAMINOPHEN 325 MG/1
975 TABLET ORAL ONCE
OUTPATIENT
Start: 2024-06-21 | End: 2024-06-21

## 2024-06-21 RX ORDER — CELECOXIB 400 MG/1
400 CAPSULE ORAL ONCE
OUTPATIENT
Start: 2024-06-21 | End: 2024-06-21

## 2024-06-21 RX ORDER — CEFAZOLIN SODIUM 2 G/100ML
2 INJECTION, SOLUTION INTRAVENOUS ONCE
OUTPATIENT
Start: 2024-06-21 | End: 2024-06-21

## 2024-06-21 RX ORDER — SODIUM CHLORIDE 9 MG/ML
100 INJECTION, SOLUTION INTRAVENOUS CONTINUOUS
OUTPATIENT
Start: 2024-06-21

## 2024-06-21 NOTE — PROGRESS NOTES
This is a consultation from Dr. Alva Prince MD for   Chief Complaint   Patient presents with    Left Hip - Pain       This is a 73 y.o. female who presents for follow-up for her left hip.  Patient is had left hip arthritis for a long time, this is chronic issue but is been recent exacerbated.  Infection getting much worse for her over time.  She is experience severe exacerbation over the last several weeks that she has been walking more.  She complains of a deep sharp stabbing pain in the anterior hip and groin radiating to the anterior thigh down to her knee.  She has had extensive trial of nonsurgical management occluding use of anti-inflammatories physical therapy activity modification use of assistive devices cortisone injections.  Despite that she has severe and worsening pain which impacts her quality of life and activities of daily living she like to consider total hip.  Patient did recently have cardiac valvular surgery in the last few months.  She has been cleared to proceed with total hip by her cardiologist.    Physical Exam    There has been no interval change in this patient's past medical, surgical, medications, allergies, family history or social history since the most recent visit to a provider within our department. 14 point review of systems was performed, reviewed, and negative except for pertinent positives documented in the history of present illness.     Constitutional: well developed, well nourished female in no acute distress  Psychiatric: normal mood, appropriate affect  Eyes: sclera anicteric  HENT: normocephalic/atraumatic  CV: regular rate and rhythm   Respiratory: non labored breathing  Integumentary: no rash  Neurological: moves all extremities    Left hip exam: skin normal, no abrasions, wounds, or lacerations. nttp over greater trochanter. negative log roll, negative norma's test. flexion to 90 degrees with pain.  Severe pain with flexion abduction and external rotation,  reproduction of pain with flexion adduction and internal rotation. neurovascularly intact distally        Xrays were ordered by me, they were reviewed and independently interpreted by me today, they show severe degenerative disease bone-on-bone arthritis subchondral sclerosis osteophyte formation of the left hip    Procedures      Impression/Plan: This is a 73 y.o. female with severe end-stage degenerative disease left hip that has failed nonoperative management.  Patient like to proceed with left total hip.    I had an extensive discussion with the patient regarding her condition and possible treatment options. Nonsurgical treatment for left hip arthritis includes activity modification, weight loss, use of a cane or other assistive device, pain medications and nonsteroidal anti-inflammatory medications, joint injections, and physical therapy. The patient has attempted non-surgical treatment for this condition for greater than 6 months and it has failed. We discussed the risks benefits and alternatives of total hip arthroplasty. Benefits of joint replacement include: Relief of pain, improvement of function, and improved quality of life. Alternatives include observation and watchful waiting, and the nonsurgical modalities noted above.     1 specific issue this patient is that the patient is on therapeutic anticoagulation.  The patient will need to come off that anticoagulation for surgery.  We discussed the risks and coming off anticoagulation as the patient may be susceptible to the condition for which they are treated with anticoagulation normally.  Additionally they will have to go back on therapeutic anticoagulation soon after surgery and this places them at increased risk for bleeding complications.  We will do this in coordination with the doctor who is managing the patient's anticoagulation.  The patient is fully aware of the risks and would still like to proceed.    Patient did have a recent significant  cardiac issue, who ensure we have full cardiac clearance prior to proceeding.    We discussed the risks of complications as well as the risks of morbidity and mortality related to surgical treatment with total joint replacement. We reviewed the fact that total joint replacement is major elective surgery with significant associated surgical and procedural risk factors as detailed below.   Risks include: Pain, blood loss, damage to nearby anatomical structures including but not limited to nerves or blood vessels muscles tendons and bone, failure surgery to ameliorate symptoms, persistence of pain surrounding the affected joint, mechanical failure of the prosthesis including but not limited to loosening of the prosthesis from bone ,breakage of the prosthesis and dislocation of the prosthesis, change in length or appearance of a limb, infection possibly necessitating further surgery, removal of the prosthesis, or limb amputation, the need for additional surgery for other reasons, blood clots, amputation, and death. No guarantees were implied or given.  All questions were answered and the patient voiced their understanding.     A complete set of surgical instructions was given to the patient. The patient was educated regarding preoperative nutrition, preparation of their home for postoperative rehabilitation, choosing a care partner, medical and dental clearance, the cessation of medications that can cause bleeding or presenting to risk for infection, chlorhexidine bath, nasal swab and decontamination, post operative follow up, and need for medical equipment. Patient was also educated regarding the possibility of same day surgery and related criteria and protocols. The patient will attend our joint class further education, and thereafter they will return for a preoperative visit. A presurgery education booklet was also given to the patient.     surgical plan: Left total hip  implants: [place] direct anterior  approach:  "Direct anterior  DVT ppx aspirin and Plavix  drugs to stop aspirin and Plavix  allergy to abx none  post operative abx: ancef +/- vanc (per protocol)  special clearance needed cardiology    BMI Readings from Last 1 Encounters:   06/17/24 34.02 kg/m²      Lab Results   Component Value Date    CREATININE 1.77 (H) 06/17/2024     Tobacco Use: Low Risk  (6/21/2024)    Patient History     Smoking Tobacco Use: Never     Smokeless Tobacco Use: Never     Passive Exposure: Not on file      Computed MELD 3.0 unavailable. One or more values for this score either were not found within the given timeframe or did not fit some other criterion.  Computed MELD-Na unavailable. One or more values for this score either were not found within the given timeframe or did not fit some other criterion.       Lab Results   Component Value Date    HGBA1C 7.2 (A) 01/30/2024     No results found for: \"STAPHMRSASCR\"  "

## 2024-06-25 ENCOUNTER — APPOINTMENT (OUTPATIENT)
Dept: PRIMARY CARE | Facility: CLINIC | Age: 74
End: 2024-06-25
Payer: MEDICARE

## 2024-06-25 VITALS
HEART RATE: 59 BPM | SYSTOLIC BLOOD PRESSURE: 120 MMHG | OXYGEN SATURATION: 90 % | TEMPERATURE: 96.1 F | WEIGHT: 188.4 LBS | DIASTOLIC BLOOD PRESSURE: 74 MMHG | BODY MASS INDEX: 35.57 KG/M2 | HEIGHT: 61 IN

## 2024-06-25 DIAGNOSIS — N18.4 CHRONIC RENAL DISEASE, STAGE IV (MULTI): ICD-10-CM

## 2024-06-25 DIAGNOSIS — I10 HYPERTENSION, UNSPECIFIED TYPE: ICD-10-CM

## 2024-06-25 DIAGNOSIS — Z00.00 ROUTINE GENERAL MEDICAL EXAMINATION AT HEALTH CARE FACILITY: Primary | ICD-10-CM

## 2024-06-25 DIAGNOSIS — Z95.2 S/P TAVR (TRANSCATHETER AORTIC VALVE REPLACEMENT): ICD-10-CM

## 2024-06-25 DIAGNOSIS — I50.9 HEART FAILURE, UNSPECIFIED HF CHRONICITY, UNSPECIFIED HEART FAILURE TYPE (MULTI): ICD-10-CM

## 2024-06-25 DIAGNOSIS — I70.0 ATHEROSCLEROSIS OF AORTA (CMS-HCC): ICD-10-CM

## 2024-06-25 DIAGNOSIS — I47.20 VENTRICULAR TACHYCARDIA (MULTI): ICD-10-CM

## 2024-06-25 DIAGNOSIS — N18.31 TYPE 2 DIABETES MELLITUS WITH STAGE 3A CHRONIC KIDNEY DISEASE, WITHOUT LONG-TERM CURRENT USE OF INSULIN (MULTI): ICD-10-CM

## 2024-06-25 DIAGNOSIS — E11.22 TYPE 2 DIABETES MELLITUS WITH STAGE 3A CHRONIC KIDNEY DISEASE, WITHOUT LONG-TERM CURRENT USE OF INSULIN (MULTI): ICD-10-CM

## 2024-06-25 DIAGNOSIS — I50.9 HEART FAILURE, UNSPECIFIED (MULTI): ICD-10-CM

## 2024-06-25 DIAGNOSIS — E55.9 MILD VITAMIN D DEFICIENCY: ICD-10-CM

## 2024-06-25 DIAGNOSIS — Z90.13 H/O BILATERAL MASTECTOMY: Chronic | ICD-10-CM

## 2024-06-25 DIAGNOSIS — M10.9 GOUT, UNSPECIFIED CAUSE, UNSPECIFIED CHRONICITY, UNSPECIFIED SITE: ICD-10-CM

## 2024-06-25 PROBLEM — L30.9 DERMATITIS OF PERIANAL REGION: Status: RESOLVED | Noted: 2023-01-31 | Resolved: 2024-06-25

## 2024-06-25 PROBLEM — M70.62 TROCHANTERIC BURSITIS OF LEFT HIP: Status: RESOLVED | Noted: 2023-01-31 | Resolved: 2024-06-25

## 2024-06-25 PROBLEM — M19.90 DJD (DEGENERATIVE JOINT DISEASE): Status: RESOLVED | Noted: 2023-01-31 | Resolved: 2024-06-25

## 2024-06-25 PROBLEM — M79.18 MYOFASCIAL PAIN: Status: RESOLVED | Noted: 2023-01-31 | Resolved: 2024-06-25

## 2024-06-25 PROBLEM — K25.9 GASTRIC ULCER: Status: RESOLVED | Noted: 2023-01-31 | Resolved: 2024-06-25

## 2024-06-25 PROBLEM — D17.20 LIPOMA OF FOREARM: Status: RESOLVED | Noted: 2023-01-31 | Resolved: 2024-06-25

## 2024-06-25 PROBLEM — M25.552 PAIN OF LEFT HIP: Status: RESOLVED | Noted: 2023-01-31 | Resolved: 2024-06-25

## 2024-06-25 PROBLEM — R07.9 CHEST PAIN: Status: RESOLVED | Noted: 2023-01-31 | Resolved: 2024-06-25

## 2024-06-25 PROBLEM — I35.0 AORTIC STENOSIS: Status: RESOLVED | Noted: 2023-01-31 | Resolved: 2024-06-25

## 2024-06-25 PROBLEM — M19.90 ARTHRITIS: Status: RESOLVED | Noted: 2023-01-31 | Resolved: 2024-06-25

## 2024-06-25 PROBLEM — I35.0 AORTIC STENOSIS, SEVERE: Status: RESOLVED | Noted: 2023-01-31 | Resolved: 2024-06-25

## 2024-06-25 PROBLEM — I20.9 ANGINA PECTORIS (CMS-HCC): Status: RESOLVED | Noted: 2023-01-31 | Resolved: 2024-06-25

## 2024-06-25 LAB
POC FINGERSTICK BLOOD GLUCOSE: 102 MG/DL (ref 70–100)
POC HEMOGLOBIN A1C: 5.8 % (ref 4.2–6.5)

## 2024-06-25 PROCEDURE — 1170F FXNL STATUS ASSESSED: CPT | Performed by: INTERNAL MEDICINE

## 2024-06-25 PROCEDURE — 3078F DIAST BP <80 MM HG: CPT | Performed by: INTERNAL MEDICINE

## 2024-06-25 PROCEDURE — 3048F LDL-C <100 MG/DL: CPT | Performed by: INTERNAL MEDICINE

## 2024-06-25 PROCEDURE — 1160F RVW MEDS BY RX/DR IN RCRD: CPT | Performed by: INTERNAL MEDICINE

## 2024-06-25 PROCEDURE — 3008F BODY MASS INDEX DOCD: CPT | Performed by: INTERNAL MEDICINE

## 2024-06-25 PROCEDURE — 1158F ADVNC CARE PLAN TLK DOCD: CPT | Performed by: INTERNAL MEDICINE

## 2024-06-25 PROCEDURE — 1036F TOBACCO NON-USER: CPT | Performed by: INTERNAL MEDICINE

## 2024-06-25 PROCEDURE — 99214 OFFICE O/P EST MOD 30 MIN: CPT | Performed by: INTERNAL MEDICINE

## 2024-06-25 PROCEDURE — 4010F ACE/ARB THERAPY RXD/TAKEN: CPT | Performed by: INTERNAL MEDICINE

## 2024-06-25 PROCEDURE — 83036 HEMOGLOBIN GLYCOSYLATED A1C: CPT | Performed by: INTERNAL MEDICINE

## 2024-06-25 PROCEDURE — 82962 GLUCOSE BLOOD TEST: CPT | Performed by: INTERNAL MEDICINE

## 2024-06-25 PROCEDURE — 1159F MED LIST DOCD IN RCRD: CPT | Performed by: INTERNAL MEDICINE

## 2024-06-25 PROCEDURE — 1123F ACP DISCUSS/DSCN MKR DOCD: CPT | Performed by: INTERNAL MEDICINE

## 2024-06-25 PROCEDURE — G0439 PPPS, SUBSEQ VISIT: HCPCS | Performed by: INTERNAL MEDICINE

## 2024-06-25 PROCEDURE — 3074F SYST BP LT 130 MM HG: CPT | Performed by: INTERNAL MEDICINE

## 2024-06-25 PROCEDURE — 3060F POS MICROALBUMINURIA REV: CPT | Performed by: INTERNAL MEDICINE

## 2024-06-25 RX ORDER — METOPROLOL TARTRATE 25 MG/1
25 TABLET, FILM COATED ORAL 2 TIMES DAILY
Qty: 60 TABLET | Refills: 11 | Status: SHIPPED | OUTPATIENT
Start: 2024-06-25 | End: 2025-06-25

## 2024-06-25 RX ORDER — LOSARTAN POTASSIUM 25 MG/1
25 TABLET ORAL DAILY
Qty: 90 TABLET | Refills: 1 | Status: SHIPPED | OUTPATIENT
Start: 2024-06-25

## 2024-06-25 ASSESSMENT — ENCOUNTER SYMPTOMS
DIARRHEA: 0
PALPITATIONS: 0
WHEEZING: 0
BRUISES/BLEEDS EASILY: 0
DIFFICULTY URINATING: 0
ARTHRALGIAS: 0
UNEXPECTED WEIGHT CHANGE: 0
SORE THROAT: 0
SINUS PAIN: 0
COUGH: 0
DIZZINESS: 0
BLOOD IN STOOL: 0
FEVER: 0
ABDOMINAL PAIN: 0
HEADACHES: 0
FATIGUE: 0

## 2024-06-25 ASSESSMENT — PATIENT HEALTH QUESTIONNAIRE - PHQ9
2. FEELING DOWN, DEPRESSED OR HOPELESS: NOT AT ALL
1. LITTLE INTEREST OR PLEASURE IN DOING THINGS: NOT AT ALL
SUM OF ALL RESPONSES TO PHQ9 QUESTIONS 1 AND 2: 0

## 2024-06-25 ASSESSMENT — ACTIVITIES OF DAILY LIVING (ADL)
BATHING: INDEPENDENT
DOING_HOUSEWORK: INDEPENDENT
TAKING_MEDICATION: INDEPENDENT
MANAGING_FINANCES: INDEPENDENT
DRESSING: INDEPENDENT
GROCERY_SHOPPING: INDEPENDENT

## 2024-06-25 NOTE — PROGRESS NOTES
"Subjective   Patient ID: Saritha Cormier is a 73 y.o. female who presents for Medicare Annual Wellness Visit Subsequent, Diabetes (A1C, random), and Eye Problem (Crusty, drainage, right worse).    HPI       Review of Systems    Objective   Lab Results   Component Value Date    HGBA1C 7.2 (A) 01/30/2024      /74   Pulse 59   Temp 35.6 °C (96.1 °F)   Ht 1.549 m (5' 1\")   Wt 85.5 kg (188 lb 6.4 oz)   SpO2 90%   BMI 35.60 kg/m²     Physical Exam    Assessment/Plan   Saritha was seen today for medicare annual wellness visit subsequent, diabetes and eye problem.  Diagnoses and all orders for this visit:  Type 2 diabetes mellitus with stage 3a chronic kidney disease, without long-term current use of insulin (Multi)  -     POCT fingerstick glucose manually resulted  -     POCT glycosylated hemoglobin (Hb A1C) manually resulted     "

## 2024-07-09 ENCOUNTER — OFFICE VISIT (OUTPATIENT)
Dept: PAIN MEDICINE | Facility: HOSPITAL | Age: 74
End: 2024-07-09
Payer: MEDICARE

## 2024-07-09 VITALS
DIASTOLIC BLOOD PRESSURE: 62 MMHG | WEIGHT: 194 LBS | HEART RATE: 51 BPM | TEMPERATURE: 97.6 F | BODY MASS INDEX: 38.09 KG/M2 | HEIGHT: 60 IN | RESPIRATION RATE: 14 BRPM | OXYGEN SATURATION: 94 % | SYSTOLIC BLOOD PRESSURE: 114 MMHG

## 2024-07-09 DIAGNOSIS — Z79.899 MEDICATION MANAGEMENT: ICD-10-CM

## 2024-07-09 DIAGNOSIS — M25.562 CHRONIC PAIN OF LEFT KNEE: ICD-10-CM

## 2024-07-09 DIAGNOSIS — Z02.9 ENCOUNTER FOR OPIATE ANALGESIC USE AGREEMENT: Primary | Chronic | ICD-10-CM

## 2024-07-09 DIAGNOSIS — M17.12 ARTHRITIS OF KNEE, LEFT: ICD-10-CM

## 2024-07-09 DIAGNOSIS — G89.29 CHRONIC PAIN OF LEFT KNEE: ICD-10-CM

## 2024-07-09 LAB
AMPHETAMINES UR QL SCN: NORMAL
BARBITURATES UR QL SCN: NORMAL
BZE UR QL SCN: NORMAL
CANNABINOIDS UR QL SCN: NORMAL
CREAT UR-MCNC: 130.1 MG/DL (ref 20–320)
PCP UR QL SCN: NORMAL

## 2024-07-09 PROCEDURE — 4010F ACE/ARB THERAPY RXD/TAKEN: CPT | Performed by: NURSE PRACTITIONER

## 2024-07-09 PROCEDURE — 3074F SYST BP LT 130 MM HG: CPT | Performed by: NURSE PRACTITIONER

## 2024-07-09 PROCEDURE — 82570 ASSAY OF URINE CREATININE: CPT | Performed by: NURSE PRACTITIONER

## 2024-07-09 PROCEDURE — 1159F MED LIST DOCD IN RCRD: CPT | Performed by: NURSE PRACTITIONER

## 2024-07-09 PROCEDURE — 3048F LDL-C <100 MG/DL: CPT | Performed by: NURSE PRACTITIONER

## 2024-07-09 PROCEDURE — 80346 BENZODIAZEPINES1-12: CPT | Performed by: NURSE PRACTITIONER

## 2024-07-09 PROCEDURE — 1125F AMNT PAIN NOTED PAIN PRSNT: CPT | Performed by: NURSE PRACTITIONER

## 2024-07-09 PROCEDURE — 99213 OFFICE O/P EST LOW 20 MIN: CPT | Performed by: NURSE PRACTITIONER

## 2024-07-09 PROCEDURE — 1160F RVW MEDS BY RX/DR IN RCRD: CPT | Performed by: NURSE PRACTITIONER

## 2024-07-09 PROCEDURE — 3008F BODY MASS INDEX DOCD: CPT | Performed by: NURSE PRACTITIONER

## 2024-07-09 PROCEDURE — 3078F DIAST BP <80 MM HG: CPT | Performed by: NURSE PRACTITIONER

## 2024-07-09 PROCEDURE — 3060F POS MICROALBUMINURIA REV: CPT | Performed by: NURSE PRACTITIONER

## 2024-07-09 PROCEDURE — 80355 GABAPENTIN NON-BLOOD: CPT | Performed by: NURSE PRACTITIONER

## 2024-07-09 RX ORDER — GABAPENTIN 300 MG/1
300 CAPSULE ORAL 2 TIMES DAILY
Qty: 60 CAPSULE | Refills: 3 | Status: SHIPPED | OUTPATIENT
Start: 2024-07-09

## 2024-07-09 ASSESSMENT — PAIN SCALES - GENERAL: PAINLEVEL: 2

## 2024-07-09 NOTE — PATIENT INSTRUCTIONS
Continue taking gabapentin 300 mg twice a day.  Do not take sedating medications together.    Continue taking tramadol 1 pill 1-2 times a day as needed for pain relief do not take sedating medications together.  No refill today until we have your pill count.  Bring your pain medication bottle for pill count with every office visits or procedure visits.    I will see you for your follow-up visit in 7 to 8 weeks in [place].    ---Risks and side effects of chronic opioid therapy, including but not limited to tolerance, dependence, constipation, hyperalgesia, cognitive side effects, addiction, and possible death due to overuse and or misuse, were discussed.   Medications, when co-administered with other sedative agents, including but not limited to alcohol, benzodiazepines, sedatives or hypnotics, and illegal drugs, could pose life-threatening consequences, including death.   Such medication could impact if you have obstructive sleep apnea. I recommend that you continue using apnea devices if ordered by other physicians.   Being compliant with the treatment plan and the terms of the opioid agreement to effectively and safely treat the pain is important.  Being responsible with the medications and taking these only as prescribed, never in excess, and never for reasons other than pain reduction.   Keep the medications safe and locked away from children and other adults, as well as disposal methods and options. The patient understood the risks and instructions. ---

## 2024-07-09 NOTE — PROGRESS NOTES
Date of the last Controlled Substance Agreement: 5/7/2024       Last urine drug screening date/ordered today: 07/09/24     Results of last screen: as expected      Last opioid risk screening date/ordered today: 4/22/2024      Pain Scale Screening:   Pain Assessment and Documentation Tool (PADT)   Date of Assessment: 7/9/2024  Analgesia:   Patient reports her pain level on average during the past week is 2on a 0 - 10 scale.   Patient reports that her pain level at its worst during the past week was 3 on a 0 -10 scale.   90% of pain has been relieved during the past week per patient   Patient states that the amount of pain relief she is now obtaining from her current pain reliever(s) is enough to make a real difference in her life.   Query to clinician: Is the patient's pain relief clinically significant? yes  Activities of Daily Living:   Physical functioning: better  Family relationships: better  Social relationships: better  Mood: better  Sleep patterns: better  Overall functioning: better  Adverse Events: No, Saritha Cormier is not experiencing side effects from current pain reliever.  Patients overall severity of side effect:none  Specific Analgesic Plan: Continue present regimen.

## 2024-07-10 RX ORDER — TRAMADOL HYDROCHLORIDE 50 MG/1
50 TABLET ORAL 2 TIMES DAILY
Qty: 60 TABLET | Refills: 1 | Status: SHIPPED | OUTPATIENT
Start: 2024-07-10

## 2024-07-10 NOTE — PROGRESS NOTES
Subjective   Saritha Cormier is a pleasant 73 y.o. female who is here for medication refill.  Patient is ambulatory with the use of cane.  Gait is steady.  She arrives by herself.    Patient continues to have chronic pain to her left knee.  She rates her pain as 2 out of 10.  She describes it as burning and tender.  She has stinging, pins and needle sensation to her left knee.  She reports pain increases with standing, walking or with any mobility.  She denies increasing leg weakness or change in balance.  She denies bowel or bladder incontinence.  She denies recent falls, injuries, or ER visits.  There has been no changes since she was last seen.    Patient continues to take gabapentin 300 mg twice a day.  She takes tramadol 1 pill 1-2 times daily for pain relief.  Last dose was this morning.  She denies side effects to her medications.  Patient forgot to bring her pill bottle for pill count.  I discussed that I will send her prescription once I have the count.  She will stop at the [facility] outpatient surgery department today after she picked up her grandchild.    Patient mentioned that she has a scheduled left total hip replacement with Dr. Angulo on 9/9/2024 at [facility].    I reviewed previous notes and imaging.  I discussed the plan of care.  I will see her for follow-up visit.  I also discuss not to take 2 pain medications together.  I discussed that most likely she will receive a different pain medication after surgery and to take that until done before resuming her tramadol.  Patient voiced understanding.  Questions were answered during this encounter.    I have reviewed the OARRS report.  Last fill date of tramadol was on 6/3/2024 where she received 60 tabs.  No suspicious activities.  Toxicology reports based on previous drug screens were consistent with no compliance issues.        OARRS:  Debra Christian, CATHERINE-MISAEL, DNP on 7/10/2024  2:22 PM  I have personally reviewed the OARRS report for Saritha GRANGER  Genia. I have considered the risks of abuse, dependence, addiction and diversion and I believe that it is clinically appropriate for Saritha Cormier to be prescribed this medication    ROS: No changes except for what was noted in HPI.      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    Surgical History  Past Surgical History:   Procedure Laterality Date    AORTIC VALVE REPLACEMENT      CARDIAC CATHETERIZATION N/A 03/29/2024    Procedure: Left And Right Heart Cath, With LV;  Surgeon: Javier Reilly MD;  Location: [place] [address] [facility]iac [facility];  Service: Cardiovascular;  Laterality: N/A;    CARDIAC CATHETERIZATION N/A 4/30/2024    Procedure: TAVR (Transcatheter AV Replacement);  Surgeon: Jessica Stallworth MD;  Location: [place] [address] Cardiac Cath Lab;  Service: Cardiovascular;  Laterality: N/A;  4/30 0730    CARDIAC CATHETERIZATION N/A 4/30/2024    Procedure: TVP for TAVR;  Surgeon: Jessica Stallworth MD;  Location: [place] [address] Cardiac Cath Lab;  Service: Cardiovascular;  Laterality: N/A;    HYSTERECTOMY  09/21/2015    Hysterectomy    JOINT REPLACEMENT Right     Right total hip    MASTECTOMY  09/21/2015    Breast Surgery Mastectomy    OTHER SURGICAL HISTORY  09/21/2015    Breast Surgery Reconstruction        Social History  She reports that she has never smoked. She has never used smokeless tobacco. She reports that she does not drink alcohol and does not  use drugs.    Family History  Family History   Problem Relation Name Age of Onset    Gout Mother      Hypertension Mother      Arthritis Father      Other (cardiac disorder) Father      Cancer Father      Tongue cancer Brother      Other (jaw neoplasm) Brother      Breast cancer Sibling      Pancreatic cancer Sibling          Allergies  Patient has no known allergies.    MEDICATIONS:    Current Outpatient Medications:     acetaminophen (Tylenol) 325 mg tablet, Take 2 tablets (650 mg) by mouth every 6 hours if needed for mild pain (1 - 3) or moderate pain (4 - 6)., Disp: 30 tablet, Rfl: 0    allopurinol (Zyloprim) 100 mg tablet, TAKE 2 TABLETS BY MOUTH ONCE DAILY., Disp: 180 tablet, Rfl: 1    aspirin 81 mg EC tablet, TAKE 1 TABLET BY MOUTH EVERY DAY, Disp: 90 tablet, Rfl: 1    cholecalciferol (Vitamin D-3) 125 MCG (5000 UT) capsule, TAKE 1 CAPSULE (125 MCG) BY MOUTH ONCE DAILY., Disp: 90 capsule, Rfl: 1    clopidogrel (Plavix) 75 mg tablet, TAKE 1 TABLET BY MOUTH EVERY DAY, Disp: 90 tablet, Rfl: 1    ferrous gluconate 324 (38 Fe) MG tablet, TAKE 1 TABLET (324 MG) BY MOUTH 2 TIMES A DAY., Disp: 180 tablet, Rfl: 1    furosemide (Lasix) 40 mg tablet, TAKE 1 TABLET BY MOUTH EVERY DAY, Disp: 90 tablet, Rfl: 1    losartan (Cozaar) 25 mg tablet, Take 1 tablet (25 mg) by mouth once daily. as directed, Disp: 90 tablet, Rfl: 1    metFORMIN  mg 24 hr tablet, TAKE 2 TABLETS BY MOUTH EVERY DAY**REPLACES OSM, Disp: 180 tablet, Rfl: 0    metoprolol tartrate (Lopressor) 25 mg tablet, Take 1 tablet (25 mg) by mouth 2 times a day., Disp: 60 tablet, Rfl: 11    naloxone (Narcan) 4 mg/0.1 mL nasal spray, Administer 1 spray (4 mg) into affected nostril(s) if needed for opioid reversal or respiratory depression. May repeat every 2-3 minutes if needed, alternating nostrils, until medical assistance becomes available., Disp: 2 each, Rfl: 0    pantoprazole (ProtoNix) 40 mg EC tablet, Take 1 tablet (40 mg) by mouth once daily., Disp: 90  tablet, Rfl: 1    rosuvastatin (Crestor) 40 mg tablet, TAKE 1 TABLET BY MOUTH EVERY DAY, Disp: 90 tablet, Rfl: 1    gabapentin (Neurontin) 300 mg capsule, Take 1 capsule (300 mg) by mouth 2 times a day., Disp: 60 capsule, Rfl: 3    traMADol (Ultram) 50 mg tablet, Take 1 tablet (50 mg) by mouth 2 times a day., Disp: 60 tablet, Rfl: 1    Physical Exam  Vitals and nursing note reviewed.   HENT:      Head: Normocephalic.      Nose: Nose normal.   Eyes:      Extraocular Movements: Extraocular movements intact.      Conjunctiva/sclera: Conjunctivae normal.      Pupils: Pupils are equal, round, and reactive to light.   Cardiovascular:      Rate and Rhythm: Normal rate and regular rhythm.   Pulmonary:      Effort: Pulmonary effort is normal.      Breath sounds: Normal breath sounds.   Musculoskeletal:         General: Tenderness present. No swelling, deformity or signs of injury.      Cervical back: No rigidity or tenderness.      Lumbar back: Tenderness present.      Right lower leg: No edema.      Left lower leg: No edema.      Comments: Negative leg raise.  Positive for tenderness on palpation at the medial aspect of the left knee.  Negative for erythema, bruising or lesions.   Skin:     General: Skin is warm and dry.   Neurological:      General: No focal deficit present.      Mental Status: She is alert and oriented to person, place, and time.   Psychiatric:         Mood and Affect: Mood normal.         Behavior: Behavior normal.            Pain Management Panel  More data may exist         Latest Ref Rng & Units 7/9/2024 4/22/2024   Pain Management Panel   Amphetamine Screen, Urine Presumptive Negative Presumptive Negative  Presumptive Negative    Barbiturate Screen, Urine Presumptive Negative Presumptive Negative  Presumptive Negative    Codeine IgE <50 ng/mL - <50    Hydromorphone Urine <25 ng/mL - <25    Morphine  <50 ng/mL - <50       Details                      Assessment/Plan   Problem List Items Addressed This  Visit             ICD-10-CM    Arthritis of knee, left M17.12    Relevant Medications    gabapentin (Neurontin) 300 mg capsule    traMADol (Ultram) 50 mg tablet    Encounter for opiate analgesic use agreement - Primary Z02.9    Relevant Medications    gabapentin (Neurontin) 300 mg capsule    traMADol (Ultram) 50 mg tablet     Other Visit Diagnoses         Codes    Chronic pain of left knee     M25.562, G89.29    Relevant Medications    gabapentin (Neurontin) 300 mg capsule    traMADol (Ultram) 50 mg tablet    Medication management     Z79.899    Relevant Orders    Opiate/Opioid/Benzo Prescription Compliance    Gabapentin,Urine    OOB Internal Tracking (Completed)                Plan/Follow-up Instructions:    Continue taking gabapentin 300 mg twice a day.  Do not take sedating medications together.    Continue taking tramadol 1 pill 1-2 times a day as needed for pain relief do not take sedating medications together.  No refill today until we have your pill count.  Bring your pain medication bottle for pill count with every office visits or procedure visits.    I will see you for your follow-up visit in 7 to 8 weeks in [place].    ---Risks and side effects of chronic opioid therapy, including but not limited to tolerance, dependence, constipation, hyperalgesia, cognitive side effects, addiction, and possible death due to overuse and or misuse, were discussed.   Medications, when co-administered with other sedative agents, including but not limited to alcohol, benzodiazepines, sedatives or hypnotics, and illegal drugs, could pose life-threatening consequences, including death.   Such medication could impact if you have obstructive sleep apnea. I recommend that you continue using apnea devices if ordered by other physicians.   Being compliant with the treatment plan and the terms of the opioid agreement to effectively and safely treat the pain is important.  Being responsible with the medications and taking these only  as prescribed, never in excess, and never for reasons other than pain reduction.   Keep the medications safe and locked away from children and other adults, as well as disposal methods and options. The patient understood the risks and instructions. ---      ---------------  Disclaimer: This note was created using voice recognition software. It was not corrected for typographical or grammatical errors, inadvertent word insertion, or any unintended errors. Please feel free to contact me for clarification.  --------------    Time     Prep time on date of the patient encounter: 2 minutes  Time spent directly with patient/family/caregiver: 20 minutes  Documentation time: 2 minutes  Total time on date of patient encounter: 24 minutes      Debra Christian DNP, APRN, HANH   [facility]  Office #: [phone]  Fax # [phone]      ------------------  Addendum 7/10/2024:  Patient stopped by at the [place] outpatient surgery department yesterday for pill count.  Varun Granados RN counted with her.  She has 20 pills left during actual count.  I sent 60 tabs of tramadol to her pharmacy with 1 refill.  Patient will be made aware of this prescription.    Debra Christian DNP, APRN, HANH    [facility]  Office #: [phone]  Fax # [phone]

## 2024-07-12 LAB
1OH-MIDAZOLAM UR CFM-MCNC: <25 NG/ML
6MAM UR CFM-MCNC: <25 NG/ML
7AMINOCLONAZEPAM UR CFM-MCNC: <25 NG/ML
A-OH ALPRAZ UR CFM-MCNC: <25 NG/ML
ALPRAZ UR CFM-MCNC: <25 NG/ML
CHLORDIAZEP UR CFM-MCNC: <25 NG/ML
CLONAZEPAM UR CFM-MCNC: <25 NG/ML
CODEINE UR CFM-MCNC: <50 NG/ML
DIAZEPAM UR CFM-MCNC: <25 NG/ML
EDDP UR CFM-MCNC: <25 NG/ML
FENTANYL UR CFM-MCNC: <2.5 NG/ML
HYDROCODONE CTO UR CFM-MCNC: <25 NG/ML
HYDROMORPHONE UR CFM-MCNC: <25 NG/ML
LORAZEPAM UR CFM-MCNC: <25 NG/ML
METHADONE UR CFM-MCNC: <25 NG/ML
MIDAZOLAM UR CFM-MCNC: <25 NG/ML
MORPHINE UR CFM-MCNC: <50 NG/ML
NORDIAZEPAM UR CFM-MCNC: <25 NG/ML
NORFENTANYL UR CFM-MCNC: <2.5 NG/ML
NORHYDROCODONE UR CFM-MCNC: <25 NG/ML
NOROXYCODONE UR CFM-MCNC: <25 NG/ML
NORTRAMADOL UR-MCNC: 748 NG/ML
OXAZEPAM UR CFM-MCNC: <25 NG/ML
OXYCODONE UR CFM-MCNC: <25 NG/ML
OXYMORPHONE UR CFM-MCNC: <25 NG/ML
TEMAZEPAM UR CFM-MCNC: <25 NG/ML
TRAMADOL UR CFM-MCNC: >1000 NG/ML
ZOLPIDEM UR CFM-MCNC: <25 NG/ML
ZOLPIDEM UR-MCNC: <25 NG/ML

## 2024-07-13 LAB — GABAPENTIN UR-MCNC: >500 UG/ML

## 2024-08-08 ENCOUNTER — APPOINTMENT (OUTPATIENT)
Dept: ORTHOPEDIC SURGERY | Facility: CLINIC | Age: 74
End: 2024-08-08
Payer: COMMERCIAL

## 2024-08-09 DIAGNOSIS — K21.9 GASTRO-ESOPHAGEAL REFLUX DISEASE WITHOUT ESOPHAGITIS: ICD-10-CM

## 2024-08-09 RX ORDER — PANTOPRAZOLE SODIUM 40 MG/1
40 TABLET, DELAYED RELEASE ORAL DAILY
Qty: 90 TABLET | Refills: 1 | Status: SHIPPED | OUTPATIENT
Start: 2024-08-09

## 2024-08-26 ENCOUNTER — PRE-ADMISSION TESTING (OUTPATIENT)
Dept: PREADMISSION TESTING | Facility: HOSPITAL | Age: 74
End: 2024-08-26
Payer: MEDICARE

## 2024-08-26 VITALS
DIASTOLIC BLOOD PRESSURE: 78 MMHG | OXYGEN SATURATION: 97 % | HEART RATE: 50 BPM | SYSTOLIC BLOOD PRESSURE: 120 MMHG | RESPIRATION RATE: 16 BRPM | HEIGHT: 60 IN | BODY MASS INDEX: 39.39 KG/M2 | TEMPERATURE: 97 F | WEIGHT: 200.62 LBS

## 2024-08-26 DIAGNOSIS — M25.552 LEFT HIP PAIN: ICD-10-CM

## 2024-08-26 DIAGNOSIS — N18.31 STAGE 3A CHRONIC KIDNEY DISEASE (MULTI): ICD-10-CM

## 2024-08-26 DIAGNOSIS — Z01.818 PRE-OPERATIVE EXAMINATION: Primary | ICD-10-CM

## 2024-08-26 LAB
ALBUMIN SERPL BCP-MCNC: 3.6 G/DL (ref 3.4–5)
ALP SERPL-CCNC: 89 U/L (ref 33–136)
ALT SERPL W P-5'-P-CCNC: 6 U/L (ref 7–45)
ANION GAP SERPL CALC-SCNC: 12 MMOL/L (ref 10–20)
AST SERPL W P-5'-P-CCNC: 11 U/L (ref 9–39)
BASOPHILS # BLD AUTO: 0.02 X10*3/UL (ref 0–0.1)
BASOPHILS NFR BLD AUTO: 0.2 %
BILIRUB SERPL-MCNC: 0.5 MG/DL (ref 0–1.2)
BUN SERPL-MCNC: 36 MG/DL (ref 6–23)
CALCIUM SERPL-MCNC: 9 MG/DL (ref 8.6–10.3)
CHLORIDE SERPL-SCNC: 101 MMOL/L (ref 98–107)
CO2 SERPL-SCNC: 33 MMOL/L (ref 21–32)
CREAT SERPL-MCNC: 2.43 MG/DL (ref 0.5–1.05)
EGFRCR SERPLBLD CKD-EPI 2021: 21 ML/MIN/1.73M*2
EOSINOPHIL # BLD AUTO: 0.24 X10*3/UL (ref 0–0.4)
EOSINOPHIL NFR BLD AUTO: 2.5 %
ERYTHROCYTE [DISTWIDTH] IN BLOOD BY AUTOMATED COUNT: 14.9 % (ref 11.5–14.5)
GLUCOSE SERPL-MCNC: 96 MG/DL (ref 74–99)
HCT VFR BLD AUTO: 38.5 % (ref 36–46)
HGB BLD-MCNC: 10.9 G/DL (ref 12–16)
IMM GRANULOCYTES # BLD AUTO: 0.03 X10*3/UL (ref 0–0.5)
IMM GRANULOCYTES NFR BLD AUTO: 0.3 % (ref 0–0.9)
LYMPHOCYTES # BLD AUTO: 0.99 X10*3/UL (ref 0.8–3)
LYMPHOCYTES NFR BLD AUTO: 10.5 %
MCH RBC QN AUTO: 27.2 PG (ref 26–34)
MCHC RBC AUTO-ENTMCNC: 28.3 G/DL (ref 32–36)
MCV RBC AUTO: 96 FL (ref 80–100)
MONOCYTES # BLD AUTO: 0.77 X10*3/UL (ref 0.05–0.8)
MONOCYTES NFR BLD AUTO: 8.1 %
NEUTROPHILS # BLD AUTO: 7.42 X10*3/UL (ref 1.6–5.5)
NEUTROPHILS NFR BLD AUTO: 78.4 %
NRBC BLD-RTO: 0 /100 WBCS (ref 0–0)
PLATELET # BLD AUTO: 166 X10*3/UL (ref 150–450)
POTASSIUM SERPL-SCNC: 4.8 MMOL/L (ref 3.5–5.3)
PROT SERPL-MCNC: 7.3 G/DL (ref 6.4–8.2)
RBC # BLD AUTO: 4.01 X10*6/UL (ref 4–5.2)
SODIUM SERPL-SCNC: 141 MMOL/L (ref 136–145)
WBC # BLD AUTO: 9.5 X10*3/UL (ref 4.4–11.3)

## 2024-08-26 PROCEDURE — 99204 OFFICE O/P NEW MOD 45 MIN: CPT | Performed by: REGISTERED NURSE

## 2024-08-26 PROCEDURE — 36415 COLL VENOUS BLD VENIPUNCTURE: CPT

## 2024-08-26 PROCEDURE — 84075 ASSAY ALKALINE PHOSPHATASE: CPT | Performed by: REGISTERED NURSE

## 2024-08-26 PROCEDURE — 87081 CULTURE SCREEN ONLY: CPT | Mod: GEALAB | Performed by: REGISTERED NURSE

## 2024-08-26 PROCEDURE — 85025 COMPLETE CBC W/AUTO DIFF WBC: CPT | Performed by: REGISTERED NURSE

## 2024-08-26 RX ORDER — CHLORHEXIDINE GLUCONATE ORAL RINSE 1.2 MG/ML
15 SOLUTION DENTAL DAILY
Qty: 120 ML | Refills: 0 | Status: SHIPPED | OUTPATIENT
Start: 2024-08-26 | End: 2024-08-28

## 2024-08-26 ASSESSMENT — DUKE ACTIVITY SCORE INDEX (DASI)
TOTAL_SCORE: 44.7
DASI METS SCORE: 8.2
CAN YOU HAVE SEXUAL RELATIONS: YES
CAN YOU CLIMB A FLIGHT OF STAIRS OR WALK UP A HILL: YES
CAN YOU PARTICIPATE IN STRENOUS SPORTS LIKE SWIMMING, SINGLES TENNIS, FOOTBALL, BASKETBALL, OR SKIING: NO
CAN YOU RUN A SHORT DISTANCE: YES
CAN YOU DO LIGHT WORK AROUND THE HOUSE LIKE DUSTING OR WASHING DISHES: YES
CAN YOU WALK INDOORS, SUCH AS AROUND YOUR HOUSE: YES
CAN YOU TAKE CARE OF YOURSELF (EAT, DRESS, BATHE, OR USE TOILET): YES
CAN YOU DO HEAVY WORK AROUND THE HOUSE LIKE SCRUBBING FLOORS OR LIFTING AND MOVING HEAVY FURNITURE: YES
CAN YOU WALK A BLOCK OR TWO ON LEVEL GROUND: YES
CAN YOU DO YARD WORK LIKE RAKING LEAVES, WEEDING OR PUSHING A MOWER: YES
CAN YOU PARTICIPATE IN MODERATE RECREATIONAL ACTIVITIES LIKE GOLF, BOWLING, DANCING, DOUBLES TENNIS OR THROWING A BASEBALL OR FOOTBALL: NO
CAN YOU DO MODERATE WORK AROUND THE HOUSE LIKE VACUUMING, SWEEPING FLOORS OR CARRYING GROCERIES: YES

## 2024-08-26 ASSESSMENT — ENCOUNTER SYMPTOMS
NEUROLOGICAL NEGATIVE: 1
LIMITED RANGE OF MOTION: 1
RESPIRATORY NEGATIVE: 1
NECK NEGATIVE: 1
ARTHRALGIAS: 1
CARDIOVASCULAR NEGATIVE: 1
CONSTITUTIONAL NEGATIVE: 1
EYES NEGATIVE: 1
GASTROINTESTINAL NEGATIVE: 1

## 2024-08-26 ASSESSMENT — LIFESTYLE VARIABLES: SMOKING_STATUS: NONSMOKER

## 2024-08-26 ASSESSMENT — PAIN SCALES - GENERAL: PAINLEVEL_OUTOF10: 2

## 2024-08-26 ASSESSMENT — ACTIVITIES OF DAILY LIVING (ADL): ADL_SCORE: 1

## 2024-08-26 ASSESSMENT — PAIN - FUNCTIONAL ASSESSMENT: PAIN_FUNCTIONAL_ASSESSMENT: 0-10

## 2024-08-26 NOTE — CPM/PAT H&P
CPM/PAT Evaluation       Name: Saritha Cormier (Saritha Cormier)  /Age: 1950/ y.o.     In-Person       Chief Complaint: Evaluation prior to surgery    HPI    Past Medical History:   Diagnosis Date    Acquired absence of bilateral breasts and nipples 2022    History of bilateral mastectomy    Arthritis     Cancer (Multi)     Coronary artery disease     Diabetes mellitus (Multi)     Heart valve disease     Hyperlipidemia     Hypertension     Neoplasm of unspecified behavior of bone, soft tissue, and skin 2015    Skin growth    Other bursitis of hip, right hip     Bursitis of right hip, unspecified bursa    Personal history of other diseases of the respiratory system 2016    History of chronic sinusitis    Shortness of breath     Trigger finger, unspecified finger 2018    Acquired trigger finger    Type 2 diabetes mellitus (Multi)     Unilateral primary osteoarthritis, right knee 2021    Arthritis of knee, right       Past Surgical History:   Procedure Laterality Date    AORTIC VALVE REPLACEMENT      CARDIAC CATHETERIZATION N/A 2024    Procedure: Left And Right Heart Cath, With LV;  Surgeon: Javier Reilly MD;  Location: [place] [address] [facility];  Service: Cardiovascular;  Laterality: N/A;    CARDIAC CATHETERIZATION N/A 2024    Procedure: TAVR (Transcatheter AV Replacement);  Surgeon: Jessica Stallworth MD;  Location: [place] [address] [facility];  Service: Cardiovascular;  Laterality: N/A;  730    CARDIAC CATHETERIZATION N/A 2024    Procedure: TVP for TAVR;  Surgeon: Jessica Stallworth MD;  Location: [place] [address] [facility];  Service: Cardiovascular;  Laterality: N/A;    HYSTERECTOMY  2015    Hysterectomy    JOINT REPLACEMENT Right     Right total hip    MASTECTOMY  2015    Breast Surgery Mastectomy    OTHER SURGICAL HISTORY  2015    Breast Surgery Reconstruction       Patient Sexual activity questions deferred to the  physician.    Family History   Problem Relation Name Age of Onset    Gout Mother      Hypertension Mother      Arthritis Father      Other (cardiac disorder) Father      Cancer Father      Tongue cancer Brother      Other (jaw neoplasm) Brother      Breast cancer Sibling      Pancreatic cancer Sibling         No Known Allergies    Prior to Admission medications    Medication Sig Start Date End Date Taking? Authorizing Provider   acetaminophen (Tylenol) 325 mg tablet Take 2 tablets (650 mg) by mouth every 6 hours if needed for mild pain (1 - 3) or moderate pain (4 - 6). 3/30/24  Yes ANTOLIN Domínguez   allopurinol (Zyloprim) 100 mg tablet TAKE 2 TABLETS BY MOUTH ONCE DAILY.  Patient taking differently: Take 2 tablets (200 mg) by mouth 2 times a day. 6/12/24  Yes Alva Prince MD   aspirin 81 mg EC tablet TAKE 1 TABLET BY MOUTH EVERY DAY 6/12/24  Yes Alva Prince MD   clopidogrel (Plavix) 75 mg tablet TAKE 1 TABLET BY MOUTH EVERY DAY 6/12/24  Yes Alva Prince MD   ferrous gluconate 324 (38 Fe) MG tablet TAKE 1 TABLET (324 MG) BY MOUTH 2 TIMES A DAY. 6/12/24  Yes Alva Prince MD   furosemide (Lasix) 40 mg tablet TAKE 1 TABLET BY MOUTH EVERY DAY 6/12/24  Yes Alva Prince MD   gabapentin (Neurontin) 300 mg capsule Take 1 capsule (300 mg) by mouth 2 times a day. 7/9/24  Yes ANTOLIN Law DNP   losartan (Cozaar) 25 mg tablet Take 1 tablet (25 mg) by mouth once daily. as directed 6/25/24  Yes Alva Prince MD   metFORMIN  mg 24 hr tablet TAKE 2 TABLETS BY MOUTH EVERY DAY**REPLACES [facility] 4/22/24  Yes ANTOLIN Law DNP   metoprolol tartrate (Lopressor) 25 mg tablet Take 1 tablet (25 mg) by mouth 2 times a day. 6/25/24 6/25/25 Yes Alva Prince MD   pantoprazole (ProtoNix) 40 mg EC tablet TAKE 1 TABLET BY MOUTH EVERY DAY 8/9/24  Yes Alva Prince MD   rosuvastatin (Crestor) 40 mg tablet TAKE 1 TABLET BY MOUTH EVERY DAY 6/12/24  Yes Alva Prince MD    traMADol (Ultram) 50 mg tablet Take 1 tablet (50 mg) by mouth 2 times a day. 7/10/24  Yes ANTOLIN Law DNP   chlorhexidine (Peridex) 0.12 % solution Use 15 mL in the mouth or throat once daily for 2 days. Use 15ml once the night before surgery and 15ml once the morning of surgery 8/26/24 8/28/24  ANTOLIN Mancini   cholecalciferol (Vitamin D-3) 125 MCG (5000 UT) capsule TAKE 1 CAPSULE (125 MCG) BY MOUTH ONCE DAILY. 6/12/24   Alva Prince MD   naloxone (Narcan) 4 mg/0.1 mL nasal spray Administer 1 spray (4 mg) into affected nostril(s) if needed for opioid reversal or respiratory depression. May repeat every 2-3 minutes if needed, alternating nostrils, until medical assistance becomes available.  Patient not taking: Reported on 8/26/2024 5/7/24 5/7/25  ANTOLIN Law DNP        PAT ROS:   Constitutional:   neg    Neuro/Psych:   neg    Eyes:   neg    Ears:   neg    Nose:   Mouth:   neg    Throat:   neg    Neck:   neg    Cardio:   neg    Respiratory:   neg    Endocrine:   GI:   neg    :   neg    Musculoskeletal:    Left hip pain 2/10   arthralgias   decreased ROM  Hematologic:   neg    Skin:  neg        Physical Exam  Vitals reviewed.   Constitutional:       Appearance: Normal appearance.   HENT:      Head: Normocephalic and atraumatic.      Nose: Nose normal.      Mouth/Throat:      Mouth: Mucous membranes are moist.      Pharynx: Oropharynx is clear.   Neck:      Vascular: No carotid bruit.   Cardiovascular:      Rate and Rhythm: Normal rate and regular rhythm.      Pulses: Normal pulses.      Heart sounds: Normal heart sounds.   Pulmonary:      Effort: Pulmonary effort is normal.      Breath sounds: Normal breath sounds.   Abdominal:      Palpations: Abdomen is soft.   Musculoskeletal:         General: Tenderness present.      Cervical back: Normal range of motion and neck supple.      Comments: Left hip   Skin:     General: Skin is warm and dry.      Capillary Refill: Capillary  refill takes less than 2 seconds.   Neurological:      General: No focal deficit present.      Mental Status: She is alert and oriented to person, place, and time.   Psychiatric:         Mood and Affect: Mood normal.         Behavior: Behavior normal.         Thought Content: Thought content normal.         Judgment: Judgment normal.          PAT AIRWAY:   Airway:     Mallampati::  IV    TM distance::  >3 FB    Neck ROM::  Full  normal        Visit Vitals  /78   Pulse 50   Temp 36.1 °C (97 °F) (Tympanic)   Resp 16       DASI Risk Score      Flowsheet Row Most Recent Value   DASI SCORE 44.7   METS Score (Will be calculated only when all the questions are answered) 8.2          Caprini DVT Assessment      Flowsheet Row Most Recent Value   DVT Score 16   Current Status Major surgery planned, including arthroscopic and laproscopic (1-2 hours), Elective major lower extremity arthroplasty   History Prior major surgery, Previous malignancy, Congestive heart failure   Age 60-75 years   BMI 31-40 (Obesity)          Modified Frailty Index    No data to display       CHADS2 Stroke Risk  Current as of about an hour ago        N/A 3 to 100%: High Risk   2 to < 3%: Medium Risk   0 to < 2%: Low Risk     Last Change: N/A          This score determines the patient's risk of having a stroke if the patient has atrial fibrillation.        This score is not applicable to this patient. Components are not calculated.          Revised Cardiac Risk Index      Flowsheet Row Most Recent Value   Revised Cardiac Risk Calculator 1          Apfel Simplified Score      Flowsheet Row Most Recent Value   Apfel Simplified Score Calculator 3          Risk Analysis Index Results This Encounter         8/26/2024  1001             PILLAI Cancer History: Patient does not indicate history of cancer          Stop Bang Score      Flowsheet Row Most Recent Value   Do you snore loudly? 0   Do you often feel tired or fatigued after your sleep? 1   Has anyone  ever observed you stop breathing in your sleep? 0   Do you have or are you being treated for high blood pressure? 1   Recent BMI (Calculated) 37.9   Is BMI greater than 35 kg/m2? 1=Yes   Age older than 50 years old? 1=Yes   Is your neck circumference greater than 17 inches (Male) or 16 inches (Female)? 0   Gender - Male 0=No   STOP-BANG Total Score 4              Assessment & Plan:    73 y.o.  female  scheduled for ARTHROPLASTY TOTAL HIP ANTERIOR APPROACH - Left  on 9/9/24 with Dr. Angulo for Left hip pain. PMHX includes TAVR, nonrheumatic aortic valve stenosis, CHF, HTN, HLD, CA s/p bilateral mastectomy, DM2, stage 3a CKD.  Has had extensive trial of nonsurgical management for Left hip pain including use of anti-inflammatories physical therapy activity modification use of assistive devices cortisone injections. Despite that she has severe and worsening pain which impacts her quality of life and activities of daily living PAT consulted for perioperative risk stratification and optimization    Neuro:  No neurologic diagnosis or significant findings on chart review, clinical presentation and evaluation.  No grossly apparent neurologic perioperative risk.  age  Patient is at increased risk for perioperative CVA secondary to  HTN, DM, increased age    HEENT:  No HEENT diagnosis or significant findings on chart review or clinical presentation and evaluation. No further preoperative testing/intervention indicated at this time.    Cardiovascular:  S/p TAVR (26 Evolut FX 4/30/2024). Nonobstructive coronary atherosclerosis. HFpEF Euvolemic state. NYHA Class II. HTN. DLP.    6/17/24 visit Cardiac Clearance, Tommie TINAJERO:  She is cleared for orthopedic surgery as planned, with a low risk of cardiovascular complications.    ECHO 5/28/2024  PHYSICIAN INTERPRETATION:  Left Ventricle: The left ventricular systolic function is normal, with an estimated ejection fraction of 60-65%. There are no regional wall motion abnormalities. The  left ventricular cavity size is normal. Left ventricular diastolic filling was indeterminate.  Left Atrium: The left atrium is normal in size.  Right Ventricle: The right ventricle is normal in size. There is normal right ventricular global systolic function.  Right Atrium: The right atrium is normal in size.  Aortic Valve: There is a prosthetic aortic valve present. There is mild aortic valve regurgitation. The peak instantaneous gradient of the aortic valve is 16.2 mmHg. The mean gradient of the aortic valve is 8.0 mmHg. There is an Evolut (Evolut FX 26) TAVR valve in aortic position functioning normally, with mild levar-prosthetic regurgitation.  Mitral Valve: The mitral valve is normal in structure. There is mild mitral annular calcification. There is mild mitral valve regurgitation.  Tricuspid Valve: The tricuspid valve is structurally normal. There is mild tricuspid regurgitation.  Pulmonic Valve: The pulmonic valve is not well visualized. There is physiologic pulmonic valve regurgitation.  Pericardium: There is no pericardial effusion noted.  Aorta: The aortic root was not well visualized.  Pulmonary Artery: The tricuspid regurgitant velocity is 3.21 m/s, and with an estimated right atrial pressure of 3 mmHg, the estimated pulmonary artery pressure is mildly elevated with the RVSP at 44.2 mmHg.        CONCLUSIONS:   1. Left ventricular systolic function is normal with a 60-65% estimated ejection fraction.   2. There is an Evolut (Evolut FX 26) TAVR valve in aortic position functioning normally, with mild levar-prosthetic regurgitation.   3. There is mild pulmonary hypertension.  METS: 8.2  RCRI: 1 point, 6.0% risk for postoperative MACE   RANDI: 0.7% risk for 30 day postoperative MACE  EKG - 5/18/24 Sinus rhythm with occasional Premature ventricular complexes  Left axis deviation  Abnormal ECG  When compared with ECG of 01-MAY-2024 04:47,  Premature ventricular complexes are now Present  Nonspecific T wave  abnormality has replaced inverted T waves in Lateral leads  See ED provider note for full interpretation and clinical correlation  Confirmed by Judy Steel ([id number]) on 5/25/2024 12:41:24 PM    Pulmonary:  Stop Bang score is 4 placing patient at intermediate risk for ALBERTO  ARISCAT: <26 points, 1.6% risk of in-hospital postoperative pulmonary complication  PRODIGY: High risk for opioid induced respiratory depression  Pulmonary education discussed, patient also provided deep breathing exercises and educational handout    Renal:   Hx Stage 3a CKD    Urological/GYN/Breast   S/p B/L mastectomy/Oophorectomy for CA breast BRCA+     Endocrine:  DM2 on metformin    Hematologic:  Hx anemia (NSAID induced GI ulcer with bleed)  Antiplatelet management   The patient is currently receiving antiplatelet therapy with Plavix and ASA. The patient was instructed to Hold Plavix 4 days prior to procedure and continue ASA per cardiology.   Anticoagulation management  The patient is not currently receiving anticoagulation therapy. Patient provided with DVT educational handout.    Caprini Score 16, patient at High risk for perioperative DVT.  Patient provided with VTE education/handout.    Gastrointestinal:   NSAID induced GI ulcer with bleed and anemia, obesity  Eat-10 score 0      Infectious disease:   No infectious diagnosis or significant findings on chart review or clinical presentation and evaluation.   Prescription provided for CHG body wash and dental rinse. CHG use instructions reviewed and provided to patient.  Staph screen collected    Musculoskeletal:   Hx Left hip pain    3/30/24 XR Left hip  FINDINGS:  AP pelvis along with two views of the left hip.      Status post total right hip arthroplasty. Severe left hip  osteoarthrosis with joint space narrowing and subchondral sclerosis.  Lower lumbar degenerative change. The soft tissues are  unremarkable.No acute fracture.      IMPRESSION:  Severe left hip  osteoarthrosis.    Anesthesia/Airway:  No anesthesia complications      Medication instructions and NPO guidelines reviewed with the patient.  All questions or concerns discussed and addressed.      Labs ordered  CBC  CMP  MRSA  Peridex

## 2024-08-26 NOTE — PREPROCEDURE INSTRUCTIONS
Medication List            Accurate as of August 26, 2024 10:21 AM. Always use your most recent med list.                acetaminophen 325 mg tablet  Commonly known as: Tylenol  Take 2 tablets (650 mg) by mouth every 6 hours if needed for mild pain (1 - 3) or moderate pain (4 - 6).  Medication Adjustments for Surgery: Other (Comment)  Notes to patient: As needed     allopurinol 100 mg tablet  Commonly known as: Zyloprim  TAKE 2 TABLETS BY MOUTH ONCE DAILY.  Medication Adjustments for Surgery: Take morning of surgery with sip of water, no other fluids     aspirin 81 mg EC tablet  TAKE 1 TABLET BY MOUTH EVERY DAY  Medication Adjustments for Surgery: Other (Comment)  Notes to patient: Per cardiology instruction     chlorhexidine 0.12 % solution  Commonly known as: Peridex  Use 15 mL in the mouth or throat once daily for 2 days. Use 15ml once the night before surgery and 15ml once the morning of surgery     cholecalciferol 125 MCG (5000 UT) capsule  Commonly known as: Vitamin D-3  TAKE 1 CAPSULE (125 MCG) BY MOUTH ONCE DAILY.  Medication Adjustments for Surgery: Stop 7 days before surgery     clopidogrel 75 mg tablet  Commonly known as: Plavix  TAKE 1 TABLET BY MOUTH EVERY DAY  Medication Adjustments for Surgery: Other (Comment)  Notes to patient: Per cardiology instruction     ferrous gluconate 324 (38 Fe) mg tablet  Commonly known as: Fergon  TAKE 1 TABLET (324 MG) BY MOUTH 2 TIMES A DAY.  Medication Adjustments for Surgery: Continue until night before surgery     furosemide 40 mg tablet  Commonly known as: Lasix  TAKE 1 TABLET BY MOUTH EVERY DAY  Medication Adjustments for Surgery: Take morning of surgery with sip of water, no other fluids     gabapentin 300 mg capsule  Commonly known as: Neurontin  Take 1 capsule (300 mg) by mouth 2 times a day.  Medication Adjustments for Surgery: Take morning of surgery with sip of water, no other fluids     losartan 25 mg tablet  Commonly known as: Cozaar  Take 1 tablet (25  mg) by mouth once daily. as directed  Medication Adjustments for Surgery: Other (Comment)  Notes to patient: HOLD any evening dose the night before the day of surgery AND HOLD the day of surgery     metFORMIN  mg 24 hr tablet  Commonly known as: Glucophage-XR  TAKE 2 TABLETS BY MOUTH EVERY DAY**REPLACES OSM  Medication Adjustments for Surgery: Continue until night before surgery     metoprolol tartrate 25 mg tablet  Commonly known as: Lopressor  Take 1 tablet (25 mg) by mouth 2 times a day.  Medication Adjustments for Surgery: Take morning of surgery with sip of water, no other fluids     naloxone 4 mg/0.1 mL nasal spray  Commonly known as: Narcan  Administer 1 spray (4 mg) into affected nostril(s) if needed for opioid reversal or respiratory depression. May repeat every 2-3 minutes if needed, alternating nostrils, until medical assistance becomes available.     pantoprazole 40 mg EC tablet  Commonly known as: ProtoNix  TAKE 1 TABLET BY MOUTH EVERY DAY  Medication Adjustments for Surgery: Take morning of surgery with sip of water, no other fluids     rosuvastatin 40 mg tablet  Commonly known as: Crestor  TAKE 1 TABLET BY MOUTH EVERY DAY  Medication Adjustments for Surgery: Take morning of surgery with sip of water, no other fluids     traMADol 50 mg tablet  Commonly known as: Ultram  Take 1 tablet (50 mg) by mouth 2 times a day.  Medication Adjustments for Surgery: Other (Comment)  Notes to patient: As needed                                  Thank you for visiting [facility]) today for your pre-procedure evaluation, you were seen by     Nimo Vazquez CNP  Pre Admission Testing  [facility]  [phone]    This summary includes instructions and information to aid you during your perioperative period.  Please read carefully. If you have any questions about your visit today, please call the number listed above.  If you become ill or have any changes to your health before your  surgery, please contact your primary care provider and alert your surgeon.    Preparing for your Surgery       Exercises  Preoperative Deep Breathing Exercises  Why it is important to do deep breathing exercises before my surgery?  Deep breathing exercises strengthen your breathing muscles.  This helps you to recover after your surgery and decreases the chance of breathing complications.  How are the deep breathing exercises done?  Sit straight with your back supported.  Breathe in deeply and slowly through your nose. Your lower rib cage should expand and your abdomen may move forward.  Hold that breath for 3 to 5 seconds.  Breathe out through pursed lips, slowly and completely.  Rest and repeat 10 times every hour while awake.  Rest longer if you become dizzy or lightheaded.       Preoperative Brain Exercises    What are brain exercises?  A brain exercise is any activity that engages your thinking (cognitive) skills.    What types of activities are considered brain exercises?  Jigsaw puzzles, crossword puzzles, word jumble, memory games, word search, and many more.  Many can be found free online or on your phone via a mobile willian.    Why should I do brain exercises before my surgery?  More recent research has shown brain exercise before surgery can lower the risk of postoperative delirium (confusion) which can be especially important for older adults.  Patients who did brain exercises for 5 to 10 hours the days before surgery, cut their risk of postoperative delirium in half up to 1 week after surgery.    Sit-to-Stand Exercise    What is the sit-to-stand exercise?  The sit-to-stand exercise strengthens the muscles of your lower body and muscles in the center of your body (core muscles for stability) helping to maintain and improve your strength and mobility.  How do I do the sit-to-stand exercise?  The goal is to do this exercise without using your arms or hands.  If this is too difficult, use your arms and hands or  a chair with armrests to help slowly push yourself to the standing position and lower yourself back to the sitting position. As the movement becomes easier use your arms and hands less.    Steps to the sit-to-stand exercise  Sit up tall in a sturdy chair, knees bent, feet flat on the floor shoulder-width apart.  Shift your hips/pelvis forward in the chair to correctly position yourself for the next movement.  Lean forward at your hips.  Stand up straight putting equal weight on both feet.  Check to be sure you are properly aligned with the chair, in a slow controlled movement sit back down.  Repeat this exercise 10-15 times.  If needed you can do it fewer times until your strength improves.  Rest for 1 minute.  Do another 10-15 sit-to-stand exercises.  Try to do this in the morning and evening.        Instructions    Preoperative Fasting Guidelines    Why must I stop eating and drinking near surgery time?  With sedation, food or liquid in your stomach can enter your lungs causing serious complications  Food can increase nausea and vomiting  When do I need to stop eating and drinking before my surgery?      Do not eat any food or drink any liquids after midnight the night before your surgery/procedure.  You may have sips of water to take medications.            Simple things you can do to help prevent blood clots     Blood clots are blockages that can form in the body's veins. When a blood clot forms in your deep veins, it may be called a deep vein thrombosis, or DVT for short. Blood clots can happen in any part of the body where blood flows, but they are most common in the arms and legs. If a piece of a blood clot breaks free and travels to the lungs, it is called a pulmonary embolus (PE). A PE can be a very serious problem.         Being in the hospital or having surgery can raise your chances of getting a blood clot because you may not be well enough to move around as much as you normally do.         Ways you can  help prevent blood clots in the hospital       Wearing SCDs  SCDs stands for Sequential Compression Devices.   SCDs are special sleeves that wrap around your legs. They attach to a pump that fills them with air to gently squeeze your legs every few minutes.  This helps return the blood in your legs to your heart.   SCDs should only be taken off when walking or bathing. SCDs may not be comfortable, but they can help save your life.              Pump SCD leg sleeves  Wearing compression stockings - if your doctor orders them. These special snug-fitting stockings gently squeeze your legs to help blood flow.       Walking. Walking helps move the blood in your legs.   If your doctor says it is ok, try walking the halls at least   5 times a day. Ask us to help you get up, so you don't fall.      Taking any blood-thinning medicines your doctor orders.              Ways you can help prevent blood clots at home         Wearing compression stockings - if your doctor orders them.   Walking - to help move the blood in your legs.    Taking any blood-thinning medicines your doctor orders.      Signs of a blood clot or PE    Tell your doctor or nurse right away if you have any of the problems listed below.         If you are at home, seek medical care right away. Call 911 for chest pain or problems breathing.            Signs of a blood clot (DVT) - such as pain, swelling, redness, or warmth in your arm or legs.  Signs of a pulmonary embolism (PE) - such as chest pain or feeling short of breath      Tobacco and Alcohol;  Do not drink alcohol or smoke within 24 hours of surgery.  It is best to quit smoking for as long as possible before any surgery or procedure.    Other Instructions  Why did I have my nose, under my arms, and groin swabbed? The purpose of the swab is to identify Staphylococcus aureus inside your nose or on your skin.  The swab was sent to the laboratory for culture.  A positive swab/culture for Staphylococcus  "aureus is called colonization or carriage.     What is Staphylococcus aureus? Staphylococcus aureus, also known as \"staph\", is a germ found on the skin or in the nose of healthy people.  Sometimes Staphylococcus aureus can get into the body and cause an infection.  This can be minor (such as pimples, boils, or other skin problems).  It might also be serious (such as a blood infection, pneumonia, or a surgical site infection).     What is Staphylococcus aureus colonization or carriage? Colonization or carriage means that a person has the germ but is not sick from it.  These bacteria can be spread on the hands or when breathing or sneezing.   How is Staphylococcus aureus spread? It is most often spread by close contact with a person or item that carries it.   What happens if my culture is positive for Staphylococcus aureus? Your doctor/medical team will use this information to guide any antibiotic treatment which may be necessary.  Regardless of the culture results, we will clean the inside of your nose with a betadine swab just before you have your surgery.   Will I get an infection if I have Staphylococcus aureus in my nose or on my skin? Anyone can get an infection with Staphylococcus aureus.  However, the best way to reduce your risk of infection is to follow the instructions provided to you for the use of your CHG soap and dental rinse.      Body Wash:     What is a home preoperative antibacterial shower? This shower is a way of cleaning the skin with a germ-killing solution before surgery.  The solution contains chlorhexidine, commonly known as CHG.  CHG is a skin cleanser with germ-killing ability.  Let your doctor know if you are allergic to chlorhexidine.   Why do I need to take a preoperative antibacterial shower? Skin is not sterile.  It is best to try to make your skin as free of germs as possible before surgery.  Proper cleansing with a germ-killing soap before surgery can lower the number of germs on " your skin.  This helps to reduce the risk of infection at the surgical site.    Following the instructions listed below will help you prepare your skin for surgery.   How do I use the solution? Steps:  Begin using your CHG soap 5 days before your scheduled surgery        Oral/Dental Rinse:     What is oral/dental rinse?  It is a mouthwash. It is a way of cleaning the mouth with a germ-killing solution before your surgery.  The solution contains chlorhexidine, commonly known as CHG. It is used inside the mouth to kill a bacteria known as Staphylococcus aureus.  Let your doctor know if you are allergic to Chlorhexidine.   Why do I need to use CHG oral/dental rinse? The CHG oral/dental rinse helps to kill a bacteria in your mouth known as Staphylococcus aureus.  This reduces the risk of infection at the surgical site.    Using your CHG oral/dental rinse STEPS: Use your CHG oral/dental rinse after you brush your teeth the night before (at bedtime) and the morning of your surgery.  Follow all directions on your prescription label.    Use the cap on the container to measure 15 ml.  Swish (gargle if you can) the mouthwash in your mouth for at least 30 seconds, (do not swallow) and spit out.  After you use your CHG rinse, do not rinse your mouth with water, drink or eat.    Please refer to the prescription label for the appropriate time to resume oral intake   What side effects might I have using the CHG oral/dental rinse? CHG rinse will stick to plaque on the teeth.  Brush and floss just before use.  Teeth brushing will help avoid staining of plaque during use.       The Week before Surgery        Seven days before Surgery  Check your PAT medication instructions  Do the exercises provided to you by [facility]  Arrange for a responsible, adult licensed  to take you home after surgery and stay with you for 24 hours.  You will not be permitted to drive yourself home if you have received any anesthetic/sedation  Six days before  surgery  Check your PAT medication instructions  Do the exercises provided to you by PAT  Start using Chlorhexidene (CHG) body wash if prescribed  Five days before surgery  Check your PAT medication instructions  Do the exercises provided to you by PAT  Continue to use CHG body wash if prescribed  Three days before surgery  Check your PAT medication instructions  Do the exercises provided to you by PAT  Continue to use CHG body wash if prescribed  Two days before surgery  Check your PAT medication instructions  Do the exercises provided to you by PAT  Continue to use CHG body wash if prescribed    The Day before Surgery       Check your PAT medication and all other PAT instructions including when to stop eating and drinking  You will be called with your arrival time for surgery in the late afternoon.  If you do not receive a call please reach out to your surgeon's office.  Do not smoke or drink 24 hours before surgery  Prepare items to bring with you to the hospital  Shower with your chlorhexidine wash if prescribed  Brush your teeth and use your chlorhexidine dental rinse if prescribed    The Day of Surgery       Check your PAT medication instructions  Ensure you follow the instructions for when to stop eating and drinking  Shower, if prescribed use CHG.  Do not apply any lotions, creams, moisturizers, perfume or deodorant  Brush your teeth and use your CHG dental rinse if prescribed  Wear loose comfortable clothing  Avoid make-up  Remove  jewelry and piercings, consider professional piercing removal with a plastic spacer if needed  Bring photo ID and Insurance card  Bring an accurate medication list that includes medication dose, frequency and allergies  Bring a copy of your advanced directives (will, health care power of )  Bring any devices and controllers as well as medical devices you have been provided with for surgery (CPAP, slings, braces, etc.)  Dentures, eyeglasses, and contacts will be removed  before surgery, please bring cases for contacts or glasses

## 2024-08-26 NOTE — H&P (VIEW-ONLY)
CPM/PAT Evaluation       Name: Saritha Cormier (Saritha Cormier)  /Age: 1950/ y.o.     In-Person       Chief Complaint: Evaluation prior to surgery    HPI    Past Medical History:   Diagnosis Date    Acquired absence of bilateral breasts and nipples 2022    History of bilateral mastectomy    Arthritis     Cancer (Multi)     Coronary artery disease     Diabetes mellitus (Multi)     Heart valve disease     Hyperlipidemia     Hypertension     Neoplasm of unspecified behavior of bone, soft tissue, and skin 2015    Skin growth    Other bursitis of hip, right hip     Bursitis of right hip, unspecified bursa    Personal history of other diseases of the respiratory system 2016    History of chronic sinusitis    Shortness of breath     Trigger finger, unspecified finger 2018    Acquired trigger finger    Type 2 diabetes mellitus (Multi)     Unilateral primary osteoarthritis, right knee 2021    Arthritis of knee, right       Past Surgical History:   Procedure Laterality Date    AORTIC VALVE REPLACEMENT      CARDIAC CATHETERIZATION N/A 2024    Procedure: Left And Right Heart Cath, With LV;  Surgeon: Javier Reilly MD;  Location: [place] [address] [facility];  Service: Cardiovascular;  Laterality: N/A;    CARDIAC CATHETERIZATION N/A 2024    Procedure: TAVR (Transcatheter AV Replacement);  Surgeon: Jessica Stallworth MD;  Location: [place] [address] [facility];  Service: Cardiovascular;  Laterality: N/A;  730    CARDIAC CATHETERIZATION N/A 2024    Procedure: TVP for TAVR;  Surgeon: Jessica Stallworth MD;  Location: [place] [address] [facility];  Service: Cardiovascular;  Laterality: N/A;    HYSTERECTOMY  2015    Hysterectomy    JOINT REPLACEMENT Right     Right total hip    MASTECTOMY  2015    Breast Surgery Mastectomy    OTHER SURGICAL HISTORY  2015    Breast Surgery Reconstruction       Patient Sexual activity questions deferred to the  physician.    Family History   Problem Relation Name Age of Onset    Gout Mother      Hypertension Mother      Arthritis Father      Other (cardiac disorder) Father      Cancer Father      Tongue cancer Brother      Other (jaw neoplasm) Brother      Breast cancer Sibling      Pancreatic cancer Sibling         No Known Allergies    Prior to Admission medications    Medication Sig Start Date End Date Taking? Authorizing Provider   acetaminophen (Tylenol) 325 mg tablet Take 2 tablets (650 mg) by mouth every 6 hours if needed for mild pain (1 - 3) or moderate pain (4 - 6). 3/30/24  Yes ANTOLIN Domínguez   allopurinol (Zyloprim) 100 mg tablet TAKE 2 TABLETS BY MOUTH ONCE DAILY.  Patient taking differently: Take 2 tablets (200 mg) by mouth 2 times a day. 6/12/24  Yes Alva Prince MD   aspirin 81 mg EC tablet TAKE 1 TABLET BY MOUTH EVERY DAY 6/12/24  Yes Alva Prince MD   clopidogrel (Plavix) 75 mg tablet TAKE 1 TABLET BY MOUTH EVERY DAY 6/12/24  Yes Alva Prince MD   ferrous gluconate 324 (38 Fe) MG tablet TAKE 1 TABLET (324 MG) BY MOUTH 2 TIMES A DAY. 6/12/24  Yes Alva Prince MD   furosemide (Lasix) 40 mg tablet TAKE 1 TABLET BY MOUTH EVERY DAY 6/12/24  Yes Alva Prince MD   gabapentin (Neurontin) 300 mg capsule Take 1 capsule (300 mg) by mouth 2 times a day. 7/9/24  Yes ANTOLIN Law DNP   losartan (Cozaar) 25 mg tablet Take 1 tablet (25 mg) by mouth once daily. as directed 6/25/24  Yes Alva Prince MD   metFORMIN  mg 24 hr tablet TAKE 2 TABLETS BY MOUTH EVERY DAY**REPLACES [facility] 4/22/24  Yes ANTOLIN Law DNP   metoprolol tartrate (Lopressor) 25 mg tablet Take 1 tablet (25 mg) by mouth 2 times a day. 6/25/24 6/25/25 Yes Alva Prince MD   pantoprazole (ProtoNix) 40 mg EC tablet TAKE 1 TABLET BY MOUTH EVERY DAY 8/9/24  Yes Alva Prince MD   rosuvastatin (Crestor) 40 mg tablet TAKE 1 TABLET BY MOUTH EVERY DAY 6/12/24  Yes Alva Prince MD    traMADol (Ultram) 50 mg tablet Take 1 tablet (50 mg) by mouth 2 times a day. 7/10/24  Yes ANTOLIN Law DNP   chlorhexidine (Peridex) 0.12 % solution Use 15 mL in the mouth or throat once daily for 2 days. Use 15ml once the night before surgery and 15ml once the morning of surgery 8/26/24 8/28/24  ANTOLIN Mancini   cholecalciferol (Vitamin D-3) 125 MCG (5000 UT) capsule TAKE 1 CAPSULE (125 MCG) BY MOUTH ONCE DAILY. 6/12/24   Alva Prince MD   naloxone (Narcan) 4 mg/0.1 mL nasal spray Administer 1 spray (4 mg) into affected nostril(s) if needed for opioid reversal or respiratory depression. May repeat every 2-3 minutes if needed, alternating nostrils, until medical assistance becomes available.  Patient not taking: Reported on 8/26/2024 5/7/24 5/7/25  ANTOLIN Law DNP        PAT ROS:   Constitutional:   neg    Neuro/Psych:   neg    Eyes:   neg    Ears:   neg    Nose:   Mouth:   neg    Throat:   neg    Neck:   neg    Cardio:   neg    Respiratory:   neg    Endocrine:   GI:   neg    :   neg    Musculoskeletal:    Left hip pain 2/10   arthralgias   decreased ROM  Hematologic:   neg    Skin:  neg        Physical Exam  Vitals reviewed.   Constitutional:       Appearance: Normal appearance.   HENT:      Head: Normocephalic and atraumatic.      Nose: Nose normal.      Mouth/Throat:      Mouth: Mucous membranes are moist.      Pharynx: Oropharynx is clear.   Neck:      Vascular: No carotid bruit.   Cardiovascular:      Rate and Rhythm: Normal rate and regular rhythm.      Pulses: Normal pulses.      Heart sounds: Normal heart sounds.   Pulmonary:      Effort: Pulmonary effort is normal.      Breath sounds: Normal breath sounds.   Abdominal:      Palpations: Abdomen is soft.   Musculoskeletal:         General: Tenderness present.      Cervical back: Normal range of motion and neck supple.      Comments: Left hip   Skin:     General: Skin is warm and dry.      Capillary Refill: Capillary  refill takes less than 2 seconds.   Neurological:      General: No focal deficit present.      Mental Status: She is alert and oriented to person, place, and time.   Psychiatric:         Mood and Affect: Mood normal.         Behavior: Behavior normal.         Thought Content: Thought content normal.         Judgment: Judgment normal.          PAT AIRWAY:   Airway:     Mallampati::  IV    TM distance::  >3 FB    Neck ROM::  Full  normal        Visit Vitals  /78   Pulse 50   Temp 36.1 °C (97 °F) (Tympanic)   Resp 16       DASI Risk Score      Flowsheet Row Most Recent Value   DASI SCORE 44.7   METS Score (Will be calculated only when all the questions are answered) 8.2          Caprini DVT Assessment      Flowsheet Row Most Recent Value   DVT Score 16   Current Status Major surgery planned, including arthroscopic and laproscopic (1-2 hours), Elective major lower extremity arthroplasty   History Prior major surgery, Previous malignancy, Congestive heart failure   Age 60-75 years   BMI 31-40 (Obesity)          Modified Frailty Index    No data to display       CHADS2 Stroke Risk  Current as of about an hour ago        N/A 3 to 100%: High Risk   2 to < 3%: Medium Risk   0 to < 2%: Low Risk     Last Change: N/A          This score determines the patient's risk of having a stroke if the patient has atrial fibrillation.        This score is not applicable to this patient. Components are not calculated.          Revised Cardiac Risk Index      Flowsheet Row Most Recent Value   Revised Cardiac Risk Calculator 1          Apfel Simplified Score      Flowsheet Row Most Recent Value   Apfel Simplified Score Calculator 3          Risk Analysis Index Results This Encounter         8/26/2024  1001             PILLAI Cancer History: Patient does not indicate history of cancer          Stop Bang Score      Flowsheet Row Most Recent Value   Do you snore loudly? 0   Do you often feel tired or fatigued after your sleep? 1   Has anyone  ever observed you stop breathing in your sleep? 0   Do you have or are you being treated for high blood pressure? 1   Recent BMI (Calculated) 37.9   Is BMI greater than 35 kg/m2? 1=Yes   Age older than 50 years old? 1=Yes   Is your neck circumference greater than 17 inches (Male) or 16 inches (Female)? 0   Gender - Male 0=No   STOP-BANG Total Score 4              Assessment & Plan:    73 y.o.  female  scheduled for ARTHROPLASTY TOTAL HIP ANTERIOR APPROACH - Left  on 9/9/24 with Dr. Angulo for Left hip pain. PMHX includes TAVR, nonrheumatic aortic valve stenosis, CHF, HTN, HLD, CA s/p bilateral mastectomy, DM2, stage 3a CKD.  Has had extensive trial of nonsurgical management for Left hip pain including use of anti-inflammatories physical therapy activity modification use of assistive devices cortisone injections. Despite that she has severe and worsening pain which impacts her quality of life and activities of daily living PAT consulted for perioperative risk stratification and optimization    Neuro:  No neurologic diagnosis or significant findings on chart review, clinical presentation and evaluation.  No grossly apparent neurologic perioperative risk.  age  Patient is at increased risk for perioperative CVA secondary to  HTN, DM, increased age    HEENT:  No HEENT diagnosis or significant findings on chart review or clinical presentation and evaluation. No further preoperative testing/intervention indicated at this time.    Cardiovascular:  S/p TAVR (26 Evolut FX 4/30/2024). Nonobstructive coronary atherosclerosis. HFpEF Euvolemic state. NYHA Class II. HTN. DLP.    6/17/24 visit Cardiac Clearance, Tommie TINAJERO:  She is cleared for orthopedic surgery as planned, with a low risk of cardiovascular complications.    ECHO 5/28/2024  PHYSICIAN INTERPRETATION:  Left Ventricle: The left ventricular systolic function is normal, with an estimated ejection fraction of 60-65%. There are no regional wall motion abnormalities. The  left ventricular cavity size is normal. Left ventricular diastolic filling was indeterminate.  Left Atrium: The left atrium is normal in size.  Right Ventricle: The right ventricle is normal in size. There is normal right ventricular global systolic function.  Right Atrium: The right atrium is normal in size.  Aortic Valve: There is a prosthetic aortic valve present. There is mild aortic valve regurgitation. The peak instantaneous gradient of the aortic valve is 16.2 mmHg. The mean gradient of the aortic valve is 8.0 mmHg. There is an Evolut (Evolut FX 26) TAVR valve in aortic position functioning normally, with mild levar-prosthetic regurgitation.  Mitral Valve: The mitral valve is normal in structure. There is mild mitral annular calcification. There is mild mitral valve regurgitation.  Tricuspid Valve: The tricuspid valve is structurally normal. There is mild tricuspid regurgitation.  Pulmonic Valve: The pulmonic valve is not well visualized. There is physiologic pulmonic valve regurgitation.  Pericardium: There is no pericardial effusion noted.  Aorta: The aortic root was not well visualized.  Pulmonary Artery: The tricuspid regurgitant velocity is 3.21 m/s, and with an estimated right atrial pressure of 3 mmHg, the estimated pulmonary artery pressure is mildly elevated with the RVSP at 44.2 mmHg.        CONCLUSIONS:   1. Left ventricular systolic function is normal with a 60-65% estimated ejection fraction.   2. There is an Evolut (Evolut FX 26) TAVR valve in aortic position functioning normally, with mild levar-prosthetic regurgitation.   3. There is mild pulmonary hypertension.  METS: 8.2  RCRI: 1 point, 6.0% risk for postoperative MACE   RANDI: 0.7% risk for 30 day postoperative MACE  EKG - 5/18/24 Sinus rhythm with occasional Premature ventricular complexes  Left axis deviation  Abnormal ECG  When compared with ECG of 01-MAY-2024 04:47,  Premature ventricular complexes are now Present  Nonspecific T wave  abnormality has replaced inverted T waves in Lateral leads  See ED provider note for full interpretation and clinical correlation  Confirmed by Judy Steel ([id number]) on 5/25/2024 12:41:24 PM    Pulmonary:  Stop Bang score is 4 placing patient at intermediate risk for ALBERTO  ARISCAT: <26 points, 1.6% risk of in-hospital postoperative pulmonary complication  PRODIGY: High risk for opioid induced respiratory depression  Pulmonary education discussed, patient also provided deep breathing exercises and educational handout    Renal:   Hx Stage 3a CKD    Urological/GYN/Breast   S/p B/L mastectomy/Oophorectomy for CA breast BRCA+     Endocrine:  DM2 on metformin    Hematologic:  Hx anemia (NSAID induced GI ulcer with bleed)  Antiplatelet management   The patient is currently receiving antiplatelet therapy with Plavix and ASA. The patient was instructed to Hold Plavix 4 days prior to procedure and continue ASA per cardiology.   Anticoagulation management  The patient is not currently receiving anticoagulation therapy. Patient provided with DVT educational handout.    Caprini Score 16, patient at High risk for perioperative DVT.  Patient provided with VTE education/handout.    Gastrointestinal:   NSAID induced GI ulcer with bleed and anemia, obesity  Eat-10 score 0      Infectious disease:   No infectious diagnosis or significant findings on chart review or clinical presentation and evaluation.   Prescription provided for CHG body wash and dental rinse. CHG use instructions reviewed and provided to patient.  Staph screen collected    Musculoskeletal:   Hx Left hip pain    3/30/24 XR Left hip  FINDINGS:  AP pelvis along with two views of the left hip.      Status post total right hip arthroplasty. Severe left hip  osteoarthrosis with joint space narrowing and subchondral sclerosis.  Lower lumbar degenerative change. The soft tissues are  unremarkable.No acute fracture.      IMPRESSION:  Severe left hip  osteoarthrosis.    Anesthesia/Airway:  No anesthesia complications      Medication instructions and NPO guidelines reviewed with the patient.  All questions or concerns discussed and addressed.      Labs ordered  CBC  CMP  MRSA  Peridex

## 2024-08-28 LAB — STAPHYLOCOCCUS SPEC CULT: ABNORMAL

## 2024-08-29 ENCOUNTER — HOSPITAL ENCOUNTER (OUTPATIENT)
Dept: RADIOLOGY | Facility: HOSPITAL | Age: 74
Discharge: HOME | End: 2024-08-29
Payer: MEDICARE

## 2024-08-29 ENCOUNTER — OFFICE VISIT (OUTPATIENT)
Dept: NEPHROLOGY | Facility: CLINIC | Age: 74
End: 2024-08-29
Payer: MEDICARE

## 2024-08-29 VITALS
DIASTOLIC BLOOD PRESSURE: 74 MMHG | HEART RATE: 55 BPM | WEIGHT: 203.8 LBS | HEIGHT: 61 IN | BODY MASS INDEX: 38.48 KG/M2 | TEMPERATURE: 97.1 F | SYSTOLIC BLOOD PRESSURE: 120 MMHG | OXYGEN SATURATION: 97 % | RESPIRATION RATE: 16 BRPM

## 2024-08-29 DIAGNOSIS — N18.4 CKD (CHRONIC KIDNEY DISEASE) STAGE 4, GFR 15-29 ML/MIN (MULTI): ICD-10-CM

## 2024-08-29 DIAGNOSIS — I10 BENIGN ESSENTIAL HYPERTENSION: ICD-10-CM

## 2024-08-29 DIAGNOSIS — N18.4 CKD (CHRONIC KIDNEY DISEASE) STAGE 4, GFR 15-29 ML/MIN (MULTI): Primary | ICD-10-CM

## 2024-08-29 DIAGNOSIS — M10.00 IDIOPATHIC GOUT, UNSPECIFIED CHRONICITY, UNSPECIFIED SITE: ICD-10-CM

## 2024-08-29 DIAGNOSIS — N06.0 ISOLATED PROTEINURIA WITH MINOR GLOMERULAR ABNORMALITY: ICD-10-CM

## 2024-08-29 DIAGNOSIS — N18.31 STAGE 3A CHRONIC KIDNEY DISEASE (MULTI): ICD-10-CM

## 2024-08-29 DIAGNOSIS — N18.4 ANEMIA DUE TO STAGE 4 CHRONIC KIDNEY DISEASE (MULTI): ICD-10-CM

## 2024-08-29 DIAGNOSIS — D63.1 ANEMIA DUE TO STAGE 4 CHRONIC KIDNEY DISEASE (MULTI): ICD-10-CM

## 2024-08-29 LAB
APPEARANCE UR: CLEAR
BACTERIA #/AREA URNS AUTO: ABNORMAL /HPF
BILIRUB UR STRIP.AUTO-MCNC: NEGATIVE MG/DL
CHLORIDE UR-SCNC: 33 MMOL/L
CHLORIDE/CREATININE (MMOL/G) IN URINE: 41 MMOL/G CREAT (ref 38–318)
COLOR UR: ABNORMAL
CREAT UR-MCNC: 80.5 MG/DL (ref 20–320)
CREAT UR-MCNC: 81.1 MG/DL (ref 20–320)
CREAT UR-MCNC: 81.1 MG/DL (ref 20–320)
GLUCOSE UR STRIP.AUTO-MCNC: NORMAL MG/DL
KETONES UR STRIP.AUTO-MCNC: NEGATIVE MG/DL
LEUKOCYTE ESTERASE UR QL STRIP.AUTO: NEGATIVE
MICROALBUMIN UR-MCNC: 73.5 MG/L
MICROALBUMIN/CREAT UR: 91.3 UG/MG CREAT
MUCOUS THREADS #/AREA URNS AUTO: ABNORMAL /LPF
NITRITE UR QL STRIP.AUTO: NEGATIVE
PH UR STRIP.AUTO: 6.5 [PH]
POC APPEARANCE, URINE: ABNORMAL
POC BILIRUBIN, URINE: NEGATIVE
POC BLOOD, URINE: ABNORMAL
POC COLOR, URINE: YELLOW
POC GLUCOSE, URINE: NEGATIVE MG/DL
POC KETONES, URINE: NEGATIVE MG/DL
POC LEUKOCYTES, URINE: NEGATIVE
POC NITRITE,URINE: NEGATIVE
POC PH, URINE: 6.5 PH
POC PROTEIN, URINE: ABNORMAL MG/DL
POC SPECIFIC GRAVITY, URINE: 1.01
POC UROBILINOGEN, URINE: 0.2 EU/DL
POTASSIUM UR-SCNC: 55 MMOL/L
POTASSIUM/CREAT UR-RTO: 68 MMOL/G CREAT
PROT UR STRIP.AUTO-MCNC: ABNORMAL MG/DL
PROT UR-ACNC: 84 MG/DL (ref 5–24)
PROT/CREAT UR: 1.04 MG/MG CREAT (ref 0–0.17)
RBC # UR STRIP.AUTO: ABNORMAL /UL
RBC #/AREA URNS AUTO: ABNORMAL /HPF
SODIUM UR-SCNC: 49 MMOL/L
SODIUM/CREAT UR-RTO: 60 MMOL/G CREAT
SP GR UR STRIP.AUTO: 1.02
UROBILINOGEN UR STRIP.AUTO-MCNC: NORMAL MG/DL
WBC #/AREA URNS AUTO: ABNORMAL /HPF

## 2024-08-29 PROCEDURE — 3074F SYST BP LT 130 MM HG: CPT | Performed by: INTERNAL MEDICINE

## 2024-08-29 PROCEDURE — 99205 OFFICE O/P NEW HI 60 MIN: CPT | Performed by: INTERNAL MEDICINE

## 2024-08-29 PROCEDURE — 81003 URINALYSIS AUTO W/O SCOPE: CPT | Performed by: INTERNAL MEDICINE

## 2024-08-29 PROCEDURE — 76770 US EXAM ABDO BACK WALL COMP: CPT

## 2024-08-29 PROCEDURE — 3008F BODY MASS INDEX DOCD: CPT | Performed by: INTERNAL MEDICINE

## 2024-08-29 PROCEDURE — 84133 ASSAY OF URINE POTASSIUM: CPT

## 2024-08-29 PROCEDURE — 76770 US EXAM ABDO BACK WALL COMP: CPT | Performed by: RADIOLOGY

## 2024-08-29 PROCEDURE — 82436 ASSAY OF URINE CHLORIDE: CPT

## 2024-08-29 PROCEDURE — 3060F POS MICROALBUMINURIA REV: CPT | Performed by: INTERNAL MEDICINE

## 2024-08-29 PROCEDURE — 1159F MED LIST DOCD IN RCRD: CPT | Performed by: INTERNAL MEDICINE

## 2024-08-29 PROCEDURE — 84300 ASSAY OF URINE SODIUM: CPT

## 2024-08-29 PROCEDURE — 81001 URINALYSIS AUTO W/SCOPE: CPT

## 2024-08-29 PROCEDURE — 82570 ASSAY OF URINE CREATININE: CPT

## 2024-08-29 PROCEDURE — 82043 UR ALBUMIN QUANTITATIVE: CPT

## 2024-08-29 PROCEDURE — 3048F LDL-C <100 MG/DL: CPT | Performed by: INTERNAL MEDICINE

## 2024-08-29 PROCEDURE — 3078F DIAST BP <80 MM HG: CPT | Performed by: INTERNAL MEDICINE

## 2024-08-29 PROCEDURE — 4010F ACE/ARB THERAPY RXD/TAKEN: CPT | Performed by: INTERNAL MEDICINE

## 2024-08-29 PROCEDURE — 84156 ASSAY OF PROTEIN URINE: CPT

## 2024-08-29 NOTE — PATIENT INSTRUCTIONS
Alina-it was nice meeting you nephrology clinic today to discuss the following    # Chronic kidney disease stage III baseline 30-40% with recent worsening now down to 20%.  Will do some workup including kidney ultrasound, blood work and urinalysis.  Will temporarily hold allopurinol, metformin, Lasix, losartan.  He will check blood pressure daily and send me 1 week blood pressure log to review.  Monitor leg swelling.  We discussed appropriate hydration and limiting salt in diet.  We will repeat blood work in 1 week-I will call with results and recommendations regarding clearance for upcoming hip surgery    # Chronic anemia-check iron storage and other causes for anemia    # Recurrent gout-temporarily hold allopurinol.  Will consider to resume in the future    # Hypertension-good control.  Hold losartan, Lasix.  Continue metoprolol    Blood work in 1 week-I will call with results and further surgery clearance  3 months follow-up with another repeat blood work urinalysis prior to next visit    Nelly Little MD, MS, XAVI ANDERS   Clinical  - [facility] [employer]   Nephrologist - [facility]

## 2024-08-29 NOTE — PROGRESS NOTES
Subjective       Saritha Cormier is a 73 y.o. female who has past medical history of HTN, HLD, CHF, AV stenosis s/p TAVR, T2DM, GERD, peptic ulcers, breast cancer s/p bilateral mastectomy, OA, gout, spinal stenosis presents for initial consultation for CKD management.     Saritha was referred by her PCP due to worsening kidney function. Planning on obtaining hip replacement in September, needing clearance from renal standpoint. Was running around 30-40 GFR up until June 2024. Most recent GFR in August, now 21. She had a TAVR placed in April 2024 due to aortic stenosis. Repeat echo in May 2024 with EF 60-65%. States that she was eating food that she shouldn't have after surgery. Reports no gout flares for years. BP and diabetes have been stable. Long discussion regarding current medications. Will be planning to stop specific medications that will decrease GFR. No changes in urination or difficulty urinating. Had chemotherapy and radiation in 1990 for breast cancer. History of severe iron deficiency anemia. Currently taking iron supplements, will need to recheck iron studies. Takes tramadol and gabapentin for pain management.     Social history: No tobacco use    Objective   There were no vitals taken for this visit.  Wt Readings from Last 3 Encounters:   08/26/24 91 kg (200 lb 9.9 oz)   07/09/24 88 kg (194 lb)   06/25/24 85.5 kg (188 lb 6.4 oz)       Physical Exam    General appearance: no distress awake and alert on room air, euvolemic on exam  Eyes: non-icteric  HEENT: atrumatic head, PEERLA, moist mucosa  Skin: no apparent rash  Heart: NSR, S1, S2 normal, murmur   Lungs: Symmetrical expansion,CTA bilat no wheezing/crackles  Abdomen: soft, nt/nd, obese  Extremities: trace edema bilat  Neuro: No FND,asterixis, no focal deficits noticed        Review of Systems     Constitutional: no fever, no chills, no recent weight gain and no recent weight loss.   Eyes: no blurred vision and no diplopia.   ENT: no hearing  loss, no earache, no sore throat, no swollen glands in the neck and no nasal discharge.   Cardiovascular: no chest pain, no palpitations, lower extremity edema.   Respiratory: no shortness of breath, no chronic cough and no shortness of breath during exertion.   Gastrointestinal: no abdominal pain, no constipation, no heartburn, no vomiting, no bloody stools and no change in bowel movements.   Genitourinary: no dysuria and no hematuria.   Musculoskeletal: no arthralgias and no myalgias.   Skin: no rashes and no skin lesions.   Neurological: no headaches and no dizziness.   Psychiatric: no confusion, no depression and no anxiety.   Endocrine: no heat intolerance, no cold intolerance, appetite not increased, no thyroid disorder, no increased urinary frequency and no dry skin.   Hematologic/Lymphatic: does not bleed easily and does not bruise easily.   All other systems have been reviewed and are negative for complaint.         Data Review        Results from last 7 days   Lab Units 08/26/24  0957   WBC AUTO x10*3/uL 9.5   HEMOGLOBIN g/dL 10.9*   HEMATOCRIT % 38.5   PLATELETS AUTO x10*3/uL 166            Lab Results   Component Value Date    URICACID 5.5 06/14/2023           Lab Results   Component Value Date    HGBA1C 5.8 06/25/2024           Results from last 7 days   Lab Units 08/26/24  0957   SODIUM mmol/L 141   POTASSIUM mmol/L 4.8   CHLORIDE mmol/L 101   CO2 mmol/L 33*   BUN mg/dL 36*   GLUCOSE mg/dL 96   CALCIUM mg/dL 9.0   ANION GAP mmol/L 12   EGFR mL/min/1.73m*2 21*           Albumin/Creatinine Ratio   Date Value Ref Range Status   06/17/2024 67.2 (H) <30.0 ug/mg Creat Final            RFP  Recent Labs     08/26/24  0957 06/17/24  0932 05/18/24  0800 05/08/24  1430 05/01/24  0442 04/30/24  1001 04/09/24  0851 08/08/22  1002 02/19/22  0447 03/10/21  2130 03/09/21  0149 03/08/21  1804 03/08/21  0807 03/07/21  0920 03/06/21  0851    139 138 137 140 139 137   < > 140   < > 141 146* 143 144 143   K 4.8 5.2  4.1 4.3 4.5 4.2 4.2   < > 4.7   < > 3.6 3.9 4.2 4.5 4.7    100 98 101 106 104 101   < > 100   < > 99 102 103 108* 108*   CO2 33* 32 30 27 28 26 25   < > 31   < > 39* 36* 32 29 25   BUN 36* 31* 39* 32* 24* 23 36*   < > 19   < > 23 18 16 15 21   CREATININE 2.43* 1.77* 1.77* 1.65* 1.63* 1.40* 1.59*   < > 1.10*   < > 1.04 1.08* 1.08* 1.01 1.24*   GLUCOSE 96 112* 119* 123* 128* 105* 123*   < > 146*   < > 173* 109* 127* 141* 123*   CALCIUM 9.0 9.7 9.6 9.7 8.3* 8.8 10.0   < > 9.2   < > 8.8 9.1 9.2 9.0 8.8   PHOS  --   --  5.0*  --   --   --   --   --  4.1  --  2.9 3.4 3.8 4.2 5.0*   EGFR 21* 30* 30* 33* 34* 40* 35*   < >  --   --   --   --   --   --   --    ANIONGAP 12 12 14 13 11 13 15   < > 14   < > 7* 12 12 12 15    < > = values in this interval not displayed.        Urineanalysis  Recent Labs     06/08/21  0710   COLORU STRAW   APPEARANCEU CLEAR   SPECGRAVU 1.010   DEAN 6.0   PROTUR NEGATIVE   GLUCOSEU NEGATIVE   BLOODU NEGATIVE   KETONESU NEGATIVE   BILIRUBINU NEGATIVE   NITRITEU NEGATIVE   LEUKOCYTESU SMALL(1+)*       Urine Electrolytes  Recent Labs     07/09/24  1015 06/17/24  0932 04/22/24  1307 06/08/21  0710   CREATU 130.1 116.1 173.2  --    PROTUR  --   --   --  NEGATIVE   ALBUMINUR  --  78.0  --   --    MICROALBCREA  --  67.2*  --   --         Urine Micro  Recent Labs     06/08/21  0710   WBCU 18*   RBCU 2   HYALCASTU 3+*   SQUAMEPIU 1   BACTERIAU 1+*   MUCUSU FEW        Iron  Recent Labs     03/05/21  0910 03/04/21  1201   IRON  --  11*   TIBC  --  459*   IRONSAT  --  2*   FERRITIN 16  --           Current Outpatient Medications on File Prior to Visit   Medication Sig Dispense Refill    acetaminophen (Tylenol) 325 mg tablet Take 2 tablets (650 mg) by mouth every 6 hours if needed for mild pain (1 - 3) or moderate pain (4 - 6). 30 tablet 0    allopurinol (Zyloprim) 100 mg tablet TAKE 2 TABLETS BY MOUTH ONCE DAILY. (Patient taking differently: Take 2 tablets (200 mg) by mouth 2 times a day.) 180 tablet 1     aspirin 81 mg EC tablet TAKE 1 TABLET BY MOUTH EVERY DAY 90 tablet 1    [] chlorhexidine (Peridex) 0.12 % solution Use 15 mL in the mouth or throat once daily for 2 days. Use 15ml once the night before surgery and 15ml once the morning of surgery 120 mL 0    cholecalciferol (Vitamin D-3) 125 MCG (5000 UT) capsule TAKE 1 CAPSULE (125 MCG) BY MOUTH ONCE DAILY. 90 capsule 1    clopidogrel (Plavix) 75 mg tablet TAKE 1 TABLET BY MOUTH EVERY DAY 90 tablet 1    ferrous gluconate 324 (38 Fe) MG tablet TAKE 1 TABLET (324 MG) BY MOUTH 2 TIMES A DAY. 180 tablet 1    furosemide (Lasix) 40 mg tablet TAKE 1 TABLET BY MOUTH EVERY DAY 90 tablet 1    gabapentin (Neurontin) 300 mg capsule Take 1 capsule (300 mg) by mouth 2 times a day. 60 capsule 3    losartan (Cozaar) 25 mg tablet Take 1 tablet (25 mg) by mouth once daily. as directed 90 tablet 1    metFORMIN  mg 24 hr tablet TAKE 2 TABLETS BY MOUTH EVERY DAY**REPLACES  tablet 0    metoprolol tartrate (Lopressor) 25 mg tablet Take 1 tablet (25 mg) by mouth 2 times a day. 60 tablet 11    naloxone (Narcan) 4 mg/0.1 mL nasal spray Administer 1 spray (4 mg) into affected nostril(s) if needed for opioid reversal or respiratory depression. May repeat every 2-3 minutes if needed, alternating nostrils, until medical assistance becomes available. (Patient not taking: Reported on 2024) 2 each 0    pantoprazole (ProtoNix) 40 mg EC tablet TAKE 1 TABLET BY MOUTH EVERY DAY 90 tablet 1    rosuvastatin (Crestor) 40 mg tablet TAKE 1 TABLET BY MOUTH EVERY DAY 90 tablet 1    traMADol (Ultram) 50 mg tablet Take 1 tablet (50 mg) by mouth 2 times a day. 60 tablet 1     No current facility-administered medications on file prior to visit.           Assessment and Plan       Saritha Cormier  is a 73 y.o. female who has past medical history of HTN, HLD, CHF, AV stenosis s/p TAVR, T2DM, GERD, peptic ulcers, breast cancer s/p bilateral mastectomy, OA, gout, spinal stenosis presents  for initial consultation for CKD management.     # Chronic kidney disease - G4A2  - Baseline SCr 1.4-1.7 in 2024  - Most recent Scr 2.43, GFR 21  - ACR 67.2 in June 2024  -CKD is most likely related to diabetic nephropathy, atherosclerotic cardiovascular disease.  Multiple JA 2/2 hemodynamic instability   - Urine dipstick in office today protein 2+ and trace blood  - Prior urine analysis negative in June   - No prior kidney images  - Lytes : wnl  -Pantoprazole 40mg   -Order kidney US, urine electrolytes, repeat labs in 1 week with holding medication and increased hydration. Treating pt as if she has JA, will attempt to return GFR back to baseline.     # BP - today at office 113/69  - Current meds: Lasix 40mg, Losartan 25mg, metoprolol 25mg  - Temporary hold for losartan, BP log for 1 week  - Stop Lasix temporarily. Watch for ankle swelling. Can restart in future if necessary.    #Iron Deficiency Anemia Hb-10.9   - RDW elevated, MCHC low  - Check iron levels  -Ferrous gluconate 324mg bid    #(CKD)-MBD  - Ca 9 with normal albumin, Vit D wnl    # [facility]  - On statins  -DAPT for valve replacement    #Gout  -Allopurinol 200mg bid--hold for GFR preservation  -Uric acid 5.5     #Alkalosis  -bicarb 33    #Diabetes  -Metformin XR 500mg--hold GFR <30  -Gabapentin 300mg bid  -HbA1c 5.8      #Others  - No NSAIDs, no contrast as possible. If to be done- we recommend holding ACEi/ARBS/diuretics 24 hrs prior to contrast exposure and ensure appropriate hydration   - Ensure well hydration  - Limit salt in diet  - No smoking      Patient received CKD education and counseling. Plan to hold allopurinol, metformin, losartan, and lasix, will repeat labs in a week. Please obtain UA and labs prior to next office visit in 3 months.       DEMETRIS Roman IV

## 2024-08-30 ENCOUNTER — TELEPHONE (OUTPATIENT)
Dept: NEPHROLOGY | Facility: CLINIC | Age: 74
End: 2024-08-30
Payer: MEDICARE

## 2024-09-03 ENCOUNTER — ANESTHESIA EVENT (OUTPATIENT)
Dept: OPERATING ROOM | Facility: HOSPITAL | Age: 74
End: 2024-09-03
Payer: MEDICARE

## 2024-09-05 ENCOUNTER — LAB (OUTPATIENT)
Dept: LAB | Facility: LAB | Age: 74
End: 2024-09-05
Payer: MEDICARE

## 2024-09-05 DIAGNOSIS — N06.0 ISOLATED PROTEINURIA WITH MINOR GLOMERULAR ABNORMALITY: ICD-10-CM

## 2024-09-05 DIAGNOSIS — N18.4 ANEMIA DUE TO STAGE 4 CHRONIC KIDNEY DISEASE (MULTI): ICD-10-CM

## 2024-09-05 DIAGNOSIS — M10.00 IDIOPATHIC GOUT, UNSPECIFIED CHRONICITY, UNSPECIFIED SITE: ICD-10-CM

## 2024-09-05 DIAGNOSIS — N18.31 STAGE 3A CHRONIC KIDNEY DISEASE (MULTI): ICD-10-CM

## 2024-09-05 DIAGNOSIS — I10 BENIGN ESSENTIAL HYPERTENSION: ICD-10-CM

## 2024-09-05 DIAGNOSIS — D63.1 ANEMIA DUE TO STAGE 4 CHRONIC KIDNEY DISEASE (MULTI): ICD-10-CM

## 2024-09-05 DIAGNOSIS — N18.4 CKD (CHRONIC KIDNEY DISEASE) STAGE 4, GFR 15-29 ML/MIN (MULTI): ICD-10-CM

## 2024-09-05 LAB
ALBUMIN SERPL BCP-MCNC: 3.6 G/DL (ref 3.4–5)
ANION GAP SERPL CALC-SCNC: 11 MMOL/L (ref 10–20)
BUN SERPL-MCNC: 26 MG/DL (ref 6–23)
CALCIUM SERPL-MCNC: 9.8 MG/DL (ref 8.6–10.3)
CHLORIDE SERPL-SCNC: 103 MMOL/L (ref 98–107)
CO2 SERPL-SCNC: 30 MMOL/L (ref 21–32)
CREAT SERPL-MCNC: 1.6 MG/DL (ref 0.5–1.05)
EGFRCR SERPLBLD CKD-EPI 2021: 34 ML/MIN/1.73M*2
GLUCOSE SERPL-MCNC: 112 MG/DL (ref 74–99)
PHOSPHATE SERPL-MCNC: 4.8 MG/DL (ref 2.5–4.9)
POTASSIUM SERPL-SCNC: 5.2 MMOL/L (ref 3.5–5.3)
SODIUM SERPL-SCNC: 139 MMOL/L (ref 136–145)

## 2024-09-05 PROCEDURE — 36415 COLL VENOUS BLD VENIPUNCTURE: CPT

## 2024-09-05 RX ORDER — POLYETHYLENE GLYCOL 3350 17 G/17G
17 POWDER, FOR SOLUTION ORAL DAILY
Qty: 119 G | Refills: 0 | Status: SHIPPED | OUTPATIENT
Start: 2024-09-05

## 2024-09-05 RX ORDER — DOCUSATE SODIUM 100 MG/1
100 CAPSULE, LIQUID FILLED ORAL 2 TIMES DAILY
Qty: 20 CAPSULE | Refills: 0 | Status: SHIPPED | OUTPATIENT
Start: 2024-09-05 | End: 2024-09-15

## 2024-09-05 RX ORDER — OXYCODONE AND ACETAMINOPHEN 5; 325 MG/1; MG/1
1 TABLET ORAL EVERY 4 HOURS PRN
Qty: 36 TABLET | Refills: 0 | Status: SHIPPED | OUTPATIENT
Start: 2024-09-05 | End: 2024-09-11

## 2024-09-05 RX ORDER — DOXYCYCLINE 100 MG/1
100 CAPSULE ORAL 2 TIMES DAILY
Qty: 20 CAPSULE | Refills: 0 | OUTPATIENT
Start: 2024-09-05 | End: 2024-09-15

## 2024-09-05 NOTE — DISCHARGE INSTRUCTIONS
Resume Aspirin and Plavix tomorrow 9/10/24  Take Doxycycline antibiotic until completed  Do not take Tramadol and Percocet together     TOTAL HIP AND KNEE REPLACEMENT DISCHARGE INSTRUCTIONS  Aleksandar Angulo MD      DRIVING & TRAVEL AFTER SURGERY   Patients should anticipate waiting at least 4-6 weeks before traveling long distances after surgery.  You will need to stop to walk around ever 1 hour during your travel to help with blood clot prevention.  Please call the office or your joint nurse to discuss prior to post-surgical travel.  Patients may not drive until cleared by the joint nurse or the office.    DENTAL PROCEDURES & CLEANINGS  You must wait a minimum of 3 months for elective dental appointments, including routine cleanings or dental work including bridges, crowns, extractions, etc..  For any dental visit - cleaning or dental procedures - patients must take an antibiotic 1 hour before the appointment.  Antibiotics are a lifelong need before dental appointments.  The antibiotic prescribed will be based on each patient's allergies.    WOUND CARE  If you experience continued drainage or bleeding, you may cover with abdominal pads (purchase at local drug store).  Knee replacements should wrap with an ace wrap.  Do not remove surgical dressing, it will be removed when you arrive in clinic for follow up visit.   Mesh dressing under the bandage will remain in place until it peels off on its own.  If it is loose, you may gently remove.  Do not remove if pulling causes resistance against the incision.      PAIN, SWELLING, BRUISING & CLICKING  Pain and swelling are a natural part of your recovery which is considered normal fur up to a year after surgery.  Symptoms may be treated with movement, ice, compression stockings, elevating your leg, and by following the pain medication regimen as prescribed.  Bruising is normal for several weeks after surgery and will run down the leg over time.  You may ice areas that are  tender to help with discomfort.  You are required to wear the provided compression stockings, every day, for 4 weeks following surgery.  Remove the stockings at night and place them back on in the morning.  Pain and swelling may temporarily increase with an increase in activity or exercise.  Use ice after activity.  Audible clicking with movement or exercises is considered normal following joint replacement.  You may also feel decreased sensation or numbness near the incision site.  These are usually normal and may or may not fade over time.     PERSONAL HYGIENE  You may shower upon discharging from the hospital.  Soap and water is permitted to run over the surgical dressing, steri-strips and incision.  Do not scrub directly over these items.  DO NOT soak your incision in a bath, hot tub, pool or pond/lake for a minimum of 8 weeks following your surgery.  DO NOT use lotions, creams, ointments on your wound for a minimum of 6 weeks following your surgery. At that time you may use vitamin E to assist with softening of your incision.      RESTARTING HOME ROUTINE - DIET & MEDICATIONS  Post-operative constipation can result due to a combination of inactivity, anesthesia and pain medication. To help prevent this, you should increase your water and fiber intake. Physical activity such as walking will also help stimulate the bowels.   You may resume your normal diet when you discharge home.  Choose foods that help promote good bowel habits and prevent constipation, such as foods high in fiber.  You may restart your home medications the following day after your surgery UNLESS you have been given alternate instructions.  Follow the instructions given to you on your hospital discharge instructions for more information regarding your home medications.      IN-HOME PHYSICAL THERAPY & OUTPATIENT PHYSICAL THERAPY  In-home physical therapy will start 1-2 days after you get home from the hospital.    The home care agency will call  within the first 24-48 hours to set up their first visit.  Please do not call your care team to inquire during this timeframe.  Continue the exercises you were given in the hospital until you have been seen by in-home therapy.  Make sure to provide a phone number with the ability for the home care staff to leave a message if you do not answer your phone.    Outpatient physical therapy following knee replacement surgery should begin 2-3 weeks after surgery.  You should call to schedule this appointment ASAP if not already scheduled before surgery.  Waiting until you are ready for outpatient physical therapy will cause a delay in your care.  You may choose any outpatient physical therapy location.  Call the office for an order if needed.    EMERGENCIES - WHEN TO CONTACT THE SURGEON'S OFFICE IMMEDIATELY  Fever >101 with chills that has been present for at least 48 hours.   Excessive bleeding from incision that will not slow down. A small amount of drainage is normal and expected.  Once pressure is applied and the area is covered, do not continue to check the area regularly.  This will remove pressure and bleeding will continue.  Leave in place for 4-6 hours.  Signs of infection of incision-excessive drainage that is soaking through your dressing (especially if it is pus-like), redness that is spreading out from the edges of your incision, or increased warmth around the area.  Excruciating pain for which the pain medication, taken as instructed, is not helping.  Severe calf pain.  Go directly to the emergency room or call 911, if you are experiencing chest pain or difficulty breathing.    ICE & COLD THERAPY INSTRUCTIONS    To assist with pain control and post-op swelling, you should be using ice regularly throughout recovery, especially for the first 6 weeks, regardless of the cold therapy method you use.      Always make sure there is a layer of protection between the cold pad and your skin.    If you are using ICE  PACKS or GEL PACKS, you will need to alternate 20 minutes on, 20 minutes off twice per hour.    If you are using an ICE MACHINE, please follow the provided ice machine instructions.  These devices differ from ice or ice packs whereas the mechanism circulates water through tubing and a pad to provide longer periods of cold therapy to the desired site.  You can use your cold devices around the clock for optimal comfort.  We recommend using cold therapy after working with therapy or completing exercises on your own.  There is no set schedule in which you must follow while using cold therapy.  Below are a few points to remember when using a cold therapy device:    You do not need to need to use the 20 on, 20 off method.  Detach the pad from the cooler and ambulate at least once every hour.  You can check your skin under the pad at this time.  You may wear the cold therapy device during periods of sleep including overnight.  If you wake up during the night, you can check the skin at this time.  You do not need to wake up specifically to perform skin checks.  Empty the cooler and pad when device is not in use.  Follow 's instructions for cleaning your cold therapy device.      DISCHARGE MEDICATIONS    PAIN MEDICATION    __X__ Oxycodone-acetaminophen  Oxycodone-acetaminophen has been prescribed for post-operative pain control.    These medications may only be refilled if neededONCE every 7 days for a period of up to 6 weeks following surgery.  After 6 weeks, you will transition to acetaminophen and over -the- counter anti-inflammatories such as Ibuprofen, Advil or Aleve in conjunction with ICE/COLD THERAPY.   Side effects may be constipation and nausea, vomiting, sleepiness, dizziness, lightheadedness, headache, blurred vision, dry mouth sweating, itching (if you have itching, over-the -counter Benadryl can be used as needed).  You may NOT operate a motor vehicle while taking these medications or have been  cleared by your care team.    Please call the office for a refill request.  The office staff and orthopedic nurses cannot refill medications; messages should be left directly through the office.  Please do not call multiple times or call other members of the care team for medication needs, this will cause the refill to take longer.    Per State and Institutional policy, pain medications can only be refilled every 7 days for up to six weeks following surgery.   .    _______ Naproxen has been prescribed as an adjunct anti-inflammatory to assist in pain control.    Take one 500 mg tablet twice a day for 4 weeks.  You will not receive refills on this medication.   Side effects may include nausea.  May not be prescribed if you are on a more potent blood thinner than aspirin or have chronic kidney disease.    BLOOD THINNER    __X__ Blood Thinner   A blood thinner has been prescribed to prevent blood clots in your leg or lungs. Take as prescribed on the bottle for 4 weeks. You will not receive a refill on this medication.      STOOL SOFTENERS    __X__ Colace (Docusate Sodium)   Take this medication to help with constipation while using the Oxycodone for pain control.  You will not receive a refill on this medication.  If you have not had a bowel movement for 2 days after surgery if you feel painful constipation, you may try over-the-counter Miralax or a suppository. If you still are not able to have a bowel movement, call our office or discuss with your PCP. If you have preexisting constipation or other GI issues that may predispose you to constipation, please consult with your GI provider or PCP if you have not had a bowel movement for 2 days after surgery.     Antibiotics    __X__ Cefadroxil or doxycycline  May be prescribed if needed for prophylaxis against infection   Take 7 day course of antibiotics until they are all gone  May not be prescribed if you do not meet criteria for elevated infection risk after  surgery          You will not receive refills on the following medications:    Naproxen  Colace  Blood Thinner

## 2024-09-06 ENCOUNTER — TELEPHONE (OUTPATIENT)
Dept: CARDIOLOGY | Facility: CLINIC | Age: 74
End: 2024-09-06

## 2024-09-06 ENCOUNTER — OFFICE VISIT (OUTPATIENT)
Dept: PAIN MEDICINE | Facility: HOSPITAL | Age: 74
End: 2024-09-06
Payer: MEDICARE

## 2024-09-06 ENCOUNTER — TELEPHONE (OUTPATIENT)
Dept: NEPHROLOGY | Facility: CLINIC | Age: 74
End: 2024-09-06
Payer: MEDICARE

## 2024-09-06 VITALS
TEMPERATURE: 97.2 F | RESPIRATION RATE: 16 BRPM | SYSTOLIC BLOOD PRESSURE: 130 MMHG | OXYGEN SATURATION: 97 % | HEART RATE: 86 BPM | DIASTOLIC BLOOD PRESSURE: 73 MMHG | HEIGHT: 62 IN | BODY MASS INDEX: 36.77 KG/M2 | WEIGHT: 199.8 LBS

## 2024-09-06 DIAGNOSIS — G89.29 CHRONIC PAIN OF LEFT KNEE: ICD-10-CM

## 2024-09-06 DIAGNOSIS — Z02.9 ENCOUNTER FOR OPIATE ANALGESIC USE AGREEMENT: Primary | Chronic | ICD-10-CM

## 2024-09-06 DIAGNOSIS — M25.562 CHRONIC PAIN OF LEFT KNEE: ICD-10-CM

## 2024-09-06 DIAGNOSIS — M17.12 ARTHRITIS OF KNEE, LEFT: ICD-10-CM

## 2024-09-06 PROCEDURE — 99214 OFFICE O/P EST MOD 30 MIN: CPT | Performed by: NURSE PRACTITIONER

## 2024-09-06 RX ORDER — TRAMADOL HYDROCHLORIDE 50 MG/1
50 TABLET ORAL 2 TIMES DAILY
Qty: 60 TABLET | Refills: 1 | Status: SHIPPED | OUTPATIENT
Start: 2024-09-06

## 2024-09-06 ASSESSMENT — ENCOUNTER SYMPTOMS
EYES NEGATIVE: 1
GASTROINTESTINAL NEGATIVE: 1
ENDOCRINE NEGATIVE: 1
NECK PAIN: 0
JOINT SWELLING: 0
ARTHRALGIAS: 1
PSYCHIATRIC NEGATIVE: 1
CARDIOVASCULAR NEGATIVE: 1
NEUROLOGICAL NEGATIVE: 1
RESPIRATORY NEGATIVE: 1
MYALGIAS: 1
CONSTITUTIONAL NEGATIVE: 1
ALLERGIC/IMMUNOLOGIC NEGATIVE: 1
NECK STIFFNESS: 0

## 2024-09-06 ASSESSMENT — PAIN SCALES - GENERAL: PAINLEVEL: 2

## 2024-09-06 NOTE — PATIENT INSTRUCTIONS
Continue taking gabapentin 300 mg twice a day.  Do not take sedating medications together.    I sent 60 tabs of tramadol to your pharmacy with 1 refill.  Continue taking 1 pill 1-2 times a day as needed for pain relief.  Do not take sedating medications together.    Your surgeon will place you on a different pain pill after your hip surgery.  Take that Percocet until gone.  Do not take tramadol while you are on Percocet.  You may resume the tramadol after you completed your Percocet prescription.  Do not take 2 pain pills together.  If you have any questions please call our office.      I will see you for your follow-up visit in November.    ---Risks and side effects of chronic opioid therapy, including but not limited to tolerance, dependence, constipation, hyperalgesia, cognitive side effects, addiction, and possible death due to overuse and or misuse, were discussed.   Medications, when co-administered with other sedative agents, including but not limited to alcohol, benzodiazepines, sedatives or hypnotics, and illegal drugs, could pose life-threatening consequences, including death.   Such medication could impact if you have obstructive sleep apnea. I recommend that you continue using apnea devices if ordered by other physicians.   Being compliant with the treatment plan and the terms of the opioid agreement to effectively and safely treat the pain is important.  Being responsible with the medications and taking these only as prescribed, never in excess, and never for reasons other than pain reduction.   Keep the medications safe and locked away from children and other adults, as well as disposal methods and options. The patient understood the risks and instructions. ---

## 2024-09-06 NOTE — TELEPHONE ENCOUNTER
----- Message from Nelly Little sent at 9/5/2024  1:46 PM EDT -----  Please call [facility]  Kidney function improved from 21 up to 34%.  Cleared for surgery.  Follow-up after surgery as planned  Dr. Sidney TINAJERO

## 2024-09-06 NOTE — PROGRESS NOTES
Subjective   Saritha Cormier is a pleasant 73 y.o. female who is here for medication refill.  Patient is ambulatory with the use of cane.  Gait is slow but steady.  She arrives by herself.    Patient continues to have chronic pain to her left hip.  She rates her pain as 2 out of 10 with rest.  Pain increases with activities like walking, standing or certain motion.  She describes it as aching and can be sharp depending on location.  She denies pain, numbness or tingling sensation to her legs.  She denies leg weakness or change in balance.  She denies bowel or bladder incontinence.  She denies recent falls, injuries, or ER visits.  There has been no changes since she was last seen.    Patient continues to take gabapentin 300 mg twice a day.  She takes tramadol 1 pill 1-2 times daily and last dose was last night.  She has 10 pills left during actual count.  Patient denies side effects to her medications.  She voiced significant impact of her pain medicine to her left hip pain.    Patient is scheduled for a left hip surgery this Monday with Dr. Angulo.  Based on chart review, she is going to be on Percocet.  I had a lengthy discussion with her about not taking 2 pain pills together.  She can take the Percocet after her surgery until done and may resume the tramadol.  Patient voiced understanding.    I have reviewed previous notes and imaging.  I discussed the plan of care.  I will see her for follow-up visit.  Questions were answered during this encounter.    I have reviewed the OARRS report.  Last fill date of tramadol was on 7/10/2024 where she received 60 tabs.  No suspicious activities.  Toxicology reports based on previous drug screens were consistent with no compliance issues.    The patient was counseled regarding diagnostic results, instructions for management, risk factor reductions, risks and benefits of treatment options and importance of compliance with treatment.      OARRS:  Debra Christian, CATHERINE-MISAEL,  DNP on 9/6/2024  9:32 AM  I have personally reviewed the OARRS report for Saritha Cormier. I have considered the risks of abuse, dependence, addiction and diversion and I believe that it is clinically appropriate for Saritha Cormier to be prescribed this medication    Review of Systems   Constitutional: Negative.    HENT: Negative.     Eyes: Negative.    Respiratory: Negative.     Cardiovascular: Negative.    Gastrointestinal: Negative.    Endocrine: Negative.    Genitourinary: Negative.    Musculoskeletal:  Positive for arthralgias and myalgias. Negative for gait problem, joint swelling, neck pain and neck stiffness.   Skin: Negative.    Allergic/Immunologic: Negative.    Neurological: Negative.    Psychiatric/Behavioral: Negative.            /73 (BP Location: Right arm, Patient Position: Sitting, BP Cuff Size: Large adult)   Pulse 86   Temp 36.2 °C (97.2 °F) (Tympanic)   Resp 16   Wt 90.6 kg (199 lb 12.8 oz)   SpO2 97%  Body mass index is 36.54 kg/m².      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    Surgical History  Past Surgical History:   Procedure Laterality Date    AORTIC VALVE REPLACEMENT      CARDIAC CATHETERIZATION N/A 03/29/2024    Procedure: Left And Right Heart Cath, With LV;  Surgeon: Javier Reilly MD;  Location: [place] [address] [facility];  Service: Cardiovascular;  Laterality:  N/A;    CARDIAC CATHETERIZATION N/A 4/30/2024    Procedure: TAVR (Transcatheter AV Replacement);  Surgeon: Jessica Stallworth MD;  Location: [place] [address] Cardiac Cath [facility];  Service: Cardiovascular;  Laterality: N/A;  4/30 0730    CARDIAC CATHETERIZATION N/A 4/30/2024    Procedure: TVP for TAVR;  Surgeon: Jessica Stallworth MD;  Location: [place] [address] Cardiac Cath Lab;  Service: Cardiovascular;  Laterality: N/A;    HYSTERECTOMY  09/21/2015    Hysterectomy    JOINT REPLACEMENT Right     Right total hip    MASTECTOMY  09/21/2015    Breast Surgery Mastectomy    OTHER SURGICAL HISTORY  09/21/2015    Breast Surgery Reconstruction        Social History  She reports that she has never smoked. She has never used smokeless tobacco. She reports that she does not drink alcohol and does not use drugs.    Family History  Family History   Problem Relation Name Age of Onset    Gout Mother      Hypertension Mother      Arthritis Father      Other (cardiac disorder) Father      Cancer Father      Tongue cancer Brother      Other (jaw neoplasm) Brother      Breast cancer Sibling      Pancreatic cancer Sibling          Allergies  Patient has no known allergies.    MEDICATIONS:    Current Outpatient Medications:     acetaminophen (Tylenol) 325 mg tablet, Take 2 tablets (650 mg) by mouth every 6 hours if needed for mild pain (1 - 3) or moderate pain (4 - 6)., Disp: 30 tablet, Rfl: 0    aspirin 81 mg EC tablet, TAKE 1 TABLET BY MOUTH EVERY DAY, Disp: 90 tablet, Rfl: 1    clopidogrel (Plavix) 75 mg tablet, TAKE 1 TABLET BY MOUTH EVERY DAY, Disp: 90 tablet, Rfl: 1    ferrous gluconate 324 (38 Fe) MG tablet, TAKE 1 TABLET (324 MG) BY MOUTH 2 TIMES A DAY., Disp: 180 tablet, Rfl: 1    gabapentin (Neurontin) 300 mg capsule, Take 1 capsule (300 mg) by mouth 2 times a day., Disp: 60 capsule, Rfl: 3    metoprolol tartrate (Lopressor) 25 mg tablet, Take 1 tablet (25 mg) by mouth 2 times a day., Disp: 60 tablet, Rfl: 11    naloxone (Narcan) 4 mg/0.1 mL  nasal spray, Administer 1 spray (4 mg) into affected nostril(s) if needed for opioid reversal or respiratory depression. May repeat every 2-3 minutes if needed, alternating nostrils, until medical assistance becomes available., Disp: 2 each, Rfl: 0    oxyCODONE-acetaminophen (Percocet) 5-325 mg tablet, Take 1 tablet by mouth every 4 hours if needed for severe pain (7 - 10) for up to 6 days., Disp: 36 tablet, Rfl: 0    pantoprazole (ProtoNix) 40 mg EC tablet, TAKE 1 TABLET BY MOUTH EVERY DAY, Disp: 90 tablet, Rfl: 1    rosuvastatin (Crestor) 40 mg tablet, TAKE 1 TABLET BY MOUTH EVERY DAY, Disp: 90 tablet, Rfl: 1    allopurinol (Zyloprim) 100 mg tablet, TAKE 2 TABLETS BY MOUTH ONCE DAILY. (Patient not taking: Reported on 9/6/2024), Disp: 180 tablet, Rfl: 1    cholecalciferol (Vitamin D-3) 125 MCG (5000 UT) capsule, TAKE 1 CAPSULE (125 MCG) BY MOUTH ONCE DAILY. (Patient not taking: Reported on 9/6/2024), Disp: 90 capsule, Rfl: 1    docusate sodium (Colace) 100 mg capsule, Take 1 capsule (100 mg) by mouth 2 times a day for 10 days. (Patient not taking: Reported on 9/6/2024), Disp: 20 capsule, Rfl: 0    doxycycline (Monodox) 100 mg capsule, Take 1 capsule (100 mg) by mouth 2 times a day for 10 days. Take with at least 8 ounces (large glass) of water, do not lie down for 30 minutes after (Patient not taking: Reported on 9/6/2024), Disp: 20 capsule, Rfl: 0    furosemide (Lasix) 40 mg tablet, TAKE 1 TABLET BY MOUTH EVERY DAY (Patient not taking: Reported on 9/6/2024), Disp: 90 tablet, Rfl: 1    losartan (Cozaar) 25 mg tablet, Take 1 tablet (25 mg) by mouth once daily. as directed (Patient not taking: Reported on 9/6/2024), Disp: 90 tablet, Rfl: 1    metFORMIN  mg 24 hr tablet, TAKE 2 TABLETS BY MOUTH EVERY DAY**REPLACES OSM (Patient not taking: Reported on 9/6/2024), Disp: 180 tablet, Rfl: 0    polyethylene glycol (Glycolax, Miralax) 17 gram/dose powder, Take 17 g by mouth once daily. (Patient not taking: Reported on  9/6/2024), Disp: 119 g, Rfl: 0    traMADol (Ultram) 50 mg tablet, Take 1 tablet (50 mg) by mouth 2 times a day., Disp: 60 tablet, Rfl: 1    Physical Exam  Vitals and nursing note reviewed.   HENT:      Head: Normocephalic.      Nose: Nose normal.   Eyes:      Extraocular Movements: Extraocular movements intact.      Conjunctiva/sclera: Conjunctivae normal.      Pupils: Pupils are equal, round, and reactive to light.   Cardiovascular:      Rate and Rhythm: Normal rate and regular rhythm.   Pulmonary:      Effort: Pulmonary effort is normal.      Breath sounds: Normal breath sounds.   Musculoskeletal:         General: Tenderness present. No swelling, deformity or signs of injury.      Cervical back: No rigidity or tenderness.      Lumbar back: Tenderness present.      Right lower leg: No edema.      Left lower leg: No edema.      Comments: Positive left leg raise eliciting hip pain.  Negative for tenderness on palpation of the lumbar region.  Positive for tenderness on palpation at the left femoral hip joint.  BUE 4-5/5, BLE 4/5.   Skin:     General: Skin is warm and dry.   Neurological:      General: No focal deficit present.      Mental Status: She is alert and oriented to person, place, and time.   Psychiatric:         Mood and Affect: Mood normal.         Behavior: Behavior normal.            Pain Management Panel  More data may exist         Latest Ref Rng & Units 7/9/2024 4/22/2024   Pain Management Panel   Amphetamine Screen, Urine Presumptive Negative Presumptive Negative  Presumptive Negative    Barbiturate Screen, Urine Presumptive Negative Presumptive Negative  Presumptive Negative    Codeine IgE <50 ng/mL <50  <50    Hydromorphone Urine <25 ng/mL <25  <25    Morphine  <50 ng/mL <50  <50       Details                      Assessment/Plan   Problem List Items Addressed This Visit             ICD-10-CM    Arthritis of knee, left M17.12    Relevant Medications    traMADol (Ultram) 50 mg tablet    Encounter for  opiate analgesic use agreement - Primary Z02.9    Relevant Medications    traMADol (Ultram) 50 mg tablet    Chronic pain of left knee M25.562, G89.29    Relevant Medications    traMADol (Ultram) 50 mg tablet           Plan/Follow-up Instructions:    Continue taking gabapentin 300 mg twice a day.  Do not take sedating medications together.    I sent 60 tabs of tramadol to your pharmacy with 1 refill.  Continue taking 1 pill 1-2 times a day as needed for pain relief.  Do not take sedating medications together.    Your surgeon will place you on a different pain pill after your hip surgery.  Take that Percocet until gone.  Do not take tramadol while you are on Percocet.  You may resume the tramadol after you completed your Percocet prescription.  Do not take 2 pain pills together.  If you have any questions please call our office.      I will see you for your follow-up visit in November.    ---Risks and side effects of chronic opioid therapy, including but not limited to tolerance, dependence, constipation, hyperalgesia, cognitive side effects, addiction, and possible death due to overuse and or misuse, were discussed.   Medications, when co-administered with other sedative agents, including but not limited to alcohol, benzodiazepines, sedatives or hypnotics, and illegal drugs, could pose life-threatening consequences, including death.   Such medication could impact if you have obstructive sleep apnea. I recommend that you continue using apnea devices if ordered by other physicians.   Being compliant with the treatment plan and the terms of the opioid agreement to effectively and safely treat the pain is important.  Being responsible with the medications and taking these only as prescribed, never in excess, and never for reasons other than pain reduction.   Keep the medications safe and locked away from children and other adults, as well as disposal methods and options. The patient understood the risks and instructions.  ---      Time     Prep time on date of the patient encounter: 2 minutes  Time spent directly with patient/family/caregiver: 30 minutes  Documentation time: 2 minutes  Total time on date of patient encounter: 34 minutes      --------------  Disclaimer: This note was created using voice recognition software. It was not corrected for typographical or grammatical errors, inadvertent word insertion, or any unintended errors. Please feel free to contact me for clarification.  --------------      Debra Christian, PIPPA, APRN, FNP-C   [facility]  Office #: [phone]  Fax # [phone]

## 2024-09-06 NOTE — PREADMISSION SCREENING NOTE
Pre Surgery Discharge Planning :  Procedure:  L SIOBHAN    Date of procedure:  9/9/2024    Address you will be discharged to:  [facility] [address]    Care Partner:  / WILLI    Current mobility status:  CANE    Stairs at home:  5 INTO HOUSE     DME:   CANE, WALKER, ICE MACHINE    Joint Class:  YES  Same Day Surgery:  OVERNIGHT LAST TIME     Concerns:

## 2024-09-06 NOTE — TELEPHONE ENCOUNTER
Pt asked when she should stop ASA and Plavix prior to her procedure on Monday. Per Dr. Reilly pt should stop them now. Pt verbalizes understanding of this no further questions.

## 2024-09-06 NOTE — PROGRESS NOTES
Date of the last Controlled Substance Agreement: 05/07/2024       Last urine drug screening date/ordered today: 07/09/2024     Results of last screen: as expected      Last opioid risk screening date/ordered today: 04/22/2024      Pain Scale Screening:   Pain Assessment and Documentation Tool (PADT)   Date of Assessment: 09/06/2024  Analgesia:   Patient reports her pain level on average during the past week is 2on a 0 - 10 scale.   Patient reports that her pain level at its worst during the past week was 3 on a 0 -10 scale.   95% of pain has been relieved during the past week per patient   Patient states that the amount of pain relief she is now obtaining from her current pain reliever(s) is enough to make a real difference in her life.   Query to clinician: Is the patient's pain relief clinically significant? yes  Activities of Daily Living:   Physical functioning: better  Family relationships: better  Social relationships: better  Mood: better  Sleep patterns: better  Overall functioning: better  Adverse Events: No, Saritha Cormier is not experiencing side effects from current pain reliever.  Patients overall severity of side effect:none  Specific Analgesic Plan: Continue present regimen.

## 2024-09-08 ENCOUNTER — HOME HEALTH ADMISSION (OUTPATIENT)
Dept: HOME HEALTH SERVICES | Facility: HOME HEALTH | Age: 74
End: 2024-09-08
Payer: MEDICARE

## 2024-09-09 ENCOUNTER — TELEPHONE (OUTPATIENT)
Dept: ORTHOPEDIC SURGERY | Facility: CLINIC | Age: 74
End: 2024-09-09

## 2024-09-09 ENCOUNTER — ANESTHESIA (OUTPATIENT)
Dept: OPERATING ROOM | Facility: HOSPITAL | Age: 74
End: 2024-09-09
Payer: MEDICARE

## 2024-09-09 ENCOUNTER — TELEPHONE (OUTPATIENT)
Dept: PAIN MEDICINE | Facility: HOSPITAL | Age: 74
End: 2024-09-09

## 2024-09-09 ENCOUNTER — APPOINTMENT (OUTPATIENT)
Dept: CARDIOLOGY | Facility: CLINIC | Age: 74
End: 2024-09-09
Payer: MEDICARE

## 2024-09-09 ENCOUNTER — APPOINTMENT (OUTPATIENT)
Dept: RADIOLOGY | Facility: HOSPITAL | Age: 74
End: 2024-09-09
Payer: MEDICARE

## 2024-09-09 ENCOUNTER — HOSPITAL ENCOUNTER (OUTPATIENT)
Facility: HOSPITAL | Age: 74
Setting detail: OUTPATIENT SURGERY
Discharge: HOME HEALTH | End: 2024-09-09
Attending: ORTHOPAEDIC SURGERY | Admitting: ORTHOPAEDIC SURGERY
Payer: MEDICARE

## 2024-09-09 VITALS
OXYGEN SATURATION: 89 % | BODY MASS INDEX: 37.73 KG/M2 | WEIGHT: 205.03 LBS | HEIGHT: 62 IN | HEART RATE: 63 BPM | DIASTOLIC BLOOD PRESSURE: 83 MMHG | TEMPERATURE: 98.1 F | SYSTOLIC BLOOD PRESSURE: 184 MMHG | RESPIRATION RATE: 16 BRPM

## 2024-09-09 DIAGNOSIS — Z00.00 ENCOUNTER FOR GENERAL ADULT MEDICAL EXAMINATION WITHOUT ABNORMAL FINDINGS: ICD-10-CM

## 2024-09-09 DIAGNOSIS — G89.29 CHRONIC PAIN OF LEFT KNEE: ICD-10-CM

## 2024-09-09 DIAGNOSIS — M25.562 CHRONIC PAIN OF LEFT KNEE: ICD-10-CM

## 2024-09-09 DIAGNOSIS — Z02.9 ENCOUNTER FOR OPIATE ANALGESIC USE AGREEMENT: Chronic | ICD-10-CM

## 2024-09-09 DIAGNOSIS — I50.9 HEART FAILURE, UNSPECIFIED (MULTI): ICD-10-CM

## 2024-09-09 DIAGNOSIS — M17.12 ARTHRITIS OF KNEE, LEFT: ICD-10-CM

## 2024-09-09 DIAGNOSIS — M25.552 LEFT HIP PAIN: Primary | ICD-10-CM

## 2024-09-09 DIAGNOSIS — M16.12 ARTHRITIS OF LEFT HIP: ICD-10-CM

## 2024-09-09 DIAGNOSIS — J69.0: Primary | ICD-10-CM

## 2024-09-09 PROBLEM — N18.9 CHRONIC RENAL INSUFFICIENCY: Status: ACTIVE | Noted: 2024-09-09

## 2024-09-09 LAB — GLUCOSE BLD MANUAL STRIP-MCNC: 89 MG/DL (ref 74–99)

## 2024-09-09 PROCEDURE — 2500000005 HC RX 250 GENERAL PHARMACY W/O HCPCS: Performed by: NURSE PRACTITIONER

## 2024-09-09 PROCEDURE — 71045 X-RAY EXAM CHEST 1 VIEW: CPT | Performed by: RADIOLOGY

## 2024-09-09 PROCEDURE — 82947 ASSAY GLUCOSE BLOOD QUANT: CPT

## 2024-09-09 PROCEDURE — 71045 X-RAY EXAM CHEST 1 VIEW: CPT

## 2024-09-09 PROCEDURE — 2500000004 HC RX 250 GENERAL PHARMACY W/ HCPCS (ALT 636 FOR OP/ED): Performed by: ANESTHESIOLOGY

## 2024-09-09 PROCEDURE — 2500000004 HC RX 250 GENERAL PHARMACY W/ HCPCS (ALT 636 FOR OP/ED): Performed by: NURSE PRACTITIONER

## 2024-09-09 RX ORDER — ALBUTEROL SULFATE 0.83 MG/ML
2.5 SOLUTION RESPIRATORY (INHALATION) ONCE AS NEEDED
Status: DISCONTINUED | OUTPATIENT
Start: 2024-09-09 | End: 2024-09-09 | Stop reason: HOSPADM

## 2024-09-09 RX ORDER — SODIUM CHLORIDE, SODIUM LACTATE, POTASSIUM CHLORIDE, CALCIUM CHLORIDE 600; 310; 30; 20 MG/100ML; MG/100ML; MG/100ML; MG/100ML
100 INJECTION, SOLUTION INTRAVENOUS CONTINUOUS
Status: DISCONTINUED | OUTPATIENT
Start: 2024-09-09 | End: 2024-09-09 | Stop reason: HOSPADM

## 2024-09-09 RX ORDER — CLOPIDOGREL BISULFATE 75 MG/1
75 TABLET ORAL DAILY
Start: 2024-09-10

## 2024-09-09 RX ORDER — SODIUM CHLORIDE 9 MG/ML
100 INJECTION, SOLUTION INTRAVENOUS CONTINUOUS
Status: DISCONTINUED | OUTPATIENT
Start: 2024-09-09 | End: 2024-09-09 | Stop reason: HOSPADM

## 2024-09-09 RX ORDER — ACETAMINOPHEN 325 MG/1
650 TABLET ORAL EVERY 4 HOURS PRN
Status: DISCONTINUED | OUTPATIENT
Start: 2024-09-09 | End: 2024-09-09 | Stop reason: HOSPADM

## 2024-09-09 RX ORDER — TRAMADOL HYDROCHLORIDE 50 MG/1
50 TABLET ORAL 2 TIMES DAILY
Qty: 60 TABLET | Refills: 1 | Status: SHIPPED | OUTPATIENT
Start: 2024-09-09

## 2024-09-09 RX ORDER — ACETAMINOPHEN 325 MG/1
975 TABLET ORAL ONCE
Status: COMPLETED | OUTPATIENT
Start: 2024-09-09 | End: 2024-09-09

## 2024-09-09 RX ORDER — TRANEXAMIC ACID 100 MG/ML
1000 INJECTION, SOLUTION INTRAVENOUS ONCE
Status: DISCONTINUED | OUTPATIENT
Start: 2024-09-09 | End: 2024-09-09 | Stop reason: HOSPADM

## 2024-09-09 RX ORDER — VANCOMYCIN HYDROCHLORIDE 1 G/200ML
1000 INJECTION, SOLUTION INTRAVENOUS ONCE
Status: COMPLETED | OUTPATIENT
Start: 2024-09-09 | End: 2024-09-09

## 2024-09-09 RX ORDER — ONDANSETRON HYDROCHLORIDE 2 MG/ML
8 INJECTION, SOLUTION INTRAVENOUS ONCE
Status: DISCONTINUED | OUTPATIENT
Start: 2024-09-09 | End: 2024-09-09 | Stop reason: HOSPADM

## 2024-09-09 RX ORDER — ASPIRIN 81 MG/1
81 TABLET ORAL DAILY
Start: 2024-09-10

## 2024-09-09 SDOH — HEALTH STABILITY: MENTAL HEALTH: CURRENT SMOKER: 0

## 2024-09-09 NOTE — TELEPHONE ENCOUNTER
Patients surgery LT SIOBHAN was canceled with Dr. Angulo today due to fluid on her lungs. Dr. Angulo asks that patient follow up with a pulmonologist, referral was placed and I left a patient a message. Information is below in case she calls back.    Refer to Provider: BRANDO MCCLURE  Address: [address]   Phone: [phone]  Fax: [phone]

## 2024-09-09 NOTE — SIGNIFICANT EVENT
Saritha GRANGER Genia was seen in preop and was fount to have an oxygen sat of 89%. She was instructed to take big breaths and went up to 92/93%. Her previously recorded oxygen saturations were 97%, but it seems that was after encouraged deep breaths as well. She did mention that she was instructed to be on oxygen, but doesn't use it.   Portable chest xray showed a smaller right lung field with a higher diaphragm and what looks like increase opacity on the right side when compared to previous.   Discussed with surgeon, patient, and colleague, decision to send her to pulmonology before proceeding with her hip replacement. We instructed her to continue her home oxygen. Will reschedule.

## 2024-09-09 NOTE — TELEPHONE ENCOUNTER
Pt called stating her pharmacy said her tramadol rx has been cancelled.  Pt is wondering why this is, she was supposed to have a procedure today  with Dr. Angulo, but had to discontinue it due to low O2 sats. X-ray showed water on her lungs.  Please advise about rx.     Veronica Coles MA

## 2024-09-09 NOTE — ANESTHESIA PREPROCEDURE EVALUATION
Patient: Saritha Cormier    Procedure Information       Date/Time: 09/09/24 0710    Procedure: ARTHROPLASTY TOTAL HIP ANTERIOR APPROACH (Left: Hip)    [facility]: [facility] / [facility]    Surgeons: Aleksandar Angulo MD            Relevant Problems   Cardiac  Sp TAVR in April   (+) Abnormal EKG   (+) Benign essential hypertension   (+) Nonrheumatic aortic valve stenosis      Pulmonary  O2 sat at 90      Neuro   (+) Acute lumbar radiculopathy   (+) Lumbar radiculitis      GI   (+) GERD (gastroesophageal reflux disease)   (+) Peptic ulcer without hemorrhage or perforation      /Renal   (+) Chronic renal insufficiency      Liver (within normal limits)      Endocrine   (+) Class 2 obesity with body mass index (BMI) of 37.0 to 37.9 in adult   (+) Diabetes mellitus type II, controlled (Multi)   (+) Type 2 diabetes mellitus with stage 3a chronic kidney disease, without long-term current use of insulin (Multi)      Hematology   (+) Anemia      Musculoskeletal   (+) Spinal stenosis, lumbar      GYN   (+) Breast cancer (Multi)       Clinical information reviewed:    Allergies  Meds     OB Status           NPO Detail:  NPO/Void Status  Date of Last Liquid: 09/08/24  Time of Last Liquid: 1600  Date of Last Solid: 09/08/24  Time of Last Solid: 1600  Last Intake Type: Food; Clear fluids  Time of Last Void: 0545         Physical Exam    Airway  Mallampati: IV  TM distance: >3 FB  Neck ROM: full     Cardiovascular   Rhythm: regular  Rate: normal     Dental    Pulmonary   (+) decreased breath sounds  Comments: Decreased breath sounds on the right   Abdominal            Anesthesia Plan    History of general anesthesia?: yes  History of complications of general anesthesia?: yes    ASA 3     general     The patient is not a current smoker.    intravenous induction   Anesthetic plan and risks discussed with patient.  Use of blood products discussed with patient who consented to blood products.    Plan discussed with  CRNA.

## 2024-09-09 NOTE — PERIOPERATIVE NURSING NOTE
Stan' o2  sat was 96 on oxygen, repeated pulse ox 10min after oxygen removal and she was back to her baseline 88-91% prior to discharge, Dr Frances aware. Patient denies SOB.

## 2024-09-12 ENCOUNTER — OFFICE VISIT (OUTPATIENT)
Dept: PULMONOLOGY | Facility: CLINIC | Age: 74
End: 2024-09-12
Payer: MEDICARE

## 2024-09-12 VITALS
WEIGHT: 207 LBS | BODY MASS INDEX: 37.86 KG/M2 | HEART RATE: 65 BPM | RESPIRATION RATE: 16 BRPM | SYSTOLIC BLOOD PRESSURE: 157 MMHG | OXYGEN SATURATION: 88 % | DIASTOLIC BLOOD PRESSURE: 83 MMHG

## 2024-09-12 DIAGNOSIS — J96.02 ACUTE RESPIRATORY FAILURE WITH HYPERCAPNIA (MULTI): ICD-10-CM

## 2024-09-12 DIAGNOSIS — R06.83 SNORES: ICD-10-CM

## 2024-09-12 DIAGNOSIS — R09.02 HYPOXIA: Primary | ICD-10-CM

## 2024-09-12 DIAGNOSIS — J98.6 DIAPHRAGM DYSFUNCTION: ICD-10-CM

## 2024-09-12 PROCEDURE — 3048F LDL-C <100 MG/DL: CPT | Performed by: INTERNAL MEDICINE

## 2024-09-12 PROCEDURE — 1126F AMNT PAIN NOTED NONE PRSNT: CPT | Performed by: INTERNAL MEDICINE

## 2024-09-12 PROCEDURE — 3060F POS MICROALBUMINURIA REV: CPT | Performed by: INTERNAL MEDICINE

## 2024-09-12 PROCEDURE — 1159F MED LIST DOCD IN RCRD: CPT | Performed by: INTERNAL MEDICINE

## 2024-09-12 PROCEDURE — 1036F TOBACCO NON-USER: CPT | Performed by: INTERNAL MEDICINE

## 2024-09-12 PROCEDURE — 3077F SYST BP >= 140 MM HG: CPT | Performed by: INTERNAL MEDICINE

## 2024-09-12 PROCEDURE — 3079F DIAST BP 80-89 MM HG: CPT | Performed by: INTERNAL MEDICINE

## 2024-09-12 PROCEDURE — 99204 OFFICE O/P NEW MOD 45 MIN: CPT | Performed by: INTERNAL MEDICINE

## 2024-09-12 PROCEDURE — 99214 OFFICE O/P EST MOD 30 MIN: CPT | Performed by: INTERNAL MEDICINE

## 2024-09-12 ASSESSMENT — PATIENT HEALTH QUESTIONNAIRE - PHQ9
1. LITTLE INTEREST OR PLEASURE IN DOING THINGS: NOT AT ALL
SUM OF ALL RESPONSES TO PHQ9 QUESTIONS 1 AND 2: 0
2. FEELING DOWN, DEPRESSED OR HOPELESS: NOT AT ALL

## 2024-09-12 ASSESSMENT — COLUMBIA-SUICIDE SEVERITY RATING SCALE - C-SSRS
1. IN THE PAST MONTH, HAVE YOU WISHED YOU WERE DEAD OR WISHED YOU COULD GO TO SLEEP AND NOT WAKE UP?: NO
6. HAVE YOU EVER DONE ANYTHING, STARTED TO DO ANYTHING, OR PREPARED TO DO ANYTHING TO END YOUR LIFE?: NO
2. HAVE YOU ACTUALLY HAD ANY THOUGHTS OF KILLING YOURSELF?: NO

## 2024-09-12 ASSESSMENT — PAIN SCALES - GENERAL: PAINLEVEL: 0-NO PAIN

## 2024-09-12 ASSESSMENT — ENCOUNTER SYMPTOMS: DEPRESSION: 0

## 2024-09-12 NOTE — PROGRESS NOTES
Department of Medicine  Division of Pulmonary, Critical Care, and Sleep Medicine  Consultation  [facility] - [address], [address]      Physician HPI (9/12/2024):   Pleasant 73-year-old woman with history of severe AS post TAVR, diastolic heart failure, chronic hypoxia-ordered oxygen about 2 years ago but has not been using it, presenting for hypoxia.  Patient went for hip surgery on 9/9, was hypoxemic preop, surgery postponed and patient presenting today for evaluation.  Patient does have dyspnea on exertion which improved after valve repair in April but worsening again, dyspnea worse with laying down.  No productive cough, no fever, no wheezing or chest pain.  Reported snoring, fatigue and sleepiness during the day, had sleep apnea test ordered few years ago but never been done.  All other systems reviewed and negative otherwise.    Never smoked.      Immunization History   Administered Date(s) Administered    Flu vaccine, quadrivalent, high-dose, preservative free, age 65y+ (FLUZONE) 09/14/2023    Hep B, Unspecified 02/13/2001, 03/13/2001, 12/11/2001    Influenza, Unspecified 10/15/2015, 10/11/2016, 12/14/2017, 09/24/2018, 10/03/2019, 10/21/2020, 10/28/2021    Influenza, seasonal, injectable 10/31/2007, 10/21/2013, 10/09/2014, 10/01/2015, 12/07/2022    Pfizer COVID-19 vaccine, Fall 2023, 12 years and older, (30mcg/0.3mL) 11/09/2023    Pfizer Purple Cap SARS-CoV-2 04/01/2021, 04/20/2021, 11/23/2021    Pneumococcal conjugate vaccine, 13-valent (PREVNAR 13) 10/03/2019    Pneumococcal polysaccharide vaccine, 23-valent, age 2 years and older (PNEUMOVAX 23) 01/01/2014    Td (adult), unspecified 11/09/2006    Tdap vaccine, age 7 year and older (BOOSTRIX, ADACEL) 10/07/2009    Zoster vaccine, recombinant, adult (SHINGRIX) 05/24/2019, 06/13/2019, 01/28/2020       Past Medical History:   Diagnosis Date    Acquired absence of bilateral breasts and nipples 01/31/2022    History of bilateral mastectomy     Arthritis     Cancer (Multi)     CKD (chronic kidney disease)     stage 3    Coronary artery disease     Diabetes mellitus (Multi)     Heart valve disease     Hyperlipidemia     Hypertension     Neoplasm of unspecified behavior of bone, soft tissue, 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/2015    Skin growth    Other bursitis of hip, right hip     Bursitis of right hip, unspecified bursa    Personal history of other diseases of the respiratory system 03/14/2016    History of chronic sinusitis    Shortness of breath     Trigger finger, unspecified finger 06/22/2018    Acquired trigger finger    Type 2 diabetes mellitus (Multi)     Unilateral primary osteoarthritis, right knee 02/19/2021    Arthritis of knee, right       Past Surgical History:   Procedure Laterality Date    AORTIC VALVE REPLACEMENT      CARDIAC CATHETERIZATION N/A 03/29/2024    Procedure: Left And Right Heart Cath, With LV;  Surgeon: Javier Reilly MD;  Location: [place] [address] Cardiac Cath Lab;  Service: Cardiovascular;  Laterality: N/A;    CARDIAC CATHETERIZATION N/A 4/30/2024    Procedure: TAVR (Transcatheter AV Replacement);  Surgeon: Jessica Stallworth MD;  Location: [place] [address] Cardiac Cath Lab;  Service: Cardiovascular;  Laterality: N/A;  4/30 0730    CARDIAC CATHETERIZATION N/A 4/30/2024    Procedure: TVP for TAVR;  Surgeon: Jessica Stallworth MD;  Location: [place] [address] Cardiac Cath Lab;  Service: Cardiovascular;  Laterality: N/A;    HYSTERECTOMY  09/21/2015    Hysterectomy    JOINT REPLACEMENT Right     Right total hip    MASTECTOMY  09/21/2015    Breast Surgery Mastectomy    OTHER SURGICAL HISTORY  09/21/2015    Breast Surgery Reconstruction       Family History   Problem Relation Name Age of Onset    Gout Mother      Hypertension Mother      Arthritis Father      Other (cardiac disorder) Father      Cancer Father      Tongue cancer Brother      Other (jaw neoplasm) Brother      Breast cancer Sibling      Pancreatic cancer Sibling         Social History     Tobacco  "Use    Smoking status: Never    Smokeless tobacco: Never   Vaping Use    Vaping status: Never Used   Substance Use Topics    Alcohol use: Never    Drug use: Never       Occupational & Environmental History:        Current Medications:  Current Outpatient Medications   Medication Instructions    aspirin 81 mg, oral, Daily    clopidogrel (PLAVIX) 75 mg, oral, Daily    docusate sodium (COLACE) 100 mg, oral, 2 times daily    doxycycline (MONODOX) 100 mg, oral, 2 times daily, Take with at least 8 ounces (large glass) of water, do not lie down for 30 minutes after    ferrous gluconate (FERGON) 324 mg, oral, 2 times daily    gabapentin (NEURONTIN) 300 mg, oral, 2 times daily    metoprolol tartrate (LOPRESSOR) 25 mg, oral, 2 times daily    pantoprazole (PROTONIX) 40 mg, oral, Daily    polyethylene glycol (GLYCOLAX, MIRALAX) 17 g, oral, Daily    rosuvastatin (CRESTOR) 40 mg, oral, Daily    traMADol (ULTRAM) 50 mg, oral, 2 times daily        Drug Allergies/Intolerances:  No Known Allergies     Visit Vitals  /83   Pulse 65   Resp 16   Wt 93.9 kg (207 lb)   SpO2 (!) 88%   BMI 37.86 kg/m²   OB Status Hysterectomy   Smoking Status Never   BSA 2.03 m²        Physical exam  Constitutional: Normal appearance.  HEENT: Normocephalic and atraumatic.  Cardiovascular: Normal rate and regular rhythm.  Pulmonary: Normal respiratory effort, bilateral clear breath sounds, no wheezing or rhonchi.  Musculoskeletal: No edema, no cyanosis.  Neurological: Awake, alert and oriented x3.  Psychiatric: Normal behavior, mood and affect.    Pulmonary Function Test Results     Pulmonary Functions Testing Results:    No results found for: \"FEV1\", \"FVC\", \"ZLS4URC\", \"TLC\", \"DLCO\"     Chest Radiograph     XR chest 1 view 09/09/2024    Narrative  Interpreted By:  Brandt Bland,  STUDY:  XR CHEST 1 VIEW; 9/9/2024 8:05 am    INDICATION:  Signs/Symptoms:diminished breath sounds right    COMPARISON:  May 2020    ACCESSION " "NUMBER(S):  [id number]    ORDERING CLINICIAN:  PAOLA ABEBE    FINDINGS:  The study is limited due to  poor inspiratory effort, with resultant  crowding of the pulmonary vasculature. The cardiac silhouette is  within normal limits for the technique. Stent is again seen in the  proximal ascending thoracic aorta. Atherosclerotic calcifications  involve the aortic arch. There is elevation of the right  hemidiaphragm. There is no pneumothorax, confluent infiltrates or  significant effusion. The osseous structures are unchanged.    Impression  Allowing for the aforementioned limitation, no acute cardiopulmonary  disease.    Signed by: Brandt Bland 9/9/2024 9:38 AM  Dictation workstation:   [id number]      Echocardiogram     No results found for this or any previous visit from the past 365 days.       Chest CT Scan     No results found for this or any previous visit from the past 365 days.       Laboratory Studies     Lab Results   Component Value Date    WBC 9.5 08/26/2024    HGB 10.9 (L) 08/26/2024    HCT 38.5 08/26/2024    MCV 96 08/26/2024     08/26/2024      Lab Results   Component Value Date    GLUCOSE 112 (H) 09/05/2024    CALCIUM 9.8 09/05/2024     09/05/2024    K 5.2 09/05/2024    CO2 30 09/05/2024     09/05/2024    BUN 26 (H) 09/05/2024    CREATININE 1.60 (H) 09/05/2024      Lab Results   Component Value Date    ALT 6 (L) 08/26/2024    AST 11 08/26/2024    ALKPHOS 89 08/26/2024    BILITOT 0.5 08/26/2024      Protime   Date/Time Value Ref Range Status   03/16/2021 08:25 AM 14.2 (H) 10.1 - 13.3 sec Final     INR   Date/Time Value Ref Range Status   03/16/2021 08:25 AM 1.2 (H) 0.9 - 1.1 Final     No results found for: \"ICIGE\", \"IGE\", \"ICA04\", \"ASPFU\", \"IGG\", \"IGA\", \"IGM\"    Sputum Culture     No results found for: \"AFBCX\"   No results found for: \"RESPCULTCYFI\"  Susceptibility data from last 90 days.  Collected Specimen Info Organism   08/26/24 Swab from Nares/Axilla/Groin Methicillin " Resistant Staphylococcus aureus (MRSA)           Assessment and Plan / Recommendations   Pleasant 73-year-old woman with history of severe AS post TAVR, diastolic heart failure, chronic hypoxia-ordered oxygen about 2 years ago but has not been using it, presenting for hypoxia.  Patient went for hip surgery on 9/9, was hypoxemic preop, surgery postponed and patient presenting today for evaluation.  Dyspnea/hypoxia likely multifactorial due to diastolic heart failure/cardiac, sleep apnea, right diaphragm dysfunction (elevated right diaphragm on chest x-rays at least since 2022), deconditioning.  Further evaluation with  -PFT with ABG, sitting and lying down spirometry, 6-minute walk test  -Sleep study  -Sniff test  -oxygen 2 L with activity/at night pending above testing  -Return to clinic in 1 month or sooner with any concerns    This dictation was created using voice recognition software. Phonetic and/or minor grammatical errors may exist.    Mary Mercedes MD  09/12/2024

## 2024-09-15 ENCOUNTER — HOSPITAL ENCOUNTER (EMERGENCY)
Facility: HOSPITAL | Age: 74
Discharge: HOME | End: 2024-09-15
Attending: EMERGENCY MEDICINE
Payer: MEDICARE

## 2024-09-15 VITALS
SYSTOLIC BLOOD PRESSURE: 127 MMHG | DIASTOLIC BLOOD PRESSURE: 46 MMHG | OXYGEN SATURATION: 97 % | TEMPERATURE: 97.2 F | HEART RATE: 51 BPM | WEIGHT: 200 LBS | RESPIRATION RATE: 18 BRPM | HEIGHT: 62 IN | BODY MASS INDEX: 36.8 KG/M2

## 2024-09-15 DIAGNOSIS — S81.811A LACERATION OF RIGHT LOWER EXTREMITY, INITIAL ENCOUNTER: ICD-10-CM

## 2024-09-15 DIAGNOSIS — T14.8XXA HEMATOMA: ICD-10-CM
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
## 2024-09-15 PROCEDURE — 2500000004 HC RX 250 GENERAL PHARMACY W/ HCPCS (ALT 636 FOR OP/ED)

## 2024-09-15 PROCEDURE — 99283 EMERGENCY DEPT VISIT LOW MDM: CPT | Mod: 25

## 2024-09-15 PROCEDURE — 90715 TDAP VACCINE 7 YRS/> IM: CPT

## 2024-09-15 PROCEDURE — 90471 IMMUNIZATION ADMIN: CPT

## 2024-09-15 RX ORDER — DOXYCYCLINE 100 MG/1
100 TABLET ORAL 2 TIMES DAILY
Qty: 14 TABLET | Refills: 0 | Status: SHIPPED | OUTPATIENT
Start: 2024-09-15 | End: 2024-09-22

## 2024-09-15 ASSESSMENT — PAIN SCALES - GENERAL: PAINLEVEL_OUTOF10: 1

## 2024-09-15 ASSESSMENT — PAIN - FUNCTIONAL ASSESSMENT: PAIN_FUNCTIONAL_ASSESSMENT: 0-10

## 2024-09-15 ASSESSMENT — PAIN DESCRIPTION - LOCATION: LOCATION: LEG

## 2024-09-15 ASSESSMENT — PAIN DESCRIPTION - ORIENTATION: ORIENTATION: RIGHT

## 2024-09-15 NOTE — ED PROVIDER NOTES
HPI   Chief Complaint   Patient presents with    Fall     Pt had mechanical fall yesterday, tripped over something in living room and fell, injuring R lower leg, under the knee leg, pt states she has a gouge in R leg, bruising and swelling. Pt did not hit her head, no LOC, pt is on plavix. Pt pulse ox is currently 89-92% RA. Pt states she has been seeing her pulmonologist and they told her she needs to start wearing oxygen at night       HPI  Patient is a 73-year-old female on Plavix, presenting to the ED today for an injury to her right shin.  Patient explains that yesterday, she tripped over the rug in her living room and cut her right shin on a nearby cardboard box.  She notes that she cleaned and wrapped up the wound at home.  She was able to control the bleeding throughout the night.  Today while walking around however, the wound started bleeding again.  She notes that the wound bled through her bandages, so she came to the ED for further management.  Patient's injury occurred approximately 19 hours ago.  She denies any other areas of pain or injury.  Patient does not know when her last tetanus shot was.      Patient History   Past Medical History:   Diagnosis Date    Acquired absence of bilateral breasts and nipples 01/31/2022    History of bilateral mastectomy    Arthritis     Cancer (Multi)     CKD (chronic kidney disease)     stage 3    Coronary artery disease     Diabetes mellitus (Multi)     Heart valve disease     Hyperlipidemia     Hypertension     Neoplasm of unspecified behavior of bone, soft tissue, 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/2015    Skin growth    Other bursitis of hip, right hip     Bursitis of right hip, unspecified bursa    Personal history of other diseases of the respiratory system 03/14/2016    History of chronic sinusitis    Shortness of breath     Trigger finger, unspecified finger 06/22/2018    Acquired trigger finger    Type 2 diabetes mellitus (Multi)     Unilateral primary osteoarthritis, right knee  02/19/2021    Arthritis of knee, right     Past Surgical History:   Procedure Laterality Date    AORTIC VALVE REPLACEMENT      CARDIAC CATHETERIZATION N/A 03/29/2024    Procedure: Left And Right Heart Cath, With LV;  Surgeon: Javier Reilly MD;  Location: [place] [address] Cardiac Cath Lab;  Service: Cardiovascular;  Laterality: N/A;    CARDIAC CATHETERIZATION N/A 4/30/2024    Procedure: TAVR (Transcatheter AV Replacement);  Surgeon: Jessica Stallworth MD;  Location: [place] [address] Cardiac Cath Lab;  Service: Cardiovascular;  Laterality: N/A;  4/30 0730    CARDIAC CATHETERIZATION N/A 4/30/2024    Procedure: TVP for TAVR;  Surgeon: Jessica Stallworth MD;  Location: [place] [address] Cardiac Cath Lab;  Service: Cardiovascular;  Laterality: N/A;    HYSTERECTOMY  09/21/2015    Hysterectomy    JOINT REPLACEMENT Right     Right total hip    MASTECTOMY  09/21/2015    Breast Surgery Mastectomy    OTHER SURGICAL HISTORY  09/21/2015    Breast Surgery Reconstruction     Family History   Problem Relation Name Age of Onset    Gout Mother      Hypertension Mother      Arthritis Father      Other (cardiac disorder) Father      Cancer Father      Tongue cancer Brother      Other (jaw neoplasm) Brother      Breast cancer Sibling      Pancreatic cancer Sibling       Social History     Tobacco Use    Smoking status: Never    Smokeless tobacco: Never   Vaping Use    Vaping status: Never Used   Substance Use Topics    Alcohol use: Never    Drug use: Never       Physical Exam   ED Triage Vitals   Temperature Heart Rate Respirations BP   09/15/24 0951 09/15/24 0951 09/15/24 0951 09/15/24 0951   36.2 °C (97.2 °F) 58 18 143/59      Pulse Ox Temp Source Heart Rate Source Patient Position   09/15/24 0951 09/15/24 0951 09/15/24 1039 --   (!) 90 % Temporal Monitor       BP Location FiO2 (%)     -- --             Physical Exam  Vitals and nursing note reviewed.   Constitutional:       General: She is not in acute distress.     Appearance: She is not  toxic-appearing.   HENT:      Head: Normocephalic.      Mouth/Throat:      Mouth: Mucous membranes are moist.   Eyes:      Extraocular Movements: Extraocular movements intact.      Conjunctiva/sclera: Conjunctivae normal.   Cardiovascular:      Rate and Rhythm: Normal rate and regular rhythm.      Pulses: Normal pulses.   Pulmonary:      Effort: Pulmonary effort is normal. No respiratory distress.      Breath sounds: Normal breath sounds. No wheezing.   Abdominal:      General: There is no distension.      Palpations: Abdomen is soft.   Musculoskeletal:         General: No swelling.      Cervical back: Neck supple.   Skin:     Capillary Refill: Capillary refill takes less than 2 seconds.      Comments: 1.5 cm angulated laceration present over the right anterior shin.  Bleeding is currently controlled by applying pressure.  There is an underlying hematoma and ecchymosis measuring approximately 4 cm in diameter.  2+ DP and PT pulses distally, with good capillary refill, and full active range of motion.  No bony tenderness to palpation.  Ambulatory without difficulty.   Neurological:      General: No focal deficit present.      Mental Status: She is alert. Mental status is at baseline.           ED Course & MDM   Diagnoses as of 09/15/24 1530   Fall, initial encounter   Laceration of right lower extremity, initial encounter   Hematoma             No data recorded     [place] Coma Scale Score: 15 (09/15/24 1043 : Samara Matthews RN)                         Medical Decision Making  Patient was seen and evaluated for a wound to her right anterior shin.  Patient was initially having difficulty controlling the bleeding at home as she is on Plavix.  However, bleeding here is controlled by applying pressure.  Vital signs are unremarkable.  Patient is administered Tdap here in the ED.  As patient is now 19 hours out from her injury, laceration repair with sutures is contraindicated.  Patient will be started on a course  of doxycycline for infection prevention of her wound.  All questions and concerns were answered. Discharge planning with close outpatient follow-up was discussed at this time, to which the patient was agreeable. Strict return precautions were given, and patient was discharged home in stable condition.    Procedure  Procedures     Miguelina MIRELES MD  09/15/24 1540

## 2024-09-15 NOTE — ED TRIAGE NOTES
Pt had mechanical fall yesterday, tripped over something in living room and fell, injuring R lower leg, under the knee leg, pt states she has a gouge in R leg, bruising and swelling. Pt did not hit her head, no LOC, pt is on plavix. Pt pulse ox is currently 89-92% RA. Pt states she has been seeing her pulmonologist and they told her she needs to start wearing oxygen at night

## 2024-09-17 ENCOUNTER — HOSPITAL ENCOUNTER (OUTPATIENT)
Dept: RADIOLOGY | Facility: HOSPITAL | Age: 74
End: 2024-09-17
Payer: MEDICARE

## 2024-09-17 ENCOUNTER — HOSPITAL ENCOUNTER (OUTPATIENT)
Dept: RADIOLOGY | Facility: HOSPITAL | Age: 74
Discharge: HOME | End: 2024-09-17
Payer: MEDICARE

## 2024-09-17 ENCOUNTER — PHARMACY VISIT (OUTPATIENT)
Dept: PHARMACY | Facility: CLINIC | Age: 74
End: 2024-09-17
Payer: COMMERCIAL

## 2024-09-17 ENCOUNTER — OFFICE VISIT (OUTPATIENT)
Dept: PRIMARY CARE | Facility: CLINIC | Age: 74
End: 2024-09-17
Payer: MEDICARE

## 2024-09-17 VITALS
SYSTOLIC BLOOD PRESSURE: 110 MMHG | TEMPERATURE: 96.7 F | OXYGEN SATURATION: 99 % | HEART RATE: 83 BPM | DIASTOLIC BLOOD PRESSURE: 62 MMHG

## 2024-09-17 DIAGNOSIS — L02.415 CELLULITIS AND ABSCESS OF RIGHT LOWER EXTREMITY: ICD-10-CM

## 2024-09-17 DIAGNOSIS — R22.41 LOCALIZED SWELLING OF RIGHT LOWER EXTREMITY: ICD-10-CM

## 2024-09-17 DIAGNOSIS — E11.22 TYPE 2 DIABETES MELLITUS WITH STAGE 3A CHRONIC KIDNEY DISEASE, WITHOUT LONG-TERM CURRENT USE OF INSULIN (MULTI): ICD-10-CM

## 2024-09-17 DIAGNOSIS — L02.415 CELLULITIS AND ABSCESS OF RIGHT LOWER EXTREMITY: Primary | ICD-10-CM

## 2024-09-17 DIAGNOSIS — M10.9 GOUT, UNSPECIFIED CAUSE, UNSPECIFIED CHRONICITY, UNSPECIFIED SITE: ICD-10-CM

## 2024-09-17 DIAGNOSIS — N18.31 TYPE 2 DIABETES MELLITUS WITH STAGE 3A CHRONIC KIDNEY DISEASE, WITHOUT LONG-TERM CURRENT USE OF INSULIN (MULTI): ICD-10-CM

## 2024-09-17 DIAGNOSIS — J96.02 ACUTE RESPIRATORY FAILURE WITH HYPERCAPNIA (MULTI): ICD-10-CM

## 2024-09-17 DIAGNOSIS — L03.115 CELLULITIS AND ABSCESS OF RIGHT LOWER EXTREMITY: ICD-10-CM

## 2024-09-17 DIAGNOSIS — L03.115 CELLULITIS AND ABSCESS OF RIGHT LOWER EXTREMITY: Primary | ICD-10-CM

## 2024-09-17 DIAGNOSIS — N18.4 CHRONIC RENAL DISEASE, STAGE IV (MULTI): ICD-10-CM

## 2024-09-17 DIAGNOSIS — Z95.2 S/P TAVR (TRANSCATHETER AORTIC VALVE REPLACEMENT): ICD-10-CM

## 2024-09-17 PROCEDURE — 93971 EXTREMITY STUDY: CPT

## 2024-09-17 PROCEDURE — 76000 FLUOROSCOPY <1 HR PHYS/QHP: CPT

## 2024-09-17 PROCEDURE — 1159F MED LIST DOCD IN RCRD: CPT | Performed by: INTERNAL MEDICINE

## 2024-09-17 PROCEDURE — 99214 OFFICE O/P EST MOD 30 MIN: CPT | Performed by: INTERNAL MEDICINE

## 2024-09-17 PROCEDURE — 1036F TOBACCO NON-USER: CPT | Performed by: INTERNAL MEDICINE

## 2024-09-17 PROCEDURE — 3048F LDL-C <100 MG/DL: CPT | Performed by: INTERNAL MEDICINE

## 2024-09-17 PROCEDURE — 1160F RVW MEDS BY RX/DR IN RCRD: CPT | Performed by: INTERNAL MEDICINE

## 2024-09-17 PROCEDURE — 93971 EXTREMITY STUDY: CPT | Performed by: RADIOLOGY

## 2024-09-17 PROCEDURE — 3060F POS MICROALBUMINURIA REV: CPT | Performed by: INTERNAL MEDICINE

## 2024-09-17 PROCEDURE — 3074F SYST BP LT 130 MM HG: CPT | Performed by: INTERNAL MEDICINE

## 2024-09-17 PROCEDURE — 3078F DIAST BP <80 MM HG: CPT | Performed by: INTERNAL MEDICINE

## 2024-09-17 PROCEDURE — RXMED WILLOW AMBULATORY MEDICATION CHARGE

## 2024-09-17 RX ORDER — CEPHALEXIN 500 MG/1
500 CAPSULE ORAL 2 TIMES DAILY
Qty: 20 CAPSULE | Refills: 0 | Status: SHIPPED | OUTPATIENT
Start: 2024-09-17 | End: 2024-09-17

## 2024-09-17 RX ORDER — CEPHALEXIN 500 MG/1
500 CAPSULE ORAL 2 TIMES DAILY
Qty: 20 CAPSULE | Refills: 0 | Status: SHIPPED | OUTPATIENT
Start: 2024-09-17 | End: 2024-09-27

## 2024-09-17 ASSESSMENT — ENCOUNTER SYMPTOMS
COUGH: 0
FATIGUE: 0
FEVER: 0
ARTHRALGIAS: 0
BLOOD IN STOOL: 0
BRUISES/BLEEDS EASILY: 0
DIARRHEA: 0
SORE THROAT: 0
DIFFICULTY URINATING: 0
DIZZINESS: 0
SINUS PAIN: 0
WOUND: 1
HEADACHES: 0
UNEXPECTED WEIGHT CHANGE: 0
WHEEZING: 0
PALPITATIONS: 0
ABDOMINAL PAIN: 0

## 2024-09-17 NOTE — PROGRESS NOTES
Subjective   Patient ID: Saritha Cormier is a 73 y.o. female who presents for Leg Injury (Fall, right leg, went to ER day after unable to stitch due to too long of wait, will not stop bleeding ).    -Patient fell at home had skin tear patient went to the emergency room after 24 hours unable to stitch the skin given dressing started on doxycycline discharged home comes today increased leg swelling and pain  - Right lower extremity swelling cannot rule out DVT,  Results showed no DVT patient having did not change receptor change every 48 hours send patient stat ultrasound leg elevated follow-up if no improvement  - Worsening cellulitis continue on doxycycline and Keflex 3 times a day  Follow-up with patient closely if any worsening may need to go to the ER  - Dressing placed today  -- CKD stable avoid any NSAIDs increase fluid intake low-salt diet monitor weight closely  Patient underwent uneventful surgery for TAVR r continue with aspirin Plavix doing well.  - Severe left hip arthritis patient scheduled surgery now for total hip replacement with Dr. Angulo in 3 months.  -Pain control continue with gabapentin and tramadol from pain management  - History of bilateral mastectomy compensated.  - Diabetes continue with metformin..  - Chronic gout compensated continue with allopurinol  -CHF compensated continue with Lasix.  Counseled about low-salt diet.  - Coronary artery disease compensated follow-up cardiology follow-up closely as needed           Review of Systems   Constitutional:  Negative for fatigue, fever and unexpected weight change.   HENT:  Negative for congestion, ear discharge, ear pain, mouth sores, sinus pain and sore throat.    Eyes:  Negative for visual disturbance.   Respiratory:  Negative for cough and wheezing.    Cardiovascular:  Negative for chest pain, palpitations and leg swelling.   Gastrointestinal:  Negative for abdominal pain, blood in stool and diarrhea.   Genitourinary:  Negative for  difficulty urinating.   Musculoskeletal:  Negative for arthralgias.   Skin:  Positive for rash and wound.   Neurological:  Negative for dizziness and headaches.   Hematological:  Does not bruise/bleed easily.   Psychiatric/Behavioral:  Negative for behavioral problems.    All other systems reviewed and are negative.      Objective   Lab Results   Component Value Date    HGBA1C 5.8 06/25/2024      /62   Pulse 83   Temp 35.9 °C (96.7 °F)   SpO2 99%   === 08/29/24 ===    US RENAL COMPLETE    - Impression -  No hydronephrosis bilaterally.    Urinary bladder suboptimally distended for evaluation. No focal  bladder abnormality demonstrated.    MACRO:  None.    Signed by: Santy Cardona 8/29/2024 2:28 PM  Dictation workstation:   [id number]   Physical Exam  Vitals and nursing note reviewed.   Constitutional:       Appearance: Normal appearance.   HENT:      Head: Normocephalic.      Nose: Nose normal.   Eyes:      Conjunctiva/sclera: Conjunctivae normal.      Pupils: Pupils are equal, round, and reactive to light.   Cardiovascular:      Rate and Rhythm: Regular rhythm.   Pulmonary:      Effort: Pulmonary effort is normal.      Breath sounds: Normal breath sounds.   Abdominal:      General: Abdomen is flat.      Palpations: Abdomen is soft.   Musculoskeletal:      Cervical back: Neck supple.      Right lower leg: Edema present.      Left lower leg: No edema.   Skin:     General: Skin is warm.      Findings: Erythema (Right lower extremity swelling and redness tenderness) and rash present.   Neurological:      General: No focal deficit present.      Mental Status: She is oriented to person, place, and time.   Psychiatric:         Mood and Affect: Mood normal.       Assessment/Plan   Saritha was seen today for leg injury.  Diagnoses and all orders for this visit:  Cellulitis and abscess of right lower extremity (Primary)  -     Vascular US lower extremity venous duplex right; Future  -     cephalexin (Keflex)  500 mg capsule; Take 1 capsule (500 mg) by mouth 2 times a day for 10 days.  Localized swelling of right lower extremity  S/P TAVR (transcatheter aortic valve replacement)  Type 2 diabetes mellitus with stage 3a chronic kidney disease, without long-term current use of insulin (Multi)  Chronic renal disease, stage IV (Multi)  Gout, unspecified cause, unspecified chronicity, unspecified site   -Patient fell at home had skin tear patient went to the emergency room after 24 hours unable to stitch the skin given dressing started on doxycycline discharged home comes today increased leg swelling and pain  - Right lower extremity swelling cannot rule out DVT,  Results showed no DVT patient having did not change receptor change every 48 hours send patient stat ultrasound leg elevated follow-up if no improvement  - Worsening cellulitis continue on doxycycline and Keflex 3 times a day  Follow-up with patient closely if any worsening may need to go to the ER  - Dressing placed today  -- CKD stable avoid any NSAIDs increase fluid intake low-salt diet monitor weight closely  Patient underwent uneventful surgery for TAVR r continue with aspirin Plavix doing well.  - Severe left hip arthritis patient scheduled surgery now for total hip replacement with Dr. Angulo in 3 months.  -Pain control continue with gabapentin and tramadol from pain management  - History of bilateral mastectomy compensated.  - Diabetes continue with metformin..  - Chronic gout compensated continue with allopurinol  -CHF compensated continue with Lasix.  Counseled about low-salt diet.  - Coronary artery disease compensated follow-up cardiology follow-up closely as needed

## 2024-09-24 ENCOUNTER — APPOINTMENT (OUTPATIENT)
Dept: ORTHOPEDIC SURGERY | Facility: CLINIC | Age: 74
End: 2024-09-24
Payer: MEDICARE

## 2024-09-26 ENCOUNTER — APPOINTMENT (OUTPATIENT)
Dept: PRIMARY CARE | Facility: CLINIC | Age: 74
End: 2024-09-26
Payer: MEDICARE

## 2024-09-30 ENCOUNTER — APPOINTMENT (OUTPATIENT)
Dept: PRIMARY CARE | Facility: CLINIC | Age: 74
End: 2024-09-30
Payer: MEDICARE

## 2024-09-30 VITALS
DIASTOLIC BLOOD PRESSURE: 80 MMHG | TEMPERATURE: 96.7 F | SYSTOLIC BLOOD PRESSURE: 114 MMHG | HEART RATE: 68 BPM | OXYGEN SATURATION: 95 %

## 2024-09-30 DIAGNOSIS — E11.22 TYPE 2 DIABETES MELLITUS WITH STAGE 3A CHRONIC KIDNEY DISEASE, WITHOUT LONG-TERM CURRENT USE OF INSULIN (MULTI): Primary | ICD-10-CM

## 2024-09-30 DIAGNOSIS — R53.83 OTHER FATIGUE: ICD-10-CM

## 2024-09-30 DIAGNOSIS — I50.9 HEART FAILURE, UNSPECIFIED HF CHRONICITY, UNSPECIFIED HEART FAILURE TYPE: ICD-10-CM

## 2024-09-30 DIAGNOSIS — N18.31 TYPE 2 DIABETES MELLITUS WITH STAGE 3A CHRONIC KIDNEY DISEASE, WITHOUT LONG-TERM CURRENT USE OF INSULIN (MULTI): Primary | ICD-10-CM

## 2024-09-30 DIAGNOSIS — I10 HYPERTENSION, UNSPECIFIED TYPE: ICD-10-CM

## 2024-09-30 DIAGNOSIS — N18.4 CHRONIC RENAL DISEASE, STAGE IV (MULTI): ICD-10-CM

## 2024-09-30 DIAGNOSIS — Z23 IMMUNIZATION DUE: ICD-10-CM

## 2024-09-30 PROCEDURE — 90662 IIV NO PRSV INCREASED AG IM: CPT | Performed by: INTERNAL MEDICINE

## 2024-09-30 PROCEDURE — 99214 OFFICE O/P EST MOD 30 MIN: CPT | Performed by: INTERNAL MEDICINE

## 2024-09-30 PROCEDURE — 3079F DIAST BP 80-89 MM HG: CPT | Performed by: INTERNAL MEDICINE

## 2024-09-30 PROCEDURE — 3074F SYST BP LT 130 MM HG: CPT | Performed by: INTERNAL MEDICINE

## 2024-09-30 PROCEDURE — 1036F TOBACCO NON-USER: CPT | Performed by: INTERNAL MEDICINE

## 2024-09-30 PROCEDURE — 1160F RVW MEDS BY RX/DR IN RCRD: CPT | Performed by: INTERNAL MEDICINE

## 2024-09-30 PROCEDURE — G0008 ADMIN INFLUENZA VIRUS VAC: HCPCS | Performed by: INTERNAL MEDICINE

## 2024-09-30 PROCEDURE — 3048F LDL-C <100 MG/DL: CPT | Performed by: INTERNAL MEDICINE

## 2024-09-30 PROCEDURE — 3060F POS MICROALBUMINURIA REV: CPT | Performed by: INTERNAL MEDICINE

## 2024-09-30 PROCEDURE — 1159F MED LIST DOCD IN RCRD: CPT | Performed by: INTERNAL MEDICINE

## 2024-09-30 ASSESSMENT — ENCOUNTER SYMPTOMS
HEADACHES: 0
ABDOMINAL PAIN: 0
DIZZINESS: 0
SORE THROAT: 0
BLOOD IN STOOL: 0
UNEXPECTED WEIGHT CHANGE: 0
DIARRHEA: 0
WOUND: 1
ARTHRALGIAS: 0
FATIGUE: 0
HYPERTENSION: 1
PALPITATIONS: 0
COUGH: 0
BRUISES/BLEEDS EASILY: 0
DIFFICULTY URINATING: 0
WHEEZING: 0
FEVER: 0
SINUS PAIN: 0

## 2024-09-30 NOTE — PROGRESS NOTES
Subjective   Patient ID: Saritha oCrmier is a 73 y.o. female who presents for Diabetes, Hyperlipidemia, Hypertension, Flu Vaccine (Given), and Leg Injury (Right leg).    -ThisDyspnea/hypoxia likely multifactorial due to diastolic heart failure/cardiac, sleep apnea, right diaphragm dysfunction, deconditioning  Follow-up pulmonary recommendation  Awaiting further testing  -oxygen 2 L with activity/at night pending above testing  -Right leg hematoma and cellulitis resulting improving continue with warm compresses clean dressing obtained today  --- CKD stable avoid any NSAIDs increase fluid intake low-salt diet monitor weight closely.  Patient underwent uneventful surgery for TAVR r continue with aspirin Plavix doing well.  - Severe left hip arthritis patient scheduled surgery now for total hip replacement with Dr. Angulo in 3 months.  -Pain control continue with gabapentin and tramadol from pain management  - History of bilateral mastectomy compensated.  - Diabetes continue with metformin..  - Chronic gout compensated continue with allopurinol  -CHF compensated continue with Lasix.  Counseled about low-salt diet.  - Coronary artery disease compensated follow-up cardiology follow-up closely as needed    Diabetes  Pertinent negatives for hypoglycemia include no dizziness or headaches. Pertinent negatives for diabetes include no chest pain and no fatigue.   Hyperlipidemia  Pertinent negatives include no chest pain.   Hypertension  Pertinent negatives include no chest pain, headaches or palpitations.          Review of Systems   Constitutional:  Negative for fatigue, fever and unexpected weight change.   HENT:  Negative for congestion, ear discharge, ear pain, mouth sores, sinus pain and sore throat.    Eyes:  Negative for visual disturbance.   Respiratory:  Negative for cough and wheezing.    Cardiovascular:  Negative for chest pain, palpitations and leg swelling.   Gastrointestinal:  Negative for abdominal pain,  blood in stool and diarrhea.   Genitourinary:  Negative for difficulty urinating.   Musculoskeletal:  Negative for arthralgias.   Skin:  Positive for wound. Negative for rash.   Neurological:  Negative for dizziness and headaches.   Hematological:  Does not bruise/bleed easily.   Psychiatric/Behavioral:  Negative for behavioral problems.    All other systems reviewed and are negative.      Objective   Lab Results   Component Value Date    HGBA1C 5.8 06/25/2024      /80   Pulse 68   Temp 35.9 °C (96.7 °F)   SpO2 95%   === 08/29/24 ===  Lab Results   Component Value Date    WBC 9.5 08/26/2024    HGB 10.9 (L) 08/26/2024    HCT 38.5 08/26/2024     08/26/2024    CHOL 140 06/17/2024    TRIG 120 06/17/2024    HDL 33.7 06/17/2024    ALT 6 (L) 08/26/2024    AST 11 08/26/2024     09/05/2024    K 5.2 09/05/2024     09/05/2024    CREATININE 1.60 (H) 09/05/2024    BUN 26 (H) 09/05/2024    CO2 30 09/05/2024    TSH 3.28 06/17/2024    INR 1.2 (H) 03/16/2021    HGBA1C 5.8 06/25/2024     par   US RENAL COMPLETE    - Impression -  No hydronephrosis bilaterally.    Urinary bladder suboptimally distended for evaluation. No focal  bladder abnormality demonstrated.    MACRO:  None.    Signed by: Santy Cardona 8/29/2024 2:28 PM  Dictation workstation:   [id number]   Physical Exam  Vitals and nursing note reviewed.   Constitutional:       Appearance: Normal appearance.   HENT:      Head: Normocephalic.      Nose: Nose normal.   Eyes:      Conjunctiva/sclera: Conjunctivae normal.      Pupils: Pupils are equal, round, and reactive to light.   Cardiovascular:      Rate and Rhythm: Regular rhythm.   Pulmonary:      Effort: Pulmonary effort is normal.      Breath sounds: Normal breath sounds.   Abdominal:      General: Abdomen is flat.      Palpations: Abdomen is soft.   Musculoskeletal:      Cervical back: Neck supple.      Right lower leg: Edema present.      Left lower leg: No edema.   Skin:     General: Skin  is warm.      Findings: Bruising (Right leg large hematoma) present. No erythema (Right lower extremity swelling and redness tenderness) or rash.   Neurological:      General: No focal deficit present.      Mental Status: She is oriented to person, place, and time.   Psychiatric:         Mood and Affect: Mood normal.         Assessment/Plan   Saritha was seen today for diabetes, hyperlipidemia, hypertension, flu vaccine and leg injury.  Diagnoses and all orders for this visit:  Type 2 diabetes mellitus with stage 3a chronic kidney disease, without long-term current use of insulin (Multi) (Primary)  Immunization due  -     Flu vaccine, trivalent, preservative free, HIGH-DOSE, age 65y+ (Fluzone)  Heart failure, unspecified HF chronicity, unspecified heart failure type (Multi)  Hypertension, unspecified type  Chronic renal disease, stage IV (Multi)  Other fatigue  Other orders  -     Follow Up In Primary Care - Established  -     Follow Up In Primary Care - Established; Future   --ThisDyspnea/hypoxia likely multifactorial due to diastolic heart failure/cardiac, sleep apnea, right diaphragm dysfunction, deconditioning  Follow-up pulmonary recommendation  Awaiting further testing  -oxygen 2 L with activity/at night pending above testing  -Right leg hematoma and cellulitis resulting improving continue with warm compresses clean dressing obtained today  --- CKD stable avoid any NSAIDs increase fluid intake low-salt diet monitor weight closely.  Patient underwent uneventful surgery for TAVR r continue with aspirin Plavix doing well.  - Severe left hip arthritis patient scheduled surgery now for total hip replacement with Dr. Angulo in 3 months.  -Pain control continue with gabapentin and tramadol from pain management  - History of bilateral mastectomy compensated.  - Diabetes continue with metformin..  - Chronic gout compensated continue with allopurinol  -CHF compensated continue with Lasix.  Counseled about low-salt  diet.  - Coronary artery disease compensated follow-up cardiology follow-up closely as needed

## 2024-10-08 ENCOUNTER — HOSPITAL ENCOUNTER (OUTPATIENT)
Dept: RESPIRATORY THERAPY | Facility: HOSPITAL | Age: 74
End: 2024-10-08
Payer: MEDICARE

## 2024-10-10 DIAGNOSIS — D64.89 OTHER SPECIFIED ANEMIAS: ICD-10-CM

## 2024-10-10 DIAGNOSIS — Z00.00 ENCOUNTER FOR GENERAL ADULT MEDICAL EXAMINATION WITHOUT ABNORMAL FINDINGS: ICD-10-CM

## 2024-10-10 RX ORDER — FERROUS GLUCONATE 324(38)MG
1 TABLET ORAL 2 TIMES DAILY
Qty: 180 TABLET | Refills: 1 | Status: SHIPPED | OUTPATIENT
Start: 2024-10-10

## 2024-10-10 RX ORDER — ASPIRIN 81 MG/1
81 TABLET ORAL DAILY
Qty: 90 TABLET | Refills: 1 | Status: SHIPPED | OUTPATIENT
Start: 2024-10-10

## 2024-10-11 ENCOUNTER — HOSPITAL ENCOUNTER (OUTPATIENT)
Dept: RESPIRATORY THERAPY | Facility: HOSPITAL | Age: 74
Discharge: HOME | End: 2024-10-11
Payer: MEDICARE

## 2024-10-11 ENCOUNTER — APPOINTMENT (OUTPATIENT)
Dept: RESPIRATORY THERAPY | Facility: CLINIC | Age: 74
End: 2024-10-11
Payer: MEDICARE

## 2024-10-11 DIAGNOSIS — J96.02 ACUTE RESPIRATORY FAILURE WITH HYPERCAPNIA (MULTI): ICD-10-CM

## 2024-10-11 LAB
BASE EXCESS BLDA CALC-SCNC: -0.6 MMOL/L (ref -2–3)
BASE EXCESS BLDA CALC-SCNC: 0.3 MMOL/L (ref -2–3)
BODY TEMPERATURE: ABNORMAL
BODY TEMPERATURE: ABNORMAL
HCO3 BLDA-SCNC: 25.4 MMOL/L (ref 22–26)
HCO3 BLDA-SCNC: 27.3 MMOL/L (ref 22–26)
MGC ASCENT PFT - FEV1 - POST: 0.64
MGC ASCENT PFT - FEV1 - PRE: 0.59
MGC ASCENT PFT - FEV1 - PRE: 0.67
MGC ASCENT PFT - FEV1 - PREDICTED: 1.95
MGC ASCENT PFT - FEV1 - PREDICTED: 1.95
MGC ASCENT PFT - FVC - POST: 0.95
MGC ASCENT PFT - FVC - PRE: 0.85
MGC ASCENT PFT - FVC - PRE: 0.92
MGC ASCENT PFT - FVC - PREDICTED: 2.49
MGC ASCENT PFT - FVC - PREDICTED: 2.49
OXYHGB MFR BLDA: 34.9 % (ref 94–98)
OXYHGB MFR BLDA: 86.1 % (ref 94–98)
PCO2 BLDA: 46 MM HG (ref 38–42)
PCO2 BLDA: 53 MM HG (ref 38–42)
PH BLDA: 7.32 PH (ref 7.38–7.42)
PH BLDA: 7.35 PH (ref 7.38–7.42)
PO2 BLDA: 21 MM HG (ref 85–95)
PO2 BLDA: 57 MM HG (ref 85–95)
SAO2 % BLDA: 36 % (ref 94–100)
SAO2 % BLDA: 89 % (ref 94–100)

## 2024-10-11 PROCEDURE — 82805 BLOOD GASES W/O2 SATURATION: CPT

## 2024-10-11 PROCEDURE — 94060 EVALUATION OF WHEEZING: CPT

## 2024-10-11 PROCEDURE — 94727 GAS DIL/WSHOT DETER LNG VOL: CPT

## 2024-10-11 PROCEDURE — 94760 N-INVAS EAR/PLS OXIMETRY 1: CPT

## 2024-10-11 PROCEDURE — 94010 BREATHING CAPACITY TEST: CPT

## 2024-10-11 PROCEDURE — 94618 PULMONARY STRESS TESTING: CPT

## 2024-10-11 PROCEDURE — 94729 DIFFUSING CAPACITY: CPT

## 2024-10-11 PROCEDURE — 94664 DEMO&/EVAL PT USE INHALER: CPT

## 2024-10-11 PROCEDURE — 98960 EDU&TRN PT SELF-MGMT NQHP 1: CPT

## 2024-10-11 PROCEDURE — 36600 WITHDRAWAL OF ARTERIAL BLOOD: CPT

## 2024-10-24 ENCOUNTER — OFFICE VISIT (OUTPATIENT)
Dept: PULMONOLOGY | Facility: CLINIC | Age: 74
End: 2024-10-24
Payer: MEDICARE

## 2024-10-24 VITALS — OXYGEN SATURATION: 95 % | HEART RATE: 53 BPM | SYSTOLIC BLOOD PRESSURE: 135 MMHG | DIASTOLIC BLOOD PRESSURE: 64 MMHG

## 2024-10-24 DIAGNOSIS — J98.6 DIAPHRAGM DYSFUNCTION: Primary | ICD-10-CM

## 2024-10-24 DIAGNOSIS — J96.11 CHRONIC RESPIRATORY FAILURE WITH HYPOXIA (MULTI): ICD-10-CM

## 2024-10-24 LAB
MGC ASCENT PFT - FEV1 - POST: 0.64
MGC ASCENT PFT - FEV1 - PRE: 0.59
MGC ASCENT PFT - FEV1 - PRE: 0.67
MGC ASCENT PFT - FEV1 - PREDICTED: 1.95
MGC ASCENT PFT - FEV1 - PREDICTED: 1.95
MGC ASCENT PFT - FVC - POST: 0.95
MGC ASCENT PFT - FVC - PRE: 0.85
MGC ASCENT PFT - FVC - PRE: 0.92
MGC ASCENT PFT - FVC - PREDICTED: 2.49
MGC ASCENT PFT - FVC - PREDICTED: 2.49

## 2024-10-24 PROCEDURE — 3075F SYST BP GE 130 - 139MM HG: CPT | Performed by: INTERNAL MEDICINE

## 2024-10-24 PROCEDURE — 1159F MED LIST DOCD IN RCRD: CPT | Performed by: INTERNAL MEDICINE

## 2024-10-24 PROCEDURE — 3048F LDL-C <100 MG/DL: CPT | Performed by: INTERNAL MEDICINE

## 2024-10-24 PROCEDURE — 3078F DIAST BP <80 MM HG: CPT | Performed by: INTERNAL MEDICINE

## 2024-10-24 PROCEDURE — 99214 OFFICE O/P EST MOD 30 MIN: CPT | Performed by: INTERNAL MEDICINE

## 2024-10-24 PROCEDURE — 3060F POS MICROALBUMINURIA REV: CPT | Performed by: INTERNAL MEDICINE

## 2024-10-24 ASSESSMENT — PATIENT HEALTH QUESTIONNAIRE - PHQ9
2. FEELING DOWN, DEPRESSED OR HOPELESS: NOT AT ALL
SUM OF ALL RESPONSES TO PHQ9 QUESTIONS 1 AND 2: 0
1. LITTLE INTEREST OR PLEASURE IN DOING THINGS: NOT AT ALL

## 2024-10-24 NOTE — PROGRESS NOTES
Department of Medicine  Division of Pulmonary, Critical Care, and Sleep Medicine  Consultation  [facility] - [address], [address]      Physician HPI (10/24/2024):   Pleasant 73-year-old woman with history of severe AS post TAVR, diastolic heart failure, chronic  hypoxic respiratory failure presenting for follow-up.   Patient still with significant dyspnea on exertion, came in wheelchair to the office today without oxygen, she ran out of portable oxygen and did not order or contact our oxygen company.  Denied productive cough, chest pain or hemoptysis.    Never smoked.      Immunization History   Administered Date(s) Administered    Flu vaccine, quadrivalent, high-dose, preservative free, age 65y+ (FLUZONE) 09/14/2023    Flu vaccine, trivalent, preservative free, HIGH-DOSE, age 65y+ (Fluzone) 09/30/2024    Hep B, Unspecified 02/13/2001, 03/13/2001, 12/11/2001    Influenza, Unspecified 10/15/2015, 10/11/2016, 12/14/2017, 09/24/2018, 10/03/2019, 10/21/2020, 10/28/2021    Influenza, seasonal, injectable 10/31/2007, 10/21/2013, 10/09/2014, 10/01/2015, 12/07/2022    Pfizer COVID-19 vaccine, 12 years and older, (30mcg/0.3mL) (Comirnaty) 11/09/2023    Pfizer Purple Cap SARS-CoV-2 04/01/2021, 04/20/2021, 11/23/2021    Pneumococcal conjugate vaccine, 13-valent (PREVNAR 13) 10/03/2019    Pneumococcal polysaccharide vaccine, 23-valent, age 2 years and older (PNEUMOVAX 23) 01/01/2014    Td (adult), unspecified 11/09/2006    Tdap vaccine, age 7 year and older (BOOSTRIX, ADACEL) 10/07/2009, 09/15/2024    Zoster vaccine, recombinant, adult (SHINGRIX) 05/24/2019, 06/13/2019, 01/28/2020       Past Medical History:   Diagnosis Date    Acquired absence of bilateral breasts and nipples 01/31/2022    History of bilateral mastectomy    Arthritis     Cancer (Multi)     CKD (chronic kidney disease)     stage 3    Coronary artery disease     Diabetes mellitus (Multi)     Heart valve disease     Hyperlipidemia      Hypertension     Neoplasm of unspecified behavior of bone, soft tissue, 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/2015    Skin growth    Other bursitis of hip, right hip     Bursitis of right hip, unspecified bursa    Personal history of other diseases of the respiratory system 03/14/2016    History of chronic sinusitis    Shortness of breath     Trigger finger, unspecified finger 06/22/2018    Acquired trigger finger    Type 2 diabetes mellitus (Multi)     Unilateral primary osteoarthritis, right knee 02/19/2021    Arthritis of knee, right       Past Surgical History:   Procedure Laterality Date    AORTIC VALVE REPLACEMENT      CARDIAC CATHETERIZATION N/A 03/29/2024    Procedure: Left And Right Heart Cath, With LV;  Surgeon: Javier Reilly MD;  Location: [place] [address] Cardiac Cath Lab;  Service: Cardiovascular;  Laterality: N/A;    CARDIAC CATHETERIZATION N/A 4/30/2024    Procedure: TAVR (Transcatheter AV Replacement);  Surgeon: Jessica Stallworth MD;  Location: [place] [address] Cardiac Cath Lab;  Service: Cardiovascular;  Laterality: N/A;  4/30 0730    CARDIAC CATHETERIZATION N/A 4/30/2024    Procedure: TVP for TAVR;  Surgeon: Jessica Stallworth MD;  Location: [place] [address] Cardiac Cath Lab;  Service: Cardiovascular;  Laterality: N/A;    HYSTERECTOMY  09/21/2015    Hysterectomy    JOINT REPLACEMENT Right     Right total hip    MASTECTOMY  09/21/2015    Breast Surgery Mastectomy    OTHER SURGICAL HISTORY  09/21/2015    Breast Surgery Reconstruction       Family History   Problem Relation Name Age of Onset    Gout Mother      Hypertension Mother      Arthritis Father      Other (cardiac disorder) Father      Cancer Father      Tongue cancer Brother      Other (jaw neoplasm) Brother      Breast cancer Sibling      Pancreatic cancer Sibling         Social History     Tobacco Use    Smoking status: Never    Smokeless tobacco: Never   Vaping Use    Vaping status: Never Used   Substance Use Topics    Alcohol use: Never    Drug use: Never  "      Occupational & Environmental History:        Current Medications:  Current Outpatient Medications   Medication Instructions    aspirin 81 mg, oral, Daily    clopidogrel (PLAVIX) 75 mg, oral, Daily    ferrous gluconate (FERGON) 324 mg, oral, 2 times daily    gabapentin (NEURONTIN) 300 mg, oral, 2 times daily    metoprolol tartrate (LOPRESSOR) 25 mg, oral, 2 times daily    pantoprazole (PROTONIX) 40 mg, oral, Daily    polyethylene glycol (GLYCOLAX, MIRALAX) 17 g, oral, Daily    rosuvastatin (CRESTOR) 40 mg, oral, Daily    traMADol (ULTRAM) 50 mg, oral, 2 times daily        Drug Allergies/Intolerances:  No Known Allergies     Visit Vitals  /64   Pulse 53   SpO2 95% Comment: o2 at 2l   OB Status Hysterectomy   Smoking Status Never        Physical exam  Constitutional: Normal appearance.  HEENT: Normocephalic and atraumatic.  Cardiovascular: Normal rate and regular rhythm.  Pulmonary: Normal respiratory effort, bilateral clear breath sounds, no wheezing or rhonchi.  Musculoskeletal: No edema, no cyanosis.  Neurological: Awake, alert and oriented x3.  Psychiatric: Normal behavior, mood and affect.    Pulmonary Function Test Results     Pulmonary Functions Testing Results:    No results found for: \"FEV1\", \"FVC\", \"DIU0WEU\", \"TLC\", \"DLCO\"     Chest Radiograph     XR chest 1 view 09/09/2024    Narrative  Interpreted By:  Brandt Bland,  STUDY:  XR CHEST 1 VIEW; 9/9/2024 8:05 am    INDICATION:  Signs/Symptoms:diminished breath sounds right    COMPARISON:  May 2020    ACCESSION NUMBER(S):  [id number]    ORDERING CLINICIAN:  PAOLA ABEBE    FINDINGS:  The study is limited due to  poor inspiratory effort, with resultant  crowding of the pulmonary vasculature. The cardiac silhouette is  within normal limits for the technique. Stent is again seen in the  proximal ascending thoracic aorta. Atherosclerotic calcifications  involve the aortic arch. There is elevation of the right  hemidiaphragm. There is no " "pneumothorax, confluent infiltrates or  significant effusion. The osseous structures are unchanged.    Impression  Allowing for the aforementioned limitation, no acute cardiopulmonary  disease.    Signed by: Brandt Bland 9/9/2024 9:38 AM  Dictation workstation:   [id number]      Echocardiogram     No results found for this or any previous visit from the past 365 days.       Chest CT Scan     No results found for this or any previous visit from the past 365 days.       Laboratory Studies     Lab Results   Component Value Date    WBC 9.5 08/26/2024    HGB 10.9 (L) 08/26/2024    HCT 38.5 08/26/2024    MCV 96 08/26/2024     08/26/2024      Lab Results   Component Value Date    GLUCOSE 112 (H) 09/05/2024    CALCIUM 9.8 09/05/2024     09/05/2024    K 5.2 09/05/2024    CO2 30 09/05/2024     09/05/2024    BUN 26 (H) 09/05/2024    CREATININE 1.60 (H) 09/05/2024      Lab Results   Component Value Date    ALT 6 (L) 08/26/2024    AST 11 08/26/2024    ALKPHOS 89 08/26/2024    BILITOT 0.5 08/26/2024      Protime   Date/Time Value Ref Range Status   03/16/2021 08:25 AM 14.2 (H) 10.1 - 13.3 sec Final     INR   Date/Time Value Ref Range Status   03/16/2021 08:25 AM 1.2 (H) 0.9 - 1.1 Final     No results found for: \"ICIGE\", \"IGE\", \"ICA04\", \"ASPFU\", \"IGG\", \"IGA\", \"IGM\"    Sputum Culture     No results found for: \"AFBCX\"   No results found for: \"RESPCULTCYFI\"  Susceptibility data from last 90 days.  Collected Specimen Info Organism   08/26/24 Swab from Nares/Axilla/Groin Methicillin Resistant Staphylococcus aureus (MRSA)           Assessment and Plan / Recommendations   Pleasant 73-year-old woman with history of severe AS post TAVR, diastolic heart failure, chronic  hypoxic respiratory failure presenting for follow-up.   still with significant dyspnea on exertion, came in wheelchair to the office today without oxygen, she ran out of portable oxygen and did not order refill.  Dyspnea/hypoxia likely multifactorial " due to diastolic heart failure/cardiac, sleep apnea, right diaphragm dysfunction.   PFT from 10/11/2024 with severely reduced FEV1 and FVC but no obstruction.  6-minute walk test with hypoxia requiring 2 L.  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  No  lung abnormalities on CT TAVR from April 2024.  -Continue oxygen therapy, we will order portable concentrator  -Sleep study to be done  -Will need MIP/MEP to evaluate muscle weakness, though abnormalities on PFT likely due to diaphragm dysfunction  -Referral to Dr. Johnson to [facility] for diaphragm pacer   -Has order for echocardiogram  -Return to clinic in 2-month or sooner with any concerns     This dictation was created using voice recognition software. Phonetic and/or minor grammatical errors may exist.    Mary Mercedes MD  10/24/2024

## 2024-10-27 ENCOUNTER — HOSPITAL ENCOUNTER (INPATIENT)
Facility: HOSPITAL | Age: 74
LOS: 5 days | Discharge: NURSING, REHAB OR LONG-TERM CARE | End: 2024-11-02
Attending: EMERGENCY MEDICINE | Admitting: INTERNAL MEDICINE
Payer: MEDICARE

## 2024-10-27 ENCOUNTER — APPOINTMENT (OUTPATIENT)
Dept: CARDIOLOGY | Facility: HOSPITAL | Age: 74
End: 2024-10-27
Payer: MEDICARE

## 2024-10-27 ENCOUNTER — APPOINTMENT (OUTPATIENT)
Dept: RADIOLOGY | Facility: HOSPITAL | Age: 74
End: 2024-10-27
Payer: MEDICARE

## 2024-10-27 DIAGNOSIS — E66.812 CLASS 2 OBESITY WITH BODY MASS INDEX (BMI) OF 37.0 TO 37.9 IN ADULT, UNSPECIFIED OBESITY TYPE, UNSPECIFIED WHETHER SERIOUS COMORBIDITY PRESENT: ICD-10-CM

## 2024-10-27 DIAGNOSIS — K27.9 PEPTIC ULCER WITHOUT HEMORRHAGE OR PERFORATION: ICD-10-CM

## 2024-10-27 DIAGNOSIS — R04.0 EPISTAXIS: ICD-10-CM

## 2024-10-27 DIAGNOSIS — I50.32 CHRONIC DIASTOLIC HEART FAILURE: ICD-10-CM

## 2024-10-27 DIAGNOSIS — N17.9 AKI (ACUTE KIDNEY INJURY) (CMS-HCC): ICD-10-CM

## 2024-10-27 DIAGNOSIS — J18.9 PNEUMONIA OF BOTH LOWER LOBES DUE TO INFECTIOUS ORGANISM: Primary | ICD-10-CM

## 2024-10-27 DIAGNOSIS — R79.89 ELEVATED D-DIMER: ICD-10-CM

## 2024-10-27 LAB
ALBUMIN SERPL BCP-MCNC: 3.7 G/DL (ref 3.4–5)
ALP SERPL-CCNC: 91 U/L (ref 33–136)
ALT SERPL W P-5'-P-CCNC: 6 U/L (ref 7–45)
ANION GAP SERPL CALC-SCNC: 11 MMOL/L (ref 10–20)
AST SERPL W P-5'-P-CCNC: 12 U/L (ref 9–39)
BASOPHILS # BLD AUTO: 0.01 X10*3/UL (ref 0–0.1)
BASOPHILS NFR BLD AUTO: 0.1 %
BILIRUB SERPL-MCNC: 0.8 MG/DL (ref 0–1.2)
BUN SERPL-MCNC: 28 MG/DL (ref 6–23)
CALCIUM SERPL-MCNC: 9.3 MG/DL (ref 8.6–10.3)
CARDIAC TROPONIN I PNL SERPL HS: 19 NG/L (ref 0–13)
CHLORIDE SERPL-SCNC: 106 MMOL/L (ref 98–107)
CO2 SERPL-SCNC: 28 MMOL/L (ref 21–32)
CREAT SERPL-MCNC: 1.83 MG/DL (ref 0.5–1.05)
D DIMER PPP FEU-MCNC: 1359 NG/ML FEU
EGFRCR SERPLBLD CKD-EPI 2021: 29 ML/MIN/1.73M*2
EOSINOPHIL # BLD AUTO: 0.05 X10*3/UL (ref 0–0.4)
EOSINOPHIL NFR BLD AUTO: 0.5 %
ERYTHROCYTE [DISTWIDTH] IN BLOOD BY AUTOMATED COUNT: 16.2 % (ref 11.5–14.5)
FLUAV RNA RESP QL NAA+PROBE: NOT DETECTED
FLUBV RNA RESP QL NAA+PROBE: NOT DETECTED
GLUCOSE SERPL-MCNC: 111 MG/DL (ref 74–99)
HCT VFR BLD AUTO: 40.2 % (ref 36–46)
HGB BLD-MCNC: 10.7 G/DL (ref 12–16)
IMM GRANULOCYTES # BLD AUTO: 0.04 X10*3/UL (ref 0–0.5)
IMM GRANULOCYTES NFR BLD AUTO: 0.4 % (ref 0–0.9)
LYMPHOCYTES # BLD AUTO: 0.46 X10*3/UL (ref 0.8–3)
LYMPHOCYTES NFR BLD AUTO: 4.5 %
MCH RBC QN AUTO: 24.8 PG (ref 26–34)
MCHC RBC AUTO-ENTMCNC: 26.6 G/DL (ref 32–36)
MCV RBC AUTO: 93 FL (ref 80–100)
MONOCYTES # BLD AUTO: 0.85 X10*3/UL (ref 0.05–0.8)
MONOCYTES NFR BLD AUTO: 8.4 %
NEUTROPHILS # BLD AUTO: 8.76 X10*3/UL (ref 1.6–5.5)
NEUTROPHILS NFR BLD AUTO: 86.1 %
NRBC BLD-RTO: 0 /100 WBCS (ref 0–0)
PLATELET # BLD AUTO: 150 X10*3/UL (ref 150–450)
POTASSIUM SERPL-SCNC: 5.2 MMOL/L (ref 3.5–5.3)
PROT SERPL-MCNC: 8 G/DL (ref 6.4–8.2)
RBC # BLD AUTO: 4.32 X10*6/UL (ref 4–5.2)
SARS-COV-2 RNA RESP QL NAA+PROBE: NOT DETECTED
SODIUM SERPL-SCNC: 140 MMOL/L (ref 136–145)
WBC # BLD AUTO: 10.2 X10*3/UL (ref 4.4–11.3)

## 2024-10-27 PROCEDURE — 71045 X-RAY EXAM CHEST 1 VIEW: CPT

## 2024-10-27 PROCEDURE — 93005 ELECTROCARDIOGRAM TRACING: CPT

## 2024-10-27 PROCEDURE — 85379 FIBRIN DEGRADATION QUANT: CPT | Performed by: EMERGENCY MEDICINE

## 2024-10-27 PROCEDURE — 36415 COLL VENOUS BLD VENIPUNCTURE: CPT | Performed by: EMERGENCY MEDICINE

## 2024-10-27 PROCEDURE — 94664 DEMO&/EVAL PT USE INHALER: CPT | Mod: 59

## 2024-10-27 PROCEDURE — 71045 X-RAY EXAM CHEST 1 VIEW: CPT | Performed by: RADIOLOGY

## 2024-10-27 PROCEDURE — 85025 COMPLETE CBC W/AUTO DIFF WBC: CPT | Performed by: EMERGENCY MEDICINE

## 2024-10-27 PROCEDURE — 80053 COMPREHEN METABOLIC PANEL: CPT | Performed by: EMERGENCY MEDICINE

## 2024-10-27 PROCEDURE — 30901 CONTROL OF NOSEBLEED: CPT | Performed by: EMERGENCY MEDICINE

## 2024-10-27 PROCEDURE — P9612 CATHETERIZE FOR URINE SPEC: HCPCS

## 2024-10-27 PROCEDURE — 94640 AIRWAY INHALATION TREATMENT: CPT

## 2024-10-27 PROCEDURE — 2500000005 HC RX 250 GENERAL PHARMACY W/O HCPCS: Performed by: EMERGENCY MEDICINE

## 2024-10-27 PROCEDURE — 84484 ASSAY OF TROPONIN QUANT: CPT | Performed by: EMERGENCY MEDICINE

## 2024-10-27 PROCEDURE — 2500000002 HC RX 250 W HCPCS SELF ADMINISTERED DRUGS (ALT 637 FOR MEDICARE OP, ALT 636 FOR OP/ED): Performed by: EMERGENCY MEDICINE

## 2024-10-27 PROCEDURE — 9420000001 HC RT PATIENT EDUCATION 5 MIN

## 2024-10-27 PROCEDURE — 99285 EMERGENCY DEPT VISIT HI MDM: CPT | Mod: 25

## 2024-10-27 PROCEDURE — 87636 SARSCOV2 & INF A&B AMP PRB: CPT | Performed by: EMERGENCY MEDICINE

## 2024-10-27 PROCEDURE — 30901 CONTROL OF NOSEBLEED: CPT | Mod: RT

## 2024-10-27 RX ORDER — OXYMETAZOLINE HCL 0.05 %
SPRAY, NON-AEROSOL (ML) NASAL
Status: COMPLETED
Start: 2024-10-27 | End: 2024-10-28

## 2024-10-27 RX ORDER — SILVER NITRATE 38.21; 12.74 MG/1; MG/1
STICK TOPICAL ONCE
Status: COMPLETED | OUTPATIENT
Start: 2024-10-27 | End: 2024-10-28

## 2024-10-27 RX ORDER — IPRATROPIUM BROMIDE AND ALBUTEROL SULFATE 2.5; .5 MG/3ML; MG/3ML
3 SOLUTION RESPIRATORY (INHALATION) ONCE
Status: COMPLETED | OUTPATIENT
Start: 2024-10-27 | End: 2024-10-27

## 2024-10-27 RX ORDER — IPRATROPIUM BROMIDE AND ALBUTEROL SULFATE 2.5; .5 MG/3ML; MG/3ML
3 SOLUTION RESPIRATORY (INHALATION)
Status: DISCONTINUED | OUTPATIENT
Start: 2024-10-28 | End: 2024-10-27

## 2024-10-27 RX ORDER — OXYMETAZOLINE HCL 0.05 %
2 SPRAY, NON-AEROSOL (ML) NASAL EVERY 12 HOURS PRN
Status: DISCONTINUED | OUTPATIENT
Start: 2024-10-27 | End: 2024-10-28

## 2024-10-27 ASSESSMENT — PAIN SCALES - GENERAL
PAINLEVEL_OUTOF10: 0 - NO PAIN
PAINLEVEL_OUTOF10: 0 - NO PAIN

## 2024-10-27 ASSESSMENT — PAIN - FUNCTIONAL ASSESSMENT: PAIN_FUNCTIONAL_ASSESSMENT: 0-10

## 2024-10-28 ENCOUNTER — APPOINTMENT (OUTPATIENT)
Dept: RADIOLOGY | Facility: HOSPITAL | Age: 74
End: 2024-10-28
Payer: MEDICARE

## 2024-10-28 ENCOUNTER — APPOINTMENT (OUTPATIENT)
Dept: CARDIOLOGY | Facility: CLINIC | Age: 74
End: 2024-10-28
Payer: MEDICARE

## 2024-10-28 PROBLEM — R79.89 ELEVATED D-DIMER: Status: ACTIVE | Noted: 2024-10-28

## 2024-10-28 PROBLEM — J44.9 COPD (CHRONIC OBSTRUCTIVE PULMONARY DISEASE) (MULTI): Status: ACTIVE | Noted: 2024-10-28

## 2024-10-28 PROBLEM — J18.9 ACUTE PNEUMONIA: Status: ACTIVE | Noted: 2024-10-28

## 2024-10-28 PROBLEM — R41.0 CONFUSION: Status: ACTIVE | Noted: 2024-10-28

## 2024-10-28 PROBLEM — R53.1 WEAKNESS: Status: ACTIVE | Noted: 2024-10-28

## 2024-10-28 PROBLEM — J18.9 PNEUMONIA OF BOTH LOWER LOBES DUE TO INFECTIOUS ORGANISM: Status: ACTIVE | Noted: 2024-10-28

## 2024-10-28 PROBLEM — R04.0 EPISTAXIS: Status: ACTIVE | Noted: 2024-10-28

## 2024-10-28 PROBLEM — J96.11 CHRONIC RESPIRATORY FAILURE WITH HYPOXIA (MULTI): Status: ACTIVE | Noted: 2024-10-28

## 2024-10-28 LAB
ANION GAP BLDA CALCULATED.4IONS-SCNC: 11 MMO/L (ref 10–25)
ANION GAP BLDA CALCULATED.4IONS-SCNC: 11 MMO/L (ref 10–25)
ANION GAP BLDA CALCULATED.4IONS-SCNC: 9 MMO/L (ref 10–25)
ANION GAP BLDV CALCULATED.4IONS-SCNC: 11 MMOL/L (ref 10–25)
ANION GAP SERPL CALC-SCNC: 9 MMOL/L (ref 10–20)
APPEARANCE UR: ABNORMAL
ARTERIAL PATENCY WRIST A: POSITIVE
ATRIAL RATE: 97 BPM
BACTERIA #/AREA URNS AUTO: ABNORMAL /HPF
BASE EXCESS BLDA CALC-SCNC: -0.3 MMOL/L (ref -2–3)
BASE EXCESS BLDA CALC-SCNC: -2.5 MMOL/L (ref -2–3)
BASE EXCESS BLDA CALC-SCNC: 1.6 MMOL/L (ref -2–3)
BASE EXCESS BLDV CALC-SCNC: -2.1 MMOL/L (ref -2–3)
BILIRUB UR STRIP.AUTO-MCNC: NEGATIVE MG/DL
BNP SERPL-MCNC: 1141 PG/ML (ref 0–99)
BODY TEMPERATURE: ABNORMAL
BUN SERPL-MCNC: 28 MG/DL (ref 6–23)
CA-I BLDA-SCNC: 1.25 MMOL/L (ref 1.1–1.33)
CA-I BLDA-SCNC: 1.26 MMOL/L (ref 1.1–1.33)
CA-I BLDA-SCNC: 1.27 MMOL/L (ref 1.1–1.33)
CA-I BLDV-SCNC: 1.28 MMOL/L (ref 1.1–1.33)
CALCIUM SERPL-MCNC: 8.9 MG/DL (ref 8.6–10.3)
CARDIAC TROPONIN I PNL SERPL HS: 29 NG/L (ref 0–13)
CARDIAC TROPONIN I PNL SERPL HS: 30 NG/L (ref 0–13)
CHLORIDE BLDA-SCNC: 105 MMOL/L (ref 98–107)
CHLORIDE BLDV-SCNC: 105 MMOL/L (ref 98–107)
CHLORIDE SERPL-SCNC: 108 MMOL/L (ref 98–107)
CO2 SERPL-SCNC: 28 MMOL/L (ref 21–32)
COLOR UR: YELLOW
CREAT SERPL-MCNC: 1.76 MG/DL (ref 0.5–1.05)
EGFRCR SERPLBLD CKD-EPI 2021: 31 ML/MIN/1.73M*2
EPAP CMH2O: 6 CM H2O
EPAP CMH2O: 6 CM H2O
ERYTHROCYTE [DISTWIDTH] IN BLOOD BY AUTOMATED COUNT: 16.2 % (ref 11.5–14.5)
GLUCOSE BLD MANUAL STRIP-MCNC: 108 MG/DL (ref 74–99)
GLUCOSE BLD MANUAL STRIP-MCNC: 113 MG/DL (ref 74–99)
GLUCOSE BLD MANUAL STRIP-MCNC: 170 MG/DL (ref 74–99)
GLUCOSE BLDA-MCNC: 192 MG/DL (ref 74–99)
GLUCOSE BLDA-MCNC: 202 MG/DL (ref 74–99)
GLUCOSE BLDA-MCNC: 234 MG/DL (ref 74–99)
GLUCOSE BLDV-MCNC: 200 MG/DL (ref 74–99)
GLUCOSE SERPL-MCNC: 112 MG/DL (ref 74–99)
GLUCOSE UR STRIP.AUTO-MCNC: NORMAL MG/DL
GRAN CASTS #/AREA UR COMP ASSIST: ABNORMAL /LPF
HCO3 BLDA-SCNC: 26.6 MMOL/L (ref 22–26)
HCO3 BLDA-SCNC: 27.7 MMOL/L (ref 22–26)
HCO3 BLDA-SCNC: 28.9 MMOL/L (ref 22–26)
HCO3 BLDV-SCNC: 26.3 MMOL/L (ref 22–26)
HCT VFR BLD AUTO: 33.6 % (ref 36–46)
HCT VFR BLD EST: 30 % (ref 36–46)
HCT VFR BLD EST: 30 % (ref 36–46)
HCT VFR BLD EST: 31 % (ref 36–46)
HCT VFR BLD EST: 32 % (ref 36–46)
HGB BLD-MCNC: 9.3 G/DL (ref 12–16)
HGB BLDA-MCNC: 10 G/DL (ref 12–16)
HGB BLDA-MCNC: 10.3 G/DL (ref 12–16)
HGB BLDA-MCNC: 10.7 G/DL (ref 12–16)
HGB BLDV-MCNC: 9.9 G/DL (ref 12–16)
HOLD SPECIMEN: NORMAL
HYALINE CASTS #/AREA URNS AUTO: ABNORMAL /LPF
INHALED O2 CONCENTRATION: 55 %
INHALED O2 CONCENTRATION: 60 %
INSPIRATORY TIME: 0.8
IPAP CMH2O: 22 CM H2O
IPAP CMH2O: 22 CM H2O
KETONES UR STRIP.AUTO-MCNC: ABNORMAL MG/DL
LACTATE BLDA-SCNC: 1.1 MMOL/L (ref 0.4–2)
LACTATE BLDA-SCNC: 1.1 MMOL/L (ref 0.4–2)
LACTATE BLDA-SCNC: 1.2 MMOL/L (ref 0.4–2)
LACTATE BLDV-SCNC: 1.7 MMOL/L (ref 0.4–2)
LEUKOCYTE ESTERASE UR QL STRIP.AUTO: NEGATIVE
MCH RBC QN AUTO: 25.1 PG (ref 26–34)
MCHC RBC AUTO-ENTMCNC: 27.7 G/DL (ref 32–36)
MCV RBC AUTO: 91 FL (ref 80–100)
MIXED CELL CASTS #/AREA UR COMP ASSIST: ABNORMAL /LPF
MUCOUS THREADS #/AREA URNS AUTO: ABNORMAL /LPF
NITRITE UR QL STRIP.AUTO: NEGATIVE
NRBC BLD-RTO: 0 /100 WBCS (ref 0–0)
OXYHGB MFR BLDA: 90.3 % (ref 94–98)
OXYHGB MFR BLDA: 95.3 % (ref 94–98)
OXYHGB MFR BLDA: 95.7 % (ref 94–98)
OXYHGB MFR BLDV: 90.4 % (ref 45–75)
P AXIS: 50 DEGREES
P OFFSET: 157 MS
P ONSET: 106 MS
PCO2 BLDA: 56 MM HG (ref 38–42)
PCO2 BLDA: 59 MM HG (ref 38–42)
PCO2 BLDA: 65 MM HG (ref 38–42)
PCO2 BLDV: 60 MM HG (ref 41–51)
PH BLDA: 7.22 PH (ref 7.38–7.42)
PH BLDA: 7.28 PH (ref 7.38–7.42)
PH BLDA: 7.32 PH (ref 7.38–7.42)
PH BLDV: 7.25 PH (ref 7.33–7.43)
PH UR STRIP.AUTO: 5.5 [PH]
PLATELET # BLD AUTO: 110 X10*3/UL (ref 150–450)
PO2 BLDA: 63 MM HG (ref 85–95)
PO2 BLDA: 84 MM HG (ref 85–95)
PO2 BLDA: 99 MM HG (ref 85–95)
PO2 BLDV: 64 MM HG (ref 35–45)
POTASSIUM BLDA-SCNC: 5.2 MMOL/L (ref 3.5–5.3)
POTASSIUM BLDA-SCNC: 5.3 MMOL/L (ref 3.5–5.3)
POTASSIUM BLDA-SCNC: 6 MMOL/L (ref 3.5–5.3)
POTASSIUM BLDV-SCNC: 5.5 MMOL/L (ref 3.5–5.3)
POTASSIUM SERPL-SCNC: 5.3 MMOL/L (ref 3.5–5.3)
PR INTERVAL: 212 MS
PROT UR STRIP.AUTO-MCNC: ABNORMAL MG/DL
Q ONSET: 212 MS
QRS COUNT: 16 BEATS
QRS DURATION: 86 MS
QT INTERVAL: 338 MS
QTC CALCULATION(BAZETT): 429 MS
QTC FREDERICIA: 396 MS
R AXIS: -63 DEGREES
RBC # BLD AUTO: 3.71 X10*6/UL (ref 4–5.2)
RBC # UR STRIP.AUTO: ABNORMAL /UL
RBC #/AREA URNS AUTO: ABNORMAL /HPF
SAO2 % BLDA: 93 % (ref 94–100)
SAO2 % BLDA: 98 % (ref 94–100)
SAO2 % BLDA: 98 % (ref 94–100)
SAO2 % BLDV: 93 % (ref 45–75)
SODIUM BLDA-SCNC: 137 MMOL/L (ref 136–145)
SODIUM BLDA-SCNC: 138 MMOL/L (ref 136–145)
SODIUM BLDA-SCNC: 138 MMOL/L (ref 136–145)
SODIUM BLDV-SCNC: 137 MMOL/L (ref 136–145)
SODIUM SERPL-SCNC: 140 MMOL/L (ref 136–145)
SP GR UR STRIP.AUTO: 1.02
SPECIMEN DRAWN FROM PATIENT: ABNORMAL
SQUAMOUS #/AREA URNS AUTO: ABNORMAL /HPF
T AXIS: 60 DEGREES
T OFFSET: 381 MS
TRANS CELLS #/AREA UR COMP ASSIST: ABNORMAL /HPF
UROBILINOGEN UR STRIP.AUTO-MCNC: NORMAL MG/DL
VENTILATOR RATE: 22 BPM
VENTILATOR RATE: 22 BPM
VENTRICULAR RATE: 97 BPM
WBC # BLD AUTO: 7.2 X10*3/UL (ref 4.4–11.3)
WBC #/AREA URNS AUTO: ABNORMAL /HPF
WBC CLUMPS #/AREA URNS AUTO: ABNORMAL /HPF

## 2024-10-28 PROCEDURE — 71045 X-RAY EXAM CHEST 1 VIEW: CPT | Performed by: RADIOLOGY

## 2024-10-28 PROCEDURE — 93971 EXTREMITY STUDY: CPT | Performed by: RADIOLOGY

## 2024-10-28 PROCEDURE — 83036 HEMOGLOBIN GLYCOSYLATED A1C: CPT | Mod: GENLAB | Performed by: NURSE PRACTITIONER

## 2024-10-28 PROCEDURE — 94760 N-INVAS EAR/PLS OXIMETRY 1: CPT | Mod: MUE

## 2024-10-28 PROCEDURE — 2500000005 HC RX 250 GENERAL PHARMACY W/O HCPCS: Mod: IPSPLIT | Performed by: NURSE PRACTITIONER

## 2024-10-28 PROCEDURE — 80048 BASIC METABOLIC PNL TOTAL CA: CPT | Performed by: HOSPITALIST

## 2024-10-28 PROCEDURE — 2500000001 HC RX 250 WO HCPCS SELF ADMINISTERED DRUGS (ALT 637 FOR MEDICARE OP): Mod: IPSPLIT

## 2024-10-28 PROCEDURE — 99222 1ST HOSP IP/OBS MODERATE 55: CPT | Performed by: NURSE PRACTITIONER

## 2024-10-28 PROCEDURE — 84132 ASSAY OF SERUM POTASSIUM: CPT | Mod: IPSPLIT | Performed by: NURSE PRACTITIONER

## 2024-10-28 PROCEDURE — 2500000004 HC RX 250 GENERAL PHARMACY W/ HCPCS (ALT 636 FOR OP/ED): Mod: IPSPLIT | Performed by: NURSE PRACTITIONER

## 2024-10-28 PROCEDURE — 2500000002 HC RX 250 W HCPCS SELF ADMINISTERED DRUGS (ALT 637 FOR MEDICARE OP, ALT 636 FOR OP/ED): Mod: IPSPLIT | Performed by: NURSE PRACTITIONER

## 2024-10-28 PROCEDURE — 9420000001 HC RT PATIENT EDUCATION 5 MIN

## 2024-10-28 PROCEDURE — 71045 X-RAY EXAM CHEST 1 VIEW: CPT | Mod: IPSPLIT

## 2024-10-28 PROCEDURE — G0426 INPT/ED TELECONSULT50: HCPCS | Performed by: HOSPITALIST

## 2024-10-28 PROCEDURE — 94660 CPAP INITIATION&MGMT: CPT | Mod: IPSPLIT

## 2024-10-28 PROCEDURE — 2060000001 HC INTERMEDIATE ICU ROOM DAILY: Mod: IPSPLIT

## 2024-10-28 PROCEDURE — 81001 URINALYSIS AUTO W/SCOPE: CPT | Performed by: EMERGENCY MEDICINE

## 2024-10-28 PROCEDURE — 2500000005 HC RX 250 GENERAL PHARMACY W/O HCPCS: Performed by: EMERGENCY MEDICINE

## 2024-10-28 PROCEDURE — 2500000002 HC RX 250 W HCPCS SELF ADMINISTERED DRUGS (ALT 637 FOR MEDICARE OP, ALT 636 FOR OP/ED): Performed by: HOSPITALIST

## 2024-10-28 PROCEDURE — 78803 RP LOCLZJ TUM SPECT 1 AREA: CPT

## 2024-10-28 PROCEDURE — 2500000001 HC RX 250 WO HCPCS SELF ADMINISTERED DRUGS (ALT 637 FOR MEDICARE OP): Mod: IPSPLIT | Performed by: NURSE PRACTITIONER

## 2024-10-28 PROCEDURE — 87040 BLOOD CULTURE FOR BACTERIA: CPT | Mod: GENLAB | Performed by: EMERGENCY MEDICINE

## 2024-10-28 PROCEDURE — 2500000001 HC RX 250 WO HCPCS SELF ADMINISTERED DRUGS (ALT 637 FOR MEDICARE OP)

## 2024-10-28 PROCEDURE — 84484 ASSAY OF TROPONIN QUANT: CPT | Performed by: EMERGENCY MEDICINE

## 2024-10-28 PROCEDURE — 84132 ASSAY OF SERUM POTASSIUM: CPT | Mod: IPSPLIT | Performed by: INTERNAL MEDICINE

## 2024-10-28 PROCEDURE — 93970 EXTREMITY STUDY: CPT | Mod: IPSPLIT

## 2024-10-28 PROCEDURE — 36600 WITHDRAWAL OF ARTERIAL BLOOD: CPT | Mod: IPSPLIT

## 2024-10-28 PROCEDURE — 2500000005 HC RX 250 GENERAL PHARMACY W/O HCPCS: Performed by: HOSPITALIST

## 2024-10-28 PROCEDURE — 2500000004 HC RX 250 GENERAL PHARMACY W/ HCPCS (ALT 636 FOR OP/ED): Mod: IPSPLIT

## 2024-10-28 PROCEDURE — 2500000001 HC RX 250 WO HCPCS SELF ADMINISTERED DRUGS (ALT 637 FOR MEDICARE OP): Performed by: EMERGENCY MEDICINE

## 2024-10-28 PROCEDURE — 2500000005 HC RX 250 GENERAL PHARMACY W/O HCPCS: Mod: IPSPLIT

## 2024-10-28 PROCEDURE — 83880 ASSAY OF NATRIURETIC PEPTIDE: CPT

## 2024-10-28 PROCEDURE — 78830 RP LOCLZJ TUM SPECT W/CT 1: CPT | Performed by: NUCLEAR MEDICINE

## 2024-10-28 PROCEDURE — 36415 COLL VENOUS BLD VENIPUNCTURE: CPT | Performed by: EMERGENCY MEDICINE

## 2024-10-28 PROCEDURE — 99291 CRITICAL CARE FIRST HOUR: CPT | Performed by: INTERNAL MEDICINE

## 2024-10-28 PROCEDURE — 96375 TX/PRO/DX INJ NEW DRUG ADDON: CPT | Mod: 59

## 2024-10-28 PROCEDURE — 94640 AIRWAY INHALATION TREATMENT: CPT | Mod: MUE

## 2024-10-28 PROCEDURE — 5A09357 ASSISTANCE WITH RESPIRATORY VENTILATION, LESS THAN 24 CONSECUTIVE HOURS, CONTINUOUS POSITIVE AIRWAY PRESSURE: ICD-10-PCS | Performed by: NURSE PRACTITIONER

## 2024-10-28 PROCEDURE — A9540 TC99M MAA: HCPCS | Performed by: INTERNAL MEDICINE

## 2024-10-28 PROCEDURE — 2500000004 HC RX 250 GENERAL PHARMACY W/ HCPCS (ALT 636 FOR OP/ED): Performed by: HOSPITALIST

## 2024-10-28 PROCEDURE — 3430000001 HC RX 343 DIAGNOSTIC RADIOPHARMACEUTICALS: Performed by: INTERNAL MEDICINE

## 2024-10-28 PROCEDURE — 82947 ASSAY GLUCOSE BLOOD QUANT: CPT

## 2024-10-28 PROCEDURE — 85027 COMPLETE CBC AUTOMATED: CPT | Mod: IPSPLIT | Performed by: NURSE PRACTITIONER

## 2024-10-28 PROCEDURE — 96365 THER/PROPH/DIAG IV INF INIT: CPT | Mod: 59

## 2024-10-28 RX ORDER — AZITHROMYCIN 250 MG/1
500 TABLET, FILM COATED ORAL ONCE
Status: DISCONTINUED | OUTPATIENT
Start: 2024-10-28 | End: 2024-10-28

## 2024-10-28 RX ORDER — GUAIFENESIN 100 MG/5ML
200 SOLUTION ORAL 4 TIMES DAILY PRN
Status: DISCONTINUED | OUTPATIENT
Start: 2024-10-28 | End: 2024-11-02 | Stop reason: HOSPADM

## 2024-10-28 RX ORDER — METOPROLOL TARTRATE 25 MG/1
25 TABLET, FILM COATED ORAL 2 TIMES DAILY
Status: DISCONTINUED | OUTPATIENT
Start: 2024-10-28 | End: 2024-11-02 | Stop reason: HOSPADM

## 2024-10-28 RX ORDER — FUROSEMIDE 10 MG/ML
40 INJECTION INTRAMUSCULAR; INTRAVENOUS EVERY 12 HOURS
Status: DISCONTINUED | OUTPATIENT
Start: 2024-10-28 | End: 2024-10-28

## 2024-10-28 RX ORDER — FUROSEMIDE 40 MG/1
40 TABLET ORAL DAILY
Status: DISCONTINUED | OUTPATIENT
Start: 2024-10-28 | End: 2024-11-01

## 2024-10-28 RX ORDER — OXYMETAZOLINE HCL 0.05 %
3 SPRAY, NON-AEROSOL (ML) NASAL EVERY 12 HOURS PRN
Status: DISPENSED | OUTPATIENT
Start: 2024-10-28 | End: 2024-10-31

## 2024-10-28 RX ORDER — ACETAMINOPHEN 500 MG
5000 TABLET ORAL DAILY
Status: DISCONTINUED | OUTPATIENT
Start: 2024-10-28 | End: 2024-11-02 | Stop reason: HOSPADM

## 2024-10-28 RX ORDER — ACETAMINOPHEN 500 MG
10 TABLET ORAL DAILY PRN
Status: DISCONTINUED | OUTPATIENT
Start: 2024-10-28 | End: 2024-11-02 | Stop reason: HOSPADM

## 2024-10-28 RX ORDER — CEFTRIAXONE 1 G/50ML
1 INJECTION, SOLUTION INTRAVENOUS EVERY 24 HOURS
Status: DISCONTINUED | OUTPATIENT
Start: 2024-10-28 | End: 2024-10-29

## 2024-10-28 RX ORDER — PANTOPRAZOLE SODIUM 40 MG/10ML
40 INJECTION, POWDER, LYOPHILIZED, FOR SOLUTION INTRAVENOUS DAILY
Status: DISCONTINUED | OUTPATIENT
Start: 2024-10-28 | End: 2024-10-31

## 2024-10-28 RX ORDER — POLYETHYLENE GLYCOL 3350 17 G/17G
17 POWDER, FOR SOLUTION ORAL DAILY
Status: DISCONTINUED | OUTPATIENT
Start: 2024-10-28 | End: 2024-11-02 | Stop reason: HOSPADM

## 2024-10-28 RX ORDER — PANTOPRAZOLE SODIUM 40 MG/1
40 TABLET, DELAYED RELEASE ORAL DAILY
Status: DISCONTINUED | OUTPATIENT
Start: 2024-10-28 | End: 2024-10-28

## 2024-10-28 RX ORDER — ENOXAPARIN SODIUM 100 MG/ML
40 INJECTION SUBCUTANEOUS EVERY 12 HOURS SCHEDULED
Status: DISCONTINUED | OUTPATIENT
Start: 2024-10-28 | End: 2024-10-28

## 2024-10-28 RX ORDER — ACETAMINOPHEN 325 MG/1
650 TABLET ORAL EVERY 6 HOURS PRN
Status: DISCONTINUED | OUTPATIENT
Start: 2024-10-28 | End: 2024-11-02 | Stop reason: HOSPADM

## 2024-10-28 RX ORDER — CLOPIDOGREL BISULFATE 75 MG/1
75 TABLET ORAL DAILY
Status: DISCONTINUED | OUTPATIENT
Start: 2024-10-28 | End: 2024-11-02 | Stop reason: HOSPADM

## 2024-10-28 RX ORDER — LOSARTAN POTASSIUM 25 MG/1
25 TABLET ORAL DAILY
Status: DISCONTINUED | OUTPATIENT
Start: 2024-10-28 | End: 2024-11-02 | Stop reason: HOSPADM

## 2024-10-28 RX ORDER — FUROSEMIDE 10 MG/ML
INJECTION INTRAMUSCULAR; INTRAVENOUS
Status: COMPLETED
Start: 2024-10-28 | End: 2024-10-28

## 2024-10-28 RX ORDER — TRAMADOL HYDROCHLORIDE 50 MG/1
50 TABLET ORAL 2 TIMES DAILY
Status: DISCONTINUED | OUTPATIENT
Start: 2024-10-28 | End: 2024-10-28

## 2024-10-28 RX ORDER — IPRATROPIUM BROMIDE AND ALBUTEROL SULFATE 2.5; .5 MG/3ML; MG/3ML
3 SOLUTION RESPIRATORY (INHALATION) EVERY 2 HOUR PRN
Status: DISCONTINUED | OUTPATIENT
Start: 2024-10-28 | End: 2024-11-02 | Stop reason: HOSPADM

## 2024-10-28 RX ORDER — DEXMEDETOMIDINE HYDROCHLORIDE 4 UG/ML
INJECTION, SOLUTION INTRAVENOUS
Status: COMPLETED
Start: 2024-10-28 | End: 2024-10-28

## 2024-10-28 RX ORDER — LOSARTAN POTASSIUM 25 MG/1
25 TABLET ORAL DAILY
COMMUNITY

## 2024-10-28 RX ORDER — DEXMEDETOMIDINE HYDROCHLORIDE 4 UG/ML
.1-1.5 INJECTION, SOLUTION INTRAVENOUS CONTINUOUS
Status: DISCONTINUED | OUTPATIENT
Start: 2024-10-28 | End: 2024-10-29

## 2024-10-28 RX ORDER — ACETAMINOPHEN 500 MG
5000 TABLET ORAL DAILY
COMMUNITY

## 2024-10-28 RX ORDER — ALUMINUM HYDROXIDE, MAGNESIUM HYDROXIDE, AND SIMETHICONE 1200; 120; 1200 MG/30ML; MG/30ML; MG/30ML
20 SUSPENSION ORAL EVERY 4 HOURS PRN
Status: DISCONTINUED | OUTPATIENT
Start: 2024-10-28 | End: 2024-11-02 | Stop reason: HOSPADM

## 2024-10-28 RX ORDER — FERROUS GLUCONATE 325 MG
324 TABLET ORAL 2 TIMES DAILY
Status: DISCONTINUED | OUTPATIENT
Start: 2024-10-28 | End: 2024-11-02 | Stop reason: HOSPADM

## 2024-10-28 RX ORDER — ONDANSETRON HYDROCHLORIDE 2 MG/ML
4 INJECTION, SOLUTION INTRAVENOUS EVERY 4 HOURS PRN
Status: DISCONTINUED | OUTPATIENT
Start: 2024-10-28 | End: 2024-11-02 | Stop reason: HOSPADM

## 2024-10-28 RX ORDER — ROSUVASTATIN CALCIUM 10 MG/1
40 TABLET, COATED ORAL DAILY
Status: DISCONTINUED | OUTPATIENT
Start: 2024-10-28 | End: 2024-11-02 | Stop reason: HOSPADM

## 2024-10-28 RX ORDER — KETOROLAC TROMETHAMINE 5 MG/ML
1 SOLUTION OPHTHALMIC 4 TIMES DAILY
Status: DISCONTINUED | OUTPATIENT
Start: 2024-10-28 | End: 2024-10-30

## 2024-10-28 RX ORDER — CLOTRIMAZOLE AND BETAMETHASONE DIPROPIONATE 10; .64 MG/G; MG/G
1 CREAM TOPICAL 2 TIMES DAILY
Status: DISCONTINUED | OUTPATIENT
Start: 2024-10-28 | End: 2024-11-02 | Stop reason: HOSPADM

## 2024-10-28 RX ORDER — GABAPENTIN 300 MG/1
300 CAPSULE ORAL 2 TIMES DAILY
Status: DISCONTINUED | OUTPATIENT
Start: 2024-10-28 | End: 2024-10-28

## 2024-10-28 RX ORDER — ASPIRIN 81 MG/1
81 TABLET ORAL DAILY
Status: DISCONTINUED | OUTPATIENT
Start: 2024-10-28 | End: 2024-11-02 | Stop reason: HOSPADM

## 2024-10-28 RX ORDER — CALCIUM CARBONATE 200(500)MG
500 TABLET,CHEWABLE ORAL 4 TIMES DAILY PRN
Status: DISCONTINUED | OUTPATIENT
Start: 2024-10-28 | End: 2024-11-02 | Stop reason: HOSPADM

## 2024-10-28 RX ORDER — IPRATROPIUM BROMIDE AND ALBUTEROL SULFATE 2.5; .5 MG/3ML; MG/3ML
3 SOLUTION RESPIRATORY (INHALATION) 3 TIMES DAILY
Status: DISCONTINUED | OUTPATIENT
Start: 2024-10-28 | End: 2024-11-02 | Stop reason: HOSPADM

## 2024-10-28 RX ORDER — FUROSEMIDE 10 MG/ML
20 INJECTION INTRAMUSCULAR; INTRAVENOUS ONCE
Status: DISCONTINUED | OUTPATIENT
Start: 2024-10-28 | End: 2024-10-28

## 2024-10-28 SDOH — SOCIAL STABILITY: SOCIAL INSECURITY: HAVE YOU HAD ANY THOUGHTS OF HARMING ANYONE ELSE?: NO

## 2024-10-28 SDOH — HEALTH STABILITY: MENTAL HEALTH
DO YOU FEEL STRESS - TENSE, RESTLESS, NERVOUS, OR ANXIOUS, OR UNABLE TO SLEEP AT NIGHT BECAUSE YOUR MIND IS TROUBLED ALL THE TIME - THESE DAYS?: TO SOME EXTENT

## 2024-10-28 SDOH — SOCIAL STABILITY: SOCIAL INSECURITY
WITHIN THE LAST YEAR, HAVE YOU BEEN KICKED, HIT, SLAPPED, OR OTHERWISE PHYSICALLY HURT BY YOUR PARTNER OR EX-PARTNER?: NO

## 2024-10-28 SDOH — SOCIAL STABILITY: SOCIAL INSECURITY: WERE YOU ABLE TO COMPLETE ALL THE BEHAVIORAL HEALTH SCREENINGS?: YES

## 2024-10-28 SDOH — SOCIAL STABILITY: SOCIAL INSECURITY: WITHIN THE LAST YEAR, HAVE YOU BEEN AFRAID OF YOUR PARTNER OR EX-PARTNER?: NO

## 2024-10-28 SDOH — ECONOMIC STABILITY: INCOME INSECURITY: IN THE PAST 12 MONTHS HAS THE ELECTRIC, GAS, OIL, OR WATER COMPANY THREATENED TO SHUT OFF SERVICES IN YOUR HOME?: NO

## 2024-10-28 SDOH — SOCIAL STABILITY: SOCIAL INSECURITY: ARE YOU OR HAVE YOU BEEN THREATENED OR ABUSED PHYSICALLY, EMOTIONALLY, OR SEXUALLY BY ANYONE?: NO

## 2024-10-28 SDOH — ECONOMIC STABILITY: FOOD INSECURITY
WITHIN THE PAST 12 MONTHS, YOU WORRIED THAT YOUR FOOD WOULD RUN OUT BEFORE YOU GOT THE MONEY TO BUY MORE.: SOMETIMES TRUE

## 2024-10-28 SDOH — HEALTH STABILITY: PHYSICAL HEALTH
HOW OFTEN DO YOU NEED TO HAVE SOMEONE HELP YOU WHEN YOU READ INSTRUCTIONS, PAMPHLETS, OR OTHER WRITTEN MATERIAL FROM YOUR DOCTOR OR PHARMACY?: NEVER

## 2024-10-28 SDOH — SOCIAL STABILITY: SOCIAL INSECURITY: ARE THERE ANY APPARENT SIGNS OF INJURIES/BEHAVIORS THAT COULD BE RELATED TO ABUSE/NEGLECT?: NO

## 2024-10-28 SDOH — ECONOMIC STABILITY: FOOD INSECURITY: WITHIN THE PAST 12 MONTHS, THE FOOD YOU BOUGHT JUST DIDN'T LAST AND YOU DIDN'T HAVE MONEY TO GET MORE.: SOMETIMES TRUE

## 2024-10-28 SDOH — ECONOMIC STABILITY: HOUSING INSECURITY: IN THE PAST 12 MONTHS, HOW MANY TIMES HAVE YOU MOVED WHERE YOU WERE LIVING?: 0

## 2024-10-28 SDOH — ECONOMIC STABILITY: HOUSING INSECURITY: IN THE LAST 12 MONTHS, WAS THERE A TIME WHEN YOU WERE NOT ABLE TO PAY THE MORTGAGE OR RENT ON TIME?: NO

## 2024-10-28 SDOH — HEALTH STABILITY: PHYSICAL HEALTH: ON AVERAGE, HOW MANY DAYS PER WEEK DO YOU ENGAGE IN MODERATE TO STRENUOUS EXERCISE (LIKE A BRISK WALK)?: 0 DAYS

## 2024-10-28 SDOH — HEALTH STABILITY: MENTAL HEALTH: EXPERIENCED ANY OF THE FOLLOWING LIFE EVENTS: DEATH OF FAMILY/FRIEND

## 2024-10-28 SDOH — SOCIAL STABILITY: SOCIAL INSECURITY: WITHIN THE LAST YEAR, HAVE YOU BEEN HUMILIATED OR EMOTIONALLY ABUSED IN OTHER WAYS BY YOUR PARTNER OR EX-PARTNER?: NO

## 2024-10-28 SDOH — SOCIAL STABILITY: SOCIAL NETWORK
IN A TYPICAL WEEK, HOW MANY TIMES DO YOU TALK ON THE PHONE WITH FAMILY, FRIENDS, OR NEIGHBORS?: MORE THAN THREE TIMES A WEEK

## 2024-10-28 SDOH — SOCIAL STABILITY: SOCIAL INSECURITY: HAS ANYONE EVER THREATENED TO HURT YOUR FAMILY OR YOUR PETS?: NO

## 2024-10-28 SDOH — SOCIAL STABILITY: SOCIAL INSECURITY: DO YOU FEEL UNSAFE GOING BACK TO THE PLACE WHERE YOU ARE LIVING?: NO

## 2024-10-28 SDOH — SOCIAL STABILITY: SOCIAL INSECURITY: DOES ANYONE TRY TO KEEP YOU FROM HAVING/CONTACTING OTHER FRIENDS OR DOING THINGS OUTSIDE YOUR HOME?: NO

## 2024-10-28 SDOH — ECONOMIC STABILITY: HOUSING INSECURITY: AT ANY TIME IN THE PAST 12 MONTHS, WERE YOU HOMELESS OR LIVING IN A SHELTER (INCLUDING NOW)?: NO

## 2024-10-28 SDOH — SOCIAL STABILITY: SOCIAL INSECURITY
WITHIN THE LAST YEAR, HAVE YOU BEEN RAPED OR FORCED TO HAVE ANY KIND OF SEXUAL ACTIVITY BY YOUR PARTNER OR EX-PARTNER?: NO

## 2024-10-28 SDOH — ECONOMIC STABILITY: FOOD INSECURITY: HOW HARD IS IT FOR YOU TO PAY FOR THE VERY BASICS LIKE FOOD, HOUSING, MEDICAL CARE, AND HEATING?: NOT VERY HARD

## 2024-10-28 SDOH — SOCIAL STABILITY: SOCIAL INSECURITY: ABUSE: ADULT

## 2024-10-28 SDOH — ECONOMIC STABILITY: TRANSPORTATION INSECURITY: IN THE PAST 12 MONTHS, HAS LACK OF TRANSPORTATION KEPT YOU FROM MEDICAL APPOINTMENTS OR FROM GETTING MEDICATIONS?: NO

## 2024-10-28 SDOH — SOCIAL STABILITY: SOCIAL NETWORK: HOW OFTEN DO YOU GET TOGETHER WITH FRIENDS OR RELATIVES?: THREE TIMES A WEEK

## 2024-10-28 SDOH — HEALTH STABILITY: PHYSICAL HEALTH: ON AVERAGE, HOW MANY MINUTES DO YOU ENGAGE IN EXERCISE AT THIS LEVEL?: 0 MIN

## 2024-10-28 SDOH — HEALTH STABILITY: MENTAL HEALTH: HOW OFTEN DO YOU HAVE A DRINK CONTAINING ALCOHOL?: NEVER

## 2024-10-28 SDOH — SOCIAL STABILITY: SOCIAL INSECURITY: DO YOU FEEL ANYONE HAS EXPLOITED OR TAKEN ADVANTAGE OF YOU FINANCIALLY OR OF YOUR PERSONAL PROPERTY?: NO

## 2024-10-28 SDOH — HEALTH STABILITY: MENTAL HEALTH: HOW OFTEN DO YOU HAVE SIX OR MORE DRINKS ON ONE OCCASION?: NEVER

## 2024-10-28 SDOH — SOCIAL STABILITY: SOCIAL INSECURITY: HAVE YOU HAD THOUGHTS OF HARMING ANYONE ELSE?: NO

## 2024-10-28 SDOH — HEALTH STABILITY: MENTAL HEALTH: HOW MANY DRINKS CONTAINING ALCOHOL DO YOU HAVE ON A TYPICAL DAY WHEN YOU ARE DRINKING?: PATIENT DOES NOT DRINK

## 2024-10-28 ASSESSMENT — LIFESTYLE VARIABLES
HOW MANY STANDARD DRINKS CONTAINING ALCOHOL DO YOU HAVE ON A TYPICAL DAY: PATIENT DOES NOT DRINK
PRESCIPTION_ABUSE_PAST_12_MONTHS: NO
SKIP TO QUESTIONS 9-10: 1
SKIP TO QUESTIONS 9-10: 1
SUBSTANCE_ABUSE_PAST_12_MONTHS: NO
AUDIT-C TOTAL SCORE: 0
AUDIT-C TOTAL SCORE: 0
HOW OFTEN DO YOU HAVE 6 OR MORE DRINKS ON ONE OCCASION: NEVER
HOW OFTEN DO YOU HAVE A DRINK CONTAINING ALCOHOL: NEVER
AUDIT-C TOTAL SCORE: 0

## 2024-10-28 ASSESSMENT — PATIENT HEALTH QUESTIONNAIRE - PHQ9
SUM OF ALL RESPONSES TO PHQ9 QUESTIONS 1 & 2: 6
2. FEELING DOWN, DEPRESSED OR HOPELESS: NEARLY EVERY DAY
1. LITTLE INTEREST OR PLEASURE IN DOING THINGS: NEARLY EVERY DAY

## 2024-10-28 ASSESSMENT — COGNITIVE AND FUNCTIONAL STATUS - GENERAL
DAILY ACTIVITIY SCORE: 24
MOBILITY SCORE: 24
PATIENT BASELINE BEDBOUND: NO

## 2024-10-28 ASSESSMENT — PAIN - FUNCTIONAL ASSESSMENT
PAIN_FUNCTIONAL_ASSESSMENT: 0-10
PAIN_FUNCTIONAL_ASSESSMENT: 0-10

## 2024-10-28 ASSESSMENT — ACTIVITIES OF DAILY LIVING (ADL)
JUDGMENT_ADEQUATE_SAFELY_COMPLETE_DAILY_ACTIVITIES: YES
HEARING - LEFT EAR: FUNCTIONAL
PATIENT'S MEMORY ADEQUATE TO SAFELY COMPLETE DAILY ACTIVITIES?: YES
PATIENT'S MEMORY ADEQUATE TO SAFELY COMPLETE DAILY ACTIVITIES?: YES
LACK_OF_TRANSPORTATION: NO
BATHING: INDEPENDENT
ADEQUATE_TO_COMPLETE_ADL: YES
HEARING - RIGHT EAR: FUNCTIONAL
WALKS IN HOME: INDEPENDENT
WALKS IN HOME: INDEPENDENT
GROOMING: INDEPENDENT
DRESSING YOURSELF: INDEPENDENT
TOILETING: INDEPENDENT
LACK_OF_TRANSPORTATION: NO
JUDGMENT_ADEQUATE_SAFELY_COMPLETE_DAILY_ACTIVITIES: YES
ADEQUATE_TO_COMPLETE_ADL: YES
DRESSING YOURSELF: INDEPENDENT
FEEDING YOURSELF: INDEPENDENT
GROOMING: INDEPENDENT
HEARING - LEFT EAR: FUNCTIONAL
HEARING - RIGHT EAR: FUNCTIONAL
LACK_OF_TRANSPORTATION: NO
FEEDING YOURSELF: INDEPENDENT
BATHING: INDEPENDENT
TOILETING: INDEPENDENT

## 2024-10-28 ASSESSMENT — PAIN SCALES - GENERAL
PAINLEVEL_OUTOF10: 0 - NO PAIN

## 2024-10-29 LAB
ANION GAP BLDA CALCULATED.4IONS-SCNC: 8 MMO/L (ref 10–25)
ANION GAP SERPL CALC-SCNC: 11 MMOL/L (ref 10–20)
ARTERIAL PATENCY WRIST A: POSITIVE
BASE EXCESS BLDA CALC-SCNC: 4.5 MMOL/L (ref -2–3)
BNP SERPL-MCNC: 937 PG/ML (ref 0–99)
BODY TEMPERATURE: ABNORMAL
BUN SERPL-MCNC: 32 MG/DL (ref 6–23)
CA-I BLDA-SCNC: 1.24 MMOL/L (ref 1.1–1.33)
CALCIUM SERPL-MCNC: 9.1 MG/DL (ref 8.6–10.3)
CHLORIDE BLDA-SCNC: 103 MMOL/L (ref 98–107)
CHLORIDE SERPL-SCNC: 104 MMOL/L (ref 98–107)
CO2 SERPL-SCNC: 30 MMOL/L (ref 21–32)
CREAT SERPL-MCNC: 1.82 MG/DL (ref 0.5–1.05)
EGFRCR SERPLBLD CKD-EPI 2021: 29 ML/MIN/1.73M*2
EPAP CMH2O: 6 CM H2O
ERYTHROCYTE [DISTWIDTH] IN BLOOD BY AUTOMATED COUNT: 16.2 % (ref 11.5–14.5)
EST. AVERAGE GLUCOSE BLD GHB EST-MCNC: 120 MG/DL
GLUCOSE BLD MANUAL STRIP-MCNC: 157 MG/DL (ref 74–99)
GLUCOSE BLDA-MCNC: 181 MG/DL (ref 74–99)
GLUCOSE SERPL-MCNC: 177 MG/DL (ref 74–99)
HBA1C MFR BLD: 5.8 %
HCO3 BLDA-SCNC: 31.2 MMOL/L (ref 22–26)
HCT VFR BLD AUTO: 34.4 % (ref 36–46)
HCT VFR BLD EST: 31 % (ref 36–46)
HGB BLD-MCNC: 9.6 G/DL (ref 12–16)
HGB BLDA-MCNC: 10.3 G/DL (ref 12–16)
INHALED O2 CONCENTRATION: 60 %
INSPIRATORY TIME: 0.8
IPAP CMH2O: 22 CM H2O
LACTATE BLDA-SCNC: 0.8 MMOL/L (ref 0.4–2)
MAGNESIUM SERPL-MCNC: 2.11 MG/DL (ref 1.6–2.4)
MCH RBC QN AUTO: 24.7 PG (ref 26–34)
MCHC RBC AUTO-ENTMCNC: 27.9 G/DL (ref 32–36)
MCV RBC AUTO: 89 FL (ref 80–100)
NRBC BLD-RTO: 0 /100 WBCS (ref 0–0)
OXYHGB MFR BLDA: 93.3 % (ref 94–98)
PCO2 BLDA: 54 MM HG (ref 38–42)
PH BLDA: 7.37 PH (ref 7.38–7.42)
PLATELET # BLD AUTO: 118 X10*3/UL (ref 150–450)
PO2 BLDA: 70 MM HG (ref 85–95)
POTASSIUM BLDA-SCNC: 5.1 MMOL/L (ref 3.5–5.3)
POTASSIUM SERPL-SCNC: 4.9 MMOL/L (ref 3.5–5.3)
RBC # BLD AUTO: 3.88 X10*6/UL (ref 4–5.2)
SAO2 % BLDA: 96 % (ref 94–100)
SODIUM BLDA-SCNC: 137 MMOL/L (ref 136–145)
SODIUM SERPL-SCNC: 140 MMOL/L (ref 136–145)
SPECIMEN DRAWN FROM PATIENT: ABNORMAL
VENTILATOR RATE: 22 BPM
WBC # BLD AUTO: 6.6 X10*3/UL (ref 4.4–11.3)

## 2024-10-29 PROCEDURE — 84132 ASSAY OF SERUM POTASSIUM: CPT | Mod: IPSPLIT | Performed by: INTERNAL MEDICINE

## 2024-10-29 PROCEDURE — 2500000005 HC RX 250 GENERAL PHARMACY W/O HCPCS: Mod: IPSPLIT | Performed by: NURSE PRACTITIONER

## 2024-10-29 PROCEDURE — 94660 CPAP INITIATION&MGMT: CPT | Mod: IPSPLIT

## 2024-10-29 PROCEDURE — 2500000004 HC RX 250 GENERAL PHARMACY W/ HCPCS (ALT 636 FOR OP/ED): Mod: IPSPLIT | Performed by: NURSE PRACTITIONER

## 2024-10-29 PROCEDURE — 82947 ASSAY GLUCOSE BLOOD QUANT: CPT | Mod: IPSPLIT

## 2024-10-29 PROCEDURE — 2500000002 HC RX 250 W HCPCS SELF ADMINISTERED DRUGS (ALT 637 FOR MEDICARE OP, ALT 636 FOR OP/ED): Mod: IPSPLIT | Performed by: NURSE PRACTITIONER

## 2024-10-29 PROCEDURE — 94760 N-INVAS EAR/PLS OXIMETRY 1: CPT | Mod: IPSPLIT

## 2024-10-29 PROCEDURE — 2500000004 HC RX 250 GENERAL PHARMACY W/ HCPCS (ALT 636 FOR OP/ED): Mod: IPSPLIT | Performed by: INTERNAL MEDICINE

## 2024-10-29 PROCEDURE — 99291 CRITICAL CARE FIRST HOUR: CPT | Performed by: INTERNAL MEDICINE

## 2024-10-29 PROCEDURE — 36600 WITHDRAWAL OF ARTERIAL BLOOD: CPT | Mod: IPSPLIT

## 2024-10-29 PROCEDURE — 36415 COLL VENOUS BLD VENIPUNCTURE: CPT | Mod: IPSPLIT | Performed by: NURSE PRACTITIONER

## 2024-10-29 PROCEDURE — 2500000001 HC RX 250 WO HCPCS SELF ADMINISTERED DRUGS (ALT 637 FOR MEDICARE OP): Mod: IPSPLIT | Performed by: NURSE PRACTITIONER

## 2024-10-29 PROCEDURE — 83880 ASSAY OF NATRIURETIC PEPTIDE: CPT | Mod: IPSPLIT | Performed by: NURSE PRACTITIONER

## 2024-10-29 PROCEDURE — 84132 ASSAY OF SERUM POTASSIUM: CPT | Mod: IPSPLIT | Performed by: NURSE PRACTITIONER

## 2024-10-29 PROCEDURE — 2060000001 HC INTERMEDIATE ICU ROOM DAILY: Mod: IPSPLIT

## 2024-10-29 PROCEDURE — 99232 SBSQ HOSP IP/OBS MODERATE 35: CPT | Performed by: NURSE PRACTITIONER

## 2024-10-29 PROCEDURE — 85027 COMPLETE CBC AUTOMATED: CPT | Mod: IPSPLIT | Performed by: NURSE PRACTITIONER

## 2024-10-29 PROCEDURE — 83735 ASSAY OF MAGNESIUM: CPT | Mod: IPSPLIT | Performed by: INTERNAL MEDICINE

## 2024-10-29 PROCEDURE — 99233 SBSQ HOSP IP/OBS HIGH 50: CPT | Performed by: NURSE PRACTITIONER

## 2024-10-29 PROCEDURE — 94640 AIRWAY INHALATION TREATMENT: CPT | Mod: IPSPLIT

## 2024-10-29 RX ORDER — NYSTATIN 100000 [USP'U]/G
1 POWDER TOPICAL 3 TIMES DAILY
Status: DISCONTINUED | OUTPATIENT
Start: 2024-10-29 | End: 2024-11-02 | Stop reason: HOSPADM

## 2024-10-29 RX ORDER — AZITHROMYCIN 250 MG/1
500 TABLET, FILM COATED ORAL
Status: COMPLETED | OUTPATIENT
Start: 2024-10-29 | End: 2024-11-01

## 2024-10-29 RX ORDER — CEFUROXIME AXETIL 250 MG/1
500 TABLET ORAL 2 TIMES DAILY
Status: DISCONTINUED | OUTPATIENT
Start: 2024-10-30 | End: 2024-11-02 | Stop reason: HOSPADM

## 2024-10-29 RX ORDER — SODIUM CHLORIDE 0.9 % (FLUSH) 0.9 %
10 SYRINGE (ML) INJECTION AS NEEDED
Status: DISCONTINUED | OUTPATIENT
Start: 2024-10-29 | End: 2024-11-02 | Stop reason: HOSPADM

## 2024-10-29 ASSESSMENT — PAIN - FUNCTIONAL ASSESSMENT
PAIN_FUNCTIONAL_ASSESSMENT: 0-10

## 2024-10-29 ASSESSMENT — COGNITIVE AND FUNCTIONAL STATUS - GENERAL
HELP NEEDED FOR BATHING: A LITTLE
TOILETING: A LITTLE
WALKING IN HOSPITAL ROOM: A LITTLE
DAILY ACTIVITIY SCORE: 19
TURNING FROM BACK TO SIDE WHILE IN FLAT BAD: A LITTLE
DRESSING REGULAR UPPER BODY CLOTHING: A LITTLE
CLIMB 3 TO 5 STEPS WITH RAILING: A LITTLE
MOVING FROM LYING ON BACK TO SITTING ON SIDE OF FLAT BED WITH BEDRAILS: A LITTLE
MOBILITY SCORE: 20
DRESSING REGULAR LOWER BODY CLOTHING: A LITTLE
PERSONAL GROOMING: A LITTLE

## 2024-10-29 ASSESSMENT — PAIN SCALES - GENERAL
PAINLEVEL_OUTOF10: 0 - NO PAIN

## 2024-10-30 LAB
ANION GAP SERPL CALC-SCNC: 12 MMOL/L (ref 10–20)
BUN SERPL-MCNC: 46 MG/DL (ref 6–23)
CALCIUM SERPL-MCNC: 9.5 MG/DL (ref 8.6–10.3)
CHLORIDE SERPL-SCNC: 93 MMOL/L (ref 98–107)
CO2 SERPL-SCNC: 39 MMOL/L (ref 21–32)
CREAT SERPL-MCNC: 2.12 MG/DL (ref 0.5–1.05)
EGFRCR SERPLBLD CKD-EPI 2021: 24 ML/MIN/1.73M*2
ERYTHROCYTE [DISTWIDTH] IN BLOOD BY AUTOMATED COUNT: 16.3 % (ref 11.5–14.5)
GLUCOSE SERPL-MCNC: 157 MG/DL (ref 74–99)
HCT VFR BLD AUTO: 37 % (ref 36–46)
HGB BLD-MCNC: 10.5 G/DL (ref 12–16)
MAGNESIUM SERPL-MCNC: 2.16 MG/DL (ref 1.6–2.4)
MCH RBC QN AUTO: 24.5 PG (ref 26–34)
MCHC RBC AUTO-ENTMCNC: 28.4 G/DL (ref 32–36)
MCV RBC AUTO: 86 FL (ref 80–100)
NRBC BLD-RTO: 0 /100 WBCS (ref 0–0)
PLATELET # BLD AUTO: 143 X10*3/UL (ref 150–450)
POTASSIUM SERPL-SCNC: 3.9 MMOL/L (ref 3.5–5.3)
RBC # BLD AUTO: 4.28 X10*6/UL (ref 4–5.2)
SODIUM SERPL-SCNC: 140 MMOL/L (ref 136–145)
WBC # BLD AUTO: 12 X10*3/UL (ref 4.4–11.3)

## 2024-10-30 PROCEDURE — 94760 N-INVAS EAR/PLS OXIMETRY 1: CPT | Mod: IPSPLIT

## 2024-10-30 PROCEDURE — 36415 COLL VENOUS BLD VENIPUNCTURE: CPT | Mod: IPSPLIT | Performed by: NURSE PRACTITIONER

## 2024-10-30 PROCEDURE — 2500000001 HC RX 250 WO HCPCS SELF ADMINISTERED DRUGS (ALT 637 FOR MEDICARE OP): Mod: IPSPLIT | Performed by: NURSE PRACTITIONER

## 2024-10-30 PROCEDURE — 99232 SBSQ HOSP IP/OBS MODERATE 35: CPT | Performed by: NURSE PRACTITIONER

## 2024-10-30 PROCEDURE — 2500000004 HC RX 250 GENERAL PHARMACY W/ HCPCS (ALT 636 FOR OP/ED): Mod: IPSPLIT | Performed by: NURSE PRACTITIONER

## 2024-10-30 PROCEDURE — 85027 COMPLETE CBC AUTOMATED: CPT | Mod: IPSPLIT | Performed by: NURSE PRACTITIONER

## 2024-10-30 PROCEDURE — 2500000002 HC RX 250 W HCPCS SELF ADMINISTERED DRUGS (ALT 637 FOR MEDICARE OP, ALT 636 FOR OP/ED): Mod: IPSPLIT | Performed by: NURSE PRACTITIONER

## 2024-10-30 PROCEDURE — 2060000001 HC INTERMEDIATE ICU ROOM DAILY: Mod: IPSPLIT

## 2024-10-30 PROCEDURE — 2500000005 HC RX 250 GENERAL PHARMACY W/O HCPCS: Mod: IPSPLIT | Performed by: NURSE PRACTITIONER

## 2024-10-30 PROCEDURE — 94640 AIRWAY INHALATION TREATMENT: CPT | Mod: IPSPLIT

## 2024-10-30 PROCEDURE — 80048 BASIC METABOLIC PNL TOTAL CA: CPT | Mod: IPSPLIT | Performed by: NURSE PRACTITIONER

## 2024-10-30 PROCEDURE — 97162 PT EVAL MOD COMPLEX 30 MIN: CPT | Mod: GP,IPSPLIT | Performed by: PHYSICAL THERAPIST

## 2024-10-30 PROCEDURE — 99233 SBSQ HOSP IP/OBS HIGH 50: CPT | Performed by: NURSE PRACTITIONER

## 2024-10-30 PROCEDURE — 94660 CPAP INITIATION&MGMT: CPT | Mod: IPSPLIT

## 2024-10-30 PROCEDURE — 5A0935A ASSISTANCE WITH RESPIRATORY VENTILATION, LESS THAN 24 CONSECUTIVE HOURS, HIGH NASAL FLOW/VELOCITY: ICD-10-PCS | Performed by: NURSE PRACTITIONER

## 2024-10-30 PROCEDURE — 2500000005 HC RX 250 GENERAL PHARMACY W/O HCPCS: Mod: IPSPLIT

## 2024-10-30 PROCEDURE — 83735 ASSAY OF MAGNESIUM: CPT | Mod: IPSPLIT | Performed by: INTERNAL MEDICINE

## 2024-10-30 PROCEDURE — 97166 OT EVAL MOD COMPLEX 45 MIN: CPT | Mod: GO,IPSPLIT

## 2024-10-30 RX ORDER — WATER 1 ML/ML
1000 IRRIGANT IRRIGATION ONCE
Status: DISCONTINUED | OUTPATIENT
Start: 2024-10-30 | End: 2024-11-02 | Stop reason: HOSPADM

## 2024-10-30 RX ORDER — WATER 1000 ML/1000ML
INJECTION, SOLUTION INTRAVENOUS
Status: COMPLETED
Start: 2024-10-30 | End: 2024-10-30

## 2024-10-30 ASSESSMENT — PAIN SCALES - GENERAL
PAINLEVEL_OUTOF10: 0 - NO PAIN
PAINLEVEL_OUTOF10: 1
PAINLEVEL_OUTOF10: 0 - NO PAIN

## 2024-10-30 ASSESSMENT — COGNITIVE AND FUNCTIONAL STATUS - GENERAL
MOBILITY SCORE: 10
MOVING FROM LYING ON BACK TO SITTING ON SIDE OF FLAT BED WITH BEDRAILS: A LOT
HELP NEEDED FOR BATHING: A LOT
DAILY ACTIVITIY SCORE: 12
TOILETING: TOTAL
CLIMB 3 TO 5 STEPS WITH RAILING: TOTAL
WALKING IN HOSPITAL ROOM: TOTAL
DRESSING REGULAR LOWER BODY CLOTHING: TOTAL
MOVING TO AND FROM BED TO CHAIR: A LOT
TURNING FROM BACK TO SIDE WHILE IN FLAT BAD: A LOT
STANDING UP FROM CHAIR USING ARMS: A LOT
PERSONAL GROOMING: A LITTLE
EATING MEALS: A LITTLE
DRESSING REGULAR UPPER BODY CLOTHING: A LOT

## 2024-10-30 ASSESSMENT — ACTIVITIES OF DAILY LIVING (ADL)
ADL_ASSISTANCE: INDEPENDENT
ADL_ASSISTANCE: INDEPENDENT

## 2024-10-30 ASSESSMENT — PAIN DESCRIPTION - LOCATION: LOCATION: HEAD

## 2024-10-30 ASSESSMENT — PAIN - FUNCTIONAL ASSESSMENT
PAIN_FUNCTIONAL_ASSESSMENT: 0-10

## 2024-10-31 PROBLEM — J96.21 ACUTE ON CHRONIC HYPOXIC RESPIRATORY FAILURE (MULTI): Status: ACTIVE | Noted: 2024-10-31

## 2024-10-31 PROBLEM — I50.33 ACUTE ON CHRONIC DIASTOLIC (CONGESTIVE) HEART FAILURE: Status: ACTIVE | Noted: 2024-10-31

## 2024-10-31 PROBLEM — E55.9 VITAMIN D DEFICIENCY: Status: ACTIVE | Noted: 2024-10-31

## 2024-10-31 PROBLEM — D69.6 THROMBOCYTOPENIA (CMS-HCC): Status: ACTIVE | Noted: 2024-10-31

## 2024-10-31 PROBLEM — N17.9 AKI (ACUTE KIDNEY INJURY) (CMS-HCC): Status: ACTIVE | Noted: 2024-10-31

## 2024-10-31 LAB
ANION GAP SERPL CALC-SCNC: 11 MMOL/L (ref 10–20)
BUN SERPL-MCNC: 51 MG/DL (ref 6–23)
CALCIUM SERPL-MCNC: 9.1 MG/DL (ref 8.6–10.3)
CHLORIDE SERPL-SCNC: 93 MMOL/L (ref 98–107)
CO2 SERPL-SCNC: 40 MMOL/L (ref 21–32)
CREAT SERPL-MCNC: 1.99 MG/DL (ref 0.5–1.05)
EGFRCR SERPLBLD CKD-EPI 2021: 26 ML/MIN/1.73M*2
ERYTHROCYTE [DISTWIDTH] IN BLOOD BY AUTOMATED COUNT: 16.4 % (ref 11.5–14.5)
GLUCOSE SERPL-MCNC: 121 MG/DL (ref 74–99)
HCT VFR BLD AUTO: 36.8 % (ref 36–46)
HGB BLD-MCNC: 10.4 G/DL (ref 12–16)
MAGNESIUM SERPL-MCNC: 2.35 MG/DL (ref 1.6–2.4)
MCH RBC QN AUTO: 24.6 PG (ref 26–34)
MCHC RBC AUTO-ENTMCNC: 28.3 G/DL (ref 32–36)
MCV RBC AUTO: 87 FL (ref 80–100)
NRBC BLD-RTO: 0 /100 WBCS (ref 0–0)
PLATELET # BLD AUTO: 135 X10*3/UL (ref 150–450)
POTASSIUM SERPL-SCNC: 3.6 MMOL/L (ref 3.5–5.3)
RBC # BLD AUTO: 4.22 X10*6/UL (ref 4–5.2)
SODIUM SERPL-SCNC: 140 MMOL/L (ref 136–145)
WBC # BLD AUTO: 10.1 X10*3/UL (ref 4.4–11.3)

## 2024-10-31 PROCEDURE — 80048 BASIC METABOLIC PNL TOTAL CA: CPT | Mod: IPSPLIT | Performed by: NURSE PRACTITIONER

## 2024-10-31 PROCEDURE — 2500000005 HC RX 250 GENERAL PHARMACY W/O HCPCS: Mod: IPSPLIT | Performed by: INTERNAL MEDICINE

## 2024-10-31 PROCEDURE — 2500000001 HC RX 250 WO HCPCS SELF ADMINISTERED DRUGS (ALT 637 FOR MEDICARE OP): Mod: IPSPLIT | Performed by: NURSE PRACTITIONER

## 2024-10-31 PROCEDURE — 2500000005 HC RX 250 GENERAL PHARMACY W/O HCPCS: Mod: IPSPLIT | Performed by: NURSE PRACTITIONER

## 2024-10-31 PROCEDURE — 99291 CRITICAL CARE FIRST HOUR: CPT | Performed by: INTERNAL MEDICINE

## 2024-10-31 PROCEDURE — 2500000002 HC RX 250 W HCPCS SELF ADMINISTERED DRUGS (ALT 637 FOR MEDICARE OP, ALT 636 FOR OP/ED): Mod: IPSPLIT | Performed by: NURSE PRACTITIONER

## 2024-10-31 PROCEDURE — 99233 SBSQ HOSP IP/OBS HIGH 50: CPT

## 2024-10-31 PROCEDURE — 83735 ASSAY OF MAGNESIUM: CPT | Mod: IPSPLIT | Performed by: NURSE PRACTITIONER

## 2024-10-31 PROCEDURE — 97530 THERAPEUTIC ACTIVITIES: CPT | Mod: GP,IPSPLIT | Performed by: PHYSICAL THERAPIST

## 2024-10-31 PROCEDURE — 94640 AIRWAY INHALATION TREATMENT: CPT | Mod: IPSPLIT

## 2024-10-31 PROCEDURE — 36415 COLL VENOUS BLD VENIPUNCTURE: CPT | Mod: IPSPLIT | Performed by: NURSE PRACTITIONER

## 2024-10-31 PROCEDURE — 2500000004 HC RX 250 GENERAL PHARMACY W/ HCPCS (ALT 636 FOR OP/ED): Mod: IPSPLIT | Performed by: NURSE PRACTITIONER

## 2024-10-31 PROCEDURE — 94760 N-INVAS EAR/PLS OXIMETRY 1: CPT | Mod: IPSPLIT

## 2024-10-31 PROCEDURE — 1200000002 HC GENERAL ROOM WITH TELEMETRY DAILY: Mod: IPSPLIT

## 2024-10-31 PROCEDURE — 85027 COMPLETE CBC AUTOMATED: CPT | Mod: IPSPLIT | Performed by: NURSE PRACTITIONER

## 2024-10-31 RX ORDER — PREDNISONE 5 MG/1
10 TABLET ORAL DAILY
Status: DISCONTINUED | OUTPATIENT
Start: 2024-11-03 | End: 2024-11-02 | Stop reason: HOSPADM

## 2024-10-31 RX ORDER — PREDNISONE 5 MG/1
5 TABLET ORAL DAILY
Status: DISCONTINUED | OUTPATIENT
Start: 2024-11-04 | End: 2024-11-02 | Stop reason: HOSPADM

## 2024-10-31 RX ORDER — PREDNISONE 5 MG/1
15 TABLET ORAL DAILY
Status: COMPLETED | OUTPATIENT
Start: 2024-11-02 | End: 2024-11-02

## 2024-10-31 RX ORDER — PANTOPRAZOLE SODIUM 40 MG/1
40 TABLET, DELAYED RELEASE ORAL DAILY
Status: DISCONTINUED | OUTPATIENT
Start: 2024-10-31 | End: 2024-11-02 | Stop reason: HOSPADM

## 2024-10-31 RX ORDER — PREDNISONE 20 MG/1
20 TABLET ORAL DAILY
Status: COMPLETED | OUTPATIENT
Start: 2024-11-01 | End: 2024-11-01

## 2024-10-31 ASSESSMENT — PAIN SCALES - GENERAL
PAINLEVEL_OUTOF10: 0 - NO PAIN
PAINLEVEL_OUTOF10: 3
PAINLEVEL_OUTOF10: 0 - NO PAIN
PAINLEVEL_OUTOF10: 2
PAINLEVEL_OUTOF10: 2
PAINLEVEL_OUTOF10: 0 - NO PAIN
PAINLEVEL_OUTOF10: 3
PAINLEVEL_OUTOF10: 0 - NO PAIN
PAINLEVEL_OUTOF10: 0 - NO PAIN

## 2024-10-31 ASSESSMENT — COGNITIVE AND FUNCTIONAL STATUS - GENERAL
WALKING IN HOSPITAL ROOM: A LITTLE
STANDING UP FROM CHAIR USING ARMS: A LITTLE
TURNING FROM BACK TO SIDE WHILE IN FLAT BAD: A LITTLE
CLIMB 3 TO 5 STEPS WITH RAILING: A LOT
MOVING FROM LYING ON BACK TO SITTING ON SIDE OF FLAT BED WITH BEDRAILS: A LITTLE
MOBILITY SCORE: 17
MOVING TO AND FROM BED TO CHAIR: A LITTLE

## 2024-10-31 ASSESSMENT — PAIN - FUNCTIONAL ASSESSMENT
PAIN_FUNCTIONAL_ASSESSMENT: 0-10

## 2024-10-31 ASSESSMENT — PAIN DESCRIPTION - LOCATION
LOCATION: HEAD
LOCATION: LEG

## 2024-10-31 ASSESSMENT — PAIN DESCRIPTION - ORIENTATION: ORIENTATION: LOWER;RIGHT;LEFT

## 2024-11-01 LAB
ANION GAP SERPL CALC-SCNC: 9 MMOL/L (ref 10–20)
BACTERIA BLD CULT: NORMAL
BACTERIA BLD CULT: NORMAL
BUN SERPL-MCNC: 54 MG/DL (ref 6–23)
CALCIUM SERPL-MCNC: 8.7 MG/DL (ref 8.6–10.3)
CHLORIDE SERPL-SCNC: 94 MMOL/L (ref 98–107)
CO2 SERPL-SCNC: 41 MMOL/L (ref 21–32)
CREAT SERPL-MCNC: 1.63 MG/DL (ref 0.5–1.05)
EGFRCR SERPLBLD CKD-EPI 2021: 33 ML/MIN/1.73M*2
ERYTHROCYTE [DISTWIDTH] IN BLOOD BY AUTOMATED COUNT: 16.1 % (ref 11.5–14.5)
GLUCOSE SERPL-MCNC: 115 MG/DL (ref 74–99)
HCT VFR BLD AUTO: 36.5 % (ref 36–46)
HGB BLD-MCNC: 10.3 G/DL (ref 12–16)
MAGNESIUM SERPL-MCNC: 2.53 MG/DL (ref 1.6–2.4)
MCH RBC QN AUTO: 24.7 PG (ref 26–34)
MCHC RBC AUTO-ENTMCNC: 28.2 G/DL (ref 32–36)
MCV RBC AUTO: 88 FL (ref 80–100)
NRBC BLD-RTO: 0 /100 WBCS (ref 0–0)
PLATELET # BLD AUTO: 145 X10*3/UL (ref 150–450)
POTASSIUM SERPL-SCNC: 3.7 MMOL/L (ref 3.5–5.3)
PROCALCITONIN SERPL-MCNC: 0.05 NG/ML
RBC # BLD AUTO: 4.17 X10*6/UL (ref 4–5.2)
SODIUM SERPL-SCNC: 140 MMOL/L (ref 136–145)
WBC # BLD AUTO: 11.7 X10*3/UL (ref 4.4–11.3)

## 2024-11-01 PROCEDURE — 84145 PROCALCITONIN (PCT): CPT | Mod: GENLAB

## 2024-11-01 PROCEDURE — 2500000004 HC RX 250 GENERAL PHARMACY W/ HCPCS (ALT 636 FOR OP/ED): Mod: IPSPLIT | Performed by: NURSE PRACTITIONER

## 2024-11-01 PROCEDURE — 2500000005 HC RX 250 GENERAL PHARMACY W/O HCPCS: Mod: IPSPLIT | Performed by: NURSE PRACTITIONER

## 2024-11-01 PROCEDURE — 94640 AIRWAY INHALATION TREATMENT: CPT | Mod: IPSPLIT

## 2024-11-01 PROCEDURE — 97535 SELF CARE MNGMENT TRAINING: CPT | Mod: GO,IPSPLIT | Performed by: OCCUPATIONAL THERAPIST

## 2024-11-01 PROCEDURE — 99233 SBSQ HOSP IP/OBS HIGH 50: CPT

## 2024-11-01 PROCEDURE — 2500000002 HC RX 250 W HCPCS SELF ADMINISTERED DRUGS (ALT 637 FOR MEDICARE OP, ALT 636 FOR OP/ED): Mod: IPSPLIT | Performed by: NURSE PRACTITIONER

## 2024-11-01 PROCEDURE — 2500000001 HC RX 250 WO HCPCS SELF ADMINISTERED DRUGS (ALT 637 FOR MEDICARE OP): Mod: IPSPLIT | Performed by: NURSE PRACTITIONER

## 2024-11-01 PROCEDURE — 83735 ASSAY OF MAGNESIUM: CPT | Mod: IPSPLIT | Performed by: NURSE PRACTITIONER

## 2024-11-01 PROCEDURE — 97530 THERAPEUTIC ACTIVITIES: CPT | Mod: GP,CQ,IPSPLIT

## 2024-11-01 PROCEDURE — 97116 GAIT TRAINING THERAPY: CPT | Mod: GP,CQ,IPSPLIT

## 2024-11-01 PROCEDURE — 1200000002 HC GENERAL ROOM WITH TELEMETRY DAILY: Mod: IPSPLIT

## 2024-11-01 PROCEDURE — 36415 COLL VENOUS BLD VENIPUNCTURE: CPT | Mod: IPSPLIT | Performed by: NURSE PRACTITIONER

## 2024-11-01 PROCEDURE — 99232 SBSQ HOSP IP/OBS MODERATE 35: CPT | Performed by: NURSE PRACTITIONER

## 2024-11-01 PROCEDURE — 2500000004 HC RX 250 GENERAL PHARMACY W/ HCPCS (ALT 636 FOR OP/ED): Mod: IPSPLIT

## 2024-11-01 PROCEDURE — 85027 COMPLETE CBC AUTOMATED: CPT | Mod: IPSPLIT | Performed by: NURSE PRACTITIONER

## 2024-11-01 PROCEDURE — 2500000005 HC RX 250 GENERAL PHARMACY W/O HCPCS: Mod: IPSPLIT | Performed by: INTERNAL MEDICINE

## 2024-11-01 PROCEDURE — 2500000001 HC RX 250 WO HCPCS SELF ADMINISTERED DRUGS (ALT 637 FOR MEDICARE OP): Mod: IPSPLIT

## 2024-11-01 PROCEDURE — 80048 BASIC METABOLIC PNL TOTAL CA: CPT | Mod: IPSPLIT | Performed by: NURSE PRACTITIONER

## 2024-11-01 PROCEDURE — 94760 N-INVAS EAR/PLS OXIMETRY 1: CPT | Mod: IPSPLIT

## 2024-11-01 RX ORDER — BUMETANIDE 1 MG/1
1 TABLET ORAL
Status: DISCONTINUED | OUTPATIENT
Start: 2024-11-01 | End: 2024-11-02 | Stop reason: HOSPADM

## 2024-11-01 ASSESSMENT — PAIN - FUNCTIONAL ASSESSMENT
PAIN_FUNCTIONAL_ASSESSMENT: 0-10

## 2024-11-01 ASSESSMENT — PAIN SCALES - GENERAL
PAINLEVEL_OUTOF10: 0 - NO PAIN
PAINLEVEL_OUTOF10: 3
PAINLEVEL_OUTOF10: 0 - NO PAIN
PAINLEVEL_OUTOF10: 1

## 2024-11-01 ASSESSMENT — COGNITIVE AND FUNCTIONAL STATUS - GENERAL
TOILETING: A LITTLE
DAILY ACTIVITIY SCORE: 16
MOBILITY SCORE: 19
STANDING UP FROM CHAIR USING ARMS: A LITTLE
PERSONAL GROOMING: A LITTLE
EATING MEALS: A LITTLE
DRESSING REGULAR UPPER BODY CLOTHING: A LITTLE
TOILETING: A LITTLE
TURNING FROM BACK TO SIDE WHILE IN FLAT BAD: A LITTLE
MOVING TO AND FROM BED TO CHAIR: A LITTLE
DRESSING REGULAR LOWER BODY CLOTHING: A LITTLE
HELP NEEDED FOR BATHING: A LOT
CLIMB 3 TO 5 STEPS WITH RAILING: A LITTLE
WALKING IN HOSPITAL ROOM: A LITTLE
HELP NEEDED FOR BATHING: A LITTLE
WALKING IN HOSPITAL ROOM: A LITTLE
MOBILITY SCORE: 18
PERSONAL GROOMING: A LITTLE
STANDING UP FROM CHAIR USING ARMS: A LITTLE
DAILY ACTIVITIY SCORE: 18
CLIMB 3 TO 5 STEPS WITH RAILING: A LOT
MOVING FROM LYING ON BACK TO SITTING ON SIDE OF FLAT BED WITH BEDRAILS: A LITTLE
DRESSING REGULAR UPPER BODY CLOTHING: A LITTLE
MOVING TO AND FROM BED TO CHAIR: A LITTLE
DRESSING REGULAR LOWER BODY CLOTHING: TOTAL

## 2024-11-01 ASSESSMENT — PAIN DESCRIPTION - ORIENTATION: ORIENTATION: POSTERIOR

## 2024-11-01 ASSESSMENT — ACTIVITIES OF DAILY LIVING (ADL): HOME_MANAGEMENT_TIME_ENTRY: 28

## 2024-11-01 ASSESSMENT — PAIN DESCRIPTION - LOCATION: LOCATION: HEAD

## 2024-11-02 VITALS
RESPIRATION RATE: 20 BRPM | SYSTOLIC BLOOD PRESSURE: 144 MMHG | TEMPERATURE: 96.8 F | WEIGHT: 199.12 LBS | HEIGHT: 62 IN | BODY MASS INDEX: 36.64 KG/M2 | OXYGEN SATURATION: 92 % | HEART RATE: 60 BPM | DIASTOLIC BLOOD PRESSURE: 71 MMHG

## 2024-11-02 LAB
ANION GAP SERPL CALC-SCNC: 9 MMOL/L (ref 10–20)
BUN SERPL-MCNC: 51 MG/DL (ref 6–23)
CALCIUM SERPL-MCNC: 8.5 MG/DL (ref 8.6–10.3)
CHLORIDE SERPL-SCNC: 94 MMOL/L (ref 98–107)
CO2 SERPL-SCNC: 40 MMOL/L (ref 21–32)
CREAT SERPL-MCNC: 1.64 MG/DL (ref 0.5–1.05)
EGFRCR SERPLBLD CKD-EPI 2021: 33 ML/MIN/1.73M*2
ERYTHROCYTE [DISTWIDTH] IN BLOOD BY AUTOMATED COUNT: 16 % (ref 11.5–14.5)
GLUCOSE SERPL-MCNC: 118 MG/DL (ref 74–99)
HCT VFR BLD AUTO: 35.1 % (ref 36–46)
HGB BLD-MCNC: 10 G/DL (ref 12–16)
MAGNESIUM SERPL-MCNC: 2.49 MG/DL (ref 1.6–2.4)
MCH RBC QN AUTO: 24.8 PG (ref 26–34)
MCHC RBC AUTO-ENTMCNC: 28.5 G/DL (ref 32–36)
MCV RBC AUTO: 87 FL (ref 80–100)
NRBC BLD-RTO: 0 /100 WBCS (ref 0–0)
PLATELET # BLD AUTO: 124 X10*3/UL (ref 150–450)
POTASSIUM SERPL-SCNC: 3.7 MMOL/L (ref 3.5–5.3)
RBC # BLD AUTO: 4.03 X10*6/UL (ref 4–5.2)
SODIUM SERPL-SCNC: 139 MMOL/L (ref 136–145)
WBC # BLD AUTO: 12.4 X10*3/UL (ref 4.4–11.3)

## 2024-11-02 PROCEDURE — 97535 SELF CARE MNGMENT TRAINING: CPT | Mod: GO,IPSPLIT

## 2024-11-02 PROCEDURE — 94667 MNPJ CHEST WALL 1ST: CPT | Mod: IPSPLIT

## 2024-11-02 PROCEDURE — 2500000002 HC RX 250 W HCPCS SELF ADMINISTERED DRUGS (ALT 637 FOR MEDICARE OP, ALT 636 FOR OP/ED): Mod: IPSPLIT | Performed by: NURSE PRACTITIONER

## 2024-11-02 PROCEDURE — 2500000001 HC RX 250 WO HCPCS SELF ADMINISTERED DRUGS (ALT 637 FOR MEDICARE OP): Mod: IPSPLIT | Performed by: NURSE PRACTITIONER

## 2024-11-02 PROCEDURE — 2500000004 HC RX 250 GENERAL PHARMACY W/ HCPCS (ALT 636 FOR OP/ED): Mod: IPSPLIT | Performed by: NURSE PRACTITIONER

## 2024-11-02 PROCEDURE — 2500000001 HC RX 250 WO HCPCS SELF ADMINISTERED DRUGS (ALT 637 FOR MEDICARE OP): Mod: IPSPLIT

## 2024-11-02 PROCEDURE — 36415 COLL VENOUS BLD VENIPUNCTURE: CPT | Mod: IPSPLIT

## 2024-11-02 PROCEDURE — 94760 N-INVAS EAR/PLS OXIMETRY 1: CPT | Mod: IPSPLIT

## 2024-11-02 PROCEDURE — 85027 COMPLETE CBC AUTOMATED: CPT | Mod: IPSPLIT

## 2024-11-02 PROCEDURE — 80048 BASIC METABOLIC PNL TOTAL CA: CPT | Mod: IPSPLIT

## 2024-11-02 PROCEDURE — 2500000004 HC RX 250 GENERAL PHARMACY W/ HCPCS (ALT 636 FOR OP/ED): Mod: IPSPLIT

## 2024-11-02 PROCEDURE — 9420000001 HC RT PATIENT EDUCATION 5 MIN: Mod: IPSPLIT

## 2024-11-02 PROCEDURE — 83735 ASSAY OF MAGNESIUM: CPT | Mod: IPSPLIT

## 2024-11-02 PROCEDURE — 99239 HOSP IP/OBS DSCHRG MGMT >30: CPT

## 2024-11-02 PROCEDURE — 94640 AIRWAY INHALATION TREATMENT: CPT | Mod: IPSPLIT

## 2024-11-02 RX ORDER — NYSTATIN 100000 [USP'U]/G
1 POWDER TOPICAL 3 TIMES DAILY
Start: 2024-11-02

## 2024-11-02 RX ORDER — WATER 1 ML/ML
1000 IRRIGANT IRRIGATION AS NEEDED
Status: DISCONTINUED | OUTPATIENT
Start: 2024-11-02 | End: 2024-11-02

## 2024-11-02 RX ORDER — ALUMINUM HYDROXIDE, MAGNESIUM HYDROXIDE, AND SIMETHICONE 1200; 120; 1200 MG/30ML; MG/30ML; MG/30ML
20 SUSPENSION ORAL EVERY 4 HOURS PRN
Start: 2024-11-02

## 2024-11-02 RX ORDER — PREDNISONE 5 MG/1
TABLET ORAL
Start: 2024-11-03 | End: 2024-11-05

## 2024-11-02 RX ORDER — BUMETANIDE 1 MG/1
1 TABLET ORAL
Start: 2024-11-02

## 2024-11-02 RX ORDER — CEFUROXIME AXETIL 500 MG/1
500 TABLET ORAL 2 TIMES DAILY
Start: 2024-11-02 | End: 2024-11-03

## 2024-11-02 ASSESSMENT — COGNITIVE AND FUNCTIONAL STATUS - GENERAL
TOILETING: A LITTLE
DRESSING REGULAR UPPER BODY CLOTHING: A LITTLE
DAILY ACTIVITIY SCORE: 19
STANDING UP FROM CHAIR USING ARMS: A LITTLE
CLIMB 3 TO 5 STEPS WITH RAILING: A LITTLE
PERSONAL GROOMING: A LITTLE
DAILY ACTIVITIY SCORE: 17
WALKING IN HOSPITAL ROOM: A LITTLE
TOILETING: A LITTLE
HELP NEEDED FOR BATHING: A LOT
TURNING FROM BACK TO SIDE WHILE IN FLAT BAD: A LITTLE
DRESSING REGULAR LOWER BODY CLOTHING: A LITTLE
MOBILITY SCORE: 18
DRESSING REGULAR UPPER BODY CLOTHING: A LITTLE
DRESSING REGULAR LOWER BODY CLOTHING: A LOT
MOVING TO AND FROM BED TO CHAIR: A LITTLE
PERSONAL GROOMING: A LITTLE
HELP NEEDED FOR BATHING: A LITTLE
MOVING FROM LYING ON BACK TO SITTING ON SIDE OF FLAT BED WITH BEDRAILS: A LITTLE

## 2024-11-02 ASSESSMENT — PAIN - FUNCTIONAL ASSESSMENT
PAIN_FUNCTIONAL_ASSESSMENT: 0-10

## 2024-11-02 ASSESSMENT — PAIN SCALES - GENERAL
PAINLEVEL_OUTOF10: 0 - NO PAIN

## 2024-11-02 ASSESSMENT — ACTIVITIES OF DAILY LIVING (ADL)
HOME_MANAGEMENT_TIME_ENTRY: 45
BATHING_LEVEL_OF_ASSISTANCE: MODERATE ASSISTANCE;SETUP

## 2024-11-03 ENCOUNTER — APPOINTMENT (OUTPATIENT)
Dept: SLEEP MEDICINE | Facility: CLINIC | Age: 74
End: 2024-11-03
Payer: MEDICARE

## 2024-11-04 ENCOUNTER — APPOINTMENT (OUTPATIENT)
Dept: RADIOLOGY | Facility: HOSPITAL | Age: 74
End: 2024-11-04
Payer: MEDICARE

## 2024-11-05 ENCOUNTER — APPOINTMENT (OUTPATIENT)
Dept: RADIOLOGY | Facility: HOSPITAL | Age: 74
End: 2024-11-05
Payer: MEDICARE

## 2024-11-05 ENCOUNTER — APPOINTMENT (OUTPATIENT)
Dept: SURGERY | Facility: CLINIC | Age: 74
End: 2024-11-05
Payer: MEDICARE

## 2024-11-07 ENCOUNTER — NURSING HOME VISIT (OUTPATIENT)
Dept: POST ACUTE CARE | Facility: EXTERNAL LOCATION | Age: 74
End: 2024-11-07
Payer: MEDICARE

## 2024-11-07 DIAGNOSIS — I10 BENIGN ESSENTIAL HYPERTENSION: ICD-10-CM

## 2024-11-07 DIAGNOSIS — R53.1 WEAKNESS: ICD-10-CM

## 2024-11-07 DIAGNOSIS — J96.21 ACUTE ON CHRONIC HYPOXIC RESPIRATORY FAILURE (MULTI): ICD-10-CM

## 2024-11-07 DIAGNOSIS — J44.1 CHRONIC OBSTRUCTIVE PULMONARY DISEASE WITH ACUTE EXACERBATION (MULTI): ICD-10-CM

## 2024-11-07 DIAGNOSIS — E11.8 CONTROLLED TYPE 2 DIABETES MELLITUS WITH COMPLICATION, WITHOUT LONG-TERM CURRENT USE OF INSULIN (MULTI): ICD-10-CM

## 2024-11-07 DIAGNOSIS — D69.6 THROMBOCYTOPENIA (CMS-HCC): ICD-10-CM

## 2024-11-07 DIAGNOSIS — J18.9 ACUTE PNEUMONIA: ICD-10-CM

## 2024-11-07 DIAGNOSIS — I50.33 ACUTE ON CHRONIC DIASTOLIC (CONGESTIVE) HEART FAILURE: ICD-10-CM

## 2024-11-07 DIAGNOSIS — D50.9 IRON DEFICIENCY ANEMIA, UNSPECIFIED IRON DEFICIENCY ANEMIA TYPE: ICD-10-CM

## 2024-11-07 DIAGNOSIS — M16.12 PRIMARY OSTEOARTHRITIS OF LEFT HIP: ICD-10-CM

## 2024-11-07 DIAGNOSIS — N18.31 STAGE 3A CHRONIC KIDNEY DISEASE (MULTI): ICD-10-CM

## 2024-11-07 DIAGNOSIS — E78.5 DYSLIPIDEMIA: ICD-10-CM

## 2024-11-07 DIAGNOSIS — Z99.81 DEPENDENCE ON SUPPLEMENTAL OXYGEN: ICD-10-CM

## 2024-11-07 DIAGNOSIS — I70.0 ATHEROSCLEROSIS OF AORTA (CMS-HCC): ICD-10-CM

## 2024-11-07 PROCEDURE — 99305 1ST NF CARE MODERATE MDM 35: CPT | Performed by: INTERNAL MEDICINE

## 2024-11-07 NOTE — LETTER
"Patient: Saritha Cormier  : 1950    Encounter Date: 2024    Name: Saritha Cormier  : 1950  MRN: [id number]  Visit Date: 2024  Chief Complaint: HISTORY AND PHYSICAL    HPI: Saritha Cormier is a 74 y.o. female with PMH remarkable for CKD, CAD, diabetes, aortic stenosis status post TAVR, hyperlipidemia, hypertension, home O2 dependent status post open heart surgery who was admitted to [facility] on 10/27/24 after presenting to the ER with complaint of feeling like she was \"going to die\" and increased SOB. Workup in ER revealed elevated WBC, 12.0, elevated D Dimer 1359, negative for flu and COVID. CXR revealed Bilateral mid/lower lung patchy opacities with multifocal pneumonia not excluded. VQ Scan revealed: 1. There is nonspecific heterogeneity in perfusion of both lungs indicating low probability for acute pulmonary embolism   US BLE revealed: No sonographic findings suggestive of right or left lower extremity deep venous thrombosis. She completed course of Azithromycin, Cefuroxime, IV Solumedrol. Per hospital notes, she is on baseline 02 needs 2-4lpm, but required BIPAP initially. Troponin 19>30>29. 2D echo on 24 revealed: 1. Left ventricular systolic function is normal with a 60-65% estimated ejection fraction.  2. There is an Evolut (Evolut FX 26) TAVR valve in aortic position functioning normally, with mild levar-prosthetic regurgitation. 3. There is mild pulmonary hypertension.She was initially placed on Lasix gtt, which was placed on hold due to renal function on 10/30/24. LOS 691874.6ml. She had cespedes catheter in place to monitor I+O's, removed on 10/31/24. Her Lasix was stopped, she was started on Bumex 1mg bid per cardiology. She had a nose bleed when in ER, which was cauterized, her CBC and PLT remained stable. Plavix was placed on hold. Her SP02 improved with diuresis and ATB therapy. She refused BIPAP at night per hospital notes. Once HDS, she was " discharged to [facility] on 11/02/24 for PT/OT.    Subjective: Seen and examined today. She is lying in bed awake in no acute distress. She denies any new issues or complaints.     The patient was counseled regarding diagnostic results, instructions for management, risk factor reductions, prognosis, patient and family education, impressions, risks and benefits of treatment options and importance of compliance with treatment. I have reviewed nursing notes since my last visit and document any significant changes Reviewed orders, medications, Labs. Reviewed chart looking at current medications, treatments, labs, x-rays etc.     ROS:  As above in subjective. Otherwise, all other systems have been reviewed and are negative for complaint.    Medications:  Medications reviewed and verified in NH chart.     Past Medical History:   Diagnosis Date   • Acquired absence of bilateral breasts and nipples 01/31/2022    History of bilateral mastectomy   • Acute kidney injury (CMS-[facility])    • Anemia    • Arthritis    • Cancer (Multi)    • CHF (congestive heart failure)    • Chronic respiratory failure    • CKD (chronic kidney disease)     stage 3   • COPD (chronic obstructive pulmonary disease) (Multi)    • Coronary artery disease    • Epistaxis not due to trauma 10/27/2024   • Fall    • GERD (gastroesophageal reflux disease)    • H/O chronic sinusitis    • Heart valve disease    • History of bilateral mastectomy    • History of total hip replacement, right    • History of transcatheter aortic valve replacement (TAVR) 04/2024   • Hyperlipidemia    • Hypertension    • Morbid obesity (Multi)    • Neoplasm of unspecified behavior of bone, soft tissue, 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/2015    Skin growth   • Other bursitis of hip, right hip     Bursitis of right hip, unspecified bursa   • Oxygen dependent     2LPM with sleep   • Pneumonia    • Shortness of breath    • Thrombocytopenia (CMS-[facility])    • Trigger finger, unspecified finger 06/22/2018     Acquired trigger finger   • Type 2 diabetes mellitus    • Unilateral primary osteoarthritis, right knee 02/19/2021    Arthritis of knee, right   • Vitamin D deficiency    • Weakness        Past Surgical History:   Procedure Laterality Date   • AORTIC VALVE REPLACEMENT  04/2024   • CARDIAC CATHETERIZATION N/A 03/29/2024    Procedure: Left And Right Heart Cath, With LV;  Surgeon: Javier Reilly MD;  Location: [place] [address] Cardiac Cath Lab;  Service: Cardiovascular;  Laterality: N/A;   • CARDIAC CATHETERIZATION N/A 04/30/2024    Procedure: TAVR (Transcatheter AV Replacement);  Surgeon: Jessica Stallworth MD;  Location: [place] [address] Cardiac Cath Lab;  Service: Cardiovascular;  Laterality: N/A;  4/30 0730   • CARDIAC CATHETERIZATION N/A 04/30/2024    Procedure: TVP for TAVR;  Surgeon: Jessica Stallworth MD;  Location: [place] [address] Cardiac Cath Lab;  Service: Cardiovascular;  Laterality: N/A;   • HYSTERECTOMY  09/21/2015    Hysterectomy   • JOINT REPLACEMENT Right     Right total hip   • MASTECTOMY  09/21/2015    Breast Surgery Mastectomy   • OTHER SURGICAL HISTORY  09/21/2015    Breast Surgery Reconstruction       Family History   Problem Relation Name Age of Onset   • Gout Mother     • Hypertension Mother     • Arthritis Father     • Other (cardiac disorder) Father     • Cancer Father     • Tongue cancer Brother     • Other (jaw neoplasm) Brother     • Breast cancer Sibling     • Pancreatic cancer Sibling         Social History     Tobacco Use   • Smoking status: Never   • Smokeless tobacco: Never   Substance Use Topics   • Alcohol use: Never       No Known Allergies     Vital Signs:   Vital Signs were reviewed in nursing home documentation.    Physical Exam  Vitals and nursing note reviewed.   Constitutional:       Appearance: Normal appearance.   HENT:      Head: Normocephalic and atraumatic.   Cardiovascular:      Rate and Rhythm: Normal rate and regular rhythm.   Pulmonary:      Effort: Pulmonary effort is normal.       Breath sounds: Rhonchi present.   Abdominal:      General: Bowel sounds are normal.      Palpations: Abdomen is soft.   Musculoskeletal:         General: Normal range of motion.   Skin:     General: Skin is warm and dry.   Neurological:      General: No focal deficit present.      Mental Status: She is alert and oriented to person, place, and time.   Psychiatric:         Behavior: Behavior normal.         Judgment: Judgment normal.         Results/Data:   Lab Results   Component Value Date    WBC 12.4 (H) 11/02/2024    HGB 10.0 (L) 11/02/2024    HCT 35.1 (L) 11/02/2024     (L) 11/02/2024    CHOL 140 06/17/2024    TRIG 120 06/17/2024    HDL 33.7 06/17/2024    ALT 6 (L) 10/27/2024    AST 12 10/27/2024     11/02/2024    K 3.7 11/02/2024    CL 94 (L) 11/02/2024    CREATININE 1.64 (H) 11/02/2024    BUN 51 (H) 11/02/2024    CO2 40 (HH) 11/02/2024    TSH 3.28 06/17/2024    INR 1.2 (H) 03/16/2021    HGBA1C 5.8 (H) 10/28/2024     Results were reviewed and addressed accordingly. Lab Results were also reviewed in edlab.     Provider Impression:   Acute on Chronic Respiratory failure 2/2 PNA, COPD exacerbation, fluid overload  - continue with supplemental 02  - continue with bronchodilators  - continue with Prednisone, Cefuroxime  - continue with diuretics    Acute on Chronic CHF  - continue with diuretics, Lasix was switched to Bumex upon discharge from hospital  - low Na diet  - fluid restriction  - monitor for signs/symptoms cardiac decompensation    Iron deficiency anemia/Thrombocytopenia  - Plavix was stopped during hospitalization due to nose bleed  - continue with iron supplement  - monitor CBC    Generalized weakness  - consult PT/OT    DM type 2  - continue with Jardiance  - low concentrated sweets diet    HTN  - continue with Metoprolol, Losartan, diuretics  - monitor vital signs    CAD/history of CABG  - continue with ASA, lipid lowering agent  - Plavix was stopped during hospitalization  - follow up  with cardiology    Dyslipidemia  - continue with statin    CKD  - continue to monitor kidney function  - avoid nephrotoxic agents    GERD  - continue with PPI    Chronic left hip pain 2/2 OA  - continue with pain medication  - follow up with pain management    Vitamin D deficiency  - continue with supplementation  ----------------  Written by Kayleigh Dumont RN, acting as a scribe for Dr. Francisco. This note accurately reflects the work and decisions made by Dr. Francisco.     I, Dr. Francisco, attest all medical record entries made by the scribe were under my direction and were personally dictated by me. I have reviewed the chart and agree that the record accurately reflects my performance of the history, physical exam, and assessment and plan.       Electronically Signed By: Yuliana Rolon MD   11/19/24  3:50 PM

## 2024-11-08 ENCOUNTER — OFFICE VISIT (OUTPATIENT)
Dept: PAIN MEDICINE | Facility: HOSPITAL | Age: 74
End: 2024-11-08
Payer: MEDICARE

## 2024-11-08 ENCOUNTER — PHARMACY VISIT (OUTPATIENT)
Dept: PHARMACY | Facility: CLINIC | Age: 74
End: 2024-11-08
Payer: COMMERCIAL

## 2024-11-08 ENCOUNTER — APPOINTMENT (OUTPATIENT)
Dept: RADIOLOGY | Facility: HOSPITAL | Age: 74
End: 2024-11-08
Payer: MEDICARE

## 2024-11-08 VITALS
HEART RATE: 51 BPM | SYSTOLIC BLOOD PRESSURE: 124 MMHG | RESPIRATION RATE: 15 BRPM | BODY MASS INDEX: 35.87 KG/M2 | DIASTOLIC BLOOD PRESSURE: 53 MMHG | WEIGHT: 190 LBS | HEIGHT: 61 IN | OXYGEN SATURATION: 96 % | TEMPERATURE: 97.6 F

## 2024-11-08 DIAGNOSIS — M16.12 PRIMARY OSTEOARTHRITIS OF LEFT HIP: ICD-10-CM

## 2024-11-08 DIAGNOSIS — Z79.899 MEDICATION MANAGEMENT: ICD-10-CM

## 2024-11-08 DIAGNOSIS — M25.552 PAIN OF LEFT HIP: ICD-10-CM

## 2024-11-08 DIAGNOSIS — Z02.9 ENCOUNTER FOR OPIATE ANALGESIC USE AGREEMENT: Primary | Chronic | ICD-10-CM

## 2024-11-08 PROCEDURE — RXMED WILLOW AMBULATORY MEDICATION CHARGE

## 2024-11-08 PROCEDURE — 99214 OFFICE O/P EST MOD 30 MIN: CPT | Performed by: NURSE PRACTITIONER

## 2024-11-08 RX ORDER — GABAPENTIN 300 MG/1
300 CAPSULE ORAL NIGHTLY
Qty: 30 CAPSULE | Refills: 1 | Status: SHIPPED | OUTPATIENT
Start: 2024-11-08

## 2024-11-08 RX ORDER — TRAMADOL HYDROCHLORIDE 50 MG/1
50 TABLET ORAL 2 TIMES DAILY
Qty: 60 TABLET | Refills: 1 | Status: SHIPPED | OUTPATIENT
Start: 2024-11-08

## 2024-11-08 ASSESSMENT — PAIN SCALES - GENERAL: PAINLEVEL_OUTOF10: 2

## 2024-11-08 NOTE — PROGRESS NOTES
Date of the last Controlled Substance Agreement: 5/7/2024       Last urine drug screening date/ordered today: 7/9/2024.  Updated today     Results of last screen: As expected      Last opioid risk screening date/ordered today: 4/22/2024      Pain Scale Screening:   Pain Assessment and Documentation Tool (PADT)   Date of Assessment: 11/8/2024  Analgesia:   Patient reports her pain level on average during the past week is 1on a 0 - 10 scale.   Patient reports that her pain level at its worst during the past week was 3 on a 0 -10 scale.   Activities of Daily Living:   Physical functioning: better  Family relationships: better  Social relationships: better  Mood: better  Sleep patterns: better  Overall functioning: better  Adverse Events: NoSaritha is not experiencing side effects from current pain reliever.  Patients overall severity of side effect:none  Specific Analgesic Plan: Continue present regimen.

## 2024-11-08 NOTE — PROGRESS NOTES
Subjective   Saritha Cormier is a pleasant 74 y.o. female who is here for medication refill.  Patient presents in a wheelchair.  She arrives by herself.  She is on portable O2.  She was transported to us via [employer].  Patient is currently staying at [facility].    Patient arrived to this office without a coat.  It was cold outside.  Warm blankets provided to her.  Patient reports that the nursing home staff did not realize that she has an upcoming appointment.  She was given clothes that was not her.  She has no shoes on.  She is wearing a slipper socks.  She also mentioned that she did not have her purse and have no phone.  She was given a calling card to call the taxi and has no way to call them.  Patient did not also arrive with any documentation or sort of paper report.  She is not accompanied by the nursing home staff.      I called [facility] for report.  I called 3 times and was transferred to voicemail twice.  I left a message to the director of nursing.  In my third call I was able to speak to the nurse on duty, Neena BERGER.  She reported that there was no handoff from previous shift.  She did not realized that patient is in her appointment.  I relayed to her the patient's condition coming into this appointment that she arrived without a coat when it is cold outside.  She was not able to give me a report about the patient's status while in the nursing home.  She did give me information about what pharmacy to use.    Patient continues to have chronic left hip pain.  She currently rates her pain as 2 out of 10 with rest.  Pain comes and goes.  Pain increases with activities.  She describes her pain as aching and tender.  She denies pain, numbness or tingling sensation to her legs.  She denies increasing leg weakness or change in balance.  She denies bowel or bladder incontinence.    Patient had an ED visit on 10/27/2024 where she was admitted.  She reports she was in  ICU for pneumonia and heart condition.  She was tested negative for COVID.  Patient was discharged from ICU to the nursing home for rehab.     Patient's last intake of tramadol was about 2 weeks ago.  Her tramadol bottle is at home.  She has not been taking the pain pill while at the nursing home.  We have no count of her tramadol pills.  Based on OARRS report, she received 60 tabs of tramadol on 10/10/2024.  She is also not sure if she is taking gabapentin while at the nursing home.  I do not have the medication list from the nursing home.    I reviewed previous notes and imaging.  I discussed the plan of care.  I called [facility] so patient can  her prescription today.  The nursing home pharmacy is [facility] however I was not able to add this to her pharmacy.  I will see her for follow-up visit.  Questions were answered during this encounter.    The patient was counseled regarding diagnostic results, instructions for management, risk factor reductions, risks and benefits of treatment options and importance of compliance with treatment.        OARRS:  CATHERINE Law-MISAEL, DNP on 11/8/2024  2:28 PM  I have personally reviewed the OARRS report for Saritha Cormier. I have considered the risks of abuse, dependence, addiction and diversion and I believe that it is clinically appropriate for Saritha Cormier to be prescribed this medication    Review of Systems   Constitutional: Negative.    HENT: Negative.     Eyes: Negative.    Respiratory: Negative.     Cardiovascular: Negative.    Gastrointestinal: Negative.    Endocrine: Negative.    Genitourinary: Negative.    Musculoskeletal:  Positive for arthralgias and myalgias. Negative for gait problem, joint swelling, neck pain and neck stiffness.   Skin: Negative.    Allergic/Immunologic: Negative.    Neurological: Negative.    Psychiatric/Behavioral: Negative.            /53 (BP Location: Left arm, Patient Position: Sitting, BP Cuff  Size: Adult)   Pulse 51   Temp 36.4 °C (97.6 °F) (Temporal)   Resp 15   Wt 86.2 kg (190 lb)   SpO2 96%  Body mass index is 35.9 kg/m².      Objective       Past Medical History  She has a past medical history of Acquired absence of bilateral breasts and nipples (01/31/2022), Acute kidney injury (CMS-HCC), Anemia, Arthritis, Cancer (Multi), CHF (congestive heart failure), Chronic respiratory failure, CKD (chronic kidney disease), COPD (chronic obstructive pulmonary disease) (Multi), Coronary artery disease, Epistaxis not due to trauma (10/27/2024), Fall, GERD (gastroesophageal reflux disease), H/O chronic sinusitis, Heart valve disease, History of bilateral mastectomy, History of total hip replacement, right, History of transcatheter aortic valve replacement (TAVR) (04/2024), Hyperlipidemia, Hypertension, Morbid obesity (Multi), Neoplasm of unspecified behavior of bone, soft tissue, 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    Surgical History  Past Surgical History:   Procedure Laterality Date    AORTIC VALVE REPLACEMENT  04/2024    CARDIAC CATHETERIZATION N/A 03/29/2024    Procedure: Left And Right Heart Cath, With LV;  Surgeon: Javier Reilly MD;  Location: [place] [address] Cardiac Cath Lab;  Service: Cardiovascular;  Laterality: N/A;    CARDIAC CATHETERIZATION N/A 04/30/2024    Procedure: TAVR (Transcatheter AV Replacement);  Surgeon: Jessica Stallworth MD;  Location: [place] [address] Cardiac Cath Lab;  Service: Cardiovascular;  Laterality: N/A;  4/30 0730    CARDIAC CATHETERIZATION N/A 04/30/2024    Procedure: TVP for TAVR;  Surgeon: Jessica Stallworth MD;  Location: [place] [address] Cardiac Cath Lab;  Service: Cardiovascular;  Laterality: N/A;    HYSTERECTOMY   09/21/2015    Hysterectomy    JOINT REPLACEMENT Right     Right total hip    MASTECTOMY  09/21/2015    Breast Surgery Mastectomy    OTHER SURGICAL HISTORY  09/21/2015    Breast Surgery Reconstruction        Social History  She reports that she has never smoked. She has never used smokeless tobacco. She reports that she does not drink alcohol and does not use drugs.    Family History  Family History   Problem Relation Name Age of Onset    Gout Mother      Hypertension Mother      Arthritis Father      Other (cardiac disorder) Father      Cancer Father      Tongue cancer Brother      Other (jaw neoplasm) Brother      Breast cancer Sibling      Pancreatic cancer Sibling          Allergies  Patient has no known allergies.    MEDICATIONS:    Current Outpatient Medications:     alum-mag hydroxide-simeth (Mylanta) 200-200-20 mg/5 mL oral suspension, Take 20 mL by mouth every 4 hours if needed for indigestion or heartburn., Disp: , Rfl:     aspirin 81 mg EC tablet, TAKE 1 TABLET BY MOUTH EVERY DAY, Disp: 90 tablet, Rfl: 1    bumetanide (Bumex) 1 mg tablet, Take 1 tablet (1 mg) by mouth 2 times daily (morning and late afternoon)., Disp: , Rfl:     cholecalciferol (Vitamin D3) 5,000 Units tablet, Take 1 tablet (5,000 Units) by mouth once daily., Disp: , Rfl:     empagliflozin (Jardiance) 10 mg, Take 1 tablet (10 mg) by mouth once daily., Disp: , Rfl:     ferrous gluconate 324 (38 Fe) MG tablet, TAKE 1 TABLET (324 MG) BY MOUTH 2 TIMES A DAY., Disp: 180 tablet, Rfl: 1    losartan (Cozaar) 25 mg tablet, Take 1 tablet (25 mg) by mouth once daily., Disp: , Rfl:     metoprolol tartrate (Lopressor) 25 mg tablet, Take 1 tablet (25 mg) by mouth 2 times a day., Disp: 60 tablet, Rfl: 11    nystatin (Mycostatin) 100,000 unit/gram powder, Apply 1 Application topically 3 times a day. Apply to abdominal skin folds, Disp: , Rfl:     oxygen (O2) gas therapy, Inhale 1 each continuously., Disp: , Rfl:     pantoprazole (ProtoNix) 40 mg EC  tablet, TAKE 1 TABLET BY MOUTH EVERY DAY, Disp: 90 tablet, Rfl: 1    polyethylene glycol (Glycolax, Miralax) 17 gram/dose powder, Take 17 g by mouth once daily., Disp: 119 g, Rfl: 0    rosuvastatin (Crestor) 40 mg tablet, TAKE 1 TABLET BY MOUTH EVERY DAY, Disp: 90 tablet, Rfl: 1    gabapentin (Neurontin) 300 mg capsule, Take 1 capsule (300 mg) by mouth once daily at bedtime., Disp: 30 capsule, Rfl: 1    traMADol (Ultram) 50 mg tablet, Take 1 tablet (50 mg) by mouth 2 times a day for pain., Disp: 60 tablet, Rfl: 1    Physical Exam  Vitals and nursing note reviewed.   HENT:      Head: Normocephalic.      Nose: Nose normal.   Eyes:      Extraocular Movements: Extraocular movements intact.      Conjunctiva/sclera: Conjunctivae normal.      Pupils: Pupils are equal, round, and reactive to light.   Cardiovascular:      Rate and Rhythm: Normal rate and regular rhythm.   Pulmonary:      Effort: Pulmonary effort is normal.      Breath sounds: Normal breath sounds.   Genitourinary:     Comments: Deferred  Musculoskeletal:         General: Tenderness present. No swelling, deformity or signs of injury.      Cervical back: No rigidity or tenderness.      Right lower leg: No edema.      Left lower leg: No edema.      Comments: Negative leg raise.  Positive for left Fabere's test eliciting hip pain.  Positive for tenderness on palpation at the left femoral hip joint.  Negative for paraspinal tenderness at the lumbar region.  BUE 4/5, BLE 4/5.   Skin:     General: Skin is warm and dry.   Neurological:      General: No focal deficit present.      Mental Status: She is alert and oriented to person, place, and time.   Psychiatric:         Mood and Affect: Mood normal.         Behavior: Behavior normal.            Pain Management Panel  More data may exist         Latest Ref Rng & Units 7/9/2024 4/22/2024   Pain Management Panel   Amphetamine Screen, Urine Presumptive Negative Presumptive Negative  Presumptive Negative    Barbiturate  Screen, Urine Presumptive Negative Presumptive Negative  Presumptive Negative    Codeine IgE <50 ng/mL <50  <50    Hydromorphone Urine <25 ng/mL <25  <25    Morphine  <50 ng/mL <50  <50       Details                      Assessment/Plan   Problem List Items Addressed This Visit             ICD-10-CM    Primary osteoarthritis of left hip M16.12    Relevant Medications    gabapentin (Neurontin) 300 mg capsule    traMADol (Ultram) 50 mg tablet    Pain of left hip M25.552    Relevant Medications    gabapentin (Neurontin) 300 mg capsule    traMADol (Ultram) 50 mg tablet    Encounter for opiate analgesic use agreement - Primary Z02.9    Relevant Medications    gabapentin (Neurontin) 300 mg capsule    traMADol (Ultram) 50 mg tablet     Other Visit Diagnoses         Codes    Medication management     Z79.899    Relevant Orders    Opiate/Opioid/Benzo Prescription Compliance                Plan/Follow-up Instructions:    I refilled your tramadol.  I sent 60 tabs of tramadol to [facility] pharmacy.  Take 1 pill twice a day for pain relief.  Do not take sedating medications together.    I sent refill for your gabapentin.  Take 300 mg at night.  Do not take sedating medications together.    I will see you for your follow-up visit in 8 weeks for medication refill on 1/3/2024 at 1 PM in [address].      ---Risks and side effects of chronic opioid therapy, including but not limited to tolerance, dependence, constipation, hyperalgesia, cognitive side effects, addiction, and possible death due to overuse and or misuse, were discussed.   Medications, when co-administered with other sedative agents, including but not limited to alcohol, benzodiazepines, sedatives or hypnotics, and illegal drugs, could pose life-threatening consequences, including death.   Such medication could impact if you have obstructive sleep apnea. I recommend that you continue using apnea devices if ordered by other physicians.   Being compliant with the treatment  plan and the terms of the opioid agreement to effectively and safely treat the pain is important.  Being responsible with the medications and taking these only as prescribed, never in excess, and never for reasons other than pain reduction.   Keep the medications safe and locked away from children and other adults, as well as disposal methods and options. The patient understood the risks and instructions. ---      Time     Prep time on date of the patient encounter: 2 minutes  Time spent directly with patient/family/caregiver: 30 minutes  Documentation time: 2 minutes  Total time on date of patient encounter: 34 minutes      --------------  Disclaimer: This note was created using voice recognition software. It was not corrected for typographical or grammatical errors, inadvertent word insertion, or any unintended errors. Please feel free to contact me for clarification.  --------------      Debra Christian DNP, APRN, FNP-C   [facility]  Office #: [phone]  Fax # [phone]

## 2024-11-08 NOTE — PATIENT INSTRUCTIONS
I refilled your tramadol.  I sent 60 tabs of tramadol to [facility] pharmacy.  Take 1 pill twice a day for pain relief.  Do not take sedating medications together.    I sent refill for your gabapentin.  Take 300 mg at night.  Do not take sedating medications together.    I will see you for your follow-up visit in 8 weeks for medication refill on 1/3/2024 at 1 PM in [place].      ---Risks and side effects of chronic opioid therapy, including but not limited to tolerance, dependence, constipation, hyperalgesia, cognitive side effects, addiction, and possible death due to overuse and or misuse, were discussed.   Medications, when co-administered with other sedative agents, including but not limited to alcohol, benzodiazepines, sedatives or hypnotics, and illegal drugs, could pose life-threatening consequences, including death.   Such medication could impact if you have obstructive sleep apnea. I recommend that you continue using apnea devices if ordered by other physicians.   Being compliant with the treatment plan and the terms of the opioid agreement to effectively and safely treat the pain is important.  Being responsible with the medications and taking these only as prescribed, never in excess, and never for reasons other than pain reduction.   Keep the medications safe and locked away from children and other adults, as well as disposal methods and options. The patient understood the risks and instructions. ---

## 2024-11-10 LAB
ATRIAL RATE: 97 BPM
P AXIS: 50 DEGREES
P OFFSET: 157 MS
P ONSET: 106 MS
PR INTERVAL: 212 MS
Q ONSET: 212 MS
QRS COUNT: 16 BEATS
QRS DURATION: 86 MS
QT INTERVAL: 338 MS
QTC CALCULATION(BAZETT): 429 MS
QTC FREDERICIA: 396 MS
R AXIS: -63 DEGREES
T AXIS: 60 DEGREES
T OFFSET: 381 MS
VENTRICULAR RATE: 97 BPM

## 2024-11-11 ASSESSMENT — ENCOUNTER SYMPTOMS
RESPIRATORY NEGATIVE: 1
CONSTITUTIONAL NEGATIVE: 1
PSYCHIATRIC NEGATIVE: 1
ALLERGIC/IMMUNOLOGIC NEGATIVE: 1
NECK PAIN: 0
EYES NEGATIVE: 1
GASTROINTESTINAL NEGATIVE: 1
MYALGIAS: 1
NECK STIFFNESS: 0
JOINT SWELLING: 0
NEUROLOGICAL NEGATIVE: 1
CARDIOVASCULAR NEGATIVE: 1
ARTHRALGIAS: 1
ENDOCRINE NEGATIVE: 1

## 2024-11-14 ENCOUNTER — NURSING HOME VISIT (OUTPATIENT)
Dept: POST ACUTE CARE | Facility: EXTERNAL LOCATION | Age: 74
End: 2024-11-14
Payer: MEDICARE

## 2024-11-14 DIAGNOSIS — D50.9 IRON DEFICIENCY ANEMIA, UNSPECIFIED IRON DEFICIENCY ANEMIA TYPE: ICD-10-CM

## 2024-11-14 DIAGNOSIS — N18.31 TYPE 2 DIABETES MELLITUS WITH STAGE 3A CHRONIC KIDNEY DISEASE, WITHOUT LONG-TERM CURRENT USE OF INSULIN (MULTI): ICD-10-CM

## 2024-11-14 DIAGNOSIS — K21.9 GASTROESOPHAGEAL REFLUX DISEASE WITHOUT ESOPHAGITIS: ICD-10-CM

## 2024-11-14 DIAGNOSIS — I10 BENIGN ESSENTIAL HYPERTENSION: ICD-10-CM

## 2024-11-14 DIAGNOSIS — M25.552 LEFT HIP PAIN: ICD-10-CM

## 2024-11-14 DIAGNOSIS — I50.33 ACUTE ON CHRONIC DIASTOLIC (CONGESTIVE) HEART FAILURE: ICD-10-CM

## 2024-11-14 DIAGNOSIS — E55.9 MILD VITAMIN D DEFICIENCY: ICD-10-CM

## 2024-11-14 DIAGNOSIS — R53.1 WEAKNESS: ICD-10-CM

## 2024-11-14 DIAGNOSIS — E78.5 DYSLIPIDEMIA: ICD-10-CM

## 2024-11-14 DIAGNOSIS — N18.31 STAGE 3A CHRONIC KIDNEY DISEASE (MULTI): ICD-10-CM

## 2024-11-14 DIAGNOSIS — D69.6 THROMBOCYTOPENIA (CMS-HCC): ICD-10-CM

## 2024-11-14 DIAGNOSIS — J18.9 PNEUMONIA OF BOTH LOWER LOBES DUE TO INFECTIOUS ORGANISM: ICD-10-CM

## 2024-11-14 DIAGNOSIS — I25.10 CORONARY ARTERY CALCIFICATION: ICD-10-CM

## 2024-11-14 DIAGNOSIS — E11.22 TYPE 2 DIABETES MELLITUS WITH STAGE 3A CHRONIC KIDNEY DISEASE, WITHOUT LONG-TERM CURRENT USE OF INSULIN (MULTI): ICD-10-CM

## 2024-11-14 DIAGNOSIS — J44.1 CHRONIC OBSTRUCTIVE PULMONARY DISEASE WITH ACUTE EXACERBATION (MULTI): ICD-10-CM

## 2024-11-14 DIAGNOSIS — J96.21 ACUTE ON CHRONIC HYPOXIC RESPIRATORY FAILURE (MULTI): ICD-10-CM

## 2024-11-14 DIAGNOSIS — M16.12 PRIMARY OSTEOARTHRITIS OF LEFT HIP: ICD-10-CM

## 2024-11-14 PROCEDURE — 99309 SBSQ NF CARE MODERATE MDM 30: CPT | Performed by: INTERNAL MEDICINE

## 2024-11-14 NOTE — LETTER
"Patient: Saritha Cormier  : 1950    Encounter Date: 2024    Name: Saritha Cormier  : 1950  MRN: [id number]  Visit Date: 2024  Chief Complaint: WEEKLY SNF PHYSICIAN VISIT    HPI:  Saritha Cormier is a 74 y.o. female with PMH remarkable for CKD, CAD, diabetes, aortic stenosis status post TAVR, hyperlipidemia, hypertension, home O2 dependent status post open heart surgery who was admitted to [facility] on 10/27/24 after presenting to the ER with complaint of feeling like she was \"going to die\" and increased SOB. Workup in ER revealed elevated WBC, 12.0, elevated D Dimer 1359, negative for flu and COVID. CXR revealed Bilateral mid/lower lung patchy opacities with multifocal pneumonia not excluded. VQ Scan revealed: 1. There is nonspecific heterogeneity in perfusion of both lungs indicating low probability for acute pulmonary embolism; US BLE revealed: No sonographic findings suggestive of right or left lower extremity deep venous thrombosis. She completed course of Azithromycin, Cefuroxime, IV Solumedrol. Per hospital notes, she is on baseline 02 needs 2-4lpm, but required BIPAP initially. Troponin 19>30>29. 2D echo on 24 revealed: 1. Left ventricular systolic function is normal with a 60-65% estimated ejection fraction.  2. There is an Evolut (Evolut FX 26) TAVR valve in aortic position functioning normally, with mild levar-prosthetic regurgitation. 3. There is mild pulmonary hypertension.She was initially placed on Lasix gtt, which was placed on hold due to renal function on 10/30/24. LOS 632288.6ml. She had cespedes catheter in place to monitor I+O's, removed on 10/31/24. Her Lasix was stopped, she was started on Bumex 1mg bid per cardiology. She had a nose bleed when in ER, which was cauterized, her CBC and PLT remained stable. Plavix was placed on hold. Her SP02 improved with diuresis and ATB therapy. She refused BIPAP at night per hospital notes. Once HDS, she " was discharged to [facility] on 11/02/24 for PT/OT.    Subjective: Seen and examined today. She is sitting up in chair on exam in no acute distress. Denies any new issues or complaints.     ROS:  As above in HPI. Otherwise, all other systems have been reviewed and are negative for complaint.    Medications:  Medications reviewed and verified in NH chart.     Vital Signs: Reviewed in [facility]    Physical Exam  Vitals and nursing note reviewed.   Constitutional:       Appearance: Normal appearance.   HENT:      Head: Normocephalic and atraumatic.   Cardiovascular:      Rate and Rhythm: Normal rate and regular rhythm.   Pulmonary:      Effort: Pulmonary effort is normal.      Breath sounds: Rhonchi present.   Abdominal:      General: Bowel sounds are normal.      Palpations: Abdomen is soft.   Musculoskeletal:         General: Normal range of motion.   Skin:     General: Skin is warm and dry.   Neurological:      General: No focal deficit present.      Mental Status: She is alert and oriented to person, place, and time.   Psychiatric:         Behavior: Behavior normal.         Judgment: Judgment normal.       Results/Data:   Lab Results   Component Value Date    WBC 12.4 (H) 11/02/2024    HGB 10.0 (L) 11/02/2024    HCT 35.1 (L) 11/02/2024     (L) 11/02/2024    CHOL 140 06/17/2024    TRIG 120 06/17/2024    HDL 33.7 06/17/2024    ALT 6 (L) 10/27/2024    AST 12 10/27/2024     11/02/2024    K 3.7 11/02/2024    CL 94 (L) 11/02/2024    CREATININE 1.64 (H) 11/02/2024    BUN 51 (H) 11/02/2024    CO2 40 (HH) 11/02/2024    TSH 3.28 06/17/2024    INR 1.2 (H) 03/16/2021    HGBA1C 5.8 (H) 10/28/2024       Provider Impression:   Acute on Chronic Respiratory failure 2/2 PNA, COPD exacerbation, fluid overload  - continue with supplemental 02, she is currently on 2lpm via NC  - continue with bronchodilators  - she completed course of  Prednisone, Cefuroxime  - continue with diuretics  - continue to monitor SP02  - she  requires continuous supplemental 02, and is requesting portable 02 tanks for doctor appointments and travel once discharged to home     Acute on Chronic CHF  - continue with diuretics, Lasix was switched to Bumex upon discharge from hospital  - low Na diet  - fluid restriction  - continue to monitor for signs/symptoms cardiac decompensation    Iron deficiency anemia/Thrombocytopenia  - Plavix was stopped during hospitalization due to nose bleed  - continue with iron supplement  - monitor CBC which has been stable    Generalized weakness  - continue with PT/OT  - per progress notes, plan is to return to home with home care    DM type 2  - continue with Jardiance  - low concentrated sweets diet    HTN  - continue with Metoprolol, Losartan, diuretics  - continue to monitor vital signs    CAD/history of CABG  - continue with ASA, lipid lowering agent  - Plavix was stopped during hospitalization  - follow up with cardiology    Dyslipidemia  - continue with statin    CKD  - continue to monitor kidney function which has been stable  - avoid nephrotoxic agents    GERD  - continue with PPI    Chronic left hip pain 2/2 OA  - continue with pain medication  - follow up with pain management    Vitamin D deficiency  - continue with supplementation    ----------------  Written by Kayleigh Dumont RN, acting as a scribe for Dr. Francisco. This note accurately reflects the work and decisions made by Dr. Francisco.     I, Dr. Francisco, attest all medical record entries made by the scribe were under my direction and were personally dictated by me. I have reviewed the chart and agree that the record accurately reflects my performance of the history, physical exam, and assessment and plan.     Electronically Signed By: Yuliana Rolno MD   12/3/24 11:30 AM

## 2024-11-15 ENCOUNTER — NURSING HOME VISIT (OUTPATIENT)
Dept: POST ACUTE CARE | Facility: EXTERNAL LOCATION | Age: 74
End: 2024-11-15
Payer: MEDICARE

## 2024-11-15 DIAGNOSIS — I50.33 ACUTE ON CHRONIC DIASTOLIC (CONGESTIVE) HEART FAILURE: ICD-10-CM

## 2024-11-15 DIAGNOSIS — J44.1 CHRONIC OBSTRUCTIVE PULMONARY DISEASE WITH ACUTE EXACERBATION (MULTI): ICD-10-CM

## 2024-11-15 DIAGNOSIS — E55.9 MILD VITAMIN D DEFICIENCY: ICD-10-CM

## 2024-11-15 DIAGNOSIS — N17.9 ACUTE KIDNEY INJURY SUPERIMPOSED ON CKD (CMS-HCC): ICD-10-CM

## 2024-11-15 DIAGNOSIS — J18.9 COMMUNITY ACQUIRED PNEUMONIA, UNSPECIFIED LATERALITY: ICD-10-CM

## 2024-11-15 DIAGNOSIS — J96.21 ACUTE ON CHRONIC HYPOXIC RESPIRATORY FAILURE (MULTI): Primary | ICD-10-CM

## 2024-11-15 DIAGNOSIS — R53.1 WEAKNESS: ICD-10-CM

## 2024-11-15 DIAGNOSIS — R04.0 EPISTAXIS: ICD-10-CM

## 2024-11-15 DIAGNOSIS — N18.9 ACUTE KIDNEY INJURY SUPERIMPOSED ON CKD (CMS-HCC): ICD-10-CM

## 2024-11-15 DIAGNOSIS — M48.061 SPINAL STENOSIS OF LUMBAR REGION, UNSPECIFIED WHETHER NEUROGENIC CLAUDICATION PRESENT: ICD-10-CM

## 2024-11-15 DIAGNOSIS — M16.12 PRIMARY OSTEOARTHRITIS OF LEFT HIP: ICD-10-CM

## 2024-11-15 DIAGNOSIS — I10 BENIGN ESSENTIAL HYPERTENSION: ICD-10-CM

## 2024-11-15 DIAGNOSIS — D50.9 IRON DEFICIENCY ANEMIA, UNSPECIFIED IRON DEFICIENCY ANEMIA TYPE: ICD-10-CM

## 2024-11-15 DIAGNOSIS — K21.9 GASTROESOPHAGEAL REFLUX DISEASE WITHOUT ESOPHAGITIS: ICD-10-CM

## 2024-11-15 DIAGNOSIS — F41.9 ANXIETY: ICD-10-CM

## 2024-11-15 PROCEDURE — 99310 SBSQ NF CARE HIGH MDM 45: CPT | Performed by: NURSE PRACTITIONER

## 2024-11-15 NOTE — LETTER
Patient: Saritha Cormier  : 1950    Encounter Date: 11/15/2024    Chief Complaint:   SNF F/U  -Acute on chronic respiratory failure w/ hypoxia  -Community-acquired pneumonia  -Acute on chronic diastolic heart failure   -JA on CKD  -Physical deconditioning/weakness    HPI:   73 year-old female presenting to the ER on 10/27/24 for feeling short of breath and feeling of doom. Patient called 911 several times throughout the day. She stated that she was afraid of dying. Her sister  a few months ago and she is under a lot of stress. She cares for her grandchildren, after her daughter passed away ~ 5 years ago. Pt. reported that she recently saw her pulmonologist, who recommended a referral for possible diaphragm pacemaker placement. Work-up in ER revealed pneumonia, acute on chronic heart failure, and JA. She was admitted to the hospital for further evaluation and treatment. Hospital course:    Acute on chronic respiratory failure/community-acquired pneumonia/COPD/elevated P-Gompq-cbmwrkqt O2 (2-4 L), required BiPAP on admit to ICU, COVID/Flu negative, BCx negative, VQ scan with low prob. of PE, BLE US negative for DVT, completed Azithromycin x 5 days, c/w cefuroxime, IV solu-medrol --> prednisone taper, duonebs TID  Acute on chronic diastolic HF/HTN/DLD/CAD-c/w home medications, lasix drip --> PO bumex, cespedes catheter for I&O, cardiology consult, telemetry monitoring, 1800 ml FR  GERD-c/w PPI  JA on CKD-avoid nephrotoxins, renally dose medications, BMP monitored (baseline Cr 1.4-1.7), followed by nephrology  Weakness-PT/OT epifanio, DEVORA on discharge  Epistaxis-nose bleed in ER, cauterized, afrin spray prn, humidification for O2    Pt. was HDS and discharged to [facility] on 24. Today, patient reports that she is feeling well. She denies SOB, cough, or chest pain. She reports appetite at baseline. She reports feeling anxious at times. Staff report no clinical concerns.     ROS:    As above in HPI.  Otherwise, all other systems have been reviewed and are negative for complaint.    Medications reviewed and verified in NH chart.     Patient Active Problem List   Diagnosis   • Abnormal screening cardiac CT   • Primary osteoarthritis of left hip   • Benign essential hypertension   • Chronic diastolic heart failure   • Class 2 obesity with body mass index (BMI) of 37.0 to 37.9 in adult   • Colon polyps   • Coronary artery calcification   • Elevated LDL cholesterol level   • Dyslipidemia   • Diabetes mellitus type II, controlled (Multi)   • Gout   • GERD (gastroesophageal reflux disease)   • Status post bilateral mastectomy   • Hypotension   • Lumbar radiculitis   • Pain of left hip   • Mild vitamin D deficiency   • Seborrhea capitis   • Stage 3a chronic kidney disease (Multi)   • Status post total replacement of right hip   • Spinal stenosis, lumbar   • Suspected COVID-19 virus infection   • Acute lumbar radiculopathy   • Type 2 diabetes mellitus with stage 3a chronic kidney disease, without long-term current use of insulin (Multi)   • Witnessed episode of apnea   • H/O bilateral mastectomy   • Inflamed seborrheic keratosis   • Presence of xenogenic heart valve   • Presence of right artificial hip joint   • Breast cancer (Multi)   • Peptic ulcer without hemorrhage or perforation   • Hypertensive heart disease without heart failure   • Long term (current) use of opiate analgesic   • Long term (current) use of oral hypoglycemic drugs   • Dependence on supplemental oxygen   • Aftercare following joint replacement surgery   • Acquired absence of bilateral breasts and nipples   • Anemia   • Body mass index 40.0-44.9, adult (Multi)   • Atherosclerosis of aorta (CMS-HCC)   • Acute respiratory failure with hypercapnia (Multi)   • Arthritis of knee, left   • Shortness of breath   • Abnormal EKG   • Facet arthropathy, lumbosacral   • Nonrheumatic aortic valve stenosis   • Encounter for opiate analgesic use agreement   • Left  hip pain   • Chronic renal disease, stage IV (Multi)   • Chronic pain of left knee   • Chronic renal insufficiency   • Acute pneumonia   • Chronic respiratory failure with hypoxia (Multi)   • COPD (chronic obstructive pulmonary disease) (Multi)   • Elevated d-dimer   • Weakness   • Epistaxis   • Pneumonia of both lower lobes due to infectious organism   • Confusion   • Acute on chronic hypoxic respiratory failure (Multi)   • Acute on chronic diastolic (congestive) heart failure   • JA (acute kidney injury) (CMS-HCC)   • Thrombocytopenia (CMS-HCC)   • Vitamin D deficiency        Past Medical History:   Diagnosis Date   • Acquired absence of bilateral breasts and nipples 01/31/2022    History of bilateral mastectomy   • Acute kidney injury (CMS-HCC)    • Anemia    • Arthritis    • Cancer (Multi)    • CHF (congestive heart failure)    • Chronic respiratory failure    • CKD (chronic kidney disease)     stage 3   • COPD (chronic obstructive pulmonary disease) (Multi)    • Coronary artery disease    • Epistaxis not due to trauma 10/27/2024   • Fall    • GERD (gastroesophageal reflux disease)    • H/O chronic sinusitis    • Heart valve disease    • History of bilateral mastectomy    • History of total hip replacement, right    • History of transcatheter aortic valve replacement (TAVR) 04/2024   • Hyperlipidemia    • Hypertension    • Morbid obesity (Multi)    • Neoplasm of unspecified behavior of bone, soft tissue, 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/2015    Skin growth   • Other bursitis of hip, right hip     Bursitis of right hip, unspecified bursa   • Oxygen dependent     2LPM with sleep   • Pneumonia    • Shortness of breath    • Thrombocytopenia (CMS-HCC)    • Trigger finger, unspecified finger 06/22/2018    Acquired trigger finger   • Type 2 diabetes mellitus    • Unilateral primary osteoarthritis, right knee 02/19/2021    Arthritis of knee, right   • Vitamin D deficiency    • Weakness        Past Surgical History:   Procedure  Laterality Date   • AORTIC VALVE REPLACEMENT  04/2024    TAVR   • CARDIAC CATHETERIZATION N/A 03/29/2024    Procedure: Left And Right Heart Cath, With LV;  Surgeon: Javier Reilly MD;  Location: [place] [address] Cardiac Cath Lab;  Service: Cardiovascular;  Laterality: N/A;   • CARDIAC CATHETERIZATION N/A 04/30/2024    Procedure: TAVR (Transcatheter AV Replacement);  Surgeon: Jessica Stallworth MD;  Location: [place] [address] Cardiac Cath Lab;  Service: Cardiovascular;  Laterality: N/A;  4/30 0730   • CARDIAC CATHETERIZATION N/A 04/30/2024    Procedure: TVP for TAVR;  Surgeon: Jessica Stallworth MD;  Location: [place] [address] Cardiac Cath Lab;  Service: Cardiovascular;  Laterality: N/A;   • HYSTERECTOMY  09/21/2015    Hysterectomy   • JOINT REPLACEMENT Right     Right total hip   • MASTECTOMY Bilateral     Breast Surgery Mastectomy   • OTHER SURGICAL HISTORY  09/21/2015    Breast Surgery Reconstruction       Family History   Problem Relation Name Age of Onset   • Gout Mother     • Hypertension Mother     • Arthritis Father     • Other (cardiac disorder) Father     • Cancer Father     • Tongue cancer Brother     • Other (jaw neoplasm) Brother     • Cancer Daughter     • Breast cancer Sibling     • Pancreatic cancer Sibling         Social History     Tobacco Use   Smoking Status Never   Smokeless Tobacco Never       Social History     Substance and Sexual Activity   Alcohol Use Never       Social History     Substance and Sexual Activity   Drug Use Never       No Known Allergies     Vital Signs:   90/56-64-18-98.0-99% on 2 L O2    Physical Exam:  General: Sitting up in bed in NAD, alert   Head/Face: NCAT, symmetrical  Eyes: PERRLA, no injection, no discharge  ENT: Hearing not impaired, ears without scars or lesions, nasal mucosa and turbinates pink, septum midline, lips pink and moist  Neck: Supple, symmetrical  Respiratory: CTA  but diminished without adventitious sounds, respirations even and nonlabored without use of accessory  muscles, good air exchange  Cardio: RRR without murmur or gallops, normal S1S2, trace pedal edema, pedal pulses 3+/4 bilaterally  Chest/Breast: Symmetrical  GI: BS x 4, normoactive, non-distended, abd round and soft, no masses or tenderness  : No suprapubic tenderness or distention  MSK: Gait not assessed, joints with full ROM without pain or contractures  Skin: Skin warm and dry, no induration  Neurologic: Cranial nerves II through XII intact, superficial touch and pain sensation intact  Psychiatric: Alert to person, place, and time, calm and cooperative, anxious at times     Results/Data:    Latest Reference Range & Units 11/02/24 05:27   GLUCOSE 74 - 99 mg/dL 118 (H)   SODIUM 136 - 145 mmol/L 139   POTASSIUM 3.5 - 5.3 mmol/L 3.7   CHLORIDE 98 - 107 mmol/L 94 (L)   Bicarbonate 21 - 32 mmol/L 40 (HH)   Anion Gap 10 - 20 mmol/L 9 (L)   Blood Urea Nitrogen 6 - 23 mg/dL 51 (H)   Creatinine 0.50 - 1.05 mg/dL 1.64 (H)   EGFR >60 mL/min/1.73m*2 33 (L)   Calcium 8.6 - 10.3 mg/dL 8.5 (L)   MAGNESIUM 1.60 - 2.40 mg/dL 2.49 (H)   WBC 4.4 - 11.3 x10*3/uL 12.4 (H)   nRBC 0.0 - 0.0 /100 WBCs 0.0   RBC 4.00 - 5.20 x10*6/uL 4.03   HEMOGLOBIN 12.0 - 16.0 g/dL 10.0 (L)   HEMATOCRIT 36.0 - 46.0 % 35.1 (L)   MCV 80 - 100 fL 87   MCH 26.0 - 34.0 pg 24.8 (L)   MCHC 32.0 - 36.0 g/dL 28.5 (L)   RED CELL DISTRIBUTION WIDTH 11.5 - 14.5 % 16.0 (H)   Platelets 150 - 450 x10*3/uL 124 (L)   (HH): Data is critically high  (H): Data is abnormally high  (L): Data is abnormally low    Assessment/Plan:  Acute on chronic respiratory failure/CAP/COPD-c/w supplemental O2, completed course of Cefuroxime and Prednisone taper, c/w Duonebs prn, monitor respiratory status closely, F/U with pulmonologist as scheduled  JA on CKD-avoid nephrotoxic drugs, renally dose medications as able, monitor BMP, F/U with nephrology as scheduled  Acute on chronic diastolic heart failure/HTN/HLD/CAD-2 gm Na diet, 1800 ml FR, c/w ASA, bumex, losartan, jardiance, crestor,  and metoprolol, monitor BP and HR, monitor weights, F/U with cardiology after discharge  Generalized weakness/physical deconditioning-c/w PT/OT  Epistaxis-RESOLVED, continue to monitor  GERD-c/w PPI  Anemia/thrombocytopenia-c/w iron, monitor CBC  Vit D deficiency-c/w supplement  Intertrigo of skin folds-c/w nystatin powder  Stage III pressure ulcer of bilateral buttocks-c/w local wound care, offloading, c/w zinc and Vit C, wound care team following  Constipation-c/w miralax  Left hip OA/chronic pain/lumbar radiculopathy/spinal stenosis-c/w gabapentin and tramadol, patient followed by pain mgmt     Orders:  CBC w/ diff and BMP on 11/18    Code Status:   Full Code    Time spent reviewing patient's chart on the unit (PMH, PSH, FH, Social Hx AND progress and/or consult notes, labs, and radiology results): 25 minutes  Time spent interviewing and/or examining the patient: 10 minutes  Time spent writing note on the unit: 5 minutes  Time spent reviewing POC w/ patient, family, and/or staff: 5 minutes  Total visit time: 45 minutes. Greater than 50% of time was spent on counseling and/or coordination of care of the patient. Start time: 11:15 AM, End time: 12:00 PM.      Electronically Signed By: ANTOLIN Escalante   1/5/25  7:57 PM

## 2024-11-19 NOTE — PROGRESS NOTES
"Name: Saritha Cormier  : 1950  MRN: [id number]  Visit Date: 2024  Chief Complaint: HISTORY AND PHYSICAL    HPI: Saritha Cormier is a 74 y.o. female with PMH remarkable for CKD, CAD, diabetes, aortic stenosis status post TAVR, hyperlipidemia, hypertension, home O2 dependent status post open heart surgery who was admitted to [facility] on 10/27/24 after presenting to the ER with complaint of feeling like she was \"going to die\" and increased SOB. Workup in ER revealed elevated WBC, 12.0, elevated D Dimer 1359, negative for flu and COVID. CXR revealed Bilateral mid/lower lung patchy opacities with multifocal pneumonia not excluded. VQ Scan revealed: 1. There is nonspecific heterogeneity in perfusion of both lungs indicating low probability for acute pulmonary embolism   US BLE revealed: No sonographic findings suggestive of right or left lower extremity deep venous thrombosis. She completed course of Azithromycin, Cefuroxime, IV Solumedrol. Per hospital notes, she is on baseline 02 needs 2-4lpm, but required BIPAP initially. Troponin 19>30>29. 2D echo on 24 revealed: 1. Left ventricular systolic function is normal with a 60-65% estimated ejection fraction.  2. There is an Evolut (Evolut FX 26) TAVR valve in aortic position functioning normally, with mild levar-prosthetic regurgitation. 3. There is mild pulmonary hypertension.She was initially placed on Lasix gtt, which was placed on hold due to renal function on 10/30/24. LOS 389176.6ml. She had cespedes catheter in place to monitor I+O's, removed on 10/31/24. Her Lasix was stopped, she was started on Bumex 1mg bid per cardiology. She had a nose bleed when in ER, which was cauterized, her CBC and PLT remained stable. Plavix was placed on hold. Her SP02 improved with diuresis and ATB therapy. She refused BIPAP at night per hospital notes. Once HDS, she was discharged to [facility] on 24 for PT/OT.    Subjective: Seen and " examined today. She is lying in bed awake in no acute distress. She denies any new issues or complaints.     The patient was counseled regarding diagnostic results, instructions for management, risk factor reductions, prognosis, patient and family education, impressions, risks and benefits of treatment options and importance of compliance with treatment. I have reviewed nursing notes since my last visit and document any significant changes Reviewed orders, medications, Labs. Reviewed chart looking at current medications, treatments, labs, x-rays etc.     ROS:  As above in subjective. Otherwise, all other systems have been reviewed and are negative for complaint.    Medications:  Medications reviewed and verified in NH chart.     Past Medical History:   Diagnosis Date    Acquired absence of bilateral breasts and nipples 01/31/2022    History of bilateral mastectomy    Acute kidney injury (CMS-HCC)     Anemia     Arthritis     Cancer (Multi)     CHF (congestive heart failure)     Chronic respiratory failure     CKD (chronic kidney disease)     stage 3    COPD (chronic obstructive pulmonary disease) (Multi)     Coronary artery disease     Epistaxis not due to trauma 10/27/2024    Fall     GERD (gastroesophageal reflux disease)     H/O chronic sinusitis     Heart valve disease     History of bilateral mastectomy     History of total hip replacement, right     History of transcatheter aortic valve replacement (TAVR) 04/2024    Hyperlipidemia     Hypertension     Morbid obesity (Multi)     Neoplasm of unspecified behavior of bone, soft tissue, 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/2015    Skin growth    Other bursitis of hip, right hip     Bursitis of right hip, unspecified bursa    Oxygen dependent     2LPM with sleep    Pneumonia     Shortness of breath     Thrombocytopenia (CMS-HCC)     Trigger finger, unspecified finger 06/22/2018    Acquired trigger finger    Type 2 diabetes mellitus     Unilateral primary osteoarthritis, right knee  02/19/2021    Arthritis of knee, right    Vitamin D deficiency     Weakness        Past Surgical History:   Procedure Laterality Date    AORTIC VALVE REPLACEMENT  04/2024    CARDIAC CATHETERIZATION N/A 03/29/2024    Procedure: Left And Right Heart Cath, With LV;  Surgeon: Javier Reilly MD;  Location: [place] [address] Cardiac Cath Lab;  Service: Cardiovascular;  Laterality: N/A;    CARDIAC CATHETERIZATION N/A 04/30/2024    Procedure: TAVR (Transcatheter AV Replacement);  Surgeon: Jessica Stallworth MD;  Location: [place] [address] Cardiac Cath Lab;  Service: Cardiovascular;  Laterality: N/A;  4/30 0730    CARDIAC CATHETERIZATION N/A 04/30/2024    Procedure: TVP for TAVR;  Surgeon: Jessica Stallworth MD;  Location: [place] [address] Cardiac Cath Lab;  Service: Cardiovascular;  Laterality: N/A;    HYSTERECTOMY  09/21/2015    Hysterectomy    JOINT REPLACEMENT Right     Right total hip    MASTECTOMY  09/21/2015    Breast Surgery Mastectomy    OTHER SURGICAL HISTORY  09/21/2015    Breast Surgery Reconstruction       Family History   Problem Relation Name Age of Onset    Gout Mother      Hypertension Mother      Arthritis Father      Other (cardiac disorder) Father      Cancer Father      Tongue cancer Brother      Other (jaw neoplasm) Brother      Breast cancer Sibling      Pancreatic cancer Sibling         Social History     Tobacco Use    Smoking status: Never    Smokeless tobacco: Never   Substance Use Topics    Alcohol use: Never       No Known Allergies     Vital Signs:   Vital Signs were reviewed in nursing home documentation.    Physical Exam  Vitals and nursing note reviewed.   Constitutional:       Appearance: Normal appearance.   HENT:      Head: Normocephalic and atraumatic.   Cardiovascular:      Rate and Rhythm: Normal rate and regular rhythm.   Pulmonary:      Effort: Pulmonary effort is normal.      Breath sounds: Rhonchi present.   Abdominal:      General: Bowel sounds are normal.      Palpations: Abdomen is soft.    Musculoskeletal:         General: Normal range of motion.   Skin:     General: Skin is warm and dry.   Neurological:      General: No focal deficit present.      Mental Status: She is alert and oriented to person, place, and time.   Psychiatric:         Behavior: Behavior normal.         Judgment: Judgment normal.         Results/Data:   Lab Results   Component Value Date    WBC 12.4 (H) 11/02/2024    HGB 10.0 (L) 11/02/2024    HCT 35.1 (L) 11/02/2024     (L) 11/02/2024    CHOL 140 06/17/2024    TRIG 120 06/17/2024    HDL 33.7 06/17/2024    ALT 6 (L) 10/27/2024    AST 12 10/27/2024     11/02/2024    K 3.7 11/02/2024    CL 94 (L) 11/02/2024    CREATININE 1.64 (H) 11/02/2024    BUN 51 (H) 11/02/2024    CO2 40 (HH) 11/02/2024    TSH 3.28 06/17/2024    INR 1.2 (H) 03/16/2021    HGBA1C 5.8 (H) 10/28/2024     Results were reviewed and addressed accordingly. Lab Results were also reviewed in [facility].     Provider Impression:   Acute on Chronic Respiratory failure 2/2 PNA, COPD exacerbation, fluid overload  - continue with supplemental 02  - continue with bronchodilators  - continue with Prednisone, Cefuroxime  - continue with diuretics    Acute on Chronic CHF  - continue with diuretics, Lasix was switched to Bumex upon discharge from hospital  - low Na diet  - fluid restriction  - monitor for signs/symptoms cardiac decompensation    Iron deficiency anemia/Thrombocytopenia  - Plavix was stopped during hospitalization due to nose bleed  - continue with iron supplement  - monitor CBC    Generalized weakness  - consult PT/OT    DM type 2  - continue with Jardiance  - low concentrated sweets diet    HTN  - continue with Metoprolol, Losartan, diuretics  - monitor vital signs    CAD/history of CABG  - continue with ASA, lipid lowering agent  - Plavix was stopped during hospitalization  - follow up with cardiology    Dyslipidemia  - continue with statin    CKD  - continue to monitor kidney function  - avoid  nephrotoxic agents    GERD  - continue with PPI    Chronic left hip pain 2/2 OA  - continue with pain medication  - follow up with pain management    Vitamin D deficiency  - continue with supplementation  ----------------  Written by Kayleigh Dumont RN, acting as a scribe for Dr. Francisco. This note accurately reflects the work and decisions made by Dr. Francisco.     I, Dr. Francisco, attest all medical record entries made by the scribe were under my direction and were personally dictated by me. I have reviewed the chart and agree that the record accurately reflects my performance of the history, physical exam, and assessment and plan.

## 2024-11-21 ENCOUNTER — TELEPHONE (OUTPATIENT)
Dept: PRIMARY CARE | Facility: CLINIC | Age: 74
End: 2024-11-21
Payer: MEDICARE

## 2024-11-21 NOTE — TELEPHONE ENCOUNTER
Transition of Care    Inpatient facility: [facility]  Discharge diagnosis: Physical Deconditioing  Discharged to: Home  Discharge date: 11/21/24  Initial Call date: 11/21/24  Spoke with patient/caregiver: caregiver                                                                     Do you need assistance  visits prior to your PCP visit: No  Home health care ordered: No  Have you been contacted by home care and have a start of care date: No  Are you taking medications as prescribed at discharge: Yes    Referral to [facility] Pharmacist: No  Patient advised to bring all medications to PCP follow-up appointment.  Patient advised to follow discharge instructions until provider follow-up.  TCM visit date: 11/25/24  TCM provider visit with: MICHELLE Prince MD

## 2024-11-25 ENCOUNTER — APPOINTMENT (OUTPATIENT)
Dept: PRIMARY CARE | Facility: CLINIC | Age: 74
End: 2024-11-25
Payer: MEDICARE

## 2024-11-25 VITALS
DIASTOLIC BLOOD PRESSURE: 74 MMHG | HEART RATE: 53 BPM | OXYGEN SATURATION: 97 % | TEMPERATURE: 97.3 F | WEIGHT: 200 LBS | BODY MASS INDEX: 37.79 KG/M2 | SYSTOLIC BLOOD PRESSURE: 120 MMHG

## 2024-11-25 DIAGNOSIS — N18.4 CHRONIC RENAL DISEASE, STAGE IV (MULTI): ICD-10-CM

## 2024-11-25 DIAGNOSIS — R22.41 LOCALIZED SWELLING OF RIGHT LOWER EXTREMITY: ICD-10-CM

## 2024-11-25 DIAGNOSIS — N18.31 TYPE 2 DIABETES MELLITUS WITH STAGE 3A CHRONIC KIDNEY DISEASE, WITHOUT LONG-TERM CURRENT USE OF INSULIN (MULTI): ICD-10-CM

## 2024-11-25 DIAGNOSIS — J18.9 PNEUMONIA DUE TO INFECTIOUS ORGANISM, UNSPECIFIED LATERALITY, UNSPECIFIED PART OF LUNG: Primary | ICD-10-CM

## 2024-11-25 DIAGNOSIS — E11.22 TYPE 2 DIABETES MELLITUS WITH STAGE 3A CHRONIC KIDNEY DISEASE, WITHOUT LONG-TERM CURRENT USE OF INSULIN (MULTI): ICD-10-CM

## 2024-11-25 DIAGNOSIS — Z95.2 S/P TAVR (TRANSCATHETER AORTIC VALVE REPLACEMENT): ICD-10-CM

## 2024-11-25 DIAGNOSIS — I50.9 HEART FAILURE, UNSPECIFIED HF CHRONICITY, UNSPECIFIED HEART FAILURE TYPE: ICD-10-CM

## 2024-11-25 DIAGNOSIS — I10 HYPERTENSION, UNSPECIFIED TYPE: ICD-10-CM

## 2024-11-25 DIAGNOSIS — I50.32 CHRONIC DIASTOLIC HEART FAILURE: ICD-10-CM

## 2024-11-25 PROCEDURE — 1159F MED LIST DOCD IN RCRD: CPT | Performed by: INTERNAL MEDICINE

## 2024-11-25 PROCEDURE — 3074F SYST BP LT 130 MM HG: CPT | Performed by: INTERNAL MEDICINE

## 2024-11-25 PROCEDURE — 1160F RVW MEDS BY RX/DR IN RCRD: CPT | Performed by: INTERNAL MEDICINE

## 2024-11-25 PROCEDURE — 3060F POS MICROALBUMINURIA REV: CPT | Performed by: INTERNAL MEDICINE

## 2024-11-25 PROCEDURE — 99496 TRANSJ CARE MGMT HIGH F2F 7D: CPT | Performed by: INTERNAL MEDICINE

## 2024-11-25 PROCEDURE — 3048F LDL-C <100 MG/DL: CPT | Performed by: INTERNAL MEDICINE

## 2024-11-25 PROCEDURE — 3078F DIAST BP <80 MM HG: CPT | Performed by: INTERNAL MEDICINE

## 2024-11-25 PROCEDURE — 3044F HG A1C LEVEL LT 7.0%: CPT | Performed by: INTERNAL MEDICINE

## 2024-11-25 PROCEDURE — 4010F ACE/ARB THERAPY RXD/TAKEN: CPT | Performed by: INTERNAL MEDICINE

## 2024-11-25 PROCEDURE — 1111F DSCHRG MED/CURRENT MED MERGE: CPT | Performed by: INTERNAL MEDICINE

## 2024-11-25 PROCEDURE — 1036F TOBACCO NON-USER: CPT | Performed by: INTERNAL MEDICINE

## 2024-11-25 RX ORDER — BUMETANIDE 1 MG/1
1 TABLET ORAL
Qty: 60 TABLET | Refills: 11 | Status: SHIPPED | OUTPATIENT
Start: 2024-11-25 | End: 2025-11-25

## 2024-11-25 ASSESSMENT — ENCOUNTER SYMPTOMS
FATIGUE: 0
HEADACHES: 0
DIFFICULTY URINATING: 0
PALPITATIONS: 0
BLOOD IN STOOL: 0
WHEEZING: 0
DIZZINESS: 0
FEVER: 0
BRUISES/BLEEDS EASILY: 0
COUGH: 0
DIARRHEA: 0
SINUS PAIN: 0
SORE THROAT: 0
UNEXPECTED WEIGHT CHANGE: 0
ARTHRALGIAS: 0
ABDOMINAL PAIN: 0

## 2024-11-25 NOTE — PROGRESS NOTES
Subjective   Patient ID: Saritha Cormier is a 74 y.o. female who presents for Hospital Follow-up (TCM).    Transition of care  Patient admission history and physical, hospital course, medications, verified and reviewed  Patient contacted after discharge, medications verified comes today for follow-up    Inpatient facility: [facility]  Discharge diagnosis: Physical Deconditioing  Discharged to: Home  Discharge date: 11/21/24  Initial Call date: 11/21/24  Spoke with patient/caregiver: caregiver                                                                      Do you need assistance  visits prior to your PCP visit: No  Home health care ordered: No  Have you been contacted by home care and have a start of care date: No  Are you taking medications as prescribed at discharge: Yes     Referral to [facility] Pharmacist: No  Patient advised to bring all medications to PCP follow-up appointment.  Patient advised to follow discharge instructions until provider follow-up.  TCM visit date: 11/25/24  TCM provider visit with: MICHELLE Prince MD      Patient recently admitted for acute respiratory failure found to have pneumonia sepsis hypotension patient stayed in ICU for 1 week then transferred to stepdown unit after finishing antibiotic patient transferred to skilled rehab discharged home comes to clinic for reevaluation  - Status post pneumonia doing well  - CHF compensated patient will need to continue on Bumex 1 mg twice a day as recommended  Continue Jardiance 10 mg new prescription provided  -Respiratory failure continues 2 L oxygen  ---- CKD stable avoid any NSAIDs increase fluid intake low-salt diet monitor weight closely.  Patient underwent uneventful surgery for TAVR r continue with aspirin Plavix doing well.  - Severe left hip arthritis patient scheduled surgery now for total hip replacement with Dr. Angulo in 3 months.  -Pain control continue with gabapentin and tramadol from pain  management  - History of bilateral mastectomy compensated.  - Diabetes continue with metformin..  - Chronic gout compensated continue with allopurinol  -CHF compensated continue with Lasix.  Counseled about low-salt diet.  - Coronary artery disease compensated follow-up cardiology follow-up closely as needed follow-up cardiology  Reevaluate patient in 4 weeks                Review of Systems   Constitutional:  Negative for fatigue, fever and unexpected weight change.   HENT:  Negative for congestion, ear discharge, ear pain, mouth sores, sinus pain and sore throat.    Eyes:  Negative for visual disturbance.   Respiratory:  Negative for cough and wheezing.    Cardiovascular:  Negative for chest pain, palpitations and leg swelling.   Gastrointestinal:  Negative for abdominal pain, blood in stool and diarrhea.   Genitourinary:  Negative for difficulty urinating.   Musculoskeletal:  Negative for arthralgias.   Skin:  Negative for rash.   Neurological:  Negative for dizziness and headaches.   Hematological:  Does not bruise/bleed easily.   Psychiatric/Behavioral:  Negative for behavioral problems.    All other systems reviewed and are negative.      Objective   Lab Results   Component Value Date    HGBA1C 5.8 (H) 10/28/2024      /74   Pulse 53   Temp 36.3 °C (97.3 °F)   Wt 90.7 kg (200 lb)   SpO2 97%   BMI 37.79 kg/m²     Physical Exam  Vitals and nursing note reviewed.   Constitutional:       Appearance: Normal appearance.   HENT:      Head: Normocephalic.      Nose: Nose normal.   Eyes:      Conjunctiva/sclera: Conjunctivae normal.      Pupils: Pupils are equal, round, and reactive to light.   Cardiovascular:      Rate and Rhythm: Regular rhythm.   Pulmonary:      Effort: Pulmonary effort is normal.      Breath sounds: Normal breath sounds.   Abdominal:      General: Abdomen is flat.      Palpations: Abdomen is soft.   Musculoskeletal:      Cervical back: Neck supple.   Skin:     General: Skin is warm.    Neurological:      General: No focal deficit present.      Mental Status: She is oriented to person, place, and time.   Psychiatric:         Mood and Affect: Mood normal.         Assessment/Plan   Saritha was seen today for hospital follow-up.  Diagnoses and all orders for this visit:  Pneumonia due to infectious organism, unspecified laterality, unspecified part of lung (Primary)  Chronic diastolic heart failure  -     bumetanide (Bumex) 1 mg tablet; Take 1 tablet (1 mg) by mouth 2 times daily (morning and late afternoon).  -     empagliflozin (Jardiance) 10 mg; Take 1 tablet (10 mg) by mouth once daily.  S/P TAVR (transcatheter aortic valve replacement)  Chronic renal disease, stage IV (Multi)  Type 2 diabetes mellitus with stage 3a chronic kidney disease, without long-term current use of insulin (Multi)  Hypertension, unspecified type  Localized swelling of right lower extremity  Heart failure, unspecified HF chronicity, unspecified heart failure type  Other orders  -     Follow Up In Primary Care - Established; Future  -     Follow Up In Primary Care - Established; Future   Transition of care  Patient admission history and physical, hospital course, medications, verified and reviewed  Patient contacted after discharge, medications verified comes today for follow-up    Inpatient facility: [facility]  Discharge diagnosis: Physical Deconditioing  Discharged to: Home  Discharge date: 11/21/24  Initial Call date: 11/21/24  Spoke with patient/caregiver: caregiver                                                                      Do you need assistance  visits prior to your PCP visit: No  Home health care ordered: No  Have you been contacted by home care and have a start of care date: No  Are you taking medications as prescribed at discharge: Yes     Referral to [facility] Pharmacist: No  Patient advised to bring all medications to PCP follow-up appointment.  Patient advised to follow  discharge instructions until provider follow-up.  TCM visit date: 11/25/24  TCM provider visit with: MICHELLE Prince MD      Patient recently admitted for acute respiratory failure found to have pneumonia sepsis hypotension patient stayed in ICU for 1 week then transferred to stepdown unit after finishing antibiotic patient transferred to skilled rehab discharged home comes to clinic for reevaluation  - Status post pneumonia doing well  - CHF compensated patient will need to continue on Bumex 1 mg twice a day as recommended  Continue Jardiance 10 mg new prescription provided  -Respiratory failure continues 2 L oxygen  ---- CKD stable avoid any NSAIDs increase fluid intake low-salt diet monitor weight closely.  Patient underwent uneventful surgery for TAVR r continue with aspirin Plavix doing well.  - Severe left hip arthritis patient scheduled surgery now for total hip replacement with Dr. Angulo in 3 months.  -Pain control continue with gabapentin and tramadol from pain management  - History of bilateral mastectomy compensated.  - Diabetes continue with metformin..  - Chronic gout compensated continue with allopurinol  -CHF compensated continue with Lasix.  Counseled about low-salt diet.  - Coronary artery disease compensated follow-up cardiology follow-up closely as needed follow-up cardiology  Reevaluate patient in 4 weeks

## 2024-11-26 ENCOUNTER — DOCUMENTATION (OUTPATIENT)
Dept: CARE COORDINATION | Facility: CLINIC | Age: 74
End: 2024-11-26
Payer: MEDICARE

## 2024-11-26 ENCOUNTER — TELEPHONE (OUTPATIENT)
Dept: PRIMARY CARE | Facility: CLINIC | Age: 74
End: 2024-11-26
Payer: MEDICARE

## 2024-11-26 NOTE — TELEPHONE ENCOUNTER
Patient checked her medications at home against the list from yesterday.  Differences include she no longer takes losartan or polyethylene glycol.  She did  the bumetanide yesterday and will start taking that one but does not want to start the jardiance because of the reported side effects.  She has a virtual visit scheduled with Dr. Reilly on 12/2/24 to discuss this with him because he had wanted her to start this before and she had declined because of the side effects.    Please advise as she does want a phone call back [phone]

## 2024-11-26 NOTE — PROGRESS NOTES
Chart review, did not complete JAILYN. Mrs. Cormier followed up with Alva Prince MD on same day of outreach.     Thank you,   Renetta Espinoza , RN   Nurse Care Manager   [facility] Department   [phone]

## 2024-12-01 VITALS — WEIGHT: 200 LBS | BODY MASS INDEX: 37.79 KG/M2

## 2024-12-02 ENCOUNTER — TELEMEDICINE (OUTPATIENT)
Dept: CARDIOLOGY | Facility: CLINIC | Age: 74
End: 2024-12-02
Payer: MEDICARE

## 2024-12-02 DIAGNOSIS — I50.32 CHRONIC DIASTOLIC HEART FAILURE: Primary | ICD-10-CM

## 2024-12-02 NOTE — PROGRESS NOTES
"Name: Saritha Cormier  : 1950  MRN: [id number]  Visit Date: 2024  Chief Complaint: WEEKLY SNF PHYSICIAN VISIT    HPI:  Saritha Cormier is a 74 y.o. female with PMH remarkable for CKD, CAD, diabetes, aortic stenosis status post TAVR, hyperlipidemia, hypertension, home O2 dependent status post open heart surgery who was admitted to [facility] on 10/27/24 after presenting to the ER with complaint of feeling like she was \"going to die\" and increased SOB. Workup in ER revealed elevated WBC, 12.0, elevated D Dimer 1359, negative for flu and COVID. CXR revealed Bilateral mid/lower lung patchy opacities with multifocal pneumonia not excluded. VQ Scan revealed: 1. There is nonspecific heterogeneity in perfusion of both lungs indicating low probability for acute pulmonary embolism; US BLE revealed: No sonographic findings suggestive of right or left lower extremity deep venous thrombosis. She completed course of Azithromycin, Cefuroxime, IV Solumedrol. Per hospital notes, she is on baseline 02 needs 2-4lpm, but required BIPAP initially. Troponin 19>30>29. 2D echo on 24 revealed: 1. Left ventricular systolic function is normal with a 60-65% estimated ejection fraction.  2. There is an Evolut (Evolut FX 26) TAVR valve in aortic position functioning normally, with mild levar-prosthetic regurgitation. 3. There is mild pulmonary hypertension.She was initially placed on Lasix gtt, which was placed on hold due to renal function on 10/30/24. LOS 992947.6ml. She had cespedes catheter in place to monitor I+O's, removed on 10/31/24. Her Lasix was stopped, she was started on Bumex 1mg bid per cardiology. She had a nose bleed when in ER, which was cauterized, her CBC and PLT remained stable. Plavix was placed on hold. Her SP02 improved with diuresis and ATB therapy. She refused BIPAP at night per hospital notes. Once HDS, she was discharged to [facility] on 24 for PT/OT.    Subjective: Seen and " examined today. She is sitting up in chair on exam in no acute distress. Denies any new issues or complaints.     ROS:  As above in HPI. Otherwise, all other systems have been reviewed and are negative for complaint.    Medications:  Medications reviewed and verified in NH chart.     Vital Signs: Reviewed in [facility]    Physical Exam  Vitals and nursing note reviewed.   Constitutional:       Appearance: Normal appearance.   HENT:      Head: Normocephalic and atraumatic.   Cardiovascular:      Rate and Rhythm: Normal rate and regular rhythm.   Pulmonary:      Effort: Pulmonary effort is normal.      Breath sounds: Rhonchi present.   Abdominal:      General: Bowel sounds are normal.      Palpations: Abdomen is soft.   Musculoskeletal:         General: Normal range of motion.   Skin:     General: Skin is warm and dry.   Neurological:      General: No focal deficit present.      Mental Status: She is alert and oriented to person, place, and time.   Psychiatric:         Behavior: Behavior normal.         Judgment: Judgment normal.       Results/Data:   Lab Results   Component Value Date    WBC 12.4 (H) 11/02/2024    HGB 10.0 (L) 11/02/2024    HCT 35.1 (L) 11/02/2024     (L) 11/02/2024    CHOL 140 06/17/2024    TRIG 120 06/17/2024    HDL 33.7 06/17/2024    ALT 6 (L) 10/27/2024    AST 12 10/27/2024     11/02/2024    K 3.7 11/02/2024    CL 94 (L) 11/02/2024    CREATININE 1.64 (H) 11/02/2024    BUN 51 (H) 11/02/2024    CO2 40 (HH) 11/02/2024    TSH 3.28 06/17/2024    INR 1.2 (H) 03/16/2021    HGBA1C 5.8 (H) 10/28/2024       Provider Impression:   Acute on Chronic Respiratory failure 2/2 PNA, COPD exacerbation, fluid overload  - continue with supplemental 02, she is currently on 2lpm via NC  - continue with bronchodilators  - she completed course of  Prednisone, Cefuroxime  - continue with diuretics  - continue to monitor SP02  - she requires continuous supplemental 02, and is requesting portable 02 tanks for doctor  appointments and travel once discharged to home     Acute on Chronic CHF  - continue with diuretics, Lasix was switched to Bumex upon discharge from hospital  - low Na diet  - fluid restriction  - continue to monitor for signs/symptoms cardiac decompensation    Iron deficiency anemia/Thrombocytopenia  - Plavix was stopped during hospitalization due to nose bleed  - continue with iron supplement  - monitor CBC which has been stable    Generalized weakness  - continue with PT/OT  - per progress notes, plan is to return to home with home care    DM type 2  - continue with Jardiance  - low concentrated sweets diet    HTN  - continue with Metoprolol, Losartan, diuretics  - continue to monitor vital signs    CAD/history of CABG  - continue with ASA, lipid lowering agent  - Plavix was stopped during hospitalization  - follow up with cardiology    Dyslipidemia  - continue with statin    CKD  - continue to monitor kidney function which has been stable  - avoid nephrotoxic agents    GERD  - continue with PPI    Chronic left hip pain 2/2 OA  - continue with pain medication  - follow up with pain management    Vitamin D deficiency  - continue with supplementation    ----------------  Written by Kayleigh Dumont RN, acting as a scribe for Dr. Francisco. This note accurately reflects the work and decisions made by Dr. Francisco.     I, Dr. Francisco, attest all medical record entries made by the scribe were under my direction and were personally dictated by me. I have reviewed the chart and agree that the record accurately reflects my performance of the history, physical exam, and assessment and plan.

## 2024-12-04 ENCOUNTER — LAB (OUTPATIENT)
Dept: LAB | Facility: LAB | Age: 74
End: 2024-12-04
Payer: MEDICARE

## 2024-12-04 ENCOUNTER — OFFICE VISIT (OUTPATIENT)
Dept: PULMONOLOGY | Facility: CLINIC | Age: 74
End: 2024-12-04
Payer: MEDICARE

## 2024-12-04 VITALS
DIASTOLIC BLOOD PRESSURE: 62 MMHG | OXYGEN SATURATION: 94 % | HEART RATE: 57 BPM | SYSTOLIC BLOOD PRESSURE: 145 MMHG | WEIGHT: 193 LBS | BODY MASS INDEX: 36.47 KG/M2 | RESPIRATION RATE: 16 BRPM

## 2024-12-04 DIAGNOSIS — D63.1 ANEMIA DUE TO STAGE 4 CHRONIC KIDNEY DISEASE (MULTI): ICD-10-CM

## 2024-12-04 DIAGNOSIS — N18.4 CKD (CHRONIC KIDNEY DISEASE) STAGE 4, GFR 15-29 ML/MIN (MULTI): ICD-10-CM

## 2024-12-04 DIAGNOSIS — Z79.899 MEDICATION MANAGEMENT: ICD-10-CM

## 2024-12-04 DIAGNOSIS — I10 BENIGN ESSENTIAL HYPERTENSION: ICD-10-CM

## 2024-12-04 DIAGNOSIS — N18.4 ANEMIA DUE TO STAGE 4 CHRONIC KIDNEY DISEASE (MULTI): ICD-10-CM

## 2024-12-04 DIAGNOSIS — M10.00 IDIOPATHIC GOUT, UNSPECIFIED CHRONICITY, UNSPECIFIED SITE: ICD-10-CM

## 2024-12-04 DIAGNOSIS — J96.11 CHRONIC RESPIRATORY FAILURE WITH HYPOXIA (MULTI): Primary | ICD-10-CM

## 2024-12-04 DIAGNOSIS — J44.9 CHRONIC OBSTRUCTIVE PULMONARY DISEASE, UNSPECIFIED COPD TYPE (MULTI): ICD-10-CM

## 2024-12-04 DIAGNOSIS — N06.0 ISOLATED PROTEINURIA WITH MINOR GLOMERULAR ABNORMALITY: ICD-10-CM

## 2024-12-04 DIAGNOSIS — N18.31 STAGE 3A CHRONIC KIDNEY DISEASE (MULTI): ICD-10-CM

## 2024-12-04 LAB
ALBUMIN SERPL BCP-MCNC: 4.1 G/DL (ref 3.4–5)
AMPHETAMINES UR QL SCN: NORMAL
ANION GAP SERPL CALC-SCNC: 16 MMOL/L (ref 10–20)
BARBITURATES UR QL SCN: NORMAL
BUN SERPL-MCNC: 31 MG/DL (ref 6–23)
BZE UR QL SCN: NORMAL
CALCIUM SERPL-MCNC: 9.6 MG/DL (ref 8.6–10.3)
CANNABINOIDS UR QL SCN: NORMAL
CHLORIDE SERPL-SCNC: 95 MMOL/L (ref 98–107)
CO2 SERPL-SCNC: 33 MMOL/L (ref 21–32)
CREAT SERPL-MCNC: 1.57 MG/DL (ref 0.5–1.05)
CREAT UR-MCNC: 50.5 MG/DL (ref 20–320)
CREAT UR-MCNC: 50.9 MG/DL (ref 20–320)
EGFRCR SERPLBLD CKD-EPI 2021: 35 ML/MIN/1.73M*2
ERYTHROCYTE [DISTWIDTH] IN BLOOD BY AUTOMATED COUNT: 16.6 % (ref 11.5–14.5)
FERRITIN SERPL-MCNC: 168 NG/ML (ref 8–150)
GLUCOSE SERPL-MCNC: 88 MG/DL (ref 74–99)
HCT VFR BLD AUTO: 37.3 % (ref 36–46)
HGB BLD-MCNC: 10.5 G/DL (ref 12–16)
IRON SATN MFR SERPL: 20 % (ref 25–45)
IRON SERPL-MCNC: 70 UG/DL (ref 35–150)
MCH RBC QN AUTO: 25.1 PG (ref 26–34)
MCHC RBC AUTO-ENTMCNC: 28.2 G/DL (ref 32–36)
MCV RBC AUTO: 89 FL (ref 80–100)
MICROALBUMIN UR-MCNC: <7 MG/L
MICROALBUMIN/CREAT UR: NORMAL MG/G{CREAT}
NRBC BLD-RTO: 0 /100 WBCS (ref 0–0)
PCP UR QL SCN: NORMAL
PHOSPHATE SERPL-MCNC: 4.3 MG/DL (ref 2.5–4.9)
PLATELET # BLD AUTO: 160 X10*3/UL (ref 150–450)
POTASSIUM SERPL-SCNC: 4.2 MMOL/L (ref 3.5–5.3)
RBC # BLD AUTO: 4.18 X10*6/UL (ref 4–5.2)
SODIUM SERPL-SCNC: 140 MMOL/L (ref 136–145)
TIBC SERPL-MCNC: 357 UG/DL (ref 240–445)
UIBC SERPL-MCNC: 287 UG/DL (ref 110–370)
WBC # BLD AUTO: 7.2 X10*3/UL (ref 4.4–11.3)

## 2024-12-04 PROCEDURE — 83550 IRON BINDING TEST: CPT

## 2024-12-04 PROCEDURE — 80346 BENZODIAZEPINES1-12: CPT

## 2024-12-04 PROCEDURE — 80358 DRUG SCREENING METHADONE: CPT

## 2024-12-04 PROCEDURE — 80361 OPIATES 1 OR MORE: CPT

## 2024-12-04 PROCEDURE — 83970 ASSAY OF PARATHORMONE: CPT

## 2024-12-04 PROCEDURE — 82728 ASSAY OF FERRITIN: CPT

## 2024-12-04 PROCEDURE — 85027 COMPLETE CBC AUTOMATED: CPT

## 2024-12-04 PROCEDURE — 3044F HG A1C LEVEL LT 7.0%: CPT | Performed by: INTERNAL MEDICINE

## 2024-12-04 PROCEDURE — 80365 DRUG SCREENING OXYCODONE: CPT

## 2024-12-04 PROCEDURE — 99214 OFFICE O/P EST MOD 30 MIN: CPT | Performed by: INTERNAL MEDICINE

## 2024-12-04 PROCEDURE — 80307 DRUG TEST PRSMV CHEM ANLYZR: CPT

## 2024-12-04 PROCEDURE — 80368 SEDATIVE HYPNOTICS: CPT

## 2024-12-04 PROCEDURE — 80354 DRUG SCREENING FENTANYL: CPT

## 2024-12-04 PROCEDURE — 3077F SYST BP >= 140 MM HG: CPT | Performed by: INTERNAL MEDICINE

## 2024-12-04 PROCEDURE — 1126F AMNT PAIN NOTED NONE PRSNT: CPT | Performed by: INTERNAL MEDICINE

## 2024-12-04 PROCEDURE — 3078F DIAST BP <80 MM HG: CPT | Performed by: INTERNAL MEDICINE

## 2024-12-04 PROCEDURE — 80069 RENAL FUNCTION PANEL: CPT

## 2024-12-04 PROCEDURE — 82306 VITAMIN D 25 HYDROXY: CPT

## 2024-12-04 PROCEDURE — 3048F LDL-C <100 MG/DL: CPT | Performed by: INTERNAL MEDICINE

## 2024-12-04 PROCEDURE — 1159F MED LIST DOCD IN RCRD: CPT | Performed by: INTERNAL MEDICINE

## 2024-12-04 PROCEDURE — 83540 ASSAY OF IRON: CPT

## 2024-12-04 PROCEDURE — 82043 UR ALBUMIN QUANTITATIVE: CPT

## 2024-12-04 PROCEDURE — 80373 DRUG SCREENING TRAMADOL: CPT

## 2024-12-04 PROCEDURE — 36415 COLL VENOUS BLD VENIPUNCTURE: CPT

## 2024-12-04 PROCEDURE — 82570 ASSAY OF URINE CREATININE: CPT

## 2024-12-04 PROCEDURE — 3062F POS MACROALBUMINURIA REV: CPT | Performed by: INTERNAL MEDICINE

## 2024-12-04 ASSESSMENT — COLUMBIA-SUICIDE SEVERITY RATING SCALE - C-SSRS
6. HAVE YOU EVER DONE ANYTHING, STARTED TO DO ANYTHING, OR PREPARED TO DO ANYTHING TO END YOUR LIFE?: NO
2. HAVE YOU ACTUALLY HAD ANY THOUGHTS OF KILLING YOURSELF?: NO
1. IN THE PAST MONTH, HAVE YOU WISHED YOU WERE DEAD OR WISHED YOU COULD GO TO SLEEP AND NOT WAKE UP?: NO

## 2024-12-04 ASSESSMENT — ENCOUNTER SYMPTOMS: DEPRESSION: 0

## 2024-12-04 ASSESSMENT — PAIN SCALES - GENERAL: PAINLEVEL_OUTOF10: 0-NO PAIN

## 2024-12-04 NOTE — PROGRESS NOTES
Department of Medicine  Division of Pulmonary, Critical Care, and Sleep Medicine  Consultation  [facility] - [address], [address]      Physician HPI (12/4/2024):   Pleasant 74-year-old woman with history of severe AS post TAVR, diastolic heart failure, chronic  hypoxic respiratory failure presenting for follow-up.   Hospitalized month ago and treated for respiratory failure due to heart failure, ?pneumonia.  Improved and back to baseline symptoms and O2 need.  Referred last visit to Dr. Johnson for diaphragm paralysis/pacer evaluation but did not schedule appointment/not interested at this time.    Never smoked.      Immunization History   Administered Date(s) Administered    Flu vaccine, quadrivalent, high-dose, preservative free, age 65y+ (FLUZONE) 09/14/2023    Flu vaccine, trivalent, preservative free, HIGH-DOSE, age 65y+ (Fluzone) 09/30/2024    Hep B, Unspecified 02/13/2001, 03/13/2001, 12/11/2001    Influenza, Unspecified 10/15/2015, 10/11/2016, 12/14/2017, 09/24/2018, 10/03/2019, 10/21/2020, 10/28/2021    Influenza, seasonal, injectable 10/31/2007, 10/21/2013, 10/09/2014, 10/01/2015, 12/07/2022    Pfizer COVID-19 vaccine, 12 years and older, (30mcg/0.3mL) (Comirnaty) 11/09/2023    Pfizer Purple Cap SARS-CoV-2 04/01/2021, 04/20/2021, 11/23/2021    Pneumococcal conjugate vaccine, 13-valent (PREVNAR 13) 10/03/2019    Pneumococcal polysaccharide vaccine, 23-valent, age 2 years and older (PNEUMOVAX 23) 01/01/2014    Td (adult), unspecified 11/09/2006    Tdap vaccine, age 7 year and older (BOOSTRIX, ADACEL) 10/07/2009, 09/15/2024    Zoster vaccine, recombinant, adult (SHINGRIX) 05/24/2019, 06/13/2019, 01/28/2020       Past Medical History:   Diagnosis Date    Acquired absence of bilateral breasts and nipples 01/31/2022    History of bilateral mastectomy    Acute kidney injury (CMS-HCC)     Anemia     Arthritis     Cancer (Multi)     CHF (congestive heart failure)     Chronic respiratory failure      CKD (chronic kidney disease)     stage 3    COPD (chronic obstructive pulmonary disease) (Multi)     Coronary artery disease     Epistaxis not due to trauma 10/27/2024    Fall     GERD (gastroesophageal reflux disease)     H/O chronic sinusitis     Heart valve disease     History of bilateral mastectomy     History of total hip replacement, right     History of transcatheter aortic valve replacement (TAVR) 04/2024    Hyperlipidemia     Hypertension     Morbid obesity (Multi)     Neoplasm of unspecified behavior of bone, soft tissue, 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/2015    Skin growth    Other bursitis of hip, right hip     Bursitis of right hip, unspecified bursa    Oxygen dependent     2LPM with sleep    Pneumonia     Shortness of breath     Thrombocytopenia (CMS-HCC)     Trigger finger, unspecified finger 06/22/2018    Acquired trigger finger    Type 2 diabetes mellitus     Unilateral primary osteoarthritis, right knee 02/19/2021    Arthritis of knee, right    Vitamin D deficiency     Weakness        Past Surgical History:   Procedure Laterality Date    AORTIC VALVE REPLACEMENT  04/2024    CARDIAC CATHETERIZATION N/A 03/29/2024    Procedure: Left And Right Heart Cath, With LV;  Surgeon: Javier Reilly MD;  Location: [place] [address] Cardiac Cath Lab;  Service: Cardiovascular;  Laterality: N/A;    CARDIAC CATHETERIZATION N/A 04/30/2024    Procedure: TAVR (Transcatheter AV Replacement);  Surgeon: Jessica Stallworth MD;  Location: [place] [address] Cardiac Cath Lab;  Service: Cardiovascular;  Laterality: N/A;  4/30 0730    CARDIAC CATHETERIZATION N/A 04/30/2024    Procedure: TVP for TAVR;  Surgeon: Jessica Stallworth MD;  Location: [place] [address] Cardiac Cath Lab;  Service: Cardiovascular;  Laterality: N/A;    HYSTERECTOMY  09/21/2015    Hysterectomy    JOINT REPLACEMENT Right     Right total hip    MASTECTOMY  09/21/2015    Breast Surgery Mastectomy    OTHER SURGICAL HISTORY  09/21/2015    Breast Surgery Reconstruction       Family History    Problem Relation Name Age of Onset    Gout Mother      Hypertension Mother      Arthritis Father      Other (cardiac disorder) Father      Cancer Father      Tongue cancer Brother      Other (jaw neoplasm) Brother      Breast cancer Sibling      Pancreatic cancer Sibling         Social History     Tobacco Use    Smoking status: Never    Smokeless tobacco: Never   Vaping Use    Vaping status: Never Used   Substance Use Topics    Alcohol use: Never    Drug use: Never       Occupational & Environmental History:        Current Medications:  Current Outpatient Medications   Medication Instructions    alum-mag hydroxide-simeth (Mylanta) 200-200-20 mg/5 mL oral suspension 20 mL, oral, Every 4 hours PRN    aspirin 81 mg, oral, Daily    bumetanide (BUMEX) 1 mg, oral, 2 times daily (morning and late afternoon)    empagliflozin (JARDIANCE) 10 mg, oral, Daily    ferrous gluconate (FERGON) 324 mg, oral, 2 times daily    gabapentin (NEURONTIN) 300 mg, oral, Nightly    metoprolol tartrate (LOPRESSOR) 25 mg, oral, 2 times daily    nystatin (Mycostatin) 100,000 unit/gram powder 1 Application, Topical, 3 times daily, Apply to abdominal skin folds    oxygen (O2) gas therapy 1 each, inhalation, Continuous    pantoprazole (PROTONIX) 40 mg, oral, Daily    polyethylene glycol (GLYCOLAX, MIRALAX) 17 g, oral, Daily    rosuvastatin (CRESTOR) 40 mg, oral, Daily    traMADol (Ultram) 50 mg tablet Take 1 tablet (50 mg) by mouth 2 times a day for pain.        Drug Allergies/Intolerances:  No Known Allergies     Visit Vitals  /62   Pulse 57   Resp 16   Wt 87.5 kg (193 lb)   SpO2 94% Comment: 2l   BMI 36.47 kg/m²   OB Status Hysterectomy   Smoking Status Never   BSA 1.94 m²        Physical exam  Constitutional: Normal appearance.  HEENT: Normocephalic and atraumatic.  Cardiovascular: Normal rate and regular rhythm.  Pulmonary: Normal respiratory effort, bilateral clear breath sounds, no wheezing or rhonchi.  Musculoskeletal: No edema, no  "cyanosis.  Neurological: Awake, alert and oriented x3.  Psychiatric: Normal behavior, mood and affect.    Pulmonary Function Test Results     Pulmonary Functions Testing Results:    No results found for: \"FEV1\", \"FVC\", \"QTZ7FVT\", \"TLC\", \"DLCO\"     Chest Radiograph     XR chest 1 view 09/09/2024    Narrative  Interpreted By:  Brandt Bland,  STUDY:  XR CHEST 1 VIEW; 9/9/2024 8:05 am    INDICATION:  Signs/Symptoms:diminished breath sounds right    COMPARISON:  May 2020    ACCESSION NUMBER(S):  [id number]    ORDERING CLINICIAN:  PAOLA ABBEE    FINDINGS:  The study is limited due to  poor inspiratory effort, with resultant  crowding of the pulmonary vasculature. The cardiac silhouette is  within normal limits for the technique. Stent is again seen in the  proximal ascending thoracic aorta. Atherosclerotic calcifications  involve the aortic arch. There is elevation of the right  hemidiaphragm. There is no pneumothorax, confluent infiltrates or  significant effusion. The osseous structures are unchanged.    Impression  Allowing for the aforementioned limitation, no acute cardiopulmonary  disease.    Signed by: Brandt Bland 9/9/2024 9:38 AM  Dictation workstation:   [id number]      Echocardiogram     No results found for this or any previous visit from the past 365 days.       Chest CT Scan     No results found for this or any previous visit from the past 365 days.       Laboratory Studies     Lab Results   Component Value Date    WBC 12.4 (H) 11/02/2024    HGB 10.0 (L) 11/02/2024    HCT 35.1 (L) 11/02/2024    MCV 87 11/02/2024     (L) 11/02/2024      Lab Results   Component Value Date    GLUCOSE 118 (H) 11/02/2024    CALCIUM 8.5 (L) 11/02/2024     11/02/2024    K 3.7 11/02/2024    CO2 40 (HH) 11/02/2024    CL 94 (L) 11/02/2024    BUN 51 (H) 11/02/2024    CREATININE 1.64 (H) 11/02/2024      Lab Results   Component Value Date    ALT 6 (L) 10/27/2024    AST 12 10/27/2024    ALKPHOS 91 10/27/2024    " "BILITOT 0.8 10/27/2024      Protime   Date/Time Value Ref Range Status   03/16/2021 08:25 AM 14.2 (H) 10.1 - 13.3 sec Final     INR   Date/Time Value Ref Range Status   03/16/2021 08:25 AM 1.2 (H) 0.9 - 1.1 Final     No results found for: \"ICIGE\", \"IGE\", \"ICA04\", \"ASPFU\", \"IGG\", \"IGA\", \"IGM\"    Sputum Culture     No results found for: \"AFBCX\"   No results found for: \"RESPCULTCYFI\"  No results found for the last 90 days.            Assessment and Plan / Recommendations   Pleasant 74-year-old woman with history of severe AS post TAVR, diastolic heart failure, chronic  hypoxic respiratory failure presenting for follow-up.   Hospitalized month ago and treated for respiratory failure due to heart failure, ?pneumonia.  Improved and back to baseline symptoms and O2 need.    Respiratory failure is due to due to diastolic heart failure/cardiac, sleep apnea, right diaphragm dysfunction.  PFT from 10/11/2024 with severely reduced FEV1 and FVC but no obstruction.  6-minute walk test with hypoxia requiring 2 L.  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  No  lung abnormalities on CT TAVR from April 2024.    Desat study in the office today with hypoxia requiring 3-4 L O2.    -Continue diuresis/cardiac optimization per cardiology  -Continue oxygen therapy 3 to 4 L with activity  -Encouraged to schedule sleep study, likely will benefit from PAP therapy especially with right diaphragm dysfunction, patient was referred for pacer evaluation but she is not interested at this time  -Return to clinic in 3 to 4-month or sooner with any concerns     This dictation was created using voice recognition software. Phonetic and/or minor grammatical errors may exist.    Mary Mecredes MD  12/04/2024    "

## 2024-12-05 LAB
25(OH)D3 SERPL-MCNC: 56 NG/ML (ref 30–100)
PTH-INTACT SERPL-MCNC: 160.4 PG/ML (ref 18.5–88)

## 2024-12-06 LAB
1OH-MIDAZOLAM UR CFM-MCNC: <25 NG/ML
6MAM UR CFM-MCNC: <25 NG/ML
7AMINOCLONAZEPAM UR CFM-MCNC: <25 NG/ML
A-OH ALPRAZ UR CFM-MCNC: <25 NG/ML
ALPRAZ UR CFM-MCNC: <25 NG/ML
CHLORDIAZEP UR CFM-MCNC: <25 NG/ML
CLONAZEPAM UR CFM-MCNC: <25 NG/ML
CODEINE UR CFM-MCNC: <50 NG/ML
DIAZEPAM UR CFM-MCNC: <25 NG/ML
EDDP UR CFM-MCNC: <25 NG/ML
FENTANYL UR CFM-MCNC: <2.5 NG/ML
HYDROCODONE CTO UR CFM-MCNC: <25 NG/ML
HYDROMORPHONE UR CFM-MCNC: <25 NG/ML
LORAZEPAM UR CFM-MCNC: <25 NG/ML
METHADONE UR CFM-MCNC: <25 NG/ML
MIDAZOLAM UR CFM-MCNC: <25 NG/ML
MORPHINE UR CFM-MCNC: <50 NG/ML
NORDIAZEPAM UR CFM-MCNC: <25 NG/ML
NORFENTANYL UR CFM-MCNC: <2.5 NG/ML
NORHYDROCODONE UR CFM-MCNC: <25 NG/ML
NOROXYCODONE UR CFM-MCNC: <25 NG/ML
NORTRAMADOL UR-MCNC: >1000 NG/ML
OXAZEPAM UR CFM-MCNC: <25 NG/ML
OXYCODONE UR CFM-MCNC: <25 NG/ML
OXYMORPHONE UR CFM-MCNC: <25 NG/ML
TEMAZEPAM UR CFM-MCNC: <25 NG/ML
TRAMADOL UR CFM-MCNC: >1000 NG/ML
ZOLPIDEM UR CFM-MCNC: <25 NG/ML
ZOLPIDEM UR-MCNC: <25 NG/ML

## 2024-12-10 ENCOUNTER — HOSPITAL ENCOUNTER (OUTPATIENT)
Dept: CARDIOLOGY | Facility: HOSPITAL | Age: 74
Discharge: HOME | End: 2024-12-10
Payer: MEDICARE

## 2024-12-10 ENCOUNTER — APPOINTMENT (OUTPATIENT)
Dept: NEPHROLOGY | Facility: CLINIC | Age: 74
End: 2024-12-10
Payer: MEDICARE

## 2024-12-10 VITALS — WEIGHT: 192.4 LBS | BODY MASS INDEX: 36.35 KG/M2

## 2024-12-10 DIAGNOSIS — Z95.4 PRESENCE OF OTHER HEART-VALVE REPLACEMENT: ICD-10-CM

## 2024-12-10 DIAGNOSIS — I10 BENIGN ESSENTIAL HYPERTENSION: ICD-10-CM

## 2024-12-10 DIAGNOSIS — R94.31 ABNORMAL EKG: ICD-10-CM

## 2024-12-10 DIAGNOSIS — M10.00 IDIOPATHIC GOUT, UNSPECIFIED CHRONICITY, UNSPECIFIED SITE: ICD-10-CM

## 2024-12-10 DIAGNOSIS — N18.32 STAGE 3B CHRONIC KIDNEY DISEASE (MULTI): Primary | ICD-10-CM

## 2024-12-10 DIAGNOSIS — I35.0 SEVERE AORTIC STENOSIS: ICD-10-CM

## 2024-12-10 DIAGNOSIS — N06.0 ISOLATED PROTEINURIA WITH MINOR GLOMERULAR ABNORMALITY: ICD-10-CM

## 2024-12-10 LAB
AORTIC VALVE MEAN GRADIENT: 12 MMHG
AORTIC VALVE PEAK VELOCITY: 2.57 M/S
AV PEAK GRADIENT: 26 MMHG
AVA (PEAK VEL): 1.43 CM2
AVA (VTI): 1.11 CM2
EJECTION FRACTION APICAL 4 CHAMBER: 58.3
EJECTION FRACTION: 58 %
LEFT ATRIUM VOLUME AREA LENGTH INDEX BSA: 29 ML/M2
LEFT VENTRICLE INTERNAL DIMENSION DIASTOLE: 4.72 CM (ref 3.5–6)
LEFT VENTRICULAR OUTFLOW TRACT DIAMETER: 1.8 CM
MITRAL VALVE E/A RATIO: 0.73
RIGHT VENTRICLE FREE WALL PEAK S': 11 CM/S
RIGHT VENTRICLE PEAK SYSTOLIC PRESSURE: 33.9 MMHG
TRICUSPID ANNULAR PLANE SYSTOLIC EXCURSION: 1.7 CM

## 2024-12-10 PROCEDURE — C8929 TTE W OR WO FOL WCON,DOPPLER: HCPCS

## 2024-12-10 PROCEDURE — 3062F POS MACROALBUMINURIA REV: CPT | Performed by: INTERNAL MEDICINE

## 2024-12-10 PROCEDURE — 2500000004 HC RX 250 GENERAL PHARMACY W/ HCPCS (ALT 636 FOR OP/ED): Performed by: INTERNAL MEDICINE

## 2024-12-10 PROCEDURE — 93306 TTE W/DOPPLER COMPLETE: CPT | Performed by: INTERNAL MEDICINE

## 2024-12-10 PROCEDURE — 3048F LDL-C <100 MG/DL: CPT | Performed by: INTERNAL MEDICINE

## 2024-12-10 PROCEDURE — G2211 COMPLEX E/M VISIT ADD ON: HCPCS | Performed by: INTERNAL MEDICINE

## 2024-12-10 PROCEDURE — 3044F HG A1C LEVEL LT 7.0%: CPT | Performed by: INTERNAL MEDICINE

## 2024-12-10 PROCEDURE — 99214 OFFICE O/P EST MOD 30 MIN: CPT | Performed by: INTERNAL MEDICINE

## 2024-12-10 PROCEDURE — 1159F MED LIST DOCD IN RCRD: CPT | Performed by: INTERNAL MEDICINE

## 2024-12-10 NOTE — PATIENT INSTRUCTIONS
Alina-it was nice meeting you nephrology clinic today to discuss the following    # Chronic kidney disease stage III baseline 30-40% with recent worsening down to 20%-improved up to 35% back to baseline after holding medications.  Continue to hold allopurinol, metformin, Lasix, losartan.  Continue Bumex 1 mg twice daily      # Chronic anemia-check iron storage and other causes for anemia    # Recurrent gout-continue to temporarily hold allopurinol.  Will consider to resume in the future    # Hypertension-good control.  Continue to hold losartan, Lasix.  Continue metoprolol    # You could not afford Jardiance-keep holding    Follow-up in 6 months with repeat blood work urinalysis.  Next visit    Nelly Little MD, MS, XAVI ANDERS   Clinical  - [facility] [employer]   Nephrologist - [facility] - [facility]

## 2024-12-10 NOTE — PROGRESS NOTES
Subjective       Saritha Cormier is a 74 y.o. female who has past medical history of HTN, HLD, CHF, AV stenosis s/p TAVR, T2DM, GERD, peptic ulcers, breast cancer s/p bilateral mastectomy, OA, gout, spinal stenosis presents for CKD [id number]-[id number] visit    Last office visit August 2024.  Saritha was seen/evaluated virtually today.  Last office visit we held allopurinol, metformin, Lasix, losartan due to acute drop in kidney function down from baseline 30-40% to 21%.  In the interim, she did not get her hip surgery due to poor oxygenation.  Currently she started on oxygen supplement.  She had blood work done few days ago that showed improved serum creatinine 1.5 and GFR back to 35 baseline.  Normal electrolytes.  Blood pressure is accepted.  A1c is excellent 5.8.  No recent actual gout flares off allopurinol.  She was instructed to start Jardiance-cannot afford.  Overall she feels very well except for the oxygen use which is bothering her.  No evidence of hypervolemia or fluid buildup.  Blood pressure is in accepted control    Prior notes    Saritha was referred by her PCP due to worsening kidney function. Planning on obtaining hip replacement in September, needing clearance from renal standpoint. Was running around 30-40 GFR up until June 2024. Most recent GFR in August, now 21. She had a TAVR placed in April 2024 due to aortic stenosis. Repeat echo in May 2024 with EF 60-65%. States that she was eating food that she shouldn't have after surgery. Reports no gout flares for years. BP and diabetes have been stable. Long discussion regarding current medications. Will be planning to stop specific medications that will decrease GFR. No changes in urination or difficulty urinating. Had chemotherapy and radiation in 1990 for breast cancer. History of severe iron deficiency anemia. Currently taking iron supplements, will need to recheck iron studies. Takes tramadol and gabapentin for pain management.     Social  history: No tobacco use    Objective   Wt 87.3 kg (192 lb 6.4 oz)   BMI 36.35 kg/m²   Wt Readings from Last 3 Encounters:   12/10/24 87.3 kg (192 lb 6.4 oz)   12/04/24 87.5 kg (193 lb)   12/01/24 90.7 kg (200 lb)       Physical Exam    General appearance: no distress awake and alert on room air, euvolemic on exam  Virtual visit    Review of Systems     Constitutional: no fever, no chills, no recent weight gain and no recent weight loss.   Eyes: no blurred vision and no diplopia.   ENT: no hearing loss, no earache, no sore throat, no swollen glands in the neck and no nasal discharge.   Cardiovascular: no chest pain, no palpitations, improved lower extremity edema.   Respiratory: no shortness of breath, no chronic cough and no shortness of breath during exertion.   Gastrointestinal: no abdominal pain, no constipation, no heartburn, no vomiting, no bloody stools and no change in bowel movements.   Genitourinary: no dysuria and no hematuria.   Musculoskeletal: no arthralgias and no myalgias.   Skin: no rashes and no skin lesions.   Neurological: no headaches and no dizziness.   Psychiatric: no confusion, no depression and no anxiety.   Endocrine: no heat intolerance, no cold intolerance, appetite not increased, no thyroid disorder, no increased urinary frequency and no dry skin.   Hematologic/Lymphatic: does not bleed easily and does not bruise easily.   All other systems have been reviewed and are negative for complaint.         Data Review        Results from last 7 days   Lab Units 12/04/24  1212   WBC AUTO x10*3/uL 7.2   HEMOGLOBIN g/dL 10.5*   HEMATOCRIT % 37.3   PLATELETS AUTO x10*3/uL 160            Lab Results   Component Value Date    URICACID 5.5 06/14/2023           Lab Results   Component Value Date    HGBA1C 5.8 (H) 10/28/2024           Results from last 7 days   Lab Units 12/04/24  1212   SODIUM mmol/L 140   POTASSIUM mmol/L 4.2   CHLORIDE mmol/L 95*   CO2 mmol/L 33*   BUN mg/dL 31*   GLUCOSE mg/dL 88    CALCIUM mg/dL 9.6   ANION GAP mmol/L 16   EGFR mL/min/1.73m*2 35*           Albumin/Creatinine Ratio   Date Value Ref Range Status   12/04/2024   Final     Comment:     One or more analytes used in this calculation is outside of the analytical measurement range. Calculation cannot be performed.   08/29/2024 91.3 (H) <30.0 ug/mg Creat Final   06/17/2024 67.2 (H) <30.0 ug/mg Creat Final            RFP  Recent Labs     12/04/24  1212 11/02/24  0527 11/01/24  0527 10/31/24  0515 10/30/24  0537 10/29/24  0538 10/28/24  1001 10/27/24  2216 09/05/24  0845 06/17/24  0932 05/18/24  0800 08/08/22  1002 02/19/22  0447 03/10/21  2130 03/09/21  0149 03/08/21  1804 03/08/21  0807    139 140 140 140 140 140   < > 139   < > 138   < > 140   < > 141 146* 143   K 4.2 3.7 3.7 3.6 3.9 4.9 5.3   < > 5.2   < > 4.1   < > 4.7   < > 3.6 3.9 4.2   CL 95* 94* 94* 93* 93* 104 108*   < > 103   < > 98   < > 100   < > 99 102 103   CO2 33* 40* 41* 40* 39* 30 28   < > 30   < > 30   < > 31   < > 39* 36* 32   BUN 31* 51* 54* 51* 46* 32* 28*   < > 26*   < > 39*   < > 19   < > 23 18 16   CREATININE 1.57* 1.64* 1.63* 1.99* 2.12* 1.82* 1.76*   < > 1.60*   < > 1.77*   < > 1.10*   < > 1.04 1.08* 1.08*   GLUCOSE 88 118* 115* 121* 157* 177* 112*   < > 112*   < > 119*   < > 146*   < > 173* 109* 127*   CALCIUM 9.6 8.5* 8.7 9.1 9.5 9.1 8.9   < > 9.8   < > 9.6   < > 9.2   < > 8.8 9.1 9.2   PHOS 4.3  --   --   --   --   --   --   --  4.8  --  5.0*  --  4.1  --  2.9 3.4 3.8   EGFR 35* 33* 33* 26* 24* 29* 31*   < > 34*   < > 30*   < >  --   --   --   --   --    ANIONGAP 16 9* 9* 11 12 11 9*   < > 11   < > 14   < > 14   < > 7* 12 12    < > = values in this interval not displayed.        Urineanalysis  Recent Labs     10/28/24  0152 08/29/24  1023 08/29/24  1018 06/08/21  0710   COLORU Yellow Light-Yellow Yellow STRAW   APPEARANCEU Turbid* Clear Hazy* CLEAR   SPECGRAVU 1.018 1.017 1.015 1.010   DEAN 5.5 6.5 6.5 6.0   PROTUR 100 (2+)* 30 (1+)* 100 (2+)* NEGATIVE    GLUCOSEU Normal Normal NEGATIVE NEGATIVE   BLOODU 0.2 (2+)* 0.03 (TRACE)* TRACE-Intact* NEGATIVE   KETONESU TRACE* NEGATIVE NEGATIVE NEGATIVE   BILIRUBINU NEGATIVE NEGATIVE NEGATIVE NEGATIVE   NITRITEU NEGATIVE NEGATIVE NEGATIVE NEGATIVE   LEUKOCYTESU NEGATIVE NEGATIVE  --  SMALL(1+)*       Urine Electrolytes  Recent Labs     12/04/24  1212 10/28/24  0152 08/29/24  1027 08/29/24  1023 08/29/24  1018 07/09/24  1015 06/17/24  0932 04/22/24  1307 06/08/21  0710   SODIUMURR  --   --  49  --   --   --   --   --   --    NACREATUR  --   --  60  --   --   --   --   --   --    KUR  --   --  55  --   --   --   --   --   --    KCREATUR  --   --  68  --   --   --   --   --   --    CREATU 50.9  50.5  --  81.1 80.5  81.1  --  130.1 116.1 173.2  --    PROTUR  --  100 (2+)*  --  30 (1+)* 100 (2+)*  --   --   --  NEGATIVE   UTPCR  --   --   --  1.04*  --   --   --   --   --    ALBUMINUR <7.0  --   --  73.5  --   --  78.0  --   --    MICROALBCREA  --   --   --  91.3*  --   --  67.2*  --   --         Urine Micro  Recent Labs     10/28/24  0152 08/29/24  1023 06/08/21  0710   WBCU 11-20* 1-5 18*   RBCU 6-10* 1-2 2   HYALCASTU 2+*  --  3+*   SQUAMEPIU 1-9 (SPARSE)  --  1   BACTERIAU 1+* 1+* 1+*   MUCUSU FEW FEW FEW        Iron  Recent Labs     12/04/24  1212 03/05/21  0910 03/04/21  1201   IRON 70  --  11*   TIBC 357  --  459*   IRONSAT 20*  --  2*   FERRITIN 168* 16  --           Current Outpatient Medications on File Prior to Visit   Medication Sig Dispense Refill    aspirin 81 mg EC tablet TAKE 1 TABLET BY MOUTH EVERY DAY 90 tablet 1    bumetanide (Bumex) 1 mg tablet Take 1 tablet (1 mg) by mouth 2 times daily (morning and late afternoon). 60 tablet 11    ferrous gluconate 324 (38 Fe) MG tablet TAKE 1 TABLET (324 MG) BY MOUTH 2 TIMES A DAY. 180 tablet 1    gabapentin (Neurontin) 300 mg capsule Take 1 capsule (300 mg) by mouth once daily at bedtime. 30 capsule 1    metoprolol tartrate (Lopressor) 25 mg tablet Take 1 tablet (25 mg)  by mouth 2 times a day. 60 tablet 11    oxygen (O2) gas therapy Inhale 1 each continuously.      pantoprazole (ProtoNix) 40 mg EC tablet TAKE 1 TABLET BY MOUTH EVERY DAY 90 tablet 1    rosuvastatin (Crestor) 40 mg tablet TAKE 1 TABLET BY MOUTH EVERY DAY 90 tablet 1    traMADol (Ultram) 50 mg tablet Take 1 tablet (50 mg) by mouth 2 times a day for pain. 60 tablet 1    alum-mag hydroxide-simeth (Mylanta) 200-200-20 mg/5 mL oral suspension Take 20 mL by mouth every 4 hours if needed for indigestion or heartburn. (Patient not taking: Reported on 12/10/2024)      empagliflozin (Jardiance) 10 mg Take 1 tablet (10 mg) by mouth once daily. (Patient not taking: Reported on 12/10/2024) 30 tablet 11    nystatin (Mycostatin) 100,000 unit/gram powder Apply 1 Application topically 3 times a day. Apply to abdominal skin folds (Patient not taking: Reported on 12/10/2024)      polyethylene glycol (Glycolax, Miralax) 17 gram/dose powder Take 17 g by mouth once daily. (Patient not taking: Mix of powder and drink. Reported on 12/4/2024) 119 g 0     Current Facility-Administered Medications on File Prior to Visit   Medication Dose Route Frequency Provider Last Rate Last Admin    [COMPLETED] perflutren lipid microspheres (Definity) injection 1 mL of dilution  1 mL of dilution intravenous Once in imaging [facility] Frank Reilly MD   1 mL of dilution at 12/10/24 1202           Assessment and Plan       Saritha Cormier  is a 74 y.o. female who has past medical history of HTN, HLD, CHF, AV stenosis s/p TAVR, T2DM, GERD, peptic ulcers, breast cancer s/p bilateral mastectomy, OA, gout, spinal stenosis presents for CKD follow-up    # Chronic kidney disease - G4A2---> G3 B/A2  - Baseline SCr 1.4-1.7 in 2024  -Recent worsening Scr 2.43, GFR 21-currently back to baseline-most recent serum creatinine 1.5, GFR 35  - ACR 67.2 in June 2024, negative in December 2024  -CKD is most likely related to diabetic nephropathy, atherosclerotic  cardiovascular disease.  Multiple JA 2/2 hemodynamic instability   - Urine dipstick in office today protein 2+ and trace blood  -Kidney ultrasound August 2024 unremarkable  - Lytes : wnl  -Pantoprazole 40mg     # BP - today is accepted  - Current meds: Bumex 1 mg twice daily.  Holding Lasix 40mg, Losartan 25mg, metoprolol 25mg  -No changes today    #Iron Deficiency Anemia Hb-10.9   - RDW elevated, MCHC low  - Check iron levels  -Ferrous gluconate 324mg bid    #(CKD)-MBD  - Ca 9 with normal albumin, Vit D wnl    # [facility]  - On statins  -DAPT for valve replacement    #Gout  -Allopurinol 200mg bid--hold for GFR preservation  -Uric acid 5.5 no recent gout flares    #Alkalosis  -bicarb 33?  Respiratory due to CO2 retention    #Diabetes  -Metformin XR 500mg--hold GFR <30  -Gabapentin 300mg bid  -HbA1c 5.8      #Others  - No NSAIDs, no contrast as possible. If to be done- we recommend holding ACEi/ARBS/diuretics 24 hrs prior to contrast exposure and ensure appropriate hydration   - Ensure well hydration  - Limit salt in diet  - No smoking      6 months follow-up with repeat blood work and urinalysis prior to next visit    Nelly Little MD, MS, XAVI ANDERS   Clinical  - [facility] [employer]   Nephrologist - [facility]

## 2024-12-12 RX ORDER — CLOPIDOGREL BISULFATE 75 MG/1
75 TABLET ORAL DAILY
COMMUNITY
Start: 2024-12-06 | End: 2024-12-13 | Stop reason: WASHOUT

## 2024-12-12 RX ORDER — FUROSEMIDE 40 MG/1
1 TABLET ORAL
COMMUNITY
Start: 2024-11-23 | End: 2024-12-13 | Stop reason: WASHOUT

## 2024-12-13 ENCOUNTER — TELEMEDICINE (OUTPATIENT)
Dept: PHARMACY | Facility: HOSPITAL | Age: 74
End: 2024-12-13
Payer: MEDICARE

## 2024-12-13 DIAGNOSIS — I50.32 CHRONIC DIASTOLIC HEART FAILURE: ICD-10-CM

## 2024-12-13 NOTE — PROGRESS NOTES
Clinical Pharmacy Appointment    Patient ID: Saritha Cormier is a 74 y.o. female who presents for No chief complaint on file.    Pt is here for First appointment.     Referring Provider: Javier Reilly,*  PCP: Alva Prince MD   Last visit with PCP: 11/25/24   Next visit with PCP: 1/6/25    Subjective     HPI  CONGESTIVE HEART FAILURE ASSESSMENT  Does patient follow with Cardiology: Yes    Date: 12/16/24    Staging  Ejection Fraction: 55-60% (12/10/24)  NYHA Class: Class II - Slight limitation to physical activity  ACC/AHA Stage: C - Disease with symptoms    Symptom Assessment  Weight changes/edema?: No - checks weight daily.  Dyspnea?: With exertion; on oxygen. No dyspnea with ADLs.   Dizziness/syncope/palpitations?: No    Medication Therapy  Current Regimen (GDMT):  ARNI/ACEi/ARB: No  Beta Blocker: Yes - Metoprolol Tartrate 25 mg BID   MRA: No  SGLT2i: Yes - Jardiance 10 mg daily (not started due to copay)    Other therapy:  Bumetanide 1 mg daily     Adverse Effects: None      Secondary Prevention  The ASCVD Risk score (Humberto CASE, et al., 2019) failed to calculate for the following reasons:    Risk score cannot be calculated because patient has a medical history suggesting prior/existing ASCVD  Aspirin 81mg? yes  Statin?: Yes - Rosuvastatin 40 mg daily   HTN?: Yes - slightly above goal      Medication Reconciliation:  Changed: None   Added: None   Discontinued:   Furosemide  Clopidogrel   Mylanta  Miralax    Drug Interactions  No relevant drug interactions were noted.    Medication System Management  Patient's preferred pharmacy: [facility]  Adherence/Organization: No issues   Affordability/Accessibility: Unable to afford Jardiance - $500 per month   PAP: Screening completed  4 person household <[phone]     Objective   No Known Allergies  Social History     Social History Narrative    Not on file      Medication Review  Current Outpatient Medications   Medication Instructions    alum-mag  hydroxide-simeth (Mylanta) 200-200-20 mg/5 mL oral suspension 20 mL, oral, Every 4 hours PRN    aspirin 81 mg, oral, Daily    bumetanide (BUMEX) 1 mg, oral, 2 times daily (morning and late afternoon)    clopidogrel (PLAVIX) 75 mg, Daily    empagliflozin (JARDIANCE) 10 mg, oral, Daily    ferrous gluconate (FERGON) 324 mg, oral, 2 times daily    furosemide (Lasix) 40 mg tablet 1 tablet, Daily (0630)    gabapentin (NEURONTIN) 300 mg, oral, Nightly    metoprolol tartrate (LOPRESSOR) 25 mg, oral, 2 times daily    nystatin (Mycostatin) 100,000 unit/gram powder 1 Application, Topical, 3 times daily, Apply to abdominal skin folds    oxygen (O2) gas therapy 1 each, inhalation, Continuous    pantoprazole (PROTONIX) 40 mg, oral, Daily    polyethylene glycol (GLYCOLAX, MIRALAX) 17 g, oral, Daily    rosuvastatin (CRESTOR) 40 mg, oral, Daily    traMADol (Ultram) 50 mg tablet Take 1 tablet (50 mg) by mouth 2 times a day for pain.      Vitals  BP Readings from Last 2 Encounters:   12/04/24 145/62   11/25/24 120/74     BMI Readings from Last 1 Encounters:   12/10/24 36.35 kg/m²      Labs  A1C  Lab Results   Component Value Date    HGBA1C 5.8 (H) 10/28/2024    HGBA1C 5.8 06/25/2024    HGBA1C 7.2 (A) 01/30/2024     BMP  Lab Results   Component Value Date    CALCIUM 9.6 12/04/2024     12/04/2024    K 4.2 12/04/2024    CO2 33 (H) 12/04/2024    CL 95 (L) 12/04/2024    BUN 31 (H) 12/04/2024    CREATININE 1.57 (H) 12/04/2024    EGFR 35 (L) 12/04/2024     LFTs  Lab Results   Component Value Date    ALT 6 (L) 10/27/2024    AST 12 10/27/2024    ALKPHOS 91 10/27/2024    BILITOT 0.8 10/27/2024     FLP  Lab Results   Component Value Date    TRIG 120 06/17/2024    CHOL 140 06/17/2024    LDLF 90 08/08/2022    LDLCALC 82 06/17/2024    HDL 33.7 06/17/2024     Urine Microalbumin  Lab Results   Component Value Date    MICROALBCREA  12/04/2024      Comment:      One or more analytes used in this calculation is outside of the analytical  measurement range. Calculation cannot be performed.     Weight Management  Wt Readings from Last 3 Encounters:   12/10/24 87.3 kg (192 lb 6.4 oz)   12/04/24 87.5 kg (193 lb)   12/01/24 90.7 kg (200 lb)      There is no height or weight on file to calculate BMI.     Assessment/Plan   Problem List Items Addressed This Visit       Chronic diastolic heart failure     Patient has Stage C Class II HFpEF with most recent EF 55-60%. Patient is on complete GDMT.     Rationale for plan: At this time, patient denies any side effects or concerns with current HF regimen. No signs or symptoms of worsening HF reported by patient. Weight and BP are stable at this time. Patient's lab work last completed on 12/4/24. No dose adjustments needed at this time. Patient screened for enrollment in  PAP for Jardiance. Patient has not been able to start Jardiance due to high copay. At this time, patient in NOT eligible for [address] PAP due to income exceeding 400% [employer]. As patient is on Medicare, she is also ineligible for  copay cards and income exceeds limit for  PAPs.Discussed that Medicare insurance is changing after the New Year and copay may be reduced. Plan to re-evaluate copay after 1/1/25.     Medication Changes: None  CONTINUE:   Metoprolol Tartrate 25 mg BID   Bumetanide 1 mg daily     Monitoring and Education:  Weigh yourself without clothing daily after using the bathroom first thing in the morning before breakfast   Contact your physician/seek help immediately if you notice the following with symptoms of shortness of breath or swelling in your extremities:   Weight gain of 3+ lbs overnight   Weight gain of 5+ lbs in a week   Physical limitations to your normal physical activity level   Limit fluid intake as instructed by your doctor and follow a heart friendly diet low in salt, fat, and focused in lean meats   Aim to exercise for 30 minutes anywhere between 3 to 5 times a week or more, depending on your  physical limitations   Keep a log of your daily BP, HR and weight to share with providers   If you are a smoker or drink alcohol, consider cessation to improve your heart health     Clinical Pharmacist follow-up: 1/3/25 @ 0930, Telehealth Visit    Continue all meds under the continuation of care with the referring provider and clinical pharmacy team.    Thank you,  Samara Mendez, PharmD  Clinical Pharmacist  [phone]    Verbal consent to manage patient's drug therapy was obtained from the patient. They were informed they may decline to participate or withdraw from participation in pharmacy services at any time.

## 2024-12-16 ENCOUNTER — APPOINTMENT (OUTPATIENT)
Dept: CARDIOLOGY | Facility: CLINIC | Age: 74
End: 2024-12-16
Payer: MEDICARE

## 2024-12-16 VITALS
BODY MASS INDEX: 35.15 KG/M2 | HEART RATE: 58 BPM | WEIGHT: 191 LBS | OXYGEN SATURATION: 94 % | SYSTOLIC BLOOD PRESSURE: 125 MMHG | HEIGHT: 62 IN | DIASTOLIC BLOOD PRESSURE: 72 MMHG

## 2024-12-16 DIAGNOSIS — E11.9: ICD-10-CM

## 2024-12-16 DIAGNOSIS — I50.32 CHRONIC DIASTOLIC HEART FAILURE: Primary | ICD-10-CM

## 2024-12-16 DIAGNOSIS — G47.33 OSA (OBSTRUCTIVE SLEEP APNEA): ICD-10-CM

## 2024-12-16 DIAGNOSIS — J44.9 COPD (CHRONIC OBSTRUCTIVE PULMONARY DISEASE) (MULTI): ICD-10-CM

## 2024-12-16 DIAGNOSIS — I50.32 CHRONIC DIASTOLIC HEART FAILURE: ICD-10-CM

## 2024-12-16 DIAGNOSIS — I10 BENIGN ESSENTIAL HYPERTENSION: ICD-10-CM

## 2024-12-16 DIAGNOSIS — E78.5 DYSLIPIDEMIA: ICD-10-CM

## 2024-12-16 DIAGNOSIS — I25.10 CORONARY ARTERY CALCIFICATION: ICD-10-CM

## 2024-12-16 DIAGNOSIS — I35.9 AORTIC VALVE DISEASE: Primary | ICD-10-CM

## 2024-12-16 DIAGNOSIS — N18.9 CKD (CHRONIC KIDNEY DISEASE): ICD-10-CM

## 2024-12-16 DIAGNOSIS — E66.9 OBESITY: ICD-10-CM

## 2024-12-16 PROCEDURE — 1159F MED LIST DOCD IN RCRD: CPT | Performed by: INTERNAL MEDICINE

## 2024-12-16 PROCEDURE — 3062F POS MACROALBUMINURIA REV: CPT | Performed by: INTERNAL MEDICINE

## 2024-12-16 PROCEDURE — 1036F TOBACCO NON-USER: CPT | Performed by: INTERNAL MEDICINE

## 2024-12-16 PROCEDURE — 99215 OFFICE O/P EST HI 40 MIN: CPT | Performed by: INTERNAL MEDICINE

## 2024-12-16 PROCEDURE — 3078F DIAST BP <80 MM HG: CPT | Performed by: INTERNAL MEDICINE

## 2024-12-16 PROCEDURE — 3008F BODY MASS INDEX DOCD: CPT | Performed by: INTERNAL MEDICINE

## 2024-12-16 PROCEDURE — 3048F LDL-C <100 MG/DL: CPT | Performed by: INTERNAL MEDICINE

## 2024-12-16 PROCEDURE — 3074F SYST BP LT 130 MM HG: CPT | Performed by: INTERNAL MEDICINE

## 2024-12-16 PROCEDURE — 3044F HG A1C LEVEL LT 7.0%: CPT | Performed by: INTERNAL MEDICINE

## 2024-12-16 NOTE — PROGRESS NOTES
Primary Care Physician: Alva Prince MD        Date of Visit: 12/16/2024 10:20 AM EST  Location of visit:  [facility]   Type of Visit: Follow up         Dear Dr. Prince,    DIAGNOSES: AS S/p TAVR (26 Evolut FX 4/30/2024). Nonobstructive coronary atherosclerosis. HFpEF Euvolemic state. NYHA Class II. NSAID induced GI ulcer with bleed and anemia. HTN. DM. DLP. Obesity. ALBERTO. 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 CKD. S/p B/L mastectomy/Oophorectomy for CA breast BRCA+ DJD S/p Hip replacement surgery.         Chief Complaint   Patient presents with    Follow-up     Here for follow up, unable to afford the Jardiance,        HPI / Summary:   Saritha Cormier is a 74 y.o. female  with    who returns for routine follow up       12 system review is negative except as noted above        Medical History:   Past Medical History:   Diagnosis Date    Acquired absence of bilateral breasts and nipples 01/31/2022    History of bilateral mastectomy    Acute kidney injury (CMS-HCC)     Anemia     Arthritis     Cancer (Multi)     CHF (congestive heart failure)     Chronic respiratory failure     CKD (chronic kidney disease)     stage 3    COPD (chronic obstructive pulmonary disease) (Multi)     Coronary artery disease     Epistaxis not due to trauma 10/27/2024    Fall     GERD (gastroesophageal reflux disease)     H/O chronic sinusitis     Heart valve disease     History of bilateral mastectomy     History of total hip replacement, right     History of transcatheter aortic valve replacement (TAVR) 04/2024    Hyperlipidemia     Hypertension     Morbid obesity (Multi)     Neoplasm of unspecified behavior of bone, soft tissue, 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/2015    Skin growth    Other bursitis of hip, right hip     Bursitis of right hip, unspecified bursa    Oxygen dependent     2LPM with sleep    Pneumonia     Shortness of breath     Thrombocytopenia (CMS-HCC)     Trigger finger, unspecified finger 06/22/2018    Acquired trigger finger     Type 2 diabetes mellitus     Unilateral primary osteoarthritis, right knee 02/19/2021    Arthritis of knee, right    Vitamin D deficiency     Weakness        Social History:   Tobacco Use: Low Risk  (12/16/2024)    Patient History     Smoking Tobacco Use: Never     Smokeless Tobacco Use: Never     Passive Exposure: Not on file         MEDICATIONS:   Current Outpatient Medications   Medication Instructions    aspirin 81 mg, oral, Daily    bumetanide (BUMEX) 1 mg, oral, 2 times daily (morning and late afternoon)    empagliflozin (JARDIANCE) 10 mg, oral, Daily    ferrous gluconate (FERGON) 324 mg, oral, 2 times daily    gabapentin (NEURONTIN) 300 mg, oral, Nightly    metoprolol tartrate (LOPRESSOR) 25 mg, oral, 2 times daily    oxygen (O2) gas therapy 1 each, inhalation, Continuous    pantoprazole (PROTONIX) 40 mg, oral, Daily    rosuvastatin (CRESTOR) 40 mg, oral, Daily    traMADol (Ultram) 50 mg tablet Take 1 tablet (50 mg) by mouth 2 times a day for pain.             LABS:    CBC with Differential:    Lab Results   Component Value Date    WBC 7.2 12/04/2024    RBC 4.18 12/04/2024    HGB 10.5 (L) 12/04/2024    HCT 37.3 12/04/2024     12/04/2024    MCV 89 12/04/2024    MCH 25.1 (L) 12/04/2024    MCHC 28.2 (L) 12/04/2024    RDW 16.6 (H) 12/04/2024    NRBC 0.0 12/04/2024    BANDSPCT 8.0 03/06/2021    LYMPHOPCT 4.5 10/27/2024    MONOPCT 8.4 10/27/2024    EOSPCT 0.5 10/27/2024    BASOPCT 0.1 10/27/2024    MONOSABS 0.85 (H) 10/27/2024    LYMPHSABS 0.46 (L) 10/27/2024    EOSABS 0.05 10/27/2024    BASOSABS 0.01 10/27/2024     CMP:    Lab Results   Component Value Date     12/04/2024    K 4.2 12/04/2024    CL 95 (L) 12/04/2024    CO2 33 (H) 12/04/2024    BUN 31 (H) 12/04/2024    CREATININE 1.57 (H) 12/04/2024    GLUCOSE 88 12/04/2024    PROT 8.0 10/27/2024    CALCIUM 9.6 12/04/2024    BILITOT 0.8 10/27/2024    ALKPHOS 91 10/27/2024    AST 12 10/27/2024    ALT 6 (L) 10/27/2024     BMP:    Lab Results  "  Component Value Date     12/04/2024    K 4.2 12/04/2024    CL 95 (L) 12/04/2024    CO2 33 (H) 12/04/2024    BUN 31 (H) 12/04/2024    CREATININE 1.57 (H) 12/04/2024    CALCIUM 9.6 12/04/2024    GLUCOSE 88 12/04/2024     Magnesium:  Lab Results   Component Value Date    MG 2.49 (H) 11/02/2024     Troponin:    Lab Results   Component Value Date    TROPHS 29 (H) 10/28/2024    TROPHS 30 (H) 10/27/2024    TROPHS 19 (H) 10/27/2024     BNP:   Lab Results   Component Value Date     (H) 10/29/2024         Lipid Panel:  Lab Results   Component Value Date    HDL 33.7 06/17/2024    CHHDL 4.2 06/17/2024    VLDL 24 06/17/2024    TRIG 120 06/17/2024    NHDL 106 06/17/2024        Lab work and imaging results independently reviewed by me     Visit Vitals  /72   Pulse 58   Ht 1.575 m (5' 2\")   Wt 86.6 kg (191 lb)   SpO2 94% Comment: on 2L of 02 on demand   BMI 34.93 kg/m²   OB Status Hysterectomy   Smoking Status Never   BSA 1.95 m²       Physical Exam  On examination, she was comfortably sitting.  HEENT: normal, Neck: supple, Carotids: brisk upstroke, JVP: normal, CVS: Rate and rhythm regular, S1S2, Chest: CTAB, Abdomen: soft nontender, no organomegaly appreciated, Extremities: Mild chronic bilateral lower extremity swelling, CNS: HMF normal, no focal neurological deficit.    DIAGNOSES: AS S/p TAVR (26 Evolut FX 4/30/2024). Nonobstructive coronary atherosclerosis. HFpEF Euvolemic state. NYHA Class II. NSAID induced GI ulcer with bleed and anemia. HTN. DM. DLP. Obesity. ALBERTO. 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 CKD. S/p B/L mastectomy/Oophorectomy for CA breast BRCA+ DJD S/p Hip replacement surgery.      I am pleased to see that Saritha has been remaining stable from a cardiac perspective, in euvolemic state.  She is unable to afford SGLT2 inhibitors.  She will also benefit from weight reduction and would be a candidate for GLP-1 agonist.  I encouraged her to use continuous oxygen, in view of her pulmonary " status, complicated by right diaphragm paralysis.  As Dr. Mercedes has noted, she has been reluctant to proceed for any invasive therapy including exploring the options of diaphragmatic stimulation.  I encouraged her to continue with medications as prescribed, including diuretics and regular follow-up with you and Dr. Mercedes.  We will continue to follow her along with you.    Thank you so much for the opportunity to participate in the care of this very pleasant lady. Please do not hesitate to contact me if I could be of any assistance in her future care.    Sincerely        Frank Reilly M.D.    12/16/24 at 11:03 AM - Javier Reilly MD      Orders:  No orders of the defined types were placed in this encounter.        Followup Appts:  Future Appointments   Date Time Provider Department Center   1/3/2025  9:30 AM Samara Mendez, PharmD [id number] [facility]   1/3/2025  1:00 PM Debra Christian APRN-CNP, DNP [id number] [facility]   1/6/2025 10:30 AM Alva Prince MD [id number] [facility]   4/14/2025  3:20 PM Mary Mercedes MD [id number] [facility]   4/25/2025 10:30 AM  FIORELLA [id number] CARD1 [id number] [facility]   6/16/2025  9:40 AM Nelly Little MD [id number] [facility]

## 2024-12-19 ENCOUNTER — APPOINTMENT (OUTPATIENT)
Dept: NEPHROLOGY | Facility: CLINIC | Age: 74
End: 2024-12-19
Payer: MEDICARE

## 2024-12-20 ENCOUNTER — PATIENT OUTREACH (OUTPATIENT)
Dept: CARE COORDINATION | Facility: CLINIC | Age: 74
End: 2024-12-20
Payer: MEDICARE

## 2024-12-20 NOTE — PROGRESS NOTES
Call patient 1 month after discharge - no answer , no voicemail.     Thank you,   Renetta Espinoza , RN   Nurse Care Manager   [facility] Department   [phone]

## 2024-12-21 DIAGNOSIS — E78.00 PURE HYPERCHOLESTEROLEMIA, UNSPECIFIED: ICD-10-CM

## 2024-12-21 DIAGNOSIS — I25.84 CORONARY ATHEROSCLEROSIS DUE TO CALCIFIED CORONARY LESION (CODE): ICD-10-CM

## 2024-12-21 DIAGNOSIS — I25.10 ATHEROSCLEROTIC HEART DISEASE OF NATIVE CORONARY ARTERY WITHOUT ANGINA PECTORIS: ICD-10-CM

## 2024-12-23 RX ORDER — ROSUVASTATIN CALCIUM 40 MG/1
40 TABLET, COATED ORAL DAILY
Qty: 90 TABLET | Refills: 1 | Status: SHIPPED | OUTPATIENT
Start: 2024-12-23

## 2024-12-23 RX ORDER — FUROSEMIDE 40 MG/1
TABLET ORAL
Qty: 90 TABLET | Refills: 1 | Status: SHIPPED | OUTPATIENT
Start: 2024-12-23

## 2025-01-03 ENCOUNTER — LAB (OUTPATIENT)
Dept: LAB | Facility: LAB | Age: 75
End: 2025-01-03
Payer: MEDICARE

## 2025-01-03 ENCOUNTER — APPOINTMENT (OUTPATIENT)
Dept: PHARMACY | Facility: HOSPITAL | Age: 75
End: 2025-01-03
Payer: MEDICARE

## 2025-01-03 ENCOUNTER — TELEMEDICINE (OUTPATIENT)
Dept: PAIN MEDICINE | Facility: HOSPITAL | Age: 75
End: 2025-01-03
Payer: MEDICARE

## 2025-01-03 DIAGNOSIS — M25.552 PAIN OF LEFT HIP: ICD-10-CM

## 2025-01-03 DIAGNOSIS — I50.32 CHRONIC DIASTOLIC HEART FAILURE: Primary | ICD-10-CM

## 2025-01-03 DIAGNOSIS — Z02.9 ENCOUNTER FOR OPIATE ANALGESIC USE AGREEMENT: Chronic | ICD-10-CM

## 2025-01-03 DIAGNOSIS — M16.12 PRIMARY OSTEOARTHRITIS OF LEFT HIP: ICD-10-CM

## 2025-01-03 DIAGNOSIS — N17.9 AKI (ACUTE KIDNEY INJURY) (CMS-HCC): ICD-10-CM

## 2025-01-03 LAB
ANION GAP SERPL CALC-SCNC: 10 MMOL/L (ref 10–20)
BUN SERPL-MCNC: 55 MG/DL (ref 6–23)
CALCIUM SERPL-MCNC: 10.4 MG/DL (ref 8.6–10.3)
CHLORIDE SERPL-SCNC: 94 MMOL/L (ref 98–107)
CO2 SERPL-SCNC: 40 MMOL/L (ref 21–32)
CREAT SERPL-MCNC: 2.47 MG/DL (ref 0.5–1.05)
EGFRCR SERPLBLD CKD-EPI 2021: 20 ML/MIN/1.73M*2
GLUCOSE SERPL-MCNC: 112 MG/DL (ref 74–99)
POTASSIUM SERPL-SCNC: 4.9 MMOL/L (ref 3.5–5.3)
SODIUM SERPL-SCNC: 139 MMOL/L (ref 136–145)

## 2025-01-03 PROCEDURE — 80048 BASIC METABOLIC PNL TOTAL CA: CPT

## 2025-01-03 RX ORDER — TRAMADOL HYDROCHLORIDE 50 MG/1
50 TABLET ORAL 2 TIMES DAILY
Qty: 60 TABLET | Refills: 1 | Status: SHIPPED | OUTPATIENT
Start: 2025-01-30

## 2025-01-03 RX ORDER — GABAPENTIN 300 MG/1
600 CAPSULE ORAL NIGHTLY
Qty: 60 CAPSULE | Refills: 1 | Status: SHIPPED | OUTPATIENT
Start: 2025-01-03

## 2025-01-03 NOTE — PROGRESS NOTES
Clinical Pharmacy Appointment    Patient ID: Saritha Cormier is a 74 y.o. female who presents for Congestive Heart Failure    Pt is here for Follow Up appointment.     Referring Provider: Javier Reilly,*  PCP: Alva Prince MD   Last visit with PCP: 11/25/24   Next visit with PCP: 1/6/25    Subjective     HPI  CONGESTIVE HEART FAILURE ASSESSMENT  Does patient follow with Cardiology: Yes    Date: 12/16/24    Staging  Ejection Fraction: 55-60% (12/10/24)  NYHA Class: Class II - Slight limitation to physical activity  ACC/AHA Stage: C - Disease with symptoms    Symptom Assessment  Weight changes/edema?: No - checks weight daily.  Dyspnea?: With exertion; on oxygen. No dyspnea with ADLs.   Dizziness/syncope/palpitations?: No    Medication Therapy  Current Regimen (GDMT):  ARNI/ACEi/ARB: No  Beta Blocker: Yes - Metoprolol Tartrate 25 mg BID   MRA: No  SGLT2i: No - too expensive     Other therapy:  Bumetanide 1 mg daily     Adverse Effects: None      Secondary Prevention  The ASCVD Risk score (Humberto CASE, et al., 2019) failed to calculate for the following reasons:    Risk score cannot be calculated because patient has a medical history suggesting prior/existing ASCVD  Aspirin 81mg? yes  Statin?: Yes - Rosuvastatin 40 mg daily   HTN?: Yes - slightly above goal      Medication Reconciliation:  Changed: None   Added: None   Discontinued:   Furosemide  Clopidogrel   Mylanta  Miralax    Drug Interactions  No relevant drug interactions were noted.    Medication System Management  Patient's preferred pharmacy: [facility]  Adherence/Organization: No issues   Affordability/Accessibility: Unable to afford Jardiance - $500 per month   PAP: Screening completed  Patient is ineligible due to income     Objective   No Known Allergies  Social History     Social History Narrative    Not on file      Medication Review  Current Outpatient Medications   Medication Instructions    aspirin 81 mg, oral, Daily    bumetanide  (BUMEX) 1 mg, oral, 2 times daily (morning and late afternoon)    empagliflozin (JARDIANCE) 10 mg, oral, Daily    empagliflozin (JARDIANCE) 10 mg, oral, Daily    ferrous gluconate (FERGON) 324 mg, oral, 2 times daily    furosemide (Lasix) 40 mg tablet TAKE 1 TABLET BY MOUTH EVERY DAY    gabapentin (NEURONTIN) 300 mg, oral, Nightly    metoprolol tartrate (LOPRESSOR) 25 mg, oral, 2 times daily    oxygen (O2) gas therapy 1 each, inhalation, Continuous    pantoprazole (PROTONIX) 40 mg, oral, Daily    rosuvastatin (CRESTOR) 40 mg, oral, Daily    traMADol (Ultram) 50 mg tablet Take 1 tablet (50 mg) by mouth 2 times a day for pain.      Vitals  BP Readings from Last 2 Encounters:   12/16/24 125/72   12/04/24 145/62     BMI Readings from Last 1 Encounters:   12/16/24 34.93 kg/m²      Labs  A1C  Lab Results   Component Value Date    HGBA1C 5.8 (H) 10/28/2024    HGBA1C 5.8 06/25/2024    HGBA1C 7.2 (A) 01/30/2024     BMP  Lab Results   Component Value Date    CALCIUM 9.6 12/04/2024     12/04/2024    K 4.2 12/04/2024    CO2 33 (H) 12/04/2024    CL 95 (L) 12/04/2024    BUN 31 (H) 12/04/2024    CREATININE 1.57 (H) 12/04/2024    EGFR 35 (L) 12/04/2024     LFTs  Lab Results   Component Value Date    ALT 6 (L) 10/27/2024    AST 12 10/27/2024    ALKPHOS 91 10/27/2024    BILITOT 0.8 10/27/2024     FLP  Lab Results   Component Value Date    TRIG 120 06/17/2024    CHOL 140 06/17/2024    LDLF 90 08/08/2022    LDLCALC 82 06/17/2024    HDL 33.7 06/17/2024     Urine Microalbumin  Lab Results   Component Value Date    MICROALBCREA  12/04/2024      Comment:      One or more analytes used in this calculation is outside of the analytical measurement range. Calculation cannot be performed.     Weight Management  Wt Readings from Last 3 Encounters:   12/16/24 86.6 kg (191 lb)   12/10/24 87.3 kg (192 lb 6.4 oz)   12/04/24 87.5 kg (193 lb)      There is no height or weight on file to calculate BMI.     Assessment/Plan   Problem List Items  Addressed This Visit       Chronic diastolic heart failure - Primary    Relevant Orders    Referral to Clinical Pharmacy     Patient has Stage C Class II HFpEF with most recent EF 55-60%. Patient is on complete GDMT.     Rationale for plan: At this time, patient denies any side effects or concerns with current HF regimen. No signs or symptoms of worsening HF reported by patient. Weight and BP are stable at this time. Patient's lab work last completed on 12/4/24. No dose adjustments needed at this time. Patient screened for enrollment in  PAP for Jardiance. Patient has not been able to start Jardiance due to high copay. At this time, patient in NOT eligible for  PAP due to income exceeding 400% [employer]. As patient is on Medicare, she is also ineligible for  copay cards and income exceeds limit for  PAPs. On new insurance, Jardiance and Farxiga copays are lower per test claims - around $150-$200 per month. Due to cost limitation, also discussed Inpefa (sotagliflozin) through [facility] with cash price of $55 per month. Patient declined at this time. Plan to follow up with cardiology as scheduled in March. Will set up follow up visit after that appointment to assess need for pharmacy assistance.     Medication Changes: None  CONTINUE:   Metoprolol Tartrate 25 mg BID   Bumetanide 1 mg daily     Monitoring and Education:  Weigh yourself without clothing daily after using the bathroom first thing in the morning before breakfast   Contact your physician/seek help immediately if you notice the following with symptoms of shortness of breath or swelling in your extremities:   Weight gain of 3+ lbs overnight   Weight gain of 5+ lbs in a week   Physical limitations to your normal physical activity level   Limit fluid intake as instructed by your doctor and follow a heart friendly diet low in salt, fat, and focused in lean meats   Aim to exercise for 30 minutes anywhere between 3 to 5 times a week or more,  depending on your physical limitations   Keep a log of your daily BP, HR and weight to share with providers   If you are a smoker or drink alcohol, consider cessation to improve your heart health     Clinical Pharmacist follow-up: 3/20/25 @ 0930, Telehealth Visit    Continue all meds under the continuation of care with the referring provider and clinical pharmacy team.    Thank you,  Samara Mendez, PharmD  Clinical Pharmacist  [phone]    Verbal consent to manage patient's drug therapy was obtained from the patient. They were informed they may decline to participate or withdraw from participation in pharmacy services at any time.

## 2025-01-03 NOTE — PATIENT INSTRUCTIONS
Continue with your prescribed medications.    I increase your gabapentin.  Start taking 600 mg at night.  Do not take sedating medications together.    Continue taking tramadol 1 pill twice a day.  I sent 60 tabs of tramadol to your pharmacy with a pickup date of 1/30/2025.  I included 1 refill.  Do not take sedating medications together.    I will see you for your follow-up visit on 3/28/2025 at 11 AM in [place].    ---Risks and side effects of chronic opioid therapy, including but not limited to tolerance, dependence, constipation, hyperalgesia, cognitive side effects, addiction, and possible death due to overuse and or misuse, were discussed.   Medications, when co-administered with other sedative agents, including but not limited to alcohol, benzodiazepines, sedatives or hypnotics, and illegal drugs, could pose life-threatening consequences, including death.   Such medication could impact if you have obstructive sleep apnea. I recommend that you continue using apnea devices if ordered by other physicians.   Being compliant with the treatment plan and the terms of the opioid agreement to effectively and safely treat the pain is important.  Being responsible with the medications and taking these only as prescribed, never in excess, and never for reasons other than pain reduction.   Keep the medications safe and locked away from children and other adults, as well as disposal methods and options. The patient understood the risks and instructions. ---

## 2025-01-03 NOTE — PROGRESS NOTES
This is a telehealth visit using audio and video through [employer] platform.    Patient identity was verified and consent obtained verbally.  During this encounter, provider and patient are both located in [place].        Chief Complaint/HPI: Follow-up visit for medication refill    Saritha is a pleasant 75-year-old  female with today's encounter for medication refill.  Patient appears to be well over video call.  She is wearing O2.  No shortness of breathing observed.    Patient continues to have pain to her left hip.  She reports it got aggravated while she was at the nursing home.  Patient came home since Thanksgiving. She rates her pain as 5 out of 10.  Pain is aching.  She denies pain, numbness or tingling sensation to her legs aside from her left hip.  She denies recent falls or injuries.    Patient continues to take her prescribed medications.  She is taking gabapentin 300 mg at night.  She takes tramadol 1 pill twice a day and last dose was this morning.  She has 52 pills left based on her count.  She denies side effects to her medications.    Patient reports that she is more in pain to the left hip and based on the pain screening that her sleep was affected.    I reviewed previous notes and imaging.  I discussed plan of care.  I will see her for follow-up visit and I scheduled her follow-up with me.  Questions were answered during this encounter.    I have reviewed the OARRS report.  Last fill date of tramadol was on 12/30/2024 where she received 60 tabs.  No suspicious activities.  Toxicology reports based on previous drug screens were consistent with no compliance issues.    The patient was counseled regarding diagnostic results, instructions for management, risk factor reductions, risks and benefits of treatment options and importance of compliance with treatment.      Pain Scale Screening:   Pain Assessment and Documentation Tool (PADT)   Date of Assessment: 1/3/2025  Analgesia:   Patient reports her  pain level on average during the past week is 5on a 0 - 10 scale.   Patient reports that her pain level at its worst during the past week was 5 on a 0 -10 scale.   75% of pain has been relieved during the past week per patient   Patient states that the amount of pain relief she is now obtaining from her current pain reliever(s) is enough to make a real difference in her life.   Query to clinician: Is the patient's pain relief clinically significant? yes  Activities of Daily Living:   Physical functioning: better  Family relationships: better  Social relationships: better  Mood: better  Sleep patterns: worse  Overall functioning: unchanged  Adverse Events: No, Saritha Cormier is not experiencing side effects from current pain reliever.  Patients overall severity of side effect:none  Specific Analgesic Plan: Continue present regimen.       OARRS:  ANTOLIN Law, DNP on 1/3/2025 10:09 AM  I have personally reviewed the OARRS report for Saritha Cormier. I have considered the risks of abuse, dependence, addiction and diversion and I believe that it is clinically appropriate for Saritha Cormier to be prescribed this medication    Controlled Substance Agreement date: 5/7/2024    Last opioid risk screening date: 4/22/2024    Last Urine Drug Screen: 12/4/2024  Results are as expected.   Recent Results (from the past 8760 hours)   Confirmation Opiate/Opioid/Benzo Prescription Compliance    Collection Time: 12/04/24 12:12 PM   Result Value Ref Range    Clonazepam <25 <25 ng/mL    7-Aminoclonazepam <25 <25 ng/mL    Alprazolam <25 <25 ng/mL    Alpha-Hydroxyalprazolam <25 <25 ng/mL    Midazolam <25 <25 ng/mL    Alpha-Hydroxymidazolam <25 <25 ng/mL    Chlordiazepoxide <25 <25 ng/mL    Diazepam <25 <25 ng/mL    Nordiazepam <25 <25 ng/mL    Temazepam <25 <25 ng/mL    Oxazepam <25 <25 ng/mL    Lorazepam <25 <25 ng/mL    Methadone <25 <25 ng/mL    EDDP <25 <25 ng/mL    6-Acetylmorphine <25 <25 ng/mL    Codeine <50  <50 ng/mL    Hydrocodone <25 <25 ng/mL    Hydromorphone <25 <25 ng/mL    Morphine  <50 <50 ng/mL    Norhydrocodone <25 <25 ng/mL    Noroxycodone <25 <25 ng/mL    Oxycodone <25 <25 ng/mL    Oxymorphone <25 <25 ng/mL    Fentanyl <2.5 <2.5 ng/mL    Norfentanyl <2.5 <2.5 ng/mL    Tramadol >1,000 (H) <50 ng/mL    O-Desmethyltramadol >1,000 (H) <50 ng/mL    Zolpidem <25 <25 ng/mL    Zolpidem Metabolite (ZCA) <25 <25 ng/mL   Screen Opiate/Opioid/Benzo Prescription Compliance    Collection Time: 12/04/24 12:12 PM   Result Value Ref Range    Creatinine, Urine Random 50.9 20.0 - 320.0 mg/dL    Amphetamine Screen, Urine Presumptive Negative Presumptive Negative    Barbiturate Screen, Urine Presumptive Negative Presumptive Negative    Cannabinoid Screen, Urine Presumptive Negative Presumptive Negative    Cocaine Metabolite Screen, Urine Presumptive Negative Presumptive Negative    PCP Screen, Urine Presumptive Negative Presumptive Negative   Gabapentin,Urine    Collection Time: 07/09/24 10:15 AM   Result Value Ref Range    GABAPENTIN,URINE  >500.0 ug/mL         Pain Management Panel  More data exists         Latest Ref Rng & Units 12/4/2024 7/9/2024   Pain Management Panel   Amphetamine Screen, Urine Presumptive Negative Presumptive Negative  Presumptive Negative    Barbiturate Screen, Urine Presumptive Negative Presumptive Negative  Presumptive Negative    Codeine IgE <50 ng/mL <50  <50    Hydromorphone Urine <25 ng/mL <25  <25    Morphine  <50 ng/mL <50  <50       Details                     ROS otherwise negative aside from what was mentioned above in HPI.      Patient Active Problem List   Diagnosis    Abnormal screening cardiac CT    Primary osteoarthritis of left hip    Benign essential hypertension    Chronic diastolic heart failure    Class 2 obesity with body mass index (BMI) of 37.0 to 37.9 in adult    Colon polyps    Coronary artery calcification    Elevated LDL cholesterol level    Dyslipidemia    Diabetes mellitus  type II, controlled (Multi)    Gout    GERD (gastroesophageal reflux disease)    Status post bilateral mastectomy    Hypotension    Lumbar radiculitis    Pain of left hip    Mild vitamin D deficiency    Seborrhea capitis    Stage 3a chronic kidney disease (Multi)    Status post total replacement of right hip    Spinal stenosis, lumbar    Suspected COVID-19 virus infection    Acute lumbar radiculopathy    Type 2 diabetes mellitus with stage 3a chronic kidney disease, without long-term current use of insulin (Multi)    Witnessed episode of apnea    H/O bilateral mastectomy    Inflamed seborrheic keratosis    Presence of xenogenic heart valve    Presence of right artificial hip joint    Breast cancer (Multi)    Peptic ulcer without hemorrhage or perforation    Hypertensive heart disease without heart failure    Long term (current) use of opiate analgesic    Long term (current) use of oral hypoglycemic drugs    Dependence on supplemental oxygen    Aftercare following joint replacement surgery    Acquired absence of bilateral breasts and nipples    Anemia    Body mass index 40.0-44.9, adult (Multi)    Atherosclerosis of aorta (CMS-HCC)    Acute respiratory failure with hypercapnia (Multi)    Arthritis of knee, left    Shortness of breath    Abnormal EKG    Facet arthropathy, lumbosacral    Nonrheumatic aortic valve stenosis    Encounter for opiate analgesic use agreement    Left hip pain    Chronic renal disease, stage IV (Multi)    Chronic pain of left knee    Chronic renal insufficiency    Acute pneumonia    Chronic respiratory failure with hypoxia (Multi)    COPD (chronic obstructive pulmonary disease) (Multi)    Elevated d-dimer    Weakness    Epistaxis    Pneumonia of both lower lobes due to infectious organism    Confusion    Acute on chronic hypoxic respiratory failure (Multi)    Acute on chronic diastolic (congestive) heart failure    JA (acute kidney injury) (CMS-HCC)    Thrombocytopenia (CMS-HCC)    Vitamin D  deficiency     Past Medical History:   Diagnosis Date    Acquired absence of bilateral breasts and nipples 01/31/2022    History of bilateral mastectomy    Acute kidney injury (CMS-HCC)     Anemia     Arthritis     Cancer (Multi)     CHF (congestive heart failure)     Chronic respiratory failure     CKD (chronic kidney disease)     stage 3    COPD (chronic obstructive pulmonary disease) (Multi)     Coronary artery disease     Epistaxis not due to trauma 10/27/2024    Fall     GERD (gastroesophageal reflux disease)     H/O chronic sinusitis     Heart valve disease     History of bilateral mastectomy     History of total hip replacement, right     History of transcatheter aortic valve replacement (TAVR) 04/2024    Hyperlipidemia     Hypertension     Morbid obesity (Multi)     Neoplasm of unspecified behavior of bone, soft tissue, 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/2015    Skin growth    Other bursitis of hip, right hip     Bursitis of right hip, unspecified bursa    Oxygen dependent     2LPM with sleep    Pneumonia     Shortness of breath     Thrombocytopenia (CMS-HCC)     Trigger finger, unspecified finger 06/22/2018    Acquired trigger finger    Type 2 diabetes mellitus     Unilateral primary osteoarthritis, right knee 02/19/2021    Arthritis of knee, right    Vitamin D deficiency     Weakness      Past Surgical History:   Procedure Laterality Date    AORTIC VALVE REPLACEMENT  04/2024    CARDIAC CATHETERIZATION N/A 03/29/2024    Procedure: Left And Right Heart Cath, With LV;  Surgeon: Javier Reilly MD;  Location: [facility] [address] [facility];  Service: Cardiovascular;  Laterality: N/A;    CARDIAC CATHETERIZATION N/A 04/30/2024    Procedure: TAVR (Transcatheter AV Replacement);  Surgeon: Jessica Stallworth MD;  Location: [facility] [address] [facility];  Service: Cardiovascular;  Laterality: N/A;  4/30 0730    CARDIAC CATHETERIZATION N/A 04/30/2024    Procedure: TVP for TAVR;  Surgeon: Jessica Stallworth MD;  Location: [facility] [address]  Cardiac Cath Lab;  Service: Cardiovascular;  Laterality: N/A;    HYSTERECTOMY  09/21/2015    Hysterectomy    JOINT REPLACEMENT Right     Right total hip    MASTECTOMY  09/21/2015    Breast Surgery Mastectomy    OTHER SURGICAL HISTORY  09/21/2015    Breast Surgery Reconstruction     Family History   Problem Relation Name Age of Onset    Gout Mother      Hypertension Mother      Arthritis Father      Other (cardiac disorder) Father      Cancer Father      Tongue cancer Brother      Other (jaw neoplasm) Brother      Breast cancer Sibling      Pancreatic cancer Sibling       Social History     Tobacco Use    Smoking status: Never    Smokeless tobacco: Never   Vaping Use    Vaping status: Never Used   Substance Use Topics    Alcohol use: Never    Drug use: Never         ALLERGIES  No Known Allergies      MEDICATIONS  Current Outpatient Medications   Medication Sig Dispense Refill    aspirin 81 mg EC tablet TAKE 1 TABLET BY MOUTH EVERY DAY 90 tablet 1    bumetanide (Bumex) 1 mg tablet Take 1 tablet (1 mg) by mouth 2 times daily (morning and late afternoon). 60 tablet 11    empagliflozin (Jardiance) 10 mg Take 1 tablet (10 mg) by mouth once daily. 30 tablet 11    empagliflozin (Jardiance) 10 mg Take 1 tablet (10 mg) by mouth once daily. 14 tablet 0    ferrous gluconate 324 (38 Fe) MG tablet TAKE 1 TABLET (324 MG) BY MOUTH 2 TIMES A DAY. 180 tablet 1    furosemide (Lasix) 40 mg tablet TAKE 1 TABLET BY MOUTH EVERY DAY 90 tablet 1    gabapentin (Neurontin) 300 mg capsule Take 1 capsule (300 mg) by mouth once daily at bedtime. 30 capsule 1    metoprolol tartrate (Lopressor) 25 mg tablet Take 1 tablet (25 mg) by mouth 2 times a day. 60 tablet 11    oxygen (O2) gas therapy Inhale 1 each continuously.      pantoprazole (ProtoNix) 40 mg EC tablet TAKE 1 TABLET BY MOUTH EVERY DAY 90 tablet 1    rosuvastatin (Crestor) 40 mg tablet TAKE 1 TABLET BY MOUTH EVERY DAY 90 tablet 1    traMADol (Ultram) 50 mg tablet Take 1 tablet (50 mg) by  mouth 2 times a day for pain. 60 tablet 1     No current facility-administered medications for this visit.         PHYSICAL EXAM  DEFERRED      ASSESSMENT/PLAN  Assessment/Plan   Problem List Items Addressed This Visit             ICD-10-CM    Primary osteoarthritis of left hip M16.12    Relevant Medications    gabapentin (Neurontin) 300 mg capsule    traMADol (Ultram) 50 mg tablet (Start on 1/30/2025)    Pain of left hip M25.552    Relevant Medications    gabapentin (Neurontin) 300 mg capsule    traMADol (Ultram) 50 mg tablet (Start on 1/30/2025)    Encounter for opiate analgesic use agreement Z02.9    Relevant Medications    gabapentin (Neurontin) 300 mg capsule    traMADol (Ultram) 50 mg tablet (Start on 1/30/2025)         Plan/Follow-up Instructions:    Continue with your prescribed medications.    I increase your gabapentin.  Start taking 600 mg at night.  Do not take sedating medications together.    Continue taking tramadol 1 pill twice a day.  I sent 60 tabs of tramadol to your pharmacy with a pickup date of 1/30/2025.  I included 1 refill.  Do not take sedating medications together.    I will see you for your follow-up visit on 3/28/2025 at 11 AM in [place].    ---Risks and side effects of chronic opioid therapy, including but not limited to tolerance, dependence, constipation, hyperalgesia, cognitive side effects, addiction, and possible death due to overuse and or misuse, were discussed.   Medications, when co-administered with other sedative agents, including but not limited to alcohol, benzodiazepines, sedatives or hypnotics, and illegal drugs, could pose life-threatening consequences, including death.   Such medication could impact if you have obstructive sleep apnea. I recommend that you continue using apnea devices if ordered by other physicians.   Being compliant with the treatment plan and the terms of the opioid agreement to effectively and safely treat the pain is important.  Being responsible  with the medications and taking these only as prescribed, never in excess, and never for reasons other than pain reduction.   Keep the medications safe and locked away from children and other adults, as well as disposal methods and options. The patient understood the risks and instructions. ---      Time     Prep time on date of the patient encounter: 2  Time spent directly with patient/family/caregiver: 25   Documentation time: 2  Total time on date of patient encounter: 29      --------------  Disclaimer: This note was created using voice recognition software. It was not corrected for typographical or grammatical errors, inadvertent word insertion, or any unintended errors. Please feel free to contact me for clarification.  -----------------        Debra Christian DNP, APRN, FNP-C   [facility]  Office #: [phone]  Fax # [phone]

## 2025-01-05 ENCOUNTER — APPOINTMENT (OUTPATIENT)
Dept: PHARMACY | Facility: HOSPITAL | Age: 75
End: 2025-01-05
Payer: MEDICARE

## 2025-01-06 ENCOUNTER — APPOINTMENT (OUTPATIENT)
Dept: PRIMARY CARE | Facility: CLINIC | Age: 75
End: 2025-01-06
Payer: MEDICARE

## 2025-01-06 VITALS
HEART RATE: 56 BPM | WEIGHT: 196.8 LBS | BODY MASS INDEX: 36 KG/M2 | DIASTOLIC BLOOD PRESSURE: 74 MMHG | OXYGEN SATURATION: 92 % | SYSTOLIC BLOOD PRESSURE: 126 MMHG | TEMPERATURE: 97.2 F

## 2025-01-06 DIAGNOSIS — N18.4 CHRONIC RENAL DISEASE, STAGE IV (MULTI): Primary | ICD-10-CM

## 2025-01-06 DIAGNOSIS — I50.32 CHRONIC DIASTOLIC HEART FAILURE: ICD-10-CM

## 2025-01-06 DIAGNOSIS — J44.1 CHRONIC OBSTRUCTIVE PULMONARY DISEASE WITH ACUTE EXACERBATION (MULTI): ICD-10-CM

## 2025-01-06 DIAGNOSIS — L89.42: ICD-10-CM

## 2025-01-06 DIAGNOSIS — C50.919 MALIGNANT NEOPLASM OF FEMALE BREAST, UNSPECIFIED ESTROGEN RECEPTOR STATUS, UNSPECIFIED LATERALITY, UNSPECIFIED SITE OF BREAST: ICD-10-CM

## 2025-01-06 DIAGNOSIS — E11.22 TYPE 2 DIABETES MELLITUS WITH STAGE 3A CHRONIC KIDNEY DISEASE, WITHOUT LONG-TERM CURRENT USE OF INSULIN (MULTI): ICD-10-CM

## 2025-01-06 DIAGNOSIS — J18.9 PNEUMONIA DUE TO INFECTIOUS ORGANISM, UNSPECIFIED LATERALITY, UNSPECIFIED PART OF LUNG: ICD-10-CM

## 2025-01-06 DIAGNOSIS — J96.22 ACUTE AND CHRONIC RESPIRATORY FAILURE WITH HYPERCAPNIA (MULTI): ICD-10-CM

## 2025-01-06 DIAGNOSIS — Z95.2 S/P TAVR (TRANSCATHETER AORTIC VALVE REPLACEMENT): ICD-10-CM

## 2025-01-06 DIAGNOSIS — N18.31 TYPE 2 DIABETES MELLITUS WITH STAGE 3A CHRONIC KIDNEY DISEASE, WITHOUT LONG-TERM CURRENT USE OF INSULIN (MULTI): ICD-10-CM

## 2025-01-06 DIAGNOSIS — N18.32 STAGE 3B CHRONIC KIDNEY DISEASE (MULTI): ICD-10-CM

## 2025-01-06 DIAGNOSIS — I50.33 ACUTE ON CHRONIC DIASTOLIC (CONGESTIVE) HEART FAILURE: ICD-10-CM

## 2025-01-06 DIAGNOSIS — E11.9 TYPE 2 DIABETES MELLITUS WITHOUT COMPLICATION, WITHOUT LONG-TERM CURRENT USE OF INSULIN (MULTI): ICD-10-CM

## 2025-01-06 PROCEDURE — 3078F DIAST BP <80 MM HG: CPT | Performed by: INTERNAL MEDICINE

## 2025-01-06 PROCEDURE — 1159F MED LIST DOCD IN RCRD: CPT | Performed by: INTERNAL MEDICINE

## 2025-01-06 PROCEDURE — 99214 OFFICE O/P EST MOD 30 MIN: CPT | Performed by: INTERNAL MEDICINE

## 2025-01-06 PROCEDURE — 1160F RVW MEDS BY RX/DR IN RCRD: CPT | Performed by: INTERNAL MEDICINE

## 2025-01-06 PROCEDURE — G2211 COMPLEX E/M VISIT ADD ON: HCPCS | Performed by: INTERNAL MEDICINE

## 2025-01-06 PROCEDURE — 1036F TOBACCO NON-USER: CPT | Performed by: INTERNAL MEDICINE

## 2025-01-06 PROCEDURE — 3074F SYST BP LT 130 MM HG: CPT | Performed by: INTERNAL MEDICINE

## 2025-01-06 RX ORDER — BUMETANIDE 1 MG/1
1 TABLET ORAL DAILY
Qty: 30 TABLET | Refills: 11 | Status: SHIPPED | OUTPATIENT
Start: 2025-01-06 | End: 2026-01-06

## 2025-01-06 ASSESSMENT — ENCOUNTER SYMPTOMS
ABDOMINAL PAIN: 0
PALPITATIONS: 0
DIFFICULTY URINATING: 0
BRUISES/BLEEDS EASILY: 0
UNEXPECTED WEIGHT CHANGE: 0
FATIGUE: 0
SORE THROAT: 0
BLOOD IN STOOL: 0
HEADACHES: 0
HYPERTENSION: 1
COUGH: 0
SINUS PAIN: 0
ARTHRALGIAS: 0
DIARRHEA: 0
WHEEZING: 0
DIZZINESS: 0
FEVER: 0

## 2025-01-06 NOTE — PROGRESS NOTES
Subjective   Patient ID: Saritha Cormier is a 74 y.o. female who presents for Hypertension, Hyperlipidemia, Hypothyroidism, and Diabetes.    - Chronic kidney disease seen by nephrology doing well awaiting follow-up  - CHF compensated takes now Bumex 1 mg twice a day renal function worsening BUN 90 creatinine 2.1 needs to cut down Bumex to take it only 1 mg in a.m.  Follow-up renal functions in 4 weeks continue low-salt diet avoid any NSAIDs  -Patient not able to use Jardiance due to expense not covered with insurance continue monitoring  --Respiratory failure continues 2 L oxygen  ---- CKD stable avoid any NSAIDs increase fluid intake low-salt diet monitor weight closely.  -Status post TAVR continue with aspirin Plavix follow-up CBC  - Severe left hip arthritis patient scheduled surgery now for total hip replacement with Dr. Angulo in 3 months.  -Pain control continue with gabapentin and tramadol from pain management  - History of bilateral mastectomy compensated.  - Diabetes continue with metformin..  - Chronic gout compensated continue with allopurinol.  Counseled about low-salt diet.  - Coronary artery disease compensated follow-up cardiology follow-up closely as needed follow-up cardiology  Reevaluate patient in 4 weeks            Hypertension  Pertinent negatives include no chest pain, headaches or palpitations.   Hyperlipidemia  Pertinent negatives include no chest pain.   Diabetes  Pertinent negatives for hypoglycemia include no dizziness or headaches. Pertinent negatives for diabetes include no chest pain and no fatigue.          Review of Systems   Constitutional:  Negative for fatigue, fever and unexpected weight change.   HENT:  Negative for congestion, ear discharge, ear pain, mouth sores, sinus pain and sore throat.    Eyes:  Negative for visual disturbance.   Respiratory:  Negative for cough and wheezing.    Cardiovascular:  Negative for chest pain, palpitations and leg swelling.    Gastrointestinal:  Negative for abdominal pain, blood in stool and diarrhea.   Genitourinary:  Negative for difficulty urinating.   Musculoskeletal:  Negative for arthralgias.   Skin:  Negative for rash.   Neurological:  Negative for dizziness and headaches.   Hematological:  Does not bruise/bleed easily.   Psychiatric/Behavioral:  Negative for behavioral problems.    All other systems reviewed and are negative.      Objective   Lab Results   Component Value Date    HGBA1C 5.8 (H) 10/28/2024      /74   Pulse 56   Temp 36.2 °C (97.2 °F)   Wt 89.3 kg (196 lb 12.8 oz)   SpO2 92%   BMI 36.00 kg/m²   .last  Physical Exam  Vitals and nursing note reviewed.   Constitutional:       Appearance: Normal appearance.   HENT:      Head: Normocephalic.      Nose: Nose normal.   Eyes:      Conjunctiva/sclera: Conjunctivae normal.      Pupils: Pupils are equal, round, and reactive to light.   Cardiovascular:      Rate and Rhythm: Regular rhythm.   Pulmonary:      Effort: Pulmonary effort is normal.      Breath sounds: Normal breath sounds.   Abdominal:      General: Abdomen is flat.      Palpations: Abdomen is soft.   Musculoskeletal:      Cervical back: Neck supple.   Skin:     General: Skin is warm.   Neurological:      General: No focal deficit present.      Mental Status: She is oriented to person, place, and time.   Psychiatric:         Mood and Affect: Mood normal.         Assessment/Plan   Saritha was seen today for hypertension, hyperlipidemia, hypothyroidism and diabetes.  Diagnoses and all orders for this visit:  Chronic renal disease, stage IV (Multi) (Primary)  -     Basic metabolic panel; Future  Chronic diastolic heart failure  -     bumetanide (Bumex) 1 mg tablet; Take 1 tablet (1 mg) by mouth once daily.  Type 2 diabetes mellitus with stage 3a chronic kidney disease, without long-term current use of insulin (Multi)  S/P TAVR (transcatheter aortic valve replacement)  Pneumonia due to infectious organism,  unspecified laterality, unspecified part of lung  Other orders  -     Follow Up In Primary Care - Established  -     Follow Up In Primary Care - Established; Future   - Chronic kidney disease seen by nephrology doing well awaiting follow-up  - CHF compensated takes now Bumex 1 mg twice a day renal function worsening BUN 90 creatinine 2.1 needs to cut down Bumex to take it only 1 mg in a.m.  Follow-up renal functions in 4 weeks continue low-salt diet avoid any NSAIDs  -Patient not able to use Jardiance due to expense not covered with insurance continue monitoring  --Respiratory failure continues 2 L oxygen  ---- CKD stable avoid any NSAIDs increase fluid intake low-salt diet monitor weight closely.  -Status post TAVR continue with aspirin Plavix follow-up CBC  - Severe left hip arthritis patient scheduled surgery now for total hip replacement with Dr. Angulo in 3 months.  -Pain control continue with gabapentin and tramadol from pain management  - History of bilateral mastectomy compensated.  - Diabetes continue with metformin..  - Chronic gout compensated continue with allopurinol.  Counseled about low-salt diet.  - Coronary artery disease compensated follow-up cardiology follow-up closely as needed follow-up cardiology  Reevaluate patient in 4 weeks

## 2025-01-06 NOTE — PROGRESS NOTES
Chief Complaint:   SNF F/U  -Acute on chronic respiratory failure w/ hypoxia  -Community-acquired pneumonia  -Acute on chronic diastolic heart failure   -JA on CKD  -Physical deconditioning/weakness    HPI:   73 year-old female presenting to the ER on 10/27/24 for feeling short of breath and feeling of doom. Patient called 911 several times throughout the day. She stated that she was afraid of dying. Her sister  a few months ago and she is under a lot of stress. She cares for her grandchildren, after her daughter passed away ~ 5 years ago. Pt. reported that she recently saw her pulmonologist, who recommended a referral for possible diaphragm pacemaker placement. Work-up in ER revealed pneumonia, acute on chronic heart failure, and JA. She was admitted to the hospital for further evaluation and treatment. Hospital course:    Acute on chronic respiratory failure/community-acquired pneumonia/COPD/elevated G-Yajie-uvstyafn O2 (2-4 L), required BiPAP on admit to ICU, COVID/Flu negative, BCx negative, VQ scan with low prob. of PE, BLE US negative for DVT, completed Azithromycin x 5 days, c/w cefuroxime, IV solu-medrol --> prednisone taper, duonebs TID  Acute on chronic diastolic HF/HTN/DLD/CAD-c/w home medications, lasix drip --> PO bumex, cespedes catheter for I&O, cardiology consult, telemetry monitoring, 1800 ml FR  GERD-c/w PPI  JA on CKD-avoid nephrotoxins, renally dose medications, BMP monitored (baseline Cr 1.4-1.7), followed by nephrology  Weakness-PT/OT DEVORA godfrey on discharge  Epistaxis-nose bleed in ER, cauterized, afrin spray prn, humidification for O2    Pt. was HDS and discharged to [facility] on 24. Today, patient reports that she is feeling well. She denies SOB, cough, or chest pain. She reports appetite at baseline. She reports feeling anxious at times. Staff report no clinical concerns.     ROS:    As above in HPI. Otherwise, all other systems have been reviewed and are negative for  complaint.    Medications reviewed and verified in NH chart.     Patient Active Problem List   Diagnosis    Abnormal screening cardiac CT    Primary osteoarthritis of left hip    Benign essential hypertension    Chronic diastolic heart failure    Class 2 obesity with body mass index (BMI) of 37.0 to 37.9 in adult    Colon polyps    Coronary artery calcification    Elevated LDL cholesterol level    Dyslipidemia    Diabetes mellitus type II, controlled (Multi)    Gout    GERD (gastroesophageal reflux disease)    Status post bilateral mastectomy    Hypotension    Lumbar radiculitis    Pain of left hip    Mild vitamin D deficiency    Seborrhea capitis    Stage 3a chronic kidney disease (Multi)    Status post total replacement of right hip    Spinal stenosis, lumbar    Suspected COVID-19 virus infection    Acute lumbar radiculopathy    Type 2 diabetes mellitus with stage 3a chronic kidney disease, without long-term current use of insulin (Multi)    Witnessed episode of apnea    H/O bilateral mastectomy    Inflamed seborrheic keratosis    Presence of xenogenic heart valve    Presence of right artificial hip joint    Breast cancer (Multi)    Peptic ulcer without hemorrhage or perforation    Hypertensive heart disease without heart failure    Long term (current) use of opiate analgesic    Long term (current) use of oral hypoglycemic drugs    Dependence on supplemental oxygen    Aftercare following joint replacement surgery    Acquired absence of bilateral breasts and nipples    Anemia    Body mass index 40.0-44.9, adult (Multi)    Atherosclerosis of aorta (CMS-HCC)    Acute respiratory failure with hypercapnia (Multi)    Arthritis of knee, left    Shortness of breath    Abnormal EKG    Facet arthropathy, lumbosacral    Nonrheumatic aortic valve stenosis    Encounter for opiate analgesic use agreement    Left hip pain    Chronic renal disease, stage IV (Multi)    Chronic pain of left knee    Chronic renal insufficiency     Acute pneumonia    Chronic respiratory failure with hypoxia (Multi)    COPD (chronic obstructive pulmonary disease) (Multi)    Elevated d-dimer    Weakness    Epistaxis    Pneumonia of both lower lobes due to infectious organism    Confusion    Acute on chronic hypoxic respiratory failure (Multi)    Acute on chronic diastolic (congestive) heart failure    JA (acute kidney injury) (CMS-HCC)    Thrombocytopenia (CMS-HCC)    Vitamin D deficiency        Past Medical History:   Diagnosis Date    Acquired absence of bilateral breasts and nipples 01/31/2022    History of bilateral mastectomy    Acute kidney injury (CMS-HCC)     Anemia     Arthritis     Cancer (Multi)     CHF (congestive heart failure)     Chronic respiratory failure     CKD (chronic kidney disease)     stage 3    COPD (chronic obstructive pulmonary disease) (Multi)     Coronary artery disease     Epistaxis not due to trauma 10/27/2024    Fall     GERD (gastroesophageal reflux disease)     H/O chronic sinusitis     Heart valve disease     History of bilateral mastectomy     History of total hip replacement, right     History of transcatheter aortic valve replacement (TAVR) 04/2024    Hyperlipidemia     Hypertension     Morbid obesity (Multi)     Neoplasm of unspecified behavior of bone, soft tissue, 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/2015    Skin growth    Other bursitis of hip, right hip     Bursitis of right hip, unspecified bursa    Oxygen dependent     2LPM with sleep    Pneumonia     Shortness of breath     Thrombocytopenia (CMS-HCC)     Trigger finger, unspecified finger 06/22/2018    Acquired trigger finger    Type 2 diabetes mellitus     Unilateral primary osteoarthritis, right knee 02/19/2021    Arthritis of knee, right    Vitamin D deficiency     Weakness        Past Surgical History:   Procedure Laterality Date    AORTIC VALVE REPLACEMENT  04/2024    TAVR    CARDIAC CATHETERIZATION N/A 03/29/2024    Procedure: Left And Right Heart Cath, With LV;  Surgeon: Javier  EUNICE Reilly MD;  Location: [place] [address] Cardiac Cath Lab;  Service: Cardiovascular;  Laterality: N/A;    CARDIAC CATHETERIZATION N/A 04/30/2024    Procedure: TAVR (Transcatheter AV Replacement);  Surgeon: Jessica Stallworth MD;  Location: [place] [address] Cardiac Cath Lab;  Service: Cardiovascular;  Laterality: N/A;  4/30 0730    CARDIAC CATHETERIZATION N/A 04/30/2024    Procedure: TVP for TAVR;  Surgeon: Jessica Stallworth MD;  Location: [place] [address] Cardiac Cath Lab;  Service: Cardiovascular;  Laterality: N/A;    HYSTERECTOMY  09/21/2015    Hysterectomy    JOINT REPLACEMENT Right     Right total hip    MASTECTOMY Bilateral     Breast Surgery Mastectomy    OTHER SURGICAL HISTORY  09/21/2015    Breast Surgery Reconstruction       Family History   Problem Relation Name Age of Onset    Gout Mother      Hypertension Mother      Arthritis Father      Other (cardiac disorder) Father      Cancer Father      Tongue cancer Brother      Other (jaw neoplasm) Brother      Cancer Daughter      Breast cancer Sibling      Pancreatic cancer Sibling         Social History     Tobacco Use   Smoking Status Never   Smokeless Tobacco Never       Social History     Substance and Sexual Activity   Alcohol Use Never       Social History     Substance and Sexual Activity   Drug Use Never       No Known Allergies     Vital Signs:   90/56-64-18-98.0-99% on 2 L O2    Physical Exam:  General: Sitting up in bed in NAD, alert   Head/Face: NCAT, symmetrical  Eyes: PERRLA, no injection, no discharge  ENT: Hearing not impaired, ears without scars or lesions, nasal mucosa and turbinates pink, septum midline, lips pink and moist  Neck: Supple, symmetrical  Respiratory: CTA  but diminished without adventitious sounds, respirations even and nonlabored without use of accessory muscles, good air exchange  Cardio: RRR without murmur or gallops, normal S1S2, trace pedal edema, pedal pulses 3+/4 bilaterally  Chest/Breast: Symmetrical  GI: BS x 4, normoactive,  non-distended, abd round and soft, no masses or tenderness  : No suprapubic tenderness or distention  MSK: Gait not assessed, joints with full ROM without pain or contractures  Skin: Skin warm and dry, no induration  Neurologic: Cranial nerves II through XII intact, superficial touch and pain sensation intact  Psychiatric: Alert to person, place, and time, calm and cooperative, anxious at times     Results/Data:    Latest Reference Range & Units 11/02/24 05:27   GLUCOSE 74 - 99 mg/dL 118 (H)   SODIUM 136 - 145 mmol/L 139   POTASSIUM 3.5 - 5.3 mmol/L 3.7   CHLORIDE 98 - 107 mmol/L 94 (L)   Bicarbonate 21 - 32 mmol/L 40 (HH)   Anion Gap 10 - 20 mmol/L 9 (L)   Blood Urea Nitrogen 6 - 23 mg/dL 51 (H)   Creatinine 0.50 - 1.05 mg/dL 1.64 (H)   EGFR >60 mL/min/1.73m*2 33 (L)   Calcium 8.6 - 10.3 mg/dL 8.5 (L)   MAGNESIUM 1.60 - 2.40 mg/dL 2.49 (H)   WBC 4.4 - 11.3 x10*3/uL 12.4 (H)   nRBC 0.0 - 0.0 /100 WBCs 0.0   RBC 4.00 - 5.20 x10*6/uL 4.03   HEMOGLOBIN 12.0 - 16.0 g/dL 10.0 (L)   HEMATOCRIT 36.0 - 46.0 % 35.1 (L)   MCV 80 - 100 fL 87   MCH 26.0 - 34.0 pg 24.8 (L)   MCHC 32.0 - 36.0 g/dL 28.5 (L)   RED CELL DISTRIBUTION WIDTH 11.5 - 14.5 % 16.0 (H)   Platelets 150 - 450 x10*3/uL 124 (L)   (HH): Data is critically high  (H): Data is abnormally high  (L): Data is abnormally low    Assessment/Plan:  Acute on chronic respiratory failure/CAP/COPD-c/w supplemental O2, completed course of Cefuroxime and Prednisone taper, c/w Duonebs prn, monitor respiratory status closely, F/U with pulmonologist as scheduled  JA on CKD-avoid nephrotoxic drugs, renally dose medications as able, monitor BMP, F/U with nephrology as scheduled  Acute on chronic diastolic heart failure/HTN/HLD/CAD-2 gm Na diet, 1800 ml FR, c/w ASA, bumex, losartan, jardiance, crestor, and metoprolol, monitor BP and HR, monitor weights, F/U with cardiology after discharge  Generalized weakness/physical deconditioning-c/w PT/OT  Epistaxis-RESOLVED, continue to  monitor  GERD-c/w PPI  Anemia/thrombocytopenia-c/w iron, monitor CBC  Vit D deficiency-c/w supplement  Intertrigo of skin folds-c/w nystatin powder  Stage III pressure ulcer of bilateral buttocks-c/w local wound care, offloading, c/w zinc and Vit C, wound care team following  Constipation-c/w miralax  Left hip OA/chronic pain/lumbar radiculopathy/spinal stenosis-c/w gabapentin and tramadol, patient followed by pain mgmt     Orders:  CBC w/ diff and BMP on 11/18    Code Status:   Full Code    Time spent reviewing patient's chart on the unit (PMH, PSH, FH, Social Hx AND progress and/or consult notes, labs, and radiology results): 25 minutes  Time spent interviewing and/or examining the patient: 10 minutes  Time spent writing note on the unit: 5 minutes  Time spent reviewing POC w/ patient, family, and/or staff: 5 minutes  Total visit time: 45 minutes. Greater than 50% of time was spent on counseling and/or coordination of care of the patient. Start time: 11:15 AM, End time: 12:00 PM.

## 2025-02-10 ENCOUNTER — APPOINTMENT (OUTPATIENT)
Dept: PRIMARY CARE | Facility: CLINIC | Age: 75
End: 2025-02-10
Payer: MEDICARE

## 2025-02-20 DIAGNOSIS — D64.89 OTHER SPECIFIED ANEMIAS: ICD-10-CM

## 2025-02-20 DIAGNOSIS — Z00.00 ENCOUNTER FOR GENERAL ADULT MEDICAL EXAMINATION WITHOUT ABNORMAL FINDINGS: ICD-10-CM

## 2025-02-20 DIAGNOSIS — K21.9 GASTRO-ESOPHAGEAL REFLUX DISEASE WITHOUT ESOPHAGITIS: ICD-10-CM

## 2025-02-20 RX ORDER — FERROUS GLUCONATE 324(38)MG
1 TABLET ORAL 2 TIMES DAILY
Qty: 180 TABLET | Refills: 1 | Status: SHIPPED | OUTPATIENT
Start: 2025-02-20

## 2025-02-20 RX ORDER — PANTOPRAZOLE SODIUM 40 MG/1
40 TABLET, DELAYED RELEASE ORAL DAILY
Qty: 90 TABLET | Refills: 1 | Status: SHIPPED | OUTPATIENT
Start: 2025-02-20

## 2025-02-20 RX ORDER — ASPIRIN 81 MG/1
81 TABLET ORAL DAILY
Qty: 90 TABLET | Refills: 1 | Status: SHIPPED | OUTPATIENT
Start: 2025-02-20

## 2025-03-01 LAB
ANION GAP SERPL CALCULATED.4IONS-SCNC: 8 MMOL/L (CALC) (ref 7–17)
BUN SERPL-MCNC: 35 MG/DL (ref 7–25)
BUN/CREAT SERPL: 23 (CALC) (ref 6–22)
CALCIUM SERPL-MCNC: 9.5 MG/DL (ref 8.6–10.4)
CHLORIDE SERPL-SCNC: 97 MMOL/L (ref 98–110)
CO2 SERPL-SCNC: 34 MMOL/L (ref 20–32)
CREAT SERPL-MCNC: 1.53 MG/DL (ref 0.6–1)
EGFRCR SERPLBLD CKD-EPI 2021: 36 ML/MIN/1.73M2
GLUCOSE SERPL-MCNC: 102 MG/DL (ref 65–99)
POTASSIUM SERPL-SCNC: 5.3 MMOL/L (ref 3.5–5.3)
SODIUM SERPL-SCNC: 139 MMOL/L (ref 135–146)

## 2025-03-05 ENCOUNTER — APPOINTMENT (OUTPATIENT)
Dept: PRIMARY CARE | Facility: CLINIC | Age: 75
End: 2025-03-05
Payer: MEDICARE

## 2025-03-05 VITALS
DIASTOLIC BLOOD PRESSURE: 64 MMHG | SYSTOLIC BLOOD PRESSURE: 120 MMHG | TEMPERATURE: 96.8 F | OXYGEN SATURATION: 98 % | HEART RATE: 60 BPM

## 2025-03-05 DIAGNOSIS — C50.919 MALIGNANT NEOPLASM OF FEMALE BREAST, UNSPECIFIED ESTROGEN RECEPTOR STATUS, UNSPECIFIED LATERALITY, UNSPECIFIED SITE OF BREAST: ICD-10-CM

## 2025-03-05 DIAGNOSIS — N18.4 CHRONIC RENAL DISEASE, STAGE IV (MULTI): Primary | ICD-10-CM

## 2025-03-05 DIAGNOSIS — N18.32 STAGE 3B CHRONIC KIDNEY DISEASE (MULTI): ICD-10-CM

## 2025-03-05 DIAGNOSIS — Z95.2 S/P TAVR (TRANSCATHETER AORTIC VALVE REPLACEMENT): ICD-10-CM

## 2025-03-05 DIAGNOSIS — J44.1 CHRONIC OBSTRUCTIVE PULMONARY DISEASE WITH ACUTE EXACERBATION (MULTI): ICD-10-CM

## 2025-03-05 PROCEDURE — 1160F RVW MEDS BY RX/DR IN RCRD: CPT | Performed by: INTERNAL MEDICINE

## 2025-03-05 PROCEDURE — 1036F TOBACCO NON-USER: CPT | Performed by: INTERNAL MEDICINE

## 2025-03-05 PROCEDURE — 3074F SYST BP LT 130 MM HG: CPT | Performed by: INTERNAL MEDICINE

## 2025-03-05 PROCEDURE — 1159F MED LIST DOCD IN RCRD: CPT | Performed by: INTERNAL MEDICINE

## 2025-03-05 PROCEDURE — 99214 OFFICE O/P EST MOD 30 MIN: CPT | Performed by: INTERNAL MEDICINE

## 2025-03-05 PROCEDURE — G2211 COMPLEX E/M VISIT ADD ON: HCPCS | Performed by: INTERNAL MEDICINE

## 2025-03-05 PROCEDURE — 3078F DIAST BP <80 MM HG: CPT | Performed by: INTERNAL MEDICINE

## 2025-03-05 ASSESSMENT — ENCOUNTER SYMPTOMS
DIARRHEA: 0
FATIGUE: 0
UNEXPECTED WEIGHT CHANGE: 0
COUGH: 0
SORE THROAT: 0
PALPITATIONS: 0
ARTHRALGIAS: 0
DIFFICULTY URINATING: 0
HEADACHES: 0
ABDOMINAL PAIN: 0
DIZZINESS: 0
WHEEZING: 0
BRUISES/BLEEDS EASILY: 0
FEVER: 0
BLOOD IN STOOL: 0
SINUS PAIN: 0

## 2025-03-05 NOTE — PROGRESS NOTES
Subjective   Patient ID: Saritha Cormier is a 74 y.o. female who presents for Medical management (1 month follow up ) and Results (BW).     -Chronic kidney disease and worsening renal insufficiency improved creatinine now 1.5 continue only on Bumex 1 mg in a.m. and follow-up nephrology DR SHVAER as scheduled BUN also improving patient very well compensated  -CHF continue low-salt diet continue with Bumex as recommended  --Patient not able to use Jardiance due to expense not covered with insurance continue monitoring  --Respiratory failure continues 2 L oxygen follow-up pulmonary as scheduled  --Status post TAVR continue with aspirin Plavix follow-up CBC  - Severe left hip arthritis patient scheduled surgery now for total hip replacement with Dr. Angulo in 3 months.  -Pain control continue with gabapentin and tramadol from pain management.  - History of bilateral mastectomy compensated.  - Diabetes continue with metformin..  - Chronic gout compensated continue with allopurinol.  Counseled about low-salt diet.  - Coronary artery disease compensated follow-up cardiology follow-up closely as needed follow-up cardiology  Follow-up 3 months           Review of Systems   Constitutional:  Negative for fatigue, fever and unexpected weight change.   HENT:  Negative for congestion, ear discharge, ear pain, mouth sores, sinus pain and sore throat.    Eyes:  Negative for visual disturbance.   Respiratory:  Negative for cough and wheezing.    Cardiovascular:  Negative for chest pain, palpitations and leg swelling.   Gastrointestinal:  Negative for abdominal pain, blood in stool and diarrhea.   Genitourinary:  Negative for difficulty urinating.   Musculoskeletal:  Negative for arthralgias.   Skin:  Negative for rash.   Neurological:  Negative for dizziness and headaches.   Hematological:  Does not bruise/bleed easily.   Psychiatric/Behavioral:  Negative for behavioral problems.    All other systems reviewed and are  negative.      Objective   Lab Results   Component Value Date    HGBA1C 5.8 (H) 10/28/2024      /64   Pulse 60   Temp 36 °C (96.8 °F)   SpO2 98%     Physical Exam  Vitals and nursing note reviewed.   Constitutional:       General: She is not in acute distress.     Appearance: Normal appearance. She is not ill-appearing, toxic-appearing or diaphoretic.      Comments: On 2 L nasal cannula oxygen   Pulmonary:      Effort: Pulmonary effort is normal.   Neurological:      Mental Status: She is alert.   Psychiatric:         Mood and Affect: Mood normal.       Lab Results   Component Value Date    WBC 7.2 12/04/2024    HGB 10.5 (L) 12/04/2024    HCT 37.3 12/04/2024     12/04/2024    CHOL 140 06/17/2024    TRIG 120 06/17/2024    HDL 33.7 06/17/2024    ALT 6 (L) 10/27/2024    AST 12 10/27/2024     02/28/2025    K 5.3 02/28/2025    CL 97 (L) 02/28/2025    CREATININE 1.53 (H) 02/28/2025    BUN 35 (H) 02/28/2025    CO2 34 (H) 02/28/2025    TSH 3.28 06/17/2024    INR 1.2 (H) 03/16/2021    HGBA1C 5.8 (H) 10/28/2024     par   Assessment/Plan   Saritha was seen today for medical management and results.  Diagnoses and all orders for this visit:  Chronic renal disease, stage IV (Multi) (Primary)  S/P TAVR (transcatheter aortic valve replacement)  Stage 3b chronic kidney disease (Multi)  Malignant neoplasm of female breast, unspecified estrogen receptor status, unspecified laterality, unspecified site of breast  Chronic obstructive pulmonary disease with acute exacerbation (Multi)  Other orders  -     Follow Up In Primary Care - Established  -     Follow Up In Primary Care - Established; Future  -     Follow Up In Primary Care - Established; Future    -Chronic kidney disease and worsening renal insufficiency improved creatinine now 1.5 continue only on Bumex 1 mg in a.m. and follow-up nephrology DR SHAVER as scheduled BUN also improving patient very well compensated  -CHF continue low-salt diet continue with  Bumex as recommended  --Patient not able to use Jardiance due to expense not covered with insurance continue monitoring  --Respiratory failure continues 2 L oxygen follow-up pulmonary as scheduled  --Status post TAVR continue with aspirin Plavix follow-up CBC  - Severe left hip arthritis patient scheduled surgery now for total hip replacement with Dr. Angulo in 3 months.  -Pain control continue with gabapentin and tramadol from pain management.  - History of bilateral mastectomy compensated.  - Diabetes continue with metformin..  - Chronic gout compensated continue with allopurinol.  Counseled about low-salt diet.  - Coronary artery disease compensated follow-up cardiology follow-up closely as needed follow-up cardiology  Follow-up 3 months

## 2025-03-17 ENCOUNTER — APPOINTMENT (OUTPATIENT)
Dept: CARDIOLOGY | Facility: CLINIC | Age: 75
End: 2025-03-17
Payer: MEDICARE

## 2025-03-20 ENCOUNTER — APPOINTMENT (OUTPATIENT)
Dept: PHARMACY | Facility: HOSPITAL | Age: 75
End: 2025-03-20
Payer: MEDICARE

## 2025-03-20 NOTE — PROGRESS NOTES
Clinical Pharmacy Appointment    Patient ID: Saritha Cormier is a 74 y.o. female who presents for No chief complaint on file.    Pt is here for Follow Up appointment.     Referring Provider: Javier Reilly,*  PCP: Alva Prince MD   Last visit with PCP: 11/25/24   Next visit with PCP: 1/6/25    Subjective     HPI  CONGESTIVE HEART FAILURE ASSESSMENT  Does patient follow with Cardiology: Yes    Date: 12/16/24    Staging  Ejection Fraction: 55-60% (12/10/24)  NYHA Class: Class II - Slight limitation to physical activity  ACC/AHA Stage: C - Disease with symptoms    Symptom Assessment  Weight changes/edema?: No - checks weight daily.  Dyspnea?: With exertion; on oxygen. No dyspnea with ADLs.   Dizziness/syncope/palpitations?: No    Medication Therapy  Current Regimen (GDMT):  ARNI/ACEi/ARB: No  Beta Blocker: Yes - Metoprolol Tartrate 25 mg BID   MRA: No  SGLT2i: No - too expensive     Other therapy:  Bumetanide 1 mg daily     Adverse Effects: None      Secondary Prevention  The ASCVD Risk score (Humberto CASE, et al., 2019) failed to calculate for the following reasons:    Risk score cannot be calculated because patient has a medical history suggesting prior/existing ASCVD  Aspirin 81mg? yes  Statin?: Yes - Rosuvastatin 40 mg daily   HTN?: Yes - slightly above goal      Medication Reconciliation:  Changed: None   Added: None   Discontinued:   Furosemide  Clopidogrel   Mylanta  Miralax    Drug Interactions  No relevant drug interactions were noted.    Medication System Management  Patient's preferred pharmacy: [facility]  Adherence/Organization: No issues   Affordability/Accessibility: Unable to afford Jardiance - $500 per month   PAP: Screening completed  Patient is ineligible due to income     Objective   No Known Allergies  Social History     Social History Narrative    Not on file      Medication Review  Current Outpatient Medications   Medication Instructions    aspirin 81 mg, oral, Daily     bumetanide (BUMEX) 1 mg, oral, Daily    ferrous gluconate (FERGON) 324 mg, oral, 2 times daily    gabapentin (NEURONTIN) 300 mg, oral, Nightly    metoprolol tartrate (LOPRESSOR) 25 mg, oral, 2 times daily    oxygen (O2) gas therapy 1 each, inhalation, Continuous    pantoprazole (PROTONIX) 40 mg, oral, Daily    rosuvastatin (CRESTOR) 40 mg, oral, Daily    traMADol (Ultram) 50 mg tablet Take 1 tablet (50 mg) by mouth 2 times a day for pain.      Vitals  BP Readings from Last 2 Encounters:   03/05/25 120/64   01/06/25 126/74     BMI Readings from Last 1 Encounters:   01/06/25 36.00 kg/m²      Labs  A1C  Lab Results   Component Value Date    HGBA1C 5.8 (H) 10/28/2024    HGBA1C 5.8 06/25/2024    HGBA1C 7.2 (A) 01/30/2024     BMP  Lab Results   Component Value Date    CALCIUM 9.5 02/28/2025     02/28/2025    K 5.3 02/28/2025    CO2 34 (H) 02/28/2025    CL 97 (L) 02/28/2025    BUN 35 (H) 02/28/2025    CREATININE 1.53 (H) 02/28/2025    EGFR 36 (L) 02/28/2025     LFTs  Lab Results   Component Value Date    ALT 6 (L) 10/27/2024    AST 12 10/27/2024    ALKPHOS 91 10/27/2024    BILITOT 0.8 10/27/2024     FLP  Lab Results   Component Value Date    TRIG 120 06/17/2024    CHOL 140 06/17/2024    LDLF 90 08/08/2022    LDLCALC 82 06/17/2024    HDL 33.7 06/17/2024     Urine Microalbumin  Lab Results   Component Value Date    MICROALBCREA  12/04/2024      Comment:      One or more analytes used in this calculation is outside of the analytical measurement range. Calculation cannot be performed.     Weight Management  Wt Readings from Last 3 Encounters:   01/06/25 89.3 kg (196 lb 12.8 oz)   12/16/24 86.6 kg (191 lb)   12/10/24 87.3 kg (192 lb 6.4 oz)      There is no height or weight on file to calculate BMI.     Assessment/Plan   Problem List Items Addressed This Visit    None      Patient has Stage C Class II HFpEF with most recent EF 55-60%. Patient is on complete GDMT.     Rationale for plan: At this time, patient denies any  side effects or concerns with current HF regimen. No signs or symptoms of worsening HF reported by patient. Weight and BP are stable at this time. Patient's lab work last completed on 12/4/24. No dose adjustments needed at this time. Patient screened for enrollment in  PAP for Jardiance. Patient has not been able to start Jardiance due to high copay. At this time, patient in NOT eligible for  PAP due to income exceeding 400% [employer]. As patient is on Medicare, she is also ineligible for  copay cards and income exceeds limit for  PAPs. On new insurance, Jardiance and Farxiga copays are lower per test claims - around $150-$200 per month. Due to cost limitation, also discussed Inpefa (sotagliflozin) through [employer] with cash price of $55 per month. Patient declined at this time. Plan to follow up with cardiology as scheduled in March. Will set up follow up visit after that appointment to assess need for pharmacy assistance.     Medication Changes: None  CONTINUE:   Metoprolol Tartrate 25 mg BID   Bumetanide 1 mg daily     Monitoring and Education:  Weigh yourself without clothing daily after using the bathroom first thing in the morning before breakfast   Contact your physician/seek help immediately if you notice the following with symptoms of shortness of breath or swelling in your extremities:   Weight gain of 3+ lbs overnight   Weight gain of 5+ lbs in a week   Physical limitations to your normal physical activity level   Limit fluid intake as instructed by your doctor and follow a heart friendly diet low in salt, fat, and focused in lean meats   Aim to exercise for 30 minutes anywhere between 3 to 5 times a week or more, depending on your physical limitations   Keep a log of your daily BP, HR and weight to share with providers   If you are a smoker or drink alcohol, consider cessation to improve your heart health     Clinical Pharmacist follow-up: 3/20/25 @ 0930, Telehealth Visit    Continue  all meds under the continuation of care with the referring provider and clinical pharmacy team.    Thank you,  Samara Mendez, PharmD  Clinical Pharmacist  [phone]    Verbal consent to manage patient's drug therapy was obtained from the patient. They were informed they may decline to participate or withdraw from participation in pharmacy services at any time.

## 2025-03-21 ENCOUNTER — OFFICE VISIT (OUTPATIENT)
Dept: PAIN MEDICINE | Facility: HOSPITAL | Age: 75
End: 2025-03-21
Payer: MEDICARE

## 2025-03-21 VITALS
RESPIRATION RATE: 16 BRPM | BODY MASS INDEX: 36.25 KG/M2 | TEMPERATURE: 98.1 F | DIASTOLIC BLOOD PRESSURE: 90 MMHG | WEIGHT: 197 LBS | SYSTOLIC BLOOD PRESSURE: 168 MMHG | HEIGHT: 62 IN | HEART RATE: 55 BPM | OXYGEN SATURATION: 97 %

## 2025-03-21 DIAGNOSIS — M25.552 PAIN OF LEFT HIP: ICD-10-CM

## 2025-03-21 DIAGNOSIS — Z02.9 ENCOUNTER FOR OPIATE ANALGESIC USE AGREEMENT: Primary | Chronic | ICD-10-CM

## 2025-03-21 DIAGNOSIS — M16.12 PRIMARY OSTEOARTHRITIS OF LEFT HIP: ICD-10-CM

## 2025-03-21 DIAGNOSIS — Z79.899 MEDICATION MANAGEMENT: ICD-10-CM

## 2025-03-21 PROCEDURE — 99214 OFFICE O/P EST MOD 30 MIN: CPT | Performed by: NURSE PRACTITIONER

## 2025-03-21 RX ORDER — GABAPENTIN 300 MG/1
300 CAPSULE ORAL NIGHTLY
Qty: 30 CAPSULE | Refills: 2 | Status: SHIPPED | OUTPATIENT
Start: 2025-03-21

## 2025-03-21 RX ORDER — TRAMADOL HYDROCHLORIDE 50 MG/1
50 TABLET ORAL 2 TIMES DAILY
Qty: 60 TABLET | Refills: 0 | Status: SHIPPED | OUTPATIENT
Start: 2025-03-25

## 2025-03-21 ASSESSMENT — PAIN SCALES - GENERAL: PAINLEVEL_OUTOF10: 8

## 2025-03-21 NOTE — PROGRESS NOTES
Date of the last Controlled Substance Agreement: 5/7/2024    Last urine drug screening date/ordered today: 12/4/2024  Results of last screen: As expected.  Updated today      Opioid Risk Screening:   THE OPIOID RISK TOOL (ORT)                                                                      Female                     Male     1. Family History of Substance Abuse:                              Alcohol                                                   [1]=   0                        [3]  =     Illicit Drugs                                             [2] =0                          [3]   =    Prescription Drugs                                [4]= 0                          [4]   =    2. Personal History of Substance Abuse:     Alcohol                                                    [3] =   0                       [3] =     Illegal Drugs                                           [4] =0                           [4]  =     Prescription Drugs                                [5]=   0                         [5]   =    3. Age (If between 16 to 45):               [1]= 0                          [1]   =    4. History of Preadolescent Sexual Abuse                                                                  [3]= 0                           [0]   =    5. Psychological Disease:    ADD, OCD, Bipolar, Schizophrenia    [2]= 0                           [2]   =    Depression                                          [1]=  0                           [1]   =      TOTAL Score =  0     Last opioid risk screening date/ordered today: 3/21/2025  Patient's total score is 0    Reference :  Low Score = 0 to 3  Moderate Score = 4 to 7  High Score = =8       Pain Scale Screening:   Pain Assessment and Documentation Tool (PADT)   Date of Assessment: 3/21/2025  Analgesia:   Patient reports her pain level on average during the past week is 6on a 0 - 10 scale.   Patient reports that her pain level at its worst during the past week was 8 on  a 0 -10 scale.   -----------------------  70% of pain has been relieved during the past week per patient   Patient states that the amount of pain relief she is now obtaining from her current pain reliever(s) is enough to make a real difference in her life.   Query to clinician: Is the patient's pain relief clinically significant? yes  Activities of Daily Living:   Physical functioning: unchanged  Family relationships: unchanged  Social relationships: unchanged  Mood: worse  Sleep patterns: better  Overall functioning: better  Adverse Events: no, Saritha Cormier is not experiencing side effects from current pain reliever.  Patients overall severity of side effect:  Specific Analgesic Plan: Continue present regimen. none

## 2025-03-21 NOTE — PROGRESS NOTES
Subjective   Saritha Cormier is a pleasant 74 y.o. female who is here for follow-up visit for medication refill.  Patient is ambulatory with the use of cane.  Gait is steady.  She arrives in the room by herself.  Patient is wearing portable O2.    Patient continues to have chronic left hip pain.  She rates her pain as 8 out of 10.  Pain can be constant or comes and goes depending on her mobilities.  She describes her pain as aching and sharp.  She has stinging and numbness to her left leg but not all the time.  It is aggravated with prolonged standing or walking.  She denies leg weakness or change in balance.  She denies bowel or bladder incontinence.  She denies recent falls, injuries, or ER visits.  There has been no changes since she was last seen.    Patient continues to take tramadol for pain relief.  She takes 1 pill twice a day and last dose was this morning.  She has 1 pill left during actual count.  Patient reports that at 7 AM today while washing her face, she was having pain so she went to get her tramadol bottle.  It tipped and fell over the sink full of water.  She reports there was 8 disintegrated tablets.  She continues to take tramadol 300 mg at night.  She is on other medications.  She denies side effects to her medications.    I reviewed previous notes, labs and imaging.  I discussed the plan of care including pharmacologic and joint interventional procedure.  I discussed intra-articular hip injection.  Patient declined at this time.  Will see her for follow-up visit.  Questions were answered during this encounter.    I have reviewed the OARRS report.  Last fill date of tramadol was on 2/26/2025 where she received 60 tabs.  No suspicious activities.  Toxicology reports based on previous drug screens were consistent with no compliance issues.    The patient was counseled regarding diagnostic results, instructions for management, risk factor reductions, risks and benefits of treatment options and  importance of compliance with treatment.        OARRS:  Debra Christian, APRN-CNP, DNP on 3/21/2025 11:34 AM  I have personally reviewed the OARRS report for Saritha Cormier. I have considered the risks of abuse, dependence, addiction and diversion and I believe that it is clinically appropriate for Saritha Cormier to be prescribed this medication    Review of Systems   Constitutional: Negative.    HENT: Negative.     Eyes: Negative.    Respiratory: Negative.     Cardiovascular: Negative.    Gastrointestinal: Negative.    Endocrine: Negative.    Genitourinary: Negative.    Musculoskeletal:  Positive for arthralgias, back pain and myalgias. Negative for gait problem, joint swelling, neck pain and neck stiffness.   Skin: Negative.    Allergic/Immunologic: Negative.    Neurological: Negative.    Psychiatric/Behavioral: Negative.            /90 (BP Location: Left arm, Patient Position: Sitting, BP Cuff Size: Adult)   Pulse 55   Temp 36.7 °C (98.1 °F) (Temporal)   Resp 16   Wt 89.4 kg (197 lb)   SpO2 97%  Body mass index is 36.03 kg/m².      Objective       Past Medical History  She has a past medical history of Acquired absence of bilateral breasts and nipples (01/31/2022), Acute kidney injury (CMS-HCC), Anemia, Arthritis, Cancer (Multi), CHF (congestive heart failure), Chronic respiratory failure, CKD (chronic kidney disease), COPD (chronic obstructive pulmonary disease) (Multi), Coronary artery disease, Epistaxis not due to trauma (10/27/2024), Fall, GERD (gastroesophageal reflux disease), H/O chronic sinusitis, Heart valve disease, History of bilateral mastectomy, History of total hip replacement, right, History of transcatheter aortic valve replacement (TAVR) (04/2024), Hyperlipidemia, Hypertension, Morbid obesity (Multi), Neoplasm of unspecified behavior of bone, soft tissue, 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    Surgical History  Past Surgical History:   Procedure Laterality Date    AORTIC VALVE REPLACEMENT  04/2024    TAVR    CARDIAC CATHETERIZATION N/A 03/29/2024    Procedure: Left And Right Heart Cath, With LV;  Surgeon: Javier Reilly MD;  Location: [place] [address] [facility];  Service: Cardiovascular;  Laterality: N/A;    CARDIAC CATHETERIZATION N/A 04/30/2024    Procedure: TAVR (Transcatheter AV Replacement);  Surgeon: Jessica Stallworth MD;  Location: [place] [address] [facility];  Service: Cardiovascular;  Laterality: N/A;  4/30 0730    CARDIAC CATHETERIZATION N/A 04/30/2024    Procedure: TVP for TAVR;  Surgeon: Jessica Stallworth MD;  Location: [place] [address] [facility];  Service: Cardiovascular;  Laterality: N/A;    HYSTERECTOMY  09/21/2015    Hysterectomy    JOINT REPLACEMENT Right     Right total hip    MASTECTOMY Bilateral     Breast Surgery Mastectomy    OTHER SURGICAL HISTORY  09/21/2015    Breast Surgery Reconstruction        Social History  She reports that she has never smoked. She has never used smokeless tobacco. She reports that she does not drink alcohol and does not use drugs.    Family History  Family History   Problem Relation Name Age of Onset    Gout Mother      Hypertension Mother      Arthritis Father      Other (cardiac disorder) Father      Cancer Father      Tongue cancer Brother      Other (jaw neoplasm) Brother      Cancer Daughter      Breast cancer Sibling      Pancreatic cancer Sibling          Allergies  Patient has no known allergies.    MEDICATIONS:    Current Outpatient Medications:     aspirin 81 mg EC tablet, TAKE 1 TABLET BY MOUTH EVERY DAY, Disp: 90 tablet, Rfl: 1    bumetanide (Bumex) 1 mg tablet, Take 1 tablet (1 mg) by mouth once daily., Disp: 30 tablet, Rfl: 11    ferrous gluconate 324  "(38 Fe) MG tablet, TAKE 1 TABLET (324 MG) BY MOUTH 2 TIMES A DAY., Disp: 180 tablet, Rfl: 1    metoprolol tartrate (Lopressor) 25 mg tablet, Take 1 tablet (25 mg) by mouth 2 times a day., Disp: 60 tablet, Rfl: 11    oxygen (O2) gas therapy, Inhale 1 each continuously., Disp: , Rfl:     pantoprazole (ProtoNix) 40 mg EC tablet, TAKE 1 TABLET BY MOUTH EVERY DAY, Disp: 90 tablet, Rfl: 1    rosuvastatin (Crestor) 40 mg tablet, TAKE 1 TABLET BY MOUTH EVERY DAY, Disp: 90 tablet, Rfl: 1    gabapentin (Neurontin) 300 mg capsule, Take 1 capsule (300 mg) by mouth once daily at bedtime., Disp: 30 capsule, Rfl: 2    [START ON 3/25/2025] traMADol (Ultram) 50 mg tablet, Take 1 tablet (50 mg) by mouth 2 times a day. Do not fill before March 25, 2025., Disp: 60 tablet, Rfl: 0    Physical Exam  Vitals and nursing note reviewed.   HENT:      Head: Normocephalic.      Nose: Nose normal.   Eyes:      Extraocular Movements: Extraocular movements intact.      Conjunctiva/sclera: Conjunctivae normal.      Pupils: Pupils are equal, round, and reactive to light.   Cardiovascular:      Rate and Rhythm: Normal rate and regular rhythm.   Pulmonary:      Effort: Pulmonary effort is normal.      Breath sounds: Normal breath sounds.   Genitourinary:     Comments: Deferred  Musculoskeletal:         General: Tenderness present. No swelling, deformity or signs of injury.      Cervical back: No rigidity or tenderness.      Right lower leg: No edema.      Left lower leg: No edema.      Comments: \"For full disclosure, patient was asked to pull up their garment to properly visualize the spine. This is done by the patient without me touching their garment.  The spine/joints were examined using gloves.\"    Negative leg raise.  Positive for minimal paraspinal tenderness at the lumbar region with rotation.  No radicular symptoms.  Positive for tenderness on palpation of the left femoral hip joint.  BUE 5/5, LLE 4/5, RLE 5/5.   Skin:     General: Skin is warm " and dry.   Neurological:      General: No focal deficit present.      Mental Status: She is alert and oriented to person, place, and time.   Psychiatric:         Mood and Affect: Mood normal.         Behavior: Behavior normal.            Pain Management Panel  More data exists         Latest Ref Rng & Units 12/4/2024 7/9/2024   Pain Management Panel   Amphetamine Screen, Urine Presumptive Negative Presumptive Negative  Presumptive Negative    Barbiturate Screen, Urine Presumptive Negative Presumptive Negative  Presumptive Negative    Codeine IgE <50 ng/mL <50  <50    Hydromorphone Urine <25 ng/mL <25  <25    Morphine  <50 ng/mL <50  <50           Assessment/Plan   Problem List Items Addressed This Visit             ICD-10-CM    Primary osteoarthritis of left hip M16.12    Relevant Medications    gabapentin (Neurontin) 300 mg capsule    traMADol (Ultram) 50 mg tablet (Start on 3/25/2025)    Pain of left hip M25.552    Relevant Medications    gabapentin (Neurontin) 300 mg capsule    traMADol (Ultram) 50 mg tablet (Start on 3/25/2025)    Encounter for opiate analgesic use agreement - Primary Z02.9    Relevant Medications    gabapentin (Neurontin) 300 mg capsule    traMADol (Ultram) 50 mg tablet (Start on 3/25/2025)     Other Visit Diagnoses         Codes    Medication management     Z79.899    Relevant Orders    [facility] MISCELLANEOUS TEST (ROOM TEMPERATURE)    [facility] MISCELLANEOUS TEST (ROOM TEMPERATURE)                Plan/Follow-up Instructions:    Continue with your prescribed medications.    I sent 60 tabs of tramadol to your pharmacy with a pickup date of 3/25/2025.  Continue taking 1 pill twice a day for pain relief.  Do not take sedating medications together.    Continue taking gabapentin 300 mg at night.  Do not take sedating medications together.    You will see Dr. Bautista in 4 weeks for your medication refill, 4/22/2025.      ---Risks and side effects of chronic opioid therapy, including but not limited to  tolerance, dependence, constipation, hyperalgesia, cognitive side effects, addiction, and possible death due to overuse and or misuse, were discussed.   Medications, when co-administered with other sedative agents, including but not limited to alcohol, benzodiazepines, sedatives or hypnotics, and illegal drugs, could pose life-threatening consequences, including death.   Such medication could impact if you have obstructive sleep apnea. I recommend that you continue using apnea devices if ordered by other physicians.   Being compliant with the treatment plan and the terms of the opioid agreement to effectively and safely treat the pain is important.  Being responsible with the medications and taking these only as prescribed, never in excess, and never for reasons other than pain reduction.   Keep the medications safe and locked away from children and other adults, as well as disposal methods and options. The patient understood the risks and instructions. ---          --------------  Disclaimer: This note was created using voice recognition software. It was not corrected for typographical or grammatical errors, inadvertent word insertion, or any unintended errors. Please feel free to contact me for clarification.  --------------      Debra Christian, PIPPA, APRN, FNP-C   [facility]  Office #: [phone]  Fax # [phone]

## 2025-03-21 NOTE — PATIENT INSTRUCTIONS
Continue with your prescribed medications.    I sent 60 tabs of tramadol to your pharmacy with a pickup date of 3/25/2025.  Continue taking 1 pill twice a day for pain relief.  Do not take sedating medications together.    Continue taking gabapentin 300 mg at night.  Do not take sedating medications together.    You will see Dr. Bautista in 4 weeks for your medication refill, 4/22/2025.      ---Risks and side effects of chronic opioid therapy, including but not limited to tolerance, dependence, constipation, hyperalgesia, cognitive side effects, addiction, and possible death due to overuse and or misuse, were discussed.   Medications, when co-administered with other sedative agents, including but not limited to alcohol, benzodiazepines, sedatives or hypnotics, and illegal drugs, could pose life-threatening consequences, including death.   Such medication could impact if you have obstructive sleep apnea. I recommend that you continue using apnea devices if ordered by other physicians.   Being compliant with the treatment plan and the terms of the opioid agreement to effectively and safely treat the pain is important.  Being responsible with the medications and taking these only as prescribed, never in excess, and never for reasons other than pain reduction.   Keep the medications safe and locked away from children and other adults, as well as disposal methods and options. The patient understood the risks and instructions. ---

## 2025-03-22 ASSESSMENT — ENCOUNTER SYMPTOMS
EYES NEGATIVE: 1
JOINT SWELLING: 0
ARTHRALGIAS: 1
ENDOCRINE NEGATIVE: 1
NEUROLOGICAL NEGATIVE: 1
NECK STIFFNESS: 0
GASTROINTESTINAL NEGATIVE: 1
MYALGIAS: 1
BACK PAIN: 1
CARDIOVASCULAR NEGATIVE: 1
PSYCHIATRIC NEGATIVE: 1
RESPIRATORY NEGATIVE: 1
ALLERGIC/IMMUNOLOGIC NEGATIVE: 1
NECK PAIN: 0
CONSTITUTIONAL NEGATIVE: 1

## 2025-03-28 ENCOUNTER — APPOINTMENT (OUTPATIENT)
Dept: PAIN MEDICINE | Facility: HOSPITAL | Age: 75
End: 2025-03-28
Payer: MEDICARE

## 2025-03-28 LAB
GABAPENTIN SAL CFM-MCNC: 905 NG/ML
Lab: POSITIVE NG/ML

## 2025-03-31 DIAGNOSIS — Z79.899 MEDICATION MANAGEMENT: Primary | ICD-10-CM

## 2025-04-14 ENCOUNTER — OFFICE VISIT (OUTPATIENT)
Dept: PULMONOLOGY | Facility: CLINIC | Age: 75
End: 2025-04-14
Payer: MEDICARE

## 2025-04-14 VITALS
SYSTOLIC BLOOD PRESSURE: 135 MMHG | WEIGHT: 212 LBS | HEART RATE: 69 BPM | OXYGEN SATURATION: 92 % | BODY MASS INDEX: 38.78 KG/M2 | DIASTOLIC BLOOD PRESSURE: 79 MMHG | RESPIRATION RATE: 16 BRPM

## 2025-04-14 DIAGNOSIS — R06.09 DYSPNEA ON EXERTION: ICD-10-CM

## 2025-04-14 DIAGNOSIS — J96.11 CHRONIC RESPIRATORY FAILURE WITH HYPOXIA: Primary | ICD-10-CM

## 2025-04-14 PROCEDURE — 1159F MED LIST DOCD IN RCRD: CPT | Performed by: INTERNAL MEDICINE

## 2025-04-14 PROCEDURE — 3075F SYST BP GE 130 - 139MM HG: CPT | Performed by: INTERNAL MEDICINE

## 2025-04-14 PROCEDURE — 99213 OFFICE O/P EST LOW 20 MIN: CPT | Performed by: INTERNAL MEDICINE

## 2025-04-14 PROCEDURE — 3078F DIAST BP <80 MM HG: CPT | Performed by: INTERNAL MEDICINE

## 2025-04-14 PROCEDURE — 1126F AMNT PAIN NOTED NONE PRSNT: CPT | Performed by: INTERNAL MEDICINE

## 2025-04-14 ASSESSMENT — PAIN SCALES - GENERAL: PAINLEVEL_OUTOF10: 0-NO PAIN

## 2025-04-14 ASSESSMENT — ENCOUNTER SYMPTOMS: DEPRESSION: 0

## 2025-04-14 NOTE — PROGRESS NOTES
Department of Medicine  Division of Pulmonary, Critical Care, and Sleep Medicine  Consultation  [facility] - [address], [address]      Physician HPI (4/14/2025):   Pleasant 74-year-old woman with history of severe AS post TAVR, diastolic heart failure, chronic  hypoxic respiratory failure presenting for follow-up.   Hospitalized month ago and treated for respiratory failure due to heart failure, ?pneumonia.  Improved and back to baseline symptoms and O2 need.  Referred last visit to Dr. Johnson for diaphragm paralysis/pacer evaluation but did not schedule appointment/not interested at this time.    Never smoked.      Immunization History   Administered Date(s) Administered    Flu vaccine, quadrivalent, high-dose, preservative free, age 65y+ (FLUZONE) 09/14/2023    Flu vaccine, trivalent, preservative free, HIGH-DOSE, age 65y+ (Fluzone) 09/30/2024    Hep B, Unspecified 02/13/2001, 03/13/2001, 12/11/2001    Influenza, Unspecified 10/15/2015, 10/11/2016, 12/14/2017, 09/24/2018, 10/03/2019, 10/21/2020, 10/28/2021    Influenza, seasonal, injectable 10/31/2007, 10/21/2013, 10/09/2014, 10/01/2015, 12/07/2022    Pfizer COVID-19 vaccine, 12 years and older, (30mcg/0.3mL) (Comirnaty) 11/09/2023    Pfizer Purple Cap SARS-CoV-2 04/01/2021, 04/20/2021, 11/23/2021    Pneumococcal conjugate vaccine, 13-valent (PREVNAR 13) 10/03/2019    Pneumococcal polysaccharide vaccine, 23-valent, age 2 years and older (PNEUMOVAX 23) 01/01/2014    Td (adult), unspecified 11/09/2006    Tdap vaccine, age 7 year and older (BOOSTRIX, ADACEL) 10/07/2009, 09/15/2024    Zoster vaccine, recombinant, adult (SHINGRIX) 05/24/2019, 06/13/2019, 01/28/2020       Past Medical History:   Diagnosis Date    Acquired absence of bilateral breasts and nipples 01/31/2022    History of bilateral mastectomy    Acute kidney injury     Anemia     Arthritis     Cancer (Multi)     CHF (congestive heart failure)     Chronic respiratory failure     CKD  (chronic kidney disease)     stage 3    COPD (chronic obstructive pulmonary disease) (Multi)     Coronary artery disease     Epistaxis not due to trauma 10/27/2024    Fall     GERD (gastroesophageal reflux disease)     H/O chronic sinusitis     Heart valve disease     History of bilateral mastectomy     History of total hip replacement, right     History of transcatheter aortic valve replacement (TAVR) 04/2024    Hyperlipidemia     Hypertension     Morbid obesity (Multi)     Neoplasm of unspecified behavior of bone, soft tissue, 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/2015    Skin growth    Other bursitis of hip, right hip     Bursitis of right hip, unspecified bursa    Oxygen dependent     2LPM with sleep    Pneumonia     Shortness of breath     Thrombocytopenia (CMS-HCC)     Trigger finger, unspecified finger 06/22/2018    Acquired trigger finger    Type 2 diabetes mellitus     Unilateral primary osteoarthritis, right knee 02/19/2021    Arthritis of knee, right    Vitamin D deficiency     Weakness        Past Surgical History:   Procedure Laterality Date    AORTIC VALVE REPLACEMENT  04/2024    TAVR    CARDIAC CATHETERIZATION N/A 03/29/2024    Procedure: Left And Right Heart Cath, With LV;  Surgeon: Javier Rielly MD;  Location: [place] [address] Cardiac [facility];  Service: Cardiovascular;  Laterality: N/A;    CARDIAC CATHETERIZATION N/A 04/30/2024    Procedure: TAVR (Transcatheter AV Replacement);  Surgeon: Jessica Stallworth MD;  Location: [place] [address] Cardiac Cath Lab;  Service: Cardiovascular;  Laterality: N/A;  4/30 0730    CARDIAC CATHETERIZATION N/A 04/30/2024    Procedure: TVP for TAVR;  Surgeon: Jessica Stallworth MD;  Location: [place] [address] Cardiac [facility] Lab;  Service: Cardiovascular;  Laterality: N/A;    HYSTERECTOMY  09/21/2015    Hysterectomy    JOINT REPLACEMENT Right     Right total hip    MASTECTOMY Bilateral     Breast Surgery Mastectomy    OTHER SURGICAL HISTORY  09/21/2015    Breast Surgery Reconstruction       Family History  "  Problem Relation Name Age of Onset    Gout Mother      Hypertension Mother      Arthritis Father      Other (cardiac disorder) Father      Cancer Father      Tongue cancer Brother      Other (jaw neoplasm) Brother      Cancer Daughter      Breast cancer Sibling      Pancreatic cancer Sibling         Social History     Tobacco Use    Smoking status: Never    Smokeless tobacco: Never   Vaping Use    Vaping status: Never Used   Substance Use Topics    Alcohol use: Never    Drug use: Never       Occupational & Environmental History:        Current Medications:  Current Outpatient Medications   Medication Instructions    aspirin 81 mg, oral, Daily    bumetanide (BUMEX) 1 mg, oral, Daily    ferrous gluconate (FERGON) 324 mg, oral, 2 times daily    gabapentin (NEURONTIN) 300 mg, oral, Nightly    metoprolol tartrate (LOPRESSOR) 25 mg, oral, 2 times daily    oxygen (O2) gas therapy 1 each, inhalation, Continuous    pantoprazole (PROTONIX) 40 mg, oral, Daily    rosuvastatin (CRESTOR) 40 mg, oral, Daily    traMADol (ULTRAM) 50 mg, oral, 2 times daily        Drug Allergies/Intolerances:  No Known Allergies     Visit Vitals  /79   Pulse 69   Resp 16   Wt 96.2 kg (212 lb)   SpO2 92%   BMI 38.78 kg/m²   OB Status Hysterectomy   Smoking Status Never   BSA 2.05 m²        Physical exam  Constitutional: Normal appearance.  HEENT: Normocephalic and atraumatic.  Cardiovascular: Normal rate and regular rhythm.  Pulmonary: Normal respiratory effort, bilateral clear breath sounds, no wheezing or rhonchi.  Musculoskeletal: No edema, no cyanosis.  Neurological: Awake, alert and oriented x3.  Psychiatric: Normal behavior, mood and affect.    Pulmonary Function Test Results     Pulmonary Functions Testing Results:    No results found for: \"FEV1\", \"FVC\", \"OSR1KMC\", \"TLC\", \"DLCO\"     Chest Radiograph     XR chest 1 view 09/09/2024    Narrative  Interpreted By:  Brandt Bland,  STUDY:  XR CHEST 1 VIEW; 9/9/2024 8:05 " "am    INDICATION:  Signs/Symptoms:diminished breath sounds right    COMPARISON:  May 2020    ACCESSION NUMBER(S):  [id number]    ORDERING CLINICIAN:  PAOLA ABEBE    FINDINGS:  The study is limited due to  poor inspiratory effort, with resultant  crowding of the pulmonary vasculature. The cardiac silhouette is  within normal limits for the technique. Stent is again seen in the  proximal ascending thoracic aorta. Atherosclerotic calcifications  involve the aortic arch. There is elevation of the right  hemidiaphragm. There is no pneumothorax, confluent infiltrates or  significant effusion. The osseous structures are unchanged.    Impression  Allowing for the aforementioned limitation, no acute cardiopulmonary  disease.    Signed by: Brandt Bland 9/9/2024 9:38 AM  Dictation workstation:   [id number]      Echocardiogram     No results found for this or any previous visit from the past 365 days.       Chest CT Scan     No results found for this or any previous visit from the past 365 days.       Laboratory Studies     Lab Results   Component Value Date    WBC 7.2 12/04/2024    HGB 10.5 (L) 12/04/2024    HCT 37.3 12/04/2024    MCV 89 12/04/2024     12/04/2024      Lab Results   Component Value Date    GLUCOSE 102 (H) 02/28/2025    CALCIUM 9.5 02/28/2025     02/28/2025    K 5.3 02/28/2025    CO2 34 (H) 02/28/2025    CL 97 (L) 02/28/2025    BUN 35 (H) 02/28/2025    CREATININE 1.53 (H) 02/28/2025      Lab Results   Component Value Date    ALT 6 (L) 10/27/2024    AST 12 10/27/2024    ALKPHOS 91 10/27/2024    BILITOT 0.8 10/27/2024      Protime   Date/Time Value Ref Range Status   03/16/2021 08:25 AM 14.2 (H) 10.1 - 13.3 sec Final     INR   Date/Time Value Ref Range Status   03/16/2021 08:25 AM 1.2 (H) 0.9 - 1.1 Final     No results found for: \"ICIGE\", \"IGE\", \"ICA04\", \"ASPFU\", \"IGG\", \"IGA\", \"IGM\"    Sputum Culture     No results found for: \"AFBCX\"   No results found for: \"RESPCULTCYFI\"  No results found for " the last 90 days.            Assessment and Plan / Recommendations   Pleasant 74-year-old woman with history of severe AS post TAVR, diastolic heart failure, chronic  hypoxic respiratory failure presenting for follow-up.   Hospitalized month ago and treated for respiratory failure due to heart failure, ?pneumonia.  Improved and back to baseline symptoms and O2 need.    Respiratory failure is due to due to diastolic heart failure/cardiac, sleep apnea, right diaphragm dysfunction.  PFT from 10/11/2024 with severely reduced FEV1 and FVC but no obstruction.  6-minute walk test with hypoxia requiring 2 L.  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  No  lung abnormalities on CT TAVR from April 2024.    Desat study in the office today with hypoxia requiring 3-4 L O2.    -Continue diuresis/cardiac optimization per cardiology  -Continue oxygen therapy 3 to 4 L with activity  -Encouraged to schedule sleep study, likely will benefit from PAP therapy especially with right diaphragm dysfunction, patient was referred for pacer evaluation but she is not interested at this time  -Return to clinic in 3 to 4-month or sooner with any concerns     This dictation was created using voice recognition software. Phonetic and/or minor grammatical errors may exist.    Mary Mercedes MD  04/14/2025

## 2025-04-22 ENCOUNTER — OFFICE VISIT (OUTPATIENT)
Dept: PAIN MEDICINE | Facility: HOSPITAL | Age: 75
End: 2025-04-22
Payer: MEDICARE

## 2025-04-22 VITALS
HEART RATE: 57 BPM | TEMPERATURE: 98.7 F | OXYGEN SATURATION: 93 % | WEIGHT: 212 LBS | SYSTOLIC BLOOD PRESSURE: 126 MMHG | BODY MASS INDEX: 39.01 KG/M2 | HEIGHT: 62 IN | DIASTOLIC BLOOD PRESSURE: 79 MMHG | RESPIRATION RATE: 17 BRPM

## 2025-04-22 DIAGNOSIS — N18.31 TYPE 2 DIABETES MELLITUS WITH STAGE 3A CHRONIC KIDNEY DISEASE, WITHOUT LONG-TERM CURRENT USE OF INSULIN (MULTI): ICD-10-CM

## 2025-04-22 DIAGNOSIS — M54.16 LUMBAR RADICULITIS: ICD-10-CM

## 2025-04-22 DIAGNOSIS — M16.12 PRIMARY OSTEOARTHRITIS OF LEFT HIP: ICD-10-CM

## 2025-04-22 DIAGNOSIS — N18.32 STAGE 3B CHRONIC KIDNEY DISEASE (MULTI): ICD-10-CM

## 2025-04-22 DIAGNOSIS — E11.22 CONTROLLED TYPE 2 DIABETES MELLITUS WITH STAGE 3 CHRONIC KIDNEY DISEASE, UNSPECIFIED WHETHER LONG TERM INSULIN USE (MULTI): ICD-10-CM

## 2025-04-22 DIAGNOSIS — I10 BENIGN ESSENTIAL HYPERTENSION: ICD-10-CM

## 2025-04-22 DIAGNOSIS — G89.29 CHRONIC PAIN OF LEFT KNEE: ICD-10-CM

## 2025-04-22 DIAGNOSIS — Z79.899 MEDICATION MANAGEMENT: ICD-10-CM

## 2025-04-22 DIAGNOSIS — M25.552 PAIN OF LEFT HIP: ICD-10-CM

## 2025-04-22 DIAGNOSIS — N18.30 CONTROLLED TYPE 2 DIABETES MELLITUS WITH STAGE 3 CHRONIC KIDNEY DISEASE, UNSPECIFIED WHETHER LONG TERM INSULIN USE (MULTI): ICD-10-CM

## 2025-04-22 DIAGNOSIS — N06.0 ISOLATED PROTEINURIA WITH MINOR GLOMERULAR ABNORMALITY: ICD-10-CM

## 2025-04-22 DIAGNOSIS — M25.552 LEFT HIP PAIN: ICD-10-CM

## 2025-04-22 DIAGNOSIS — E66.812 CLASS 2 SEVERE OBESITY DUE TO EXCESS CALORIES WITH SERIOUS COMORBIDITY AND BODY MASS INDEX (BMI) OF 37.0 TO 37.9 IN ADULT: ICD-10-CM

## 2025-04-22 DIAGNOSIS — M10.00 IDIOPATHIC GOUT, UNSPECIFIED CHRONICITY, UNSPECIFIED SITE: ICD-10-CM

## 2025-04-22 DIAGNOSIS — E66.01 CLASS 2 SEVERE OBESITY DUE TO EXCESS CALORIES WITH SERIOUS COMORBIDITY AND BODY MASS INDEX (BMI) OF 37.0 TO 37.9 IN ADULT: ICD-10-CM

## 2025-04-22 DIAGNOSIS — M25.562 CHRONIC PAIN OF LEFT KNEE: ICD-10-CM

## 2025-04-22 DIAGNOSIS — M48.062 SPINAL STENOSIS OF LUMBAR REGION WITH NEUROGENIC CLAUDICATION: Primary | ICD-10-CM

## 2025-04-22 DIAGNOSIS — E11.22 TYPE 2 DIABETES MELLITUS WITH STAGE 3A CHRONIC KIDNEY DISEASE, WITHOUT LONG-TERM CURRENT USE OF INSULIN (MULTI): ICD-10-CM

## 2025-04-22 DIAGNOSIS — Z02.9 ENCOUNTER FOR OPIATE ANALGESIC USE AGREEMENT: Chronic | ICD-10-CM

## 2025-04-22 DIAGNOSIS — M47.817 FACET ARTHROPATHY, LUMBOSACRAL: ICD-10-CM

## 2025-04-22 PROCEDURE — 3078F DIAST BP <80 MM HG: CPT | Performed by: ANESTHESIOLOGY

## 2025-04-22 PROCEDURE — 99213 OFFICE O/P EST LOW 20 MIN: CPT | Performed by: ANESTHESIOLOGY

## 2025-04-22 PROCEDURE — 3074F SYST BP LT 130 MM HG: CPT | Performed by: ANESTHESIOLOGY

## 2025-04-22 PROCEDURE — 1159F MED LIST DOCD IN RCRD: CPT | Performed by: ANESTHESIOLOGY

## 2025-04-22 PROCEDURE — 3008F BODY MASS INDEX DOCD: CPT | Performed by: ANESTHESIOLOGY

## 2025-04-22 PROCEDURE — 1160F RVW MEDS BY RX/DR IN RCRD: CPT | Performed by: ANESTHESIOLOGY

## 2025-04-22 PROCEDURE — 1125F AMNT PAIN NOTED PAIN PRSNT: CPT | Performed by: ANESTHESIOLOGY

## 2025-04-22 RX ORDER — TRAMADOL HYDROCHLORIDE 50 MG/1
50 TABLET ORAL 2 TIMES DAILY
Qty: 60 TABLET | Refills: 1 | Status: SHIPPED | OUTPATIENT
Start: 2025-04-22

## 2025-04-22 ASSESSMENT — PAIN SCALES - GENERAL: PAINLEVEL_OUTOF10: 7

## 2025-04-22 NOTE — PROGRESS NOTES
Subjective   Patient ID: Saritha Cormier is a 74 y.o. female is here today for med management.  She has severe degenerative osteoarthritis of her left hip which precludes her from ambulating. Risk and benefits of of a left intra-articular hip injection was discussed with the patient..    HPI  History AODM type II; history of chronic diastolic congestive heart failure; obesity; chronic pain syndrome secondary to severe osteoarthritis left hip  Review of Systems  Patient has inability to walk because of her severe pain.  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  She moderates her pain by taking tramadol 50 mg 2 times a day      Objective   Physical Exam  Gen. appearance:  Patient's alert and oriented ×3 ;cooperative mild to moderate distress  Heart: Regular rate and rhythm  Lungs: Clear to auscultation  Abdomen: Soft nontender  Neuro: Cranial nerves II -XII intact; DTR: +2 over 4 biceps triceps brachial radialis patellar and Achilles  Spinal exam: Positive paraspinal tenderness noted bilaterally L4 5 L5-S1 with flexion extension and rotation/no bony abnormalities  Musculoskeletal:  Upper and lower extremity strength was 4/5 bilaterally  :  deferred  Skin: Warm and dry      Assessment/Plan   Diagnoses and all orders for this visit:  Spinal stenosis of lumbar region with neurogenic claudication  Medication management  -     Opiate/Opioid/Benzo Prescription Compliance  -     Gabapentin,Urine  Facet arthropathy, lumbosacral  Left hip pain  Chronic pain of left knee  Lumbar radiculitis  Encounter for opiate analgesic use agreement  -     traMADol (Ultram) 50 mg tablet; Take 1 tablet (50 mg) by mouth 2 times a day.  Pain of left hip  -     traMADol (Ultram) 50 mg tablet; Take 1 tablet (50 mg) by mouth 2 times a day.  Primary osteoarthritis of left hip  -     traMADol (Ultram) 50 mg tablet; Take 1 tablet (50 mg) by mouth 2 times a day.  Risk and benefits of left intra-articular  hip injection under fluoroscopy was discussed and be scheduled couple weeks under local anesthesia.  Controlled substance agreement and urine drug screen was done today for the patient; refill of her pain meds was sent to her local pharmacy.  Patient was encouraged to keep all medication in a secure location at all times and use it only as directed.

## 2025-04-22 NOTE — PROGRESS NOTES
Patient complains of pain in her left hip radiating down the left leg.  She rates her pain 7/10.  Patient currently takes Tramadol for her pain and presented with a bottle containing 9  pills.  Last dose of Tramadol was 4/22/2024 at 0700.  Patient is currently able to perform her activities of daily living independently.    Date of the last Controlled Substance Agreement: 04/22/2025    Last urine drug screening date/ordered today: 04/22/25  Results of last screen:       Opioid Risk Screening:   THE OPIOID RISK TOOL (ORT)                                                                      Female                     Male     1. Family History of Substance Abuse:                              Alcohol                                                   [1]=0                           [3]  =     Illicit Drugs                                             [2] =0                         [3]   =    Prescription Drugs                                [4]=0                          [4]   =    2. Personal History of Substance Abuse:     Alcohol                                                    [3] =0                          [3] =     Illegal Drugs                                           [4] =0                           [4]  =     Prescription Drugs                                [5]=0                            [5]   =    3. Age (If between 16 to 45):               [1]=0                           [1]   =    4. History of Preadolescent Sexual Abuse                                                                  [3]=0                            [0]   =    5. Psychological Disease:    ADD, OCD, Bipolar, Schizophrenia    [2]=0                            [2]   =    Depression                                          [1]=0                             [1]   =      TOTAL Score =  0     Last opioid risk screening date/ordered today: 04/22/2025  Patient's total score is 0    Reference :  Low Score = 0 to 3  Moderate Score = 4 to 7  High  Score = =8       Pain Scale Screening:   Pain Assessment and Documentation Tool (PADT)   Date of Assessment: 04/22/2025  Analgesia:   Patient reports her pain level on average during the past week is 7on a 0 - 10 scale.   Patient reports that her pain level at its worst during the past week was 8 on a 0 -10 scale.   -----------------------  50% of pain has been relieved during the past week per patient   Patient states that the amount of pain relief she is now obtaining from her current pain reliever(s) is enough to make a real difference in her life.   Query to clinician: Is the patient's pain relief clinically significant?   Activities of Daily Living:   Physical functioning: unchanged  Family relationships: unchanged  Social relationships: unchanged  Mood: unchanged  Sleep patterns: unchanged  Overall functioning: worse  Adverse Events: No Saritha Cormier is not experiencing side effects from current pain reliever.  Patients overall severity of side effect:none  Specific Analgesic Plan: Continue present regimen.

## 2025-04-22 NOTE — PROGRESS NOTES
Patient complains of pain in her left hip radiating down the left leg.  She rates her pain 7/10.  Patient currently takes Tramadol for her pain and presented with a bottle containing 9  pills.  Last dose of Tramadol was 4/22/2024 at 0700.  Patient is currently able to perform her activities of daily living independently.    Date of the last Controlled Substance Agreement: 5/7/2024       Last urine drug screening date/ordered today: 04/22/25     Results of last screen:       Last opioid risk screening date/ordered today: ***      Pain Scale Screening:   Pain Assessment and Documentation Tool (PADT)   Date of Assessment: ***  Analgesia:   Patient reports her pain level on average during the past week is {NUMBERS 1-10:79446}on a 0 - 10 scale.   Patient reports that her pain level at its worst during the past week was {NUMBERS 1-10:94847} on a 0 -10 scale.   {NUMBERS; 0-100%:16118} of pain has been relieved during the past week per patient   Patient states that the amount of pain relief she is now obtaining from her current pain reliever(s) is enough to make a real difference in her life.   Query to clinician: Is the patient's pain relief clinically significant? ***  Activities of Daily Living:   Physical functioning: {DESC; BETTER/WORSE:29592}  Family relationships: {DESC; BETTER/WORSE:99569}  Social relationships: {DESC; BETTER/WORSE:22735}  Mood: {DESC; BETTER/WORSE:90737}  Sleep patterns: {DESC; BETTER/WORSE:06859}  Overall functioning: {DESC; BETTER/WORSE:95243}  Adverse Events: {YES/NO:77464}, Saritha Cormier is not experiencing side effects from current pain reliever.  Patients overall severity of side effect:{severity:18247}  Specific Analgesic Plan: Continue present regimen.

## 2025-04-24 LAB
1OH-MIDAZOLAM UR-MCNC: NEGATIVE NG/ML
7AMINOCLONAZEPAM UR-MCNC: NEGATIVE NG/ML
A-OH ALPRAZ UR-MCNC: NEGATIVE NG/ML
A-OH-TRIAZOLAM UR-MCNC: NEGATIVE NG/ML
AMPHETAMINES UR QL: NEGATIVE NG/ML
BARBITURATES UR QL: NEGATIVE NG/ML
BZE UR QL: NEGATIVE NG/ML
CODEINE UR-MCNC: NEGATIVE NG/ML
CREAT UR-MCNC: 89.4 MG/DL
DRUG SCREEN COMMENT UR-IMP: ABNORMAL
EDDP UR-MCNC: NEGATIVE NG/ML
FENTANYL UR-MCNC: NEGATIVE NG/ML
GABAPENTIN UR-MCNC: ABNORMAL NG/ML
HYDROCODONE UR-MCNC: NEGATIVE NG/ML
HYDROMORPHONE UR-MCNC: NEGATIVE NG/ML
LORAZEPAM UR-MCNC: NEGATIVE NG/ML
METHADONE UR-MCNC: NEGATIVE NG/ML
MORPHINE UR-MCNC: NEGATIVE NG/ML
NORDIAZEPAM UR-MCNC: NEGATIVE NG/ML
NORFENTANYL UR-MCNC: NEGATIVE NG/ML
NORHYDROCODONE UR CFM-MCNC: NEGATIVE NG/ML
NOROXYCODONE UR CFM-MCNC: NEGATIVE NG/ML
NORTRAMADOL UR-MCNC: 1481 NG/ML
OH-ETHYLFLURAZ UR-MCNC: NEGATIVE NG/ML
OXAZEPAM UR-MCNC: NEGATIVE NG/ML
OXIDANTS UR QL: NEGATIVE MCG/ML
OXYCODONE UR CFM-MCNC: NEGATIVE NG/ML
OXYMORPHONE UR CFM-MCNC: NEGATIVE NG/ML
PCP UR QL: NEGATIVE NG/ML
PH UR: 5 [PH] (ref 4.5–9)
QUEST 6 ACETYLMORPHINE: NEGATIVE NG/ML
QUEST NOTES AND COMMENTS: ABNORMAL
QUEST ZOLPIDEM: NEGATIVE NG/ML
TEMAZEPAM UR-MCNC: NEGATIVE NG/ML
THC UR QL: NEGATIVE NG/ML
TRAMADOL UR-MCNC: 9114 NG/ML
ZOLPIDEM PHENYL-4-CARB UR CFM-MCNC: NEGATIVE NG/ML

## 2025-04-25 ENCOUNTER — TELEMEDICINE (OUTPATIENT)
Dept: CARDIOLOGY | Facility: HOSPITAL | Age: 75
End: 2025-04-25
Payer: MEDICARE

## 2025-04-25 DIAGNOSIS — Z95.3 S/P TAVR (TRANSCATHETER AORTIC VALVE REPLACEMENT), BIOPROSTHETIC: Primary | ICD-10-CM

## 2025-04-25 PROCEDURE — 1159F MED LIST DOCD IN RCRD: CPT | Performed by: NURSE PRACTITIONER

## 2025-04-25 PROCEDURE — 99214 OFFICE O/P EST MOD 30 MIN: CPT | Performed by: NURSE PRACTITIONER

## 2025-04-25 PROCEDURE — 1160F RVW MEDS BY RX/DR IN RCRD: CPT | Performed by: NURSE PRACTITIONER

## 2025-04-25 PROCEDURE — 1036F TOBACCO NON-USER: CPT | Performed by: NURSE PRACTITIONER

## 2025-04-25 NOTE — PROGRESS NOTES
Structural Heart Follow up visit    Saritha Cormier is a 74 y.o. female presents today for 1 year follow up s/p TAVR      reports SOB,RUSS, fatigue  Recent Hospitalizations  Yes    Date: Oct 2024 for lung infection    patient with Medical History[1]    Results for orders placed or performed in visit on 04/22/25 (from the past 96 hours)   Opiate/Opioid/Benzo Prescription Compliance   Result Value Ref Range    Creatinine 89.4 > or = 20.0 mg/dL    pH 5.0 4.5 - 9.0    Oxidant NEGATIVE <200 mcg/mL    Amphetamines NEGATIVE <500 ng/mL    Barbiturates NEGATIVE <300 ng/mL    Cocaine Metabolite NEGATIVE <150 ng/mL    Marijuana Metabolite NEGATIVE <20 ng/mL    Phencyclidine NEGATIVE <25 ng/mL    Alphahydroxyalprazolam NEGATIVE <25 ng/mL    Alphahydroxymidazolam NEGATIVE <50 ng/mL    Alphahydroxytriazolam NEGATIVE <50 ng/mL    Aminoclonazepam NEGATIVE <25 ng/mL    Hydroxyethylflurazepam NEGATIVE <50 ng/mL    Lorazepam NEGATIVE <50 ng/mL    Nordiazepam NEGATIVE <50 ng/mL    Oxazepam NEGATIVE <50 ng/mL    Temazepam NEGATIVE <50 ng/mL    Benzodiazepines Comments      Fentanyl NEGATIVE <0.5 ng/mL    Norfentanyl NEGATIVE <0.5 ng/mL    Fentanyl Comments      6 Acetylmorphine NEGATIVE <10 ng/mL    Heroin Metab Comments      EDDP NEGATIVE <100 ng/mL    Methadone NEGATIVE <100 ng/mL    Methadone Comments      Codeine NEGATIVE <50 ng/mL    Hydrocodone NEGATIVE <50 ng/mL    Hydromorphone NEGATIVE <50 ng/mL    Morphine NEGATIVE <50 ng/mL    Norhydrocodone NEGATIVE <50 ng/mL    Opiates Comments      Noroxycodone NEGATIVE <50 ng/mL    Oxycodone NEGATIVE <50 ng/mL    Oxymorphone NEGATIVE <50 ng/mL    Oxycodone Comments      Desmethyltramadol 1,481 (H) <100 ng/mL    Tramadol 9,114 (H) <100 ng/mL    Tramadol Comments      Zolpidem NEGATIVE <5 ng/mL    Zolpidem Metabolite NEGATIVE <5 ng/mL    Zolpidem Comments     Gabapentin,Urine   Result Value Ref Range    Gabapentin 39,081 (H) <1,000 ng/mL    Gabapentin Comments      Notes and Comments        *Note: Due to a large number of results and/or encounters for the requested time period, some results have not been displayed. A complete set of results can be found in Results Review.        Transthoracic Echo (TTE) Complete  Result Date: 12/10/2024   [facility], [address] [place] [address]              Tel [phone] and Fax [phone] TRANSTHORACIC ECHOCARDIOGRAM REPORT  Patient Name:       RAVIN Ellis Physician:    [id number] Javier Reilly MD Study Date:         12/10/2024          Ordering Provider:    [id number] JAVIER REILLY MRN/PID:            [id number]            Fellow: Accession#:         [id number]        Nurse:                Erika Noriega RN Date of Birth/Age:  1950 / 74     Sonographer:          Tomasa dallas RDCS Gender assigned at  F                   Additional Staff: Birth: Height:             154.94 cm           Admit Date: Weight:             87.54 kg            Admission Status:     Outpatient BSA / BMI:          1.86 m2 / 36.47     Encounter#:           [id number]                     kg/m2 Blood Pressure:     142/68 mmHg         Department Location:  [facility] Study Type:    TRANSTHORACIC ECHO (TTE) COMPLETE Diagnosis/ICD: Abnormal electrocardiogram [ECG] [EKG]-R94.31; Presence of other                heart valve replacement-Z95.4 Indication:    abn EKG, s/p TAVR CPT Code:      Echo Complete w Full Doppler-81330 Patient History: Diabetes:          Yes Pertinent History: CHF, HTN and COPD. s/p TAVR 4/30/24 26mm Evolut Fx, hx                    abnormal EKG, breast cancer, mastectomy, CKD, SOB. Study Detail: The following Echo studies were performed: 2D, M-Mode, Doppler and               color flow. Technically challenging study due to body habitus.                Definity used as a contrast agent for endocardial border               definition. Total contrast used for this procedure was 1 mL via IV               push. The patient was awake.  PHYSICIAN INTERPRETATION: Left Ventricle: Left ventricular ejection fraction is normal, by visual estimate at 55-60%. There is mild concentric left ventricular hypertrophy. There are no regional left ventricular wall motion abnormalities. The left ventricular cavity size is normal. There is mildly increased posterior left ventricular wall thickness. There is left ventricular concentric remodeling. Spectral Doppler shows an abnormal pattern of left ventricular diastolic filling. Left Atrium: The left atrium is mildly dilated. Right Ventricle: The right ventricle is normal in size. There is normal right ventricular global systolic function. Right Atrium: The right atrium is normal in size. Aortic Valve: There is a prosthetic aortic valve present. The aortic valve dimensionless index is 0.44. There is mild aortic valve regurgitation. The peak instantaneous gradient of the aortic valve is 26 mmHg. The mean gradient of the aortic valve is 12 mmHg. There is an Evolut TAVR valve in situ, functioning normally. Mitral Valve: The mitral valve is normal in structure. There is trace to mild mitral valve regurgitation. Tricuspid Valve: The tricuspid valve is structurally normal. There is mild tricuspid regurgitation. Pulmonic Valve: The pulmonic valve is not well visualized. There is physiologic pulmonic valve regurgitation. Pericardium: There is no pericardial effusion noted. Aorta: The aortic root was not well visualized.  CONCLUSIONS:  1. Left ventricular ejection fraction is normal, by visual estimate at 55-60%.  2. Spectral Doppler shows an abnormal pattern of left ventricular diastolic filling.  3. There is normal right ventricular global systolic function.  4. The left atrium is mildly dilated.  5. There is an Evolut TAVR valve in  situ, functioning normally.  6. Mild aortic valve regurgitation. QUANTITATIVE DATA SUMMARY:  2D MEASUREMENTS:          Normal Ranges: Ao Root d:       2.00 cm  (2.0-3.7cm) LAs:             3.00 cm  (2.7-4.0cm) IVSd:            0.94 cm  (0.6-1.1cm) LVPWd:           1.05 cm  (0.6-1.1cm) LVIDd:           4.72 cm  (3.9-5.9cm) LVIDs:           3.05 cm LV Mass Index:   89 g/m2 LVEDV Index:     74 ml/m2 LV % FS          35.4 %  LA VOLUME:                    Normal Ranges: LA Vol A4C:        48.0 ml    (22+/-6mL/m2) LA Vol A2C:        58.3 ml LA Vol BP:         53.9 ml LA Vol Index A4C:  25.8ml/m2 LA Vol Index A2C:  31.3 ml/m2 LA Vol Index BP:   29.0 ml/m2 LA Area A4C:       17.3 cm2 LA Area A2C:       18.7 cm2 LA Major Axis A4C: 5.3 cm LA Major Axis A2C: 5.1 cm LA Volume Index:   27.0 ml/m2  RA VOLUME BY A/L METHOD:            Normal Ranges: RA Vol A4C:              34.8 ml    (8.3-19.5ml) RA Vol Index A4C:        18.7 ml/m2 RA Area A4C:             14.3 cm2 RA Major Axis A4C:       5.0 cm  M-MODE MEASUREMENTS:         Normal Ranges: Ao Root:             2.00 cm (2.0-3.7cm) LAs:                 3.90 cm (2.7-4.0cm)  AORTA MEASUREMENTS:         Normal Ranges: Asc Ao, d:          2.70 cm (2.1-3.4cm)  LV SYSTOLIC FUNCTION BY 2D PLANIMETRY (MOD):                      Normal Ranges: EF-A4C View:    58 % (>=55%) EF-A2C View:    55 % EF-Biplane:     58 % EF-Visual:      58 % LV EF Reported: 58 %  LV DIASTOLIC FUNCTION:             Normal Ranges: MV Peak E:             0.76 m/s    (0.7-1.2 m/s) MV Peak A:             1.03 m/s    (0.42-0.7 m/s) E/A Ratio:             0.73        (1.0-2.2) MV e'                  0.066 m/s   (>8.0) MV lateral e'          0.07 m/s MV medial e'           0.06 m/s MV A Dur:              124.00 msec E/e' Ratio:            11.39       (<8.0) PulmV Sys Matias:         40.90 cm/s PulmV Ewing Matias:        28.60 cm/s PulmV S/D Matias:         1.40 PulmV A Revs Matias:      33.40 cm/s PulmV A Revs Dur:      124.00 msec   MITRAL VALVE:          Normal Ranges: MV DT:        217 msec (150-240msec)  AORTIC VALVE:                      Normal Ranges: AoV Vmax:                2.57 m/s  (<=1.7m/s) AoV Peak P.4 mmHg (<20mmHg) AoV Mean P.0 mmHg (1.7-11.5mmHg) LVOT Max Matias:            1.44 m/s  (<=1.1m/s) AoV VTI:                 63.10 cm  (18-25cm) LVOT VTI:                27.50 cm LVOT Diameter:           1.80 cm   (1.8-2.4cm) AoV Area, VTI:           1.11 cm2  (2.5-5.5cm2) AoV Area,Vmax:           1.43 cm2  (2.5-4.5cm2) AoV Dimensionless Index: 0.44  RIGHT VENTRICLE: RV Basal 3.40 cm RV Mid   2.60 cm RV Major 6.5 cm TAPSE:   16.9 mm RV s'    0.11 m/s  TRICUSPID VALVE/RVSP:          Normal Ranges: Peak TR Velocity:     2.78 m/s Est. RA Pressure:     3 mmHg RV Syst Pressure:     34 mmHg  (< 30mmHg) IVC Diam:             1.40 cm  PULMONIC VALVE:          Normal Ranges: PV Max Matias:     1.1 m/s  (0.6-0.9m/s) PV Max P.2 mmHg  Pulmonary Veins: PulmV A Revs Dur: 124.00 msec PulmV A Revs Matias: 33.40 cm/s PulmV Ewing Matias:   28.60 cm/s PulmV S/D Matias:    1.40 PulmV Sys Matias:    40.90 cm/s  [id number] Javier Reilly MD Electronically signed on 12/10/2024 at 9:12:35 PM  ** Final **     Transthoracic echo (TTE) complete  Result Date: 2024   [facility], [address] [address]              Tel [phone] and Fax [phone] TRANSTHORACIC ECHOCARDIOGRAM REPORT  Patient Name:      RAVIN Ellis Physician:    [id number] Javier Reilly MD Study Date:        2024            Ordering Provider:    [id number] LIDIA HAUSER MRN/PID:           [id number]             Fellow: Accession#:        [id number]         Nurse: Date of Birth/Age: 1950 / 73      Sonographer:                    years Gender:            F                    Additional  Staff: Height:            157.48 cm            Admit Date: Weight:            85.73 kg             Admission Status:     Outpatient BSA / BMI:         1.87 m2 / 34.57      Encounter#:           [id number]                    kg/m2                                         Department Location:  [facility] Blood Pressure: 106 /63 mmHg Study Type:    TRANSTHORACIC ECHO (TTE) COMPLETE Diagnosis/ICD: Presence of prosthetic heart valve-Z95.2 Indication:    post TAVR CPT Code:      Echo Complete w Full Doppler-73013 Patient History: Pertinent History: CHF, Chest Pain, Dyslipidemia and HTN. s/p TAVR 4/30/24 26mm                    Evolut FX, bilateral mastectomy, GERD, abnormal EKG, SOB,                    CKD. Study Detail: The following Echo studies were performed: 2D, M-Mode, Doppler and               color flow. Technically challenging study due to body habitus.               Definity used as a contrast agent for endocardial border               definition. Total contrast used for this procedure was 1.5 mL via               IV push. The patient was awake.  PHYSICIAN INTERPRETATION: Left Ventricle: The left ventricular systolic function is normal, with an estimated ejection fraction of 60-65%. There are no regional wall motion abnormalities. The left ventricular cavity size is normal. Left ventricular diastolic filling was indeterminate. Left Atrium: The left atrium is normal in size. Right Ventricle: The right ventricle is normal in size. There is normal right ventricular global systolic function. Right Atrium: The right atrium is normal in size. Aortic Valve: There is a prosthetic aortic valve present. There is mild aortic valve regurgitation. The peak instantaneous gradient of the aortic valve is 16.2 mmHg. The mean gradient of the aortic valve is 8.0 mmHg. There is an Evolut (Evolut FX 26) TAVR valve in aortic position functioning normally, with mild levar-prosthetic regurgitation. Mitral Valve: The mitral valve is  normal in structure. There is mild mitral annular calcification. There is mild mitral valve regurgitation. Tricuspid Valve: The tricuspid valve is structurally normal. There is mild tricuspid regurgitation. Pulmonic Valve: The pulmonic valve is not well visualized. There is physiologic pulmonic valve regurgitation. Pericardium: There is no pericardial effusion noted. Aorta: The aortic root was not well visualized. Pulmonary Artery: The tricuspid regurgitant velocity is 3.21 m/s, and with an estimated right atrial pressure of 3 mmHg, the estimated pulmonary artery pressure is mildly elevated with the RVSP at 44.2 mmHg.  CONCLUSIONS:  1. Left ventricular systolic function is normal with a 60-65% estimated ejection fraction.  2. There is an Evolut (Evolut FX 26) TAVR valve in aortic position functioning normally, with mild levar-prosthetic regurgitation.  3. There is mild pulmonary hypertension. QUANTITATIVE DATA SUMMARY: 2D MEASUREMENTS:                          Normal Ranges: Ao Root d:     2.10 cm   (2.0-3.7cm) LAs:           3.40 cm   (2.7-4.0cm) IVSd:          1.42 cm   (0.6-1.1cm) LVPWd:         1.22 cm   (0.6-1.1cm) LVIDd:         3.92 cm   (3.9-5.9cm) LVIDs:         2.75 cm LV Mass Index: 99.3 g/m2 LV % FS        29.8 % LA VOLUME:                               Normal Ranges: LA Vol A4C:        56.3 ml    (22+/-6mL/m2) LA Vol A2C:        39.8 ml LA Vol BP:         50.0 ml LA Vol Index A4C:  30.2ml/m2 LA Vol Index A2C:  21.3 ml/m2 LA Vol Index BP:   26.8 ml/m2 LA Area A4C:       19.6 cm2 LA Area A2C:       15.6 cm2 LA Major Axis A4C: 5.8 cm LA Major Axis A2C: 5.2 cm LA Volume Index:   26.0 ml/m2 RA VOLUME BY A/L METHOD:                               Normal Ranges: RA Vol A4C:        32.4 ml    (8.3-19.5ml) RA Vol Index A4C:  17.3 ml/m2 RA Area A4C:       13.8 cm2 RA Major Axis A4C: 5.0 cm M-MODE MEASUREMENTS:                  Normal Ranges: Ao Root: 2.20 cm (2.0-3.7cm) LAs:     4.10 cm (2.7-4.0cm) AORTA  MEASUREMENTS:                    Normal Ranges: Asc Ao, d: 1.80 cm (2.1-3.4cm) LV SYSTOLIC FUNCTION BY 2D PLANIMETRY (MOD):                     Normal Ranges: EF-A4C View: 61.5 % (>=55%) EF-A2C View: 64.5 % EF-Biplane:  60.6 % LV DIASTOLIC FUNCTION:                               Normal Ranges: MV Peak E:        1.22 m/s    (0.7-1.2 m/s) MV Peak A:        0.90 m/s    (0.42-0.7 m/s) E/A Ratio:        1.35        (1.0-2.2) MV e'             0.08 m/s    (>8.0) MV lateral e'     0.10 m/s MV medial e'      0.06 m/s MV A Dur:         129.00 msec E/e' Ratio:       15.25       (<8.0) PulmV Sys Matias:    39.70 cm/s PulmV Ewing Matias:   64.80 cm/s PulmV S/D Matias:    0.60 PulmV A Revs Matias: 32.80 cm/s PulmV A Revs Dur: 129.00 msec MITRAL VALVE:                 Normal Ranges: MV DT: 129 msec (150-240msec) AORTIC VALVE:                                    Normal Ranges: AoV Vmax:                2.01 m/s  (<=1.7m/s) AoV Peak P.2 mmHg (<20mmHg) AoV Mean P.0 mmHg  (1.7-11.5mmHg) LVOT Max Matias:            1.34 m/s  (<=1.1m/s) AoV VTI:                 48.50 cm  (18-25cm) LVOT VTI:                31.10 cm LVOT Diameter:           1.80 cm   (1.8-2.4cm) AoV Area, VTI:           1.54 cm2  (2.5-5.5cm2) AoV Area,Vmax:           1.60 cm2  (2.5-4.5cm2) AoV Dimensionless Index: 0.64 AORTIC INSUFFICIENCY: AI Vmax:       2.63 m/s AI Half-time:  460 msec AI Decel Rate: 144.50 cm/s2  RIGHT VENTRICLE: RV Basal 3.20 cm RV Mid   2.90 cm RV Major 6.9 cm TAPSE:   13.1 mm RV s'    0.10 m/s TRICUSPID VALVE/RVSP:                             Normal Ranges: Peak TR Velocity: 3.21 m/s Est. RA Pressure: 3 mmHg RV Syst Pressure: 44.2 mmHg (< 30mmHg) IVC Diam:         1.50 cm PULMONIC VALVE:                      Normal Ranges: PV Max Matias: 1.0 m/s  (0.6-0.9m/s) PV Max P.1 mmHg Pulmonary Veins: PulmV A Revs Dur: 129.00 msec PulmV A Revs Matias: 32.80 cm/s PulmV Ewing Matias:   64.80 cm/s PulmV S/D Matias:    0.60 PulmV Sys Matias:    39.70 cm/s   [id number] Javier Reilly MD Electronically signed on 5/28/2024 at 12:21:12 PM  ** Final **     Transthoracic Echo (TTE) Limited  Result Date: 5/1/2024   [facility], [address] [place] [address]                Tel [phone] and Fax [phone] TRANSTHORACIC ECHOCARDIOGRAM REPORT  Patient Name:      RAVIN FUENTES  Reading Physician:    [id number] Donovan Kingsley MD Study Date:        5/1/2024             Ordering Provider:    [id number] LIDIA HAUSER MRN/PID:           [id number]             Fellow: Accession#:        [id number]         Nurse: Date of Birth/Age: 1950 / 73      Sonographer:          Zuly Acosta RDCS                    years Gender:            F                    Additional Staff: Height:            167.64 cm            Admit Date:           4/30/2024 Weight:            83.92 kg             Admission Status:     Inpatient -                                                               Routine BSA / BMI:         1.93 m2 / 29.86      Encounter#:           [id number]                    kg/m2                                         Department Location:  [facility]                                                               Invasive Blood Pressure: 109 /71 mmHg Study Type:    TRANSTHORACIC ECHO (TTE) LIMITED Diagnosis/ICD: Presence of prosthetic heart valve-Z95.2 Indication:    s/p TAVR CPT Code:      Echo Limited-84311; Color Doppler-59898; Doppler Limited-10336 Patient History: Pertinent History: HTN. CKD, obesity, bilateral mastectomy, s/p #26 Evolut FX                    TAVR (04/30/2024). Study Detail: The following Echo studies were performed: 2D, M-Mode, Doppler and               color flow. Technically challenging study due to patient lying in               supine position, poor acoustic windows, prominent lung artifact                and body habitus. Definity used as a contrast agent for               endocardial border definition. Total contrast used for this               procedure was 2.0 mL via IV push.  PHYSICIAN INTERPRETATION: Left Ventricle: The left ventricular systolic function is normal, with an estimated ejection fraction of 65%. There are no regional wall motion abnormalities. The left ventricular cavity size is normal. The left ventricular septal wall thickness is mildly increased. Spectral Doppler shows an impaired relaxation pattern of left ventricular diastolic filling. Left Atrium: The left atrium is moderately dilated. Right Ventricle: The right ventricle is upper limits of normal in size. There is normal right ventricular global systolic function. Right Atrium: The right atrium is normal in size. Aortic Valve: The aortic valve was not well visualized. There is transcatheter aortic valve replacement. There is mild levar-prosthetic aortic valve regurgitation. The peak aortic velocity was obtained from the suprasternal view. There is trivial aortic valve regurgitation. The peak instantaneous gradient of the aortic valve is 18.0 mmHg. The mean gradient of the aortic valve is 8.0 mmHg. Mitral Valve: The mitral valve is normal in structure. There is mild to moderate mitral annular calcification. There is trace mitral valve regurgitation. Tricuspid Valve: The tricuspid valve is structurally normal. There is trace to mild tricuspid regurgitation. Pulmonic Valve: The pulmonic valve is structurally normal. There is trace pulmonic valve regurgitation. Pericardium: There is a trivial pericardial effusion. Aorta: The aortic root was not well visualized. There is no dilatation of the ascending aorta. There is no dilatation of the aortic root. Pulmonary Artery: The tricuspid regurgitant velocity is 2.45 m/s, and with an estimated right atrial pressure of 3 mmHg, the estimated pulmonary artery pressure is normal with the RVSP at 27.0  mmHg. Systemic Veins: The inferior vena cava appears to be of normal size. There is IVC inspiratory collapse greater than 50%.  CONCLUSIONS:  1. Left ventricular systolic function is normal with a 65% estimated ejection fraction.  2. Spectral Doppler shows an impaired relaxation pattern of left ventricular diastolic filling.  3. The left atrium is moderately dilated.  4. There is a transcatheter aortic valve replacement. QUANTITATIVE DATA SUMMARY: 2D MEASUREMENTS:                          Normal Ranges: LAs:           4.90 cm   (2.7-4.0cm) IVSd:          1.30 cm   (0.6-1.1cm) LVPWd:         0.80 cm   (0.6-1.1cm) LVIDd:         4.10 cm   (3.9-5.9cm) LVIDs:         2.90 cm LV Mass Index: 73.2 g/m2 LV % FS        29.3 % AORTA MEASUREMENTS:                      Normal Ranges: Ao Sinus, d: 2.90 cm (2.1-3.5cm) Asc Ao, d:   2.70 cm (2.1-3.4cm) LV SYSTOLIC FUNCTION BY 2D PLANIMETRY (MOD):                     Normal Ranges: EF-A4C View: 58.0 % (>=55%) EF-A2C View: 67.9 % EF-Biplane:  64.5 % LV DIASTOLIC FUNCTION:                           Normal Ranges: MV Peak E:    0.97 m/s    (0.7-1.2 m/s) MV Peak A:    1.14 m/s    (0.42-0.7 m/s) E/A Ratio:    0.85        (1.0-2.2) MV e'         0.06 m/s    (>8.0) MV lateral e' 0.07 m/s MV medial e'  0.05 m/s MV A Dur:     100.00 msec E/e' Ratio:   17.31       (<8.0) a'            0.08 m/s MV DT:        217 msec    (150-240 msec) MITRAL VALVE:                 Normal Ranges: MV DT: 217 msec (150-240msec) AORTIC VALVE:                                    Normal Ranges: AoV Vmax:                2.12 m/s  (<=1.7m/s) AoV Peak P.0 mmHg (<20mmHg) AoV Mean P.0 mmHg  (1.7-11.5mmHg) LVOT Max Matias:            1.15 m/s  (<=1.1m/s) AoV VTI:                 39.60 cm  (18-25cm) LVOT VTI:                21.90 cm LVOT Diameter:           1.90 cm   (1.8-2.4cm) AoV Area, VTI:           1.57 cm2  (2.5-5.5cm2) AoV Area,Vmax:           1.54 cm2  (2.5-4.5cm2) AoV Dimensionless  Index: 0.55  RIGHT VENTRICLE: TAPSE: 14.8 mm RV s'  0.09 m/s TRICUSPID VALVE/RVSP:                             Normal Ranges: Peak TR Velocity: 2.45 m/s RV Syst Pressure: 27.0 mmHg (< 30mmHg) IVC Diam:         1.70 cm  [id number] Donovan Kingsley MD Electronically signed on 5/1/2024 at 1:08:23 PM  ** Final **     Transthoracic Echo (TTE) Limited  Result Date: 4/30/2024   [facility], [address] [place] [address]                Tel [phone] and Fax [phone] TRANSTHORACIC ECHOCARDIOGRAM REPORT  Patient Name:      RAVIN Ellis Physician:    [id number] Telma Alva MD Study Date:        4/30/2024            Ordering Provider:    [id number] LIDIA HAUSER MRN/PID:           [id number]             Fellow:               [id number] Javier Ha MD Accession#:        [id number]         Nurse: Date of Birth/Age: 1950 / 73      Sonographer:          Chris dallas                                      EMELY Gender:            F                    Additional Staff: Height:            157.48 cm            Admit Date:           4/30/2024 Weight:            84.37 kg             Admission Status:     Inpatient -                                                               Routine BSA / BMI:         1.85 m2 / 34.02      Encounter#:           [id number]                    kg/m2                                         Department Location:  [facility]                                                               Cath Lab Blood Pressure: 126 /68 mmHg Study Type:    TRANSTHORACIC ECHO (TTE) LIMITED Diagnosis/ICD: Nonrheumatic aortic (valve) stenosis-I35.0 Indication:    TAVR periprocedure CPT Code:      Doppler Limited-53387; Color Doppler-40165; Echo Limited-89226 Patient History:  Pertinent History: CAD; Aortic valve stenosis; HFpEF; HTN. Study Detail: The following Echo studies were performed: 2D, M-Mode, Doppler and               color flow. Technically challenging study due to body habitus and               patient lying in supine position.  PHYSICIAN INTERPRETATION: Left Ventricle: The left ventricular systolic function is normal, with an estimated ejection fraction of 65-70%. There are no regional wall motion abnormalities. The left ventricular cavity size is normal. The left ventricular septal wall thickness is mildly increased. There is mild concentric left ventricular hypertrophy. Left ventricular diastolic filling was not assessed. Left Atrium: The left atrium is mildly dilated. Right Ventricle: The right ventricle is normal in size. There is normal right ventricular global systolic function. Right Atrium: The right atrium is normal in size. Aortic Valve: The aortic valve was not well visualized. There is severe aortic valve cusp calcification. There is severe aortic valve thickening. There is evidence of severe aortic valve stenosis. There is mild aortic valve regurgitation. The peak instantaneous gradient of the aortic valve is 56.2 mmHg. The mean gradient of the aortic valve is 35.8 mmHg. Baseline: severe calcific AS with gradients of 56.2/35.8mmHg and DI of 0.21 and mild AI. Proceeded to TAVR. Mitral Valve: The mitral valve is mildly thickened. There is moderate mitral annular calcification. There is trace mitral valve regurgitation. Tricuspid Valve: The tricuspid valve is structurally normal. There is trace tricuspid regurgitation. The right ventricular systolic pressure is unable to be estimated. Pulmonic Valve: The pulmonic valve is structurally normal. There is physiologic pulmonic valve regurgitation. Pericardium: There is a trivial pericardial effusion. There is an anterior clear space. Aorta: The aortic root was not well visualized. Systemic Veins: The inferior vena cava  size appears small. In comparison to the previous echocardiogram(s): Compared with study from 3/18/2024,. Compared with the prior exam from 3/15/2024 the prior AV gradients were higher at 91/51mmHg with DI of 0.21 all consistent with severe AS wtih preserved LV function at that time with LVEF 65-70%. There was trace to mild AI at that time. Today the patient underwent successful TAVR for severe AS.  Post Transcatheter Aortic Valve Placement (TAVR): The peak instantaneous gradient of the aortic valve is 13.1 mmHg. The mean gradient of the aortic valve is 6.0 mmHg. There is a Medtronic transcatheter aortic valve replacement, with a 26 mm reported size. The left ventricular systolic function is normal. The left ventricular cavity size is normal. There is mild mitral valve regurgitation. There is no prosthetic aortic valve regurgitation. There is mild levar-prosthetic aortic valve regurgitation.  CONCLUSIONS:  1. Left ventricular systolic function is normal with a 65-70% estimated ejection fraction.  2. There is moderate mitral annular calcification.  3. Baseline: severe calcific AS with gradients of 56.2/35.8mmHg and DI of 0.21 and mild AI.     Proceeded to TAVR.  4. Severe aortic valve stenosis.  5. There is severe aortic valve cusp calcification.  6. There is severe aortic valve thickening.  7. Mild aortic valve regurgitation.  8. S/p 26mm Medtronic Evolut TAVR with gradients of 13.1/6mmHg and mild periprosthetic AI.  9. Compared with the prior exam from 3/15/2024 the prior AV gradients were higher at 91/51mmHg with DI of 0.21 all consistent with severe AS wtih preserved LV function at that time with LVEF 65-70%. There was trace to mild AI at that time. Today the patient underwent successful TAVR for severe AS. QUANTITATIVE DATA SUMMARY: 2D MEASUREMENTS:                           Normal Ranges: Ao Root d:     3.20 cm    (2.0-3.7cm) LAs:           3.60 cm    (2.7-4.0cm) IVSd:          1.40 cm    (0.6-1.1cm) LVPWd:          1.10 cm    (0.6-1.1cm) LVIDd:         4.70 cm    (3.9-5.9cm) LVIDs:         3.00 cm LV Mass Index: 121.3 g/m2 LV % FS        36.2 % LA VOLUME:                               Normal Ranges: LA Vol A4C:        72.6 ml    (22+/-6mL/m2) LA Vol A2C:        57.8 ml LA Vol BP:         66.4 ml LA Vol Index A4C:  39.2ml/m2 LA Vol Index A2C:  31.2 ml/m2 LA Vol Index BP:   35.8 ml/m2 LA Area A4C:       21.6 cm2 LA Area A2C:       18.8 cm2 LA Major Axis A4C: 5.5 cm LA Major Axis A2C: 5.2 cm LV SYSTOLIC FUNCTION BY 2D PLANIMETRY (MOD):                     Normal Ranges: EF-A4C View: 69.9 % (>=55%) EF-A2C View: 62.5 % EF-Biplane:  66.9 % AORTIC VALVE:                                              Normal Ranges: AoV Vmax:                          3.75 m/s  (<=1.7m/s) AoV Vmax Post TAVR:                1.81 m/s  (<=1.7m/s) AoV Peak P.2 mmHg (<20mmHg) AoV Peak PG Post TAVR:             13.1 mmHg (<20mmHg) AoV Mean P.8 mmHg (1.7-11.5mmHg) AoV Mean PG Post TAVR:             6.0 mmHg  (1.7-11.5mmHg) LVOT Max Matias:                      0.88 m/s  (<=1.1m/s) LVOT Max Matias Post TAVR:            1.11 m/s  (<=1.1m/s) AoV VTI:                           84.00 cm  (18-25cm) AoV VTI Post TAVR:                 32.10 cm  (18-25cm) LVOT VTI:                          17.60 cm LVOT VTI Post TAVR:                23.40 cm LVOT Diameter:                     1.90 cm   (1.8-2.4cm) LVOT Diameter Post TAVR:           1.90 cm   (1.8-2.4cm) AoV Area, VTI:                     0.59 cm2  (2.5-5.5cm2) AoV Area, VTI Post TAVR:           2.07 cm2  (2.5-5.5cm2) AoV Area,Vmax:                     0.67 cm2  (2.5-4.5cm2) AoV Area,Vmax Post TAVR:           1.74 cm2  (2.5-4.5cm2) AoV Dimensionless Index:           0.21 AoV Dimensionless Index Post TAVR: 0.73  [id number] Telma Alva MD Electronically signed on 2024 at 11:31:32 AM  ** Final **                 Medications Ordered Prior to Encounter[2]      Heart Failure  Follow up    NYHA class 2    Edema Improved  Dyspnea on Exertion Stable  Fatigue Stable  Exercise Intolerance Stable  Orthopnea Denies  PND Denies    Chest pain No  Syncope No  Palpitations No    All organ systems normal except: COPD with home O2 (2L)               K[facility]Q Questionnaire    1  Heart failure affects different people in different ways. Some feel shortness of breath while others feel fatigue. Please indicate how much you are limited by heart failure (shortness of breath or fatigue) in your ability to do the following activities over the past 2 weeks.     A.) Showering/bathing  5. Not at All  B.) Walking 1 block on level ground 5. Not at All  C.) Hurrying or Jogging   6. Limited for other reastons    2.  Over the past 2 weeks, how many times did you have swelling in your feet, ankles or legs when you woke up in the morning? 2. 3 or more times per week but not every day    3.  Over the past 2 weeks, on average, how many times has fatigue limited your ability to do what you wanted? 3. At least once a day    4.  Over the past 2 weeks, on average, how many times has shortness of breath limited your ability to do what you wanted? 2. Several times a day    5.  Over the past 2 weeks, on average, how many times have you been forced to sleep sitting up in a chair or with at least 3 pillows to prop you up because of shortness of breath? Never    6. Over the past 2 weeks, how much has your heart failure limited your enjoyment of life? It has not limited my enjoyment of life    7. If you had to spend the rest of your life with your heart failure the way it is right now, how would you feel about this? 4. Mostly satisfied    8. How much does your heart failure affect your lifestyle? Please indicate how your heart failure may have limited yourparticipation in the following activities over the past 2 weeks    A.)  Hobbies, recreational activities  5. Did not limit at all    B.) Working or doing household chores  5. Did not  limit at all    C.) Visiting family or friends out of your home  5. Did not limit at all      Saritha Cormier is a 74 year old female with a PMHx of AS -Severe, Nonobstructive coronary atherosclerosis. HFpEF with volume overload -compensated. NSAID induced GI ulcer with bleed and anemia. HTN. DM. DLP. Obesity. S/p B/L astectomy/Oophorectomy for CA breast BRCA+ DJD S/p Hip replacement surgery.  Presents today for 1 year follow-up s/p Evolut FX 26mm (pre-dil with 18mm TD) via LFP on 4/30/24 with Dr. Stallworth and Dr. Romano.    Impression:  - 1 year s/p TAVR  - Pt appears to be euvolemic with flat neck veins and no BLE edema.  Work of breathing normal with NAD, skin tone without pallor.   -Patient states she was feeling great until the end of October 2024, at which time she ended up with pneumonia resulting in permanent lung damage.  She has been on 2 L nasal cannula all around-the-clock since then  - HF symptoms: Confirms SOB/RUSS and fatigue but states she feels this is more from her lung problem.  Reports minimal BLE edema  - vitals: Stable  - Overall visually she looks well, no sign of acute distress, patient reports that she feels her lung issue is more of her problem since she saw such an improvement after her TAVR.  Patient encouraged to increase her activity level as able.    1 year ECHO - ordered, needed for TVT registry    Plan:   - ECHO ordered, schedulers to reach out to patient  - Cont ASA for life  - Cont current medication regimen  - Cont to increase activity  - No further follow-ups with Structural Heart  - f/u with Dr. Reilly (Primary [facility]) as scheduled  - Annual ECHO recommended  - SHdental: Amoxicillin on file      Virtual Visit    Jairo Anderson MSN, AGAP-BC  Acute Care Nurse Practitioner  Structural Heart / TAVR Team  [phone] (ph)  [phone] (fax)            [1]   Past Medical History:  Diagnosis Date    Acquired absence of bilateral breasts and nipples 01/31/2022    History of  bilateral mastectomy    Acute kidney injury     Anemia     Arthritis     Cancer (Multi)     CHF (congestive heart failure)     Chronic respiratory failure     CKD (chronic kidney disease)     stage 3    COPD (chronic obstructive pulmonary disease) (Multi)     Coronary artery disease     Epistaxis not due to trauma 10/27/2024    Fall     GERD (gastroesophageal reflux disease)     H/O chronic sinusitis     Heart valve disease     History of bilateral mastectomy     History of total hip replacement, right     History of transcatheter aortic valve replacement (TAVR) 04/2024    Hyperlipidemia     Hypertension     Morbid obesity (Multi)     Neoplasm of unspecified behavior of bone, soft tissue, 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/2015    Skin growth    Other bursitis of hip, right hip     Bursitis of right hip, unspecified bursa    Oxygen dependent     2LPM with sleep    Pneumonia     Shortness of breath     Thrombocytopenia (CMS-HCC)     Trigger finger, unspecified finger 06/22/2018    Acquired trigger finger    Type 2 diabetes mellitus     Unilateral primary osteoarthritis, right knee 02/19/2021    Arthritis of knee, right    Vitamin D deficiency     Weakness    [2]   Current Outpatient Medications on File Prior to Visit   Medication Sig Dispense Refill    aspirin 81 mg EC tablet TAKE 1 TABLET BY MOUTH EVERY DAY 90 tablet 1    bumetanide (Bumex) 1 mg tablet Take 1 tablet (1 mg) by mouth once daily. 30 tablet 11    ferrous gluconate 324 (38 Fe) MG tablet TAKE 1 TABLET (324 MG) BY MOUTH 2 TIMES A DAY. 180 tablet 1    gabapentin (Neurontin) 300 mg capsule Take 1 capsule (300 mg) by mouth once daily at bedtime. 30 capsule 2    metoprolol tartrate (Lopressor) 25 mg tablet Take 1 tablet (25 mg) by mouth 2 times a day. 60 tablet 11    oxygen (O2) gas therapy Inhale 1 each continuously.      pantoprazole (ProtoNix) 40 mg EC tablet TAKE 1 TABLET BY MOUTH EVERY DAY 90 tablet 1    rosuvastatin (Crestor) 40 mg tablet TAKE 1 TABLET BY MOUTH EVERY DAY 90  tablet 1    traMADol (Ultram) 50 mg tablet Take 1 tablet (50 mg) by mouth 2 times a day. 60 tablet 1    [DISCONTINUED] traMADol (Ultram) 50 mg tablet Take 1 tablet (50 mg) by mouth 2 times a day. Do not fill before March 25, 2025. 60 tablet 0     No current facility-administered medications on file prior to visit.

## 2025-05-07 DIAGNOSIS — I47.20 VENTRICULAR TACHYCARDIA (MULTI): ICD-10-CM

## 2025-05-08 ENCOUNTER — TELEPHONE (OUTPATIENT)
Dept: OPERATING ROOM | Facility: HOSPITAL | Age: 75
End: 2025-05-08
Payer: MEDICARE

## 2025-05-08 RX ORDER — METOPROLOL TARTRATE 25 MG/1
25 TABLET, FILM COATED ORAL 2 TIMES DAILY
Qty: 180 TABLET | Refills: 1 | Status: SHIPPED | OUTPATIENT
Start: 2025-05-08

## 2025-05-13 ENCOUNTER — HOSPITAL ENCOUNTER (OUTPATIENT)
Dept: PAIN MEDICINE | Facility: HOSPITAL | Age: 75
Discharge: HOME | End: 2025-05-13
Payer: MEDICARE

## 2025-05-13 VITALS
BODY MASS INDEX: 35.88 KG/M2 | HEIGHT: 62 IN | DIASTOLIC BLOOD PRESSURE: 90 MMHG | OXYGEN SATURATION: 98 % | HEART RATE: 63 BPM | SYSTOLIC BLOOD PRESSURE: 169 MMHG | RESPIRATION RATE: 17 BRPM | WEIGHT: 195 LBS | TEMPERATURE: 97.2 F

## 2025-05-13 DIAGNOSIS — Z79.899 MEDICATION MANAGEMENT: ICD-10-CM

## 2025-05-13 DIAGNOSIS — M16.12 PRIMARY OSTEOARTHRITIS OF LEFT HIP: ICD-10-CM

## 2025-05-13 LAB — GLUCOSE BLD MANUAL STRIP-MCNC: 119 MG/DL (ref 74–99)

## 2025-05-13 PROCEDURE — 20610 DRAIN/INJ JOINT/BURSA W/O US: CPT | Performed by: ANESTHESIOLOGY

## 2025-05-13 PROCEDURE — 82947 ASSAY GLUCOSE BLOOD QUANT: CPT

## 2025-05-13 PROCEDURE — 2500000004 HC RX 250 GENERAL PHARMACY W/ HCPCS (ALT 636 FOR OP/ED): Mod: JZ | Performed by: ANESTHESIOLOGY

## 2025-05-13 PROCEDURE — 2500000001 HC RX 250 WO HCPCS SELF ADMINISTERED DRUGS (ALT 637 FOR MEDICARE OP)

## 2025-05-13 PROCEDURE — 2550000001 HC RX 255 CONTRASTS: Performed by: ANESTHESIOLOGY

## 2025-05-13 RX ORDER — CIPROFLOXACIN 500 MG/1
TABLET ORAL
Status: COMPLETED
Start: 2025-05-13 | End: 2025-05-13

## 2025-05-13 RX ORDER — CIPROFLOXACIN 500 MG/1
500 TABLET ORAL ONCE
Status: DISCONTINUED | OUTPATIENT
Start: 2025-05-13 | End: 2025-05-14 | Stop reason: HOSPADM

## 2025-05-13 RX ORDER — BUPIVACAINE HYDROCHLORIDE 2.5 MG/ML
INJECTION, SOLUTION EPIDURAL; INFILTRATION; INTRACAUDAL; PERINEURAL AS NEEDED
Status: COMPLETED | OUTPATIENT
Start: 2025-05-13 | End: 2025-05-13

## 2025-05-13 RX ORDER — TRIAMCINOLONE ACETONIDE 40 MG/ML
INJECTION, SUSPENSION INTRA-ARTICULAR; INTRAMUSCULAR AS NEEDED
Status: COMPLETED | OUTPATIENT
Start: 2025-05-13 | End: 2025-05-13

## 2025-05-13 RX ORDER — LIDOCAINE HYDROCHLORIDE 10 MG/ML
INJECTION, SOLUTION EPIDURAL; INFILTRATION; INTRACAUDAL; PERINEURAL AS NEEDED
Status: COMPLETED | OUTPATIENT
Start: 2025-05-13 | End: 2025-05-13

## 2025-05-13 RX ADMIN — CIPROFLOXACIN 500 MG: 500 TABLET, FILM COATED ORAL at 09:54

## 2025-05-13 RX ADMIN — BUPIVACAINE HYDROCHLORIDE 5 ML: 2.5 INJECTION, SOLUTION EPIDURAL; INFILTRATION; INTRACAUDAL; PERINEURAL at 10:21

## 2025-05-13 RX ADMIN — TRIAMCINOLONE ACETONIDE 40 MG: 40 INJECTION, SUSPENSION INTRA-ARTICULAR; INTRAMUSCULAR at 10:22

## 2025-05-13 RX ADMIN — IOHEXOL 3 ML: 240 INJECTION, SOLUTION INTRATHECAL; INTRAVASCULAR; INTRAVENOUS; ORAL at 10:21

## 2025-05-13 RX ADMIN — LIDOCAINE HYDROCHLORIDE 4 ML: 10 INJECTION, SOLUTION EPIDURAL; INFILTRATION; INTRACAUDAL; PERINEURAL at 10:21

## 2025-05-13 ASSESSMENT — PAIN - FUNCTIONAL ASSESSMENT
PAIN_FUNCTIONAL_ASSESSMENT: 0-10
PAIN_FUNCTIONAL_ASSESSMENT: 0-10

## 2025-05-13 ASSESSMENT — PAIN SCALES - GENERAL
PAINLEVEL_OUTOF10: 8
PAINLEVEL_OUTOF10: 2

## 2025-05-13 NOTE — DISCHARGE INSTRUCTIONS
Discharge Instructions:   ° Keep Band-Aid on for the next 24 hours.    ° No showering/bathing for the next 24 hours.    ° You may notice soreness or increased pain in the area of your injection, which may continue for the first 48 hours.    ° Avoid driving or operating any heavy machinery until the next day after the procedure.  ° You should resume any medications and your regular diet after the procedure.  ° You may resume regular daily activity but should avoid strenuous activity the day of the procedure.  Some of the side affects you may experience from the steroids are:  ° Insomnia (inability to sleep)  ° Increased sweating  ° Headaches  ° Increased fluid retention (swelling of your extremities)  ° Increase appetite  ° Face flushing  ° If you are a diabetic, your blood sugars may go up.  Closely monitor your diet.  Your blood sugar should return to normal in a few days.  Complications:   ° Complications are rare with the most common being temporary increase pain near the injection site. You can apply ice to affected area on the day of the procedure.   ° If the discomfort persists, apply moist heat to the area. Serious complications are very uncommon but may include bleeding, infection or nerve damage.   ° If severe pain, fever, redness or swelling near the injection site, have someone take you to the nearest emergency room to be evaluated for procedure complications or infection.  Expectations:   ° Local anesthetics wear off in several hours but duration of relief varies from individual to individual.     If you have any questions, please call the office at [phone].    If this is an emergency, call 911 or go to your nearest hospital.

## 2025-05-13 NOTE — INTERVAL H&P NOTE
H&P reviewed. The patient was examined and there are no changes to the H&P.    No significant findings; reviewed medications and allergies; patient seen and discussed with attending physician responsible for performing the procedure.

## 2025-05-21 LAB
25(OH)D3+25(OH)D2 SERPL-MCNC: 28 NG/ML (ref 30–100)
ALBUMIN SERPL-MCNC: 4.4 G/DL (ref 3.6–5.1)
BUN SERPL-MCNC: 42 MG/DL (ref 7–25)
BUN/CREAT SERPL: 27 (CALC) (ref 6–22)
CALCIUM SERPL-MCNC: 9.2 MG/DL (ref 8.6–10.4)
CHLORIDE SERPL-SCNC: 100 MMOL/L (ref 98–110)
CO2 SERPL-SCNC: 28 MMOL/L (ref 20–32)
CREAT SERPL-MCNC: 1.58 MG/DL (ref 0.6–1)
EGFRCR SERPLBLD CKD-EPI 2021: 35 ML/MIN/1.73M2
ERYTHROCYTE [DISTWIDTH] IN BLOOD BY AUTOMATED COUNT: 13.1 % (ref 11–15)
FERRITIN SERPL-MCNC: 90 NG/ML (ref 16–288)
GLUCOSE SERPL-MCNC: 124 MG/DL (ref 65–99)
HCT VFR BLD AUTO: 38.7 % (ref 35–45)
HGB BLD-MCNC: 11.9 G/DL (ref 11.7–15.5)
IRON SATN MFR SERPL: 16 % (CALC) (ref 16–45)
IRON SERPL-MCNC: 58 MCG/DL (ref 45–160)
MCH RBC QN AUTO: 27.2 PG (ref 27–33)
MCHC RBC AUTO-ENTMCNC: 30.7 G/DL (ref 32–36)
MCV RBC AUTO: 88.4 FL (ref 80–100)
PHOSPHATE SERPL-MCNC: 4.1 MG/DL (ref 2.1–4.3)
PLATELET # BLD AUTO: 147 THOUSAND/UL (ref 140–400)
PMV BLD REES-ECKER: 10 FL (ref 7.5–12.5)
POTASSIUM SERPL-SCNC: 4.7 MMOL/L (ref 3.5–5.3)
PTH-INTACT SERPL-MCNC: 105 PG/ML (ref 16–77)
RBC # BLD AUTO: 4.38 MILLION/UL (ref 3.8–5.1)
SODIUM SERPL-SCNC: 140 MMOL/L (ref 135–146)
TIBC SERPL-MCNC: 372 MCG/DL (CALC) (ref 250–450)
URATE SERPL-MCNC: 9.3 MG/DL (ref 2.5–7)
WBC # BLD AUTO: 11.3 THOUSAND/UL (ref 3.8–10.8)

## 2025-05-28 ENCOUNTER — APPOINTMENT (OUTPATIENT)
Dept: NEPHROLOGY | Facility: CLINIC | Age: 75
End: 2025-05-28
Payer: MEDICARE

## 2025-05-28 VITALS
SYSTOLIC BLOOD PRESSURE: 120 MMHG | OXYGEN SATURATION: 96 % | DIASTOLIC BLOOD PRESSURE: 63 MMHG | TEMPERATURE: 97.3 F | BODY MASS INDEX: 37.17 KG/M2 | HEIGHT: 62 IN | WEIGHT: 202 LBS | HEART RATE: 53 BPM

## 2025-05-28 DIAGNOSIS — I10 BENIGN ESSENTIAL HYPERTENSION: ICD-10-CM

## 2025-05-28 DIAGNOSIS — N06.0 ISOLATED PROTEINURIA WITH MINOR GLOMERULAR ABNORMALITY: ICD-10-CM

## 2025-05-28 DIAGNOSIS — D63.1 ANEMIA DUE TO STAGE 4 CHRONIC KIDNEY DISEASE: ICD-10-CM

## 2025-05-28 DIAGNOSIS — N18.4 ANEMIA DUE TO STAGE 4 CHRONIC KIDNEY DISEASE: ICD-10-CM

## 2025-05-28 DIAGNOSIS — M10.00 IDIOPATHIC GOUT, UNSPECIFIED CHRONICITY, UNSPECIFIED SITE: ICD-10-CM

## 2025-05-28 DIAGNOSIS — N18.32 STAGE 3B CHRONIC KIDNEY DISEASE (MULTI): Primary | ICD-10-CM

## 2025-05-28 PROCEDURE — G2211 COMPLEX E/M VISIT ADD ON: HCPCS | Performed by: INTERNAL MEDICINE

## 2025-05-28 PROCEDURE — 3074F SYST BP LT 130 MM HG: CPT | Performed by: INTERNAL MEDICINE

## 2025-05-28 PROCEDURE — 3078F DIAST BP <80 MM HG: CPT | Performed by: INTERNAL MEDICINE

## 2025-05-28 PROCEDURE — 1159F MED LIST DOCD IN RCRD: CPT | Performed by: INTERNAL MEDICINE

## 2025-05-28 PROCEDURE — 99214 OFFICE O/P EST MOD 30 MIN: CPT | Performed by: INTERNAL MEDICINE

## 2025-05-28 PROCEDURE — 3008F BODY MASS INDEX DOCD: CPT | Performed by: INTERNAL MEDICINE

## 2025-05-28 NOTE — PROGRESS NOTES
Subjective   Saritha Cormier is a 74 y.o. female who has past medical history of HTN, HLD, CHF, AV stenosis s/p TAVR, T2DM, GERD, peptic ulcers, breast cancer s/p bilateral mastectomy, OA, gout, spinal stenosis presents for CKD [id number] visit    Last office visit December 2024. Saritha presents alone today. She reports she is doing okay. Lab results were discussed with patient- renal function stable. Uric acid is elevated; however, pt reports no recent gout flares. She reports since holding the Allopurinol, she is feeling better. Pt reports she is scheduled for upcoming echocardiogram. She reports taking Bumex daily, with no b/l LE edema and explains she is watching her water intake. She states she is taking Tramadol for hip pain and is planning for a hip replacement.  Pt reports injury to b/l LE in the fall, she reports extremities have been cool to touch. Pt advised to follow up with vascular surgery if she would like to have it checked. Bp is accepted today.  Pt advised to follow up in 6 months.     Per previous office note:   Last office visit August 2024.  Saritha was seen/evaluated virtually today.  Last office visit we held allopurinol, metformin, Lasix, losartan due to acute drop in kidney function down from baseline 30-40% to 21%.  In the interim, she did not get her hip surgery due to poor oxygenation.  Currently she started on oxygen supplement.  She had blood work done few days ago that showed improved serum creatinine 1.5 and GFR back to 35 baseline.  Normal electrolytes.  Blood pressure is accepted.  A1c is excellent 5.8.  No recent actual gout flares off allopurinol.  She was instructed to start Jardiance-cannot afford.  Overall she feels very well except for the oxygen use which is bothering her.  No evidence of hypervolemia or fluid buildup.  Blood pressure is in accepted control    Prior notes    Saritha was referred by her PCP due to worsening kidney function. Planning on obtaining  "hip replacement in September, needing clearance from renal standpoint. Was running around 30-40 GFR up until June 2024. Most recent GFR in August, now 21. She had a TAVR placed in April 2024 due to aortic stenosis. Repeat echo in May 2024 with EF 60-65%. States that she was eating food that she shouldn't have after surgery. Reports no gout flares for years. BP and diabetes have been stable. Long discussion regarding current medications. Will be planning to stop specific medications that will decrease GFR. No changes in urination or difficulty urinating. Had chemotherapy and radiation in 1990 for breast cancer. History of severe iron deficiency anemia. Currently taking iron supplements, will need to recheck iron studies. Takes tramadol and gabapentin for pain management.     Social history: No tobacco use    Objective   /63 (BP Location: Left arm, Patient Position: Standing, BP Cuff Size: Large adult)   Pulse 53   Temp 36.3 °C (97.3 °F)   Ht 1.575 m (5' 2\")   Wt 91.6 kg (202 lb)   SpO2 96%   BMI 36.95 kg/m²   Wt Readings from Last 3 Encounters:   05/28/25 91.6 kg (202 lb)   05/13/25 88.5 kg (195 lb)   04/22/25 96.2 kg (212 lb)       Physical Exam  Constitutional: Appears stated age. Wheel chair bound.   HEENT: EOMI, clear sclera, moist mucous membranes  CV: RRR, No M/R/G  PULM: CTAB, no coughing or wheezing  ABDOMEN: Soft, NT/ND. No TTP. + BSx4.  SKIN: Normal Color, Warm, Dry, No Rashes   EXTREMITIES: Non-Tender, Full ROM, LE puffiness, b/l LE erythema  NEURO: A&O x 3  PSYCH: Normal Mood & Behavior     Review of Systems     Constitutional: no fever, no chills, no recent weight gain and no recent weight loss.   Eyes: no blurred vision and no diplopia.   ENT: no hearing loss, no earache, no sore throat, no swollen glands in the neck and no nasal discharge.   Cardiovascular: no chest pain, no palpitations, improved lower extremity puffiness  Respiratory: no shortness of breath, no chronic cough and no " "shortness of breath during exertion.   Gastrointestinal: no abdominal pain, no constipation, no heartburn, no vomiting, no bloody stools and no change in bowel movements.   Genitourinary: no dysuria and no hematuria.   Musculoskeletal: no arthralgias and no myalgias.   Skin: no rashes and no skin lesions.   Neurological: no headaches and no dizziness.   Psychiatric: no confusion, no depression and no anxiety.   Endocrine: no heat intolerance, no cold intolerance, appetite not increased, no thyroid disorder, no increased urinary frequency and no dry skin.   Hematologic/Lymphatic: does not bleed easily and does not bruise easily.   All other systems have been reviewed and are negative for complaint.         Data Review        Lab Results   Component Value Date    URICACID 9.3 (H) 05/20/2025     Lab Results   Component Value Date    HGBA1C 5.8 (H) 10/28/2024         No lab exists for component: \"CR\", \"PHOSPHORUS\"    Albumin/Creatinine Ratio   Date Value Ref Range Status   12/04/2024   Final     Comment:     One or more analytes used in this calculation is outside of the analytical measurement range. Calculation cannot be performed.   08/29/2024 91.3 (H) <30.0 ug/mg Creat Final   06/17/2024 67.2 (H) <30.0 ug/mg Creat Final     RFP  Recent Labs     05/20/25  0936 02/28/25  1019 01/03/25  1215 12/04/24  1212 11/02/24  0527 11/01/24  0527 10/31/24  0515 10/30/24  0537 10/27/24  2216 09/05/24  0845 06/17/24  0932 05/18/24  0800 08/08/22  1002 02/19/22  0447 03/10/21  2130 03/09/21  0149 03/08/21  1804    139 139 140 139 140 140 140   < > 139   < > 138   < > 140   < > 141 146*   K 4.7 5.3 4.9 4.2 3.7 3.7 3.6 3.9   < > 5.2   < > 4.1   < > 4.7   < > 3.6 3.9    97* 94* 95* 94* 94* 93* 93*   < > 103   < > 98   < > 100   < > 99 102   CO2 28 34* 40* 33* 40* 41* 40* 39*   < > 30   < > 30   < > 31   < > 39* 36*   BUN 42* 35* 55* 31* 51* 54* 51* 46*   < > 26*   < > 39*   < > 19   < > 23 18   CREATININE 1.58* 1.53* 2.47* " 1.57* 1.64* 1.63* 1.99* 2.12*   < > 1.60*   < > 1.77*   < > 1.10*   < > 1.04 1.08*   GLUCOSE 124* 102* 112* 88 118* 115* 121* 157*   < > 112*   < > 119*   < > 146*   < > 173* 109*   CALCIUM 9.2 9.5 10.4* 9.6 8.5* 8.7 9.1 9.5   < > 9.8   < > 9.6   < > 9.2   < > 8.8 9.1   PHOS 4.1  --   --  4.3  --   --   --   --   --  4.8  --  5.0*  --  4.1  --  2.9 3.4   EGFR 35* 36* 20* 35* 33* 33* 26* 24*   < > 34*   < > 30*   < >  --   --   --   --    ANIONGAP  --  8 10 16 9* 9* 11 12   < > 11   < > 14   < > 14   < > 7* 12    < > = values in this interval not displayed.        Urineanalysis  Recent Labs     04/22/25  1540 10/28/24  0152 08/29/24  1023 08/29/24  1018 06/08/21  0710   COLORU  --  Yellow Light-Yellow Yellow STRAW   APPEARANCEU  --  Turbid* Clear Hazy* CLEAR   SPECGRAVU  --  1.018 1.017 1.015 1.010   DEAN 5.0 5.5 6.5 6.5 6.0   PROTUR  --  100 (2+)* 30 (1+)* 100 (2+)* NEGATIVE   GLUCOSEU  --  Normal Normal NEGATIVE NEGATIVE   BLOODU  --  0.2 (2+)* 0.03 (TRACE)* TRACE-Intact* NEGATIVE   KETONESU  --  TRACE* NEGATIVE NEGATIVE NEGATIVE   BILIRUBINU  --  NEGATIVE NEGATIVE NEGATIVE NEGATIVE   NITRITEU  --  NEGATIVE NEGATIVE NEGATIVE NEGATIVE   LEUKOCYTESU  --  NEGATIVE NEGATIVE  --  SMALL(1+)*       Urine Electrolytes  Recent Labs     04/22/25  1540 12/04/24  1212 10/28/24  0152 08/29/24  1027 08/29/24  1023 08/29/24  1018 07/09/24  1015 06/17/24  0932 04/22/24  1307 06/08/21 0710   SODIUMURR  --   --   --  49  --   --   --   --   --   --    NACREATUR  --   --   --  60  --   --   --   --   --   --    KUR  --   --   --  55  --   --   --   --   --   --    KCREATUR  --   --   --  68  --   --   --   --   --   --    CREATU 89.4 50.9  50.5  --  81.1 80.5  81.1  --  130.1 116.1 173.2  --    PROTUR  --   --  100 (2+)*  --  30 (1+)* 100 (2+)*  --   --   --  NEGATIVE   UTPCR  --   --   --   --  1.04*  --   --   --   --   --    ALBUMINUR  --  <7.0  --   --  73.5  --   --  78.0  --   --    MICROALBCREA  --   --   --   --  91.3*  --    --  67.2*  --   --         Urine Micro  Recent Labs     10/28/24  0152 08/29/24  1023 06/08/21  0710   WBCU 11-20* 1-5 18*   RBCU 6-10* 1-2 2   HYALCASTU 2+*  --  3+*   SQUAMEPIU 1-9 (SPARSE)  --  1   BACTERIAU 1+* 1+* 1+*   MUCUSU FEW FEW FEW        Iron  Recent Labs     05/20/25  0936 12/04/24  1212 03/05/21  0910 03/04/21  1201   IRON 58 70  --  11*   TIBC 372 357  --  459*   IRONSAT 16 20*  --  2*   FERRITIN 90 168* 16  --           Current Outpatient Medications on File Prior to Visit   Medication Sig Dispense Refill    aspirin 81 mg EC tablet TAKE 1 TABLET BY MOUTH EVERY DAY 90 tablet 1    bumetanide (Bumex) 1 mg tablet Take 1 tablet (1 mg) by mouth once daily. 30 tablet 11    ferrous gluconate 324 (38 Fe) MG tablet TAKE 1 TABLET (324 MG) BY MOUTH 2 TIMES A DAY. 180 tablet 1    gabapentin (Neurontin) 300 mg capsule TAKE 2 CAPSULES (600 MG) BY MOUTH ONCE DAILY AT BEDTIME. 60 capsule 0    metoprolol tartrate (Lopressor) 25 mg tablet TAKE 1 TABLET BY MOUTH TWICE A  tablet 1    oxygen (O2) gas therapy Inhale 1 each continuously.      pantoprazole (ProtoNix) 40 mg EC tablet TAKE 1 TABLET BY MOUTH EVERY DAY 90 tablet 1    rosuvastatin (Crestor) 40 mg tablet TAKE 1 TABLET BY MOUTH EVERY DAY 90 tablet 1    traMADol (Ultram) 50 mg tablet Take 1 tablet (50 mg) by mouth 2 times a day. 60 tablet 1    [DISCONTINUED] gabapentin (Neurontin) 300 mg capsule TAKE 2 CAPSULES (600 MG) BY MOUTH ONCE DAILY AT BEDTIME. 60 capsule 0     No current facility-administered medications on file prior to visit.     Assessment and Plan    Saritha Cormier  is a 74 y.o. female who has past medical history of HTN, HLD, CHF, AV stenosis s/p TAVR, T2DM, GERD, peptic ulcers, breast cancer s/p bilateral mastectomy, OA, gout, spinal stenosis presents for CKD follow-up    # Chronic kidney disease - G4A2---> G3 B/A2  - Baseline SCr 1.4-1.7 in 2024  - Most recent serum creatinine 1.58, GFR 35  - ACR 67.2 in June 2024, negative in December  2024  -CKD is most likely related to diabetic nephropathy, atherosclerotic cardiovascular disease.  Multiple JA 2/2 hemodynamic instability   - Urine dipstick previously revealed protein 2+ and trace blood  - Kidney ultrasound August 2024 unremarkable  - No immediate indication for RAAS inhibitors  - Lytes : wnl  - Pantoprazole 40mg     # BP - today is accepted  - Current meds: Bumex 1 mg daily, Metoprolol 25mg.  Holding Lasix 40mg, Losartan 25mg   -No changes today    #Iron Deficiency Anemia  - Hgb: 11.9   - RDW elevated, MCHC low  - Most recent (May 25')- Ferritin: 90, Fe: 58, % saturation:16    - Continue Ferrous gluconate 324mg bid    #(CKD)-MBD  - Ca 9.2 with normal albumin, Vit D 28     # CVS  - On statins  -DAPT for valve replacement    #Gout  -Allopurinol 200mg bid--holding for GFR preservation  -Uric acid 9.3, elevated since previous result, no recent gout flares    #Alkalosis  -bicarb 33?  Respiratory due to CO2 retention    #Diabetes  -Metformin XR 500mg--hold GFR <30  -Gabapentin 300mg bid  -HbA1c 5.8 (Oct 24)    #Others  - No NSAIDs, no contrast as possible. If to be done- we recommend holding ACEi/ARBS/diuretics 24 hrs prior to contrast exposure and ensure appropriate hydration   - Ensure well hydration  - Limit salt in diet  - No smoking    6 months follow-up with repeat blood work and urinalysis prior to next visit    Rocael Montes DO  Internal Medicine, PGY-1     Nelly Little MD, MS, XAVI ANDERS   Clinical  - [employer]   Nephrologist - [facility] - [facility]

## 2025-05-28 NOTE — PATIENT INSTRUCTIONS
Alina-it was nice meeting you nephrology clinic today to discuss the following    # Chronic kidney disease stage III baseline 30-40% currently stable after holding medications.  Continue to hold allopurinol, metformin, Lasix, losartan.  Continue Bumex 1 mg once daily      # Chronic anemia-improved.  Continue iron pills      # Recurrent gout-no recent flares.  Continue to temporarily hold allopurinol.  Will consider to resume in the future    # Hypertension-good control.  Continue to hold losartan, Lasix.  Continue metoprolol and Bumex    # You could not afford Jardiance-keep holding    Follow-up in 6 months with repeat blood work urinalysis.  Next visit    Nelly Little MD, MS, XAVI ANDERS   Clinical  - [facility] [employer]   Nephrologist - [facility]

## 2025-06-02 DIAGNOSIS — M25.552 LEFT HIP PAIN: ICD-10-CM

## 2025-06-05 ENCOUNTER — APPOINTMENT (OUTPATIENT)
Dept: ORTHOPEDIC SURGERY | Facility: CLINIC | Age: 75
End: 2025-06-05
Payer: MEDICARE

## 2025-06-05 ENCOUNTER — HOSPITAL ENCOUNTER (OUTPATIENT)
Dept: RADIOLOGY | Facility: CLINIC | Age: 75
Discharge: HOME | End: 2025-06-05
Payer: MEDICARE

## 2025-06-05 DIAGNOSIS — Z01.818 PRE-OP EVALUATION: ICD-10-CM

## 2025-06-05 DIAGNOSIS — M25.552 LEFT HIP PAIN: ICD-10-CM

## 2025-06-05 PROCEDURE — 1159F MED LIST DOCD IN RCRD: CPT | Performed by: ORTHOPAEDIC SURGERY

## 2025-06-05 PROCEDURE — G2211 COMPLEX E/M VISIT ADD ON: HCPCS | Performed by: ORTHOPAEDIC SURGERY

## 2025-06-05 PROCEDURE — 73502 X-RAY EXAM HIP UNI 2-3 VIEWS: CPT | Mod: LEFT SIDE | Performed by: RADIOLOGY

## 2025-06-05 PROCEDURE — 73502 X-RAY EXAM HIP UNI 2-3 VIEWS: CPT | Mod: LT

## 2025-06-05 PROCEDURE — 1160F RVW MEDS BY RX/DR IN RCRD: CPT | Performed by: ORTHOPAEDIC SURGERY

## 2025-06-05 PROCEDURE — 99215 OFFICE O/P EST HI 40 MIN: CPT | Performed by: ORTHOPAEDIC SURGERY

## 2025-06-05 PROCEDURE — 1036F TOBACCO NON-USER: CPT | Performed by: ORTHOPAEDIC SURGERY

## 2025-06-05 ASSESSMENT — PAIN - FUNCTIONAL ASSESSMENT: PAIN_FUNCTIONAL_ASSESSMENT: 0-10

## 2025-06-05 ASSESSMENT — PAIN SCALES - GENERAL: PAINLEVEL_OUTOF10: 10 - WORST POSSIBLE PAIN

## 2025-06-05 NOTE — PROGRESS NOTES
This is a consultation from Dr. Alva Prince MD for   Chief Complaint   Patient presents with    Left Hip - Pain       This is a 74 y.o. female who presents for follow-up of her left hip.  Patient has severe left hip arthritis, she had been on schedule for left total hip last year but we had to cancel due to pulmonary issues.  Patient states unfortunately hips been getting worse and bothering her much more.  She complains of a severe deep sharp stabbing pain of the anterior hip and groin rating to the anterior thigh.  She is having very hard time walking.  It has been severely exacerbated over the last few months.  She has had extensive trial of nonsurgical management occluding use of anti-inflammatories physical therapy activity modification use of assistive devices cortisone injections.  Despite that she has severe and worsening pain which impacts her quality of life and activities of daily living    Physical Exam    There has been no interval change in this patient's past medical, surgical, medications, allergies, family history or social history since the most recent visit to a provider within our department. 14 point review of systems was performed, reviewed, and negative except for pertinent positives documented in the history of present illness.     Constitutional: well developed, well nourished female in no acute distress  Psychiatric: normal mood, appropriate affect  Eyes: sclera anicteric  HENT: normocephalic/atraumatic  CV: regular rate and rhythm   Respiratory: non labored breathing  Integumentary: no rash  Neurological: moves all extremities    Left hip exam: skin normal, no abrasions, wounds, or lacerations. nttp over greater trochanter. negative log roll, negative norma's test. flexion to 90 degrees without pain.  Severe pain with flexion abduction and external rotation, reproduction of severe hip and groin pain with flexion adduction and internal rotation. neurovascularly intact  distally        Imaging:  Xrays were ordered by me, they were reviewed and independently interpreted by me today, they show severe degenerative changes of the left hip joint bone-on-bone arthritis subchondral sclerosis osteophyte formation erosion of the femoral head    Procedures      Impression/Plan: This is a 74 y.o. female with severe end-stage degenerative disease of the left hip that has failed nonoperative management.  Patient like to proceed with left total hip.    Insert normal I had an extensive discussion with the patient regarding her condition and possible treatment options. Nonsurgical treatment for left hip arthritis includes activity modification, weight loss, use of a cane or other assistive device, pain medications and nonsteroidal anti-inflammatory medications, joint injections, and physical therapy. The patient has attempted non-surgical treatment for this condition during the past year for greater than 6 months and it has failed. We discussed the risks benefits and alternatives of total hip arthroplasty. Benefits of joint replacement include: Relief of pain, improvement of function, and improved quality of life. Alternatives include observation and watchful waiting, and the nonsurgical modalities noted above.     Major area of concern for this patient is her pulmonary function.  She states has been cleared by her pulmonologist.  Will review with anesthesia to ensure it safe to go ahead.    We discussed the risks of complications as well as the risks of morbidity and mortality related to surgical treatment with total joint replacement. We reviewed the fact that total joint replacement is major elective surgery with significant associated surgical and procedural risk factors as detailed below.   Risks include: Pain, blood loss, damage to nearby anatomical structures including but not limited to nerves or blood vessels muscles tendons and bone, failure surgery to ameliorate symptoms, persistence of  pain surrounding the affected joint, mechanical failure of the prosthesis including but not limited to loosening of the prosthesis from bone ,breakage of the prosthesis and dislocation of the prosthesis, change in length or appearance of a limb, infection possibly necessitating further surgery, removal of the prosthesis, or limb amputation, the need for additional surgery for other reasons, blood clots, amputation, and death. No guarantees were implied or given.  All questions were answered and the patient voiced their understanding.     A complete set of surgical instructions was given to the patient. The patient was educated regarding preoperative nutrition, preparation of their home for postoperative rehabilitation, choosing a care partner, medical and dental clearance, the cessation of medications that can cause bleeding or presenting to risk for infection, chlorhexidine bath, nasal swab and decontamination, post operative follow up, and need for medical equipment. Patient was also educated regarding the possibility of same day surgery and related criteria and protocols. The patient will attend our joint class further education, and thereafter they will return for a preoperative visit. A presurgery education booklet was also given to the patient.     surgical plan: Left total hip  implants: [place]  approach: Direct anterior  DVT ppx aspirin  drugs to stop none  allergy to abx none  post operative abx: ancef +/- vanc (per protocol)  special clearance needed pulmonology cardiology    BMI Readings from Last 1 Encounters:   05/28/25 36.95 kg/m²      Lab Results   Component Value Date    CREATININE 1.58 (H) 05/20/2025     Tobacco Use: Low Risk  (6/5/2025)    Patient History     Smoking Tobacco Use: Never     Smokeless Tobacco Use: Never     Passive Exposure: Not on file      Computed MELD 3.0 unavailable. One or more values for this score either were not found within the given timeframe or did not fit some other  criterion.  Computed MELD-Na unavailable. One or more values for this score either were not found within the given timeframe or did not fit some other criterion.       Lab Results   Component Value Date    HGBA1C 5.8 (H) 10/28/2024     Lab Results   Component Value Date    STAPHMRSASCR (A) 08/26/2024     Isolated: Methicillin Resistant Staphylococcus aureus (MRSA)

## 2025-06-10 ENCOUNTER — TELEPHONE (OUTPATIENT)
Dept: ORTHOPEDIC SURGERY | Facility: CLINIC | Age: 75
End: 2025-06-10

## 2025-06-10 ENCOUNTER — OFFICE VISIT (OUTPATIENT)
Dept: PAIN MEDICINE | Facility: HOSPITAL | Age: 75
End: 2025-06-10
Payer: MEDICARE

## 2025-06-10 VITALS
OXYGEN SATURATION: 97 % | DIASTOLIC BLOOD PRESSURE: 53 MMHG | HEIGHT: 62 IN | WEIGHT: 195 LBS | SYSTOLIC BLOOD PRESSURE: 120 MMHG | BODY MASS INDEX: 35.88 KG/M2 | HEART RATE: 50 BPM | RESPIRATION RATE: 17 BRPM | TEMPERATURE: 97.5 F

## 2025-06-10 DIAGNOSIS — E66.812 CLASS 2 OBESITY DUE TO EXCESS CALORIES WITHOUT SERIOUS COMORBIDITY WITH BODY MASS INDEX (BMI) OF 37.0 TO 37.9 IN ADULT: ICD-10-CM

## 2025-06-10 DIAGNOSIS — E11.21 CONTROLLED TYPE 2 DIABETES MELLITUS WITH DIABETIC NEPHROPATHY, WITHOUT LONG-TERM CURRENT USE OF INSULIN: ICD-10-CM

## 2025-06-10 DIAGNOSIS — E66.09 CLASS 2 OBESITY DUE TO EXCESS CALORIES WITHOUT SERIOUS COMORBIDITY WITH BODY MASS INDEX (BMI) OF 37.0 TO 37.9 IN ADULT: ICD-10-CM

## 2025-06-10 DIAGNOSIS — Z02.9 ENCOUNTER FOR OPIATE ANALGESIC USE AGREEMENT: Chronic | ICD-10-CM

## 2025-06-10 DIAGNOSIS — D69.6 THROMBOCYTOPENIA: ICD-10-CM

## 2025-06-10 DIAGNOSIS — M16.12 PRIMARY OSTEOARTHRITIS OF LEFT HIP: ICD-10-CM

## 2025-06-10 DIAGNOSIS — Z79.899 MEDICATION MANAGEMENT: Primary | ICD-10-CM

## 2025-06-10 DIAGNOSIS — M25.552 PAIN OF LEFT HIP: ICD-10-CM

## 2025-06-10 DIAGNOSIS — M54.16 LUMBAR RADICULITIS: ICD-10-CM

## 2025-06-10 PROCEDURE — 1159F MED LIST DOCD IN RCRD: CPT | Performed by: ANESTHESIOLOGY

## 2025-06-10 PROCEDURE — 1036F TOBACCO NON-USER: CPT | Performed by: ANESTHESIOLOGY

## 2025-06-10 PROCEDURE — 3008F BODY MASS INDEX DOCD: CPT | Performed by: ANESTHESIOLOGY

## 2025-06-10 PROCEDURE — 3074F SYST BP LT 130 MM HG: CPT | Performed by: ANESTHESIOLOGY

## 2025-06-10 PROCEDURE — 99213 OFFICE O/P EST LOW 20 MIN: CPT | Performed by: ANESTHESIOLOGY

## 2025-06-10 PROCEDURE — 1125F AMNT PAIN NOTED PAIN PRSNT: CPT | Performed by: ANESTHESIOLOGY

## 2025-06-10 PROCEDURE — 3078F DIAST BP <80 MM HG: CPT | Performed by: ANESTHESIOLOGY

## 2025-06-10 RX ORDER — FUROSEMIDE 40 MG/1
1 TABLET ORAL
COMMUNITY
Start: 2025-05-10

## 2025-06-10 RX ORDER — TRAMADOL HYDROCHLORIDE 50 MG/1
50 TABLET, FILM COATED ORAL 2 TIMES DAILY
Qty: 60 TABLET | Refills: 1 | Status: SHIPPED | OUTPATIENT
Start: 2025-06-10

## 2025-06-10 ASSESSMENT — PAIN SCALES - GENERAL: PAINLEVEL_OUTOF10: 9

## 2025-06-10 NOTE — TELEPHONE ENCOUNTER
Patient called and stated she will be going to see her cardiac Dr on June 23rd and she will ask him about the clearance. Patient also had questions about where the surgery would take place

## 2025-06-10 NOTE — H&P
History Of Present Illness  Saritha Cormier is a 74 y.o. female presenting with a chief complaint of severe osteoarthritis and degenerative changes of her left hip.  She had a left intra-articular hip injection on her last visit which was 5/13/2025 with no significant relief.  She has been seen by the orthopedic surgeon who is scheduling left total hip replacement in the next 3 weeks..     Past Medical History  She has a past medical history of Acquired absence of bilateral breasts and nipples (01/31/2022), Acute kidney injury, Anemia, Arthritis, Cancer (Multi), CHF (congestive heart failure), Chronic respiratory failure, CKD (chronic kidney disease), COPD (chronic obstructive pulmonary disease) (Multi), Coronary artery disease, Epistaxis not due to trauma (10/27/2024), Fall, GERD (gastroesophageal reflux disease), H/O chronic sinusitis, Heart valve disease, History of bilateral mastectomy, History of total hip replacement, right, History of transcatheter aortic valve replacement (TAVR) (04/2024), Hyperlipidemia, Hypertension, Morbid obesity (Multi), Neoplasm of unspecified behavior of bone, soft tissue, and skin (12/22/2015), Other bursitis of hip, right hip, Oxygen dependent, Pneumonia, Shortness of breath, Thrombocytopenia, Trigger finger, unspecified finger (06/22/2018), Type 2 diabetes mellitus, Unilateral primary osteoarthritis, right knee (02/19/2021), Vitamin D deficiency, and Weakness.    Surgical History  She has a past surgical history that includes Mastectomy (Bilateral); Hysterectomy (09/21/2015); Other surgical history (09/21/2015); Cardiac catheterization (N/A, 03/29/2024); Joint replacement (Right); Aortic valve replacement (04/2024); Cardiac catheterization (N/A, 04/30/2024); and Cardiac catheterization (N/A, 04/30/2024).     Social History  She reports that she has never smoked. She has never used smokeless tobacco. She reports that she does not drink alcohol and does not use drugs.    Family  History  Family History[1]     Allergies  Patient has no known allergies.    Review of Systems  Unremarkable except for chief complaint  Physical Exam  Gen. appearance:  Patient's alert and oriented ×3 ;cooperative mild to moderate distress  Heart: Regular rate and rhythm  Lungs: Clear to auscultation  Abdomen: Soft nontender  Neuro: Cranial nerves II -XII intact; DTR: +2 over 4 biceps triceps brachial radialis patellar and Achilles  Spinal exam: Positive paraspinal tenderness noted bilaterally L4 5 L5-S1 with flexion extension and rotation/no bony abnormalities  Musculoskeletal:  Upper and lower extremity strength was 4/5 bilaterally  :  deferred  Skin: Warm and dry    Last Recorded Vitals  /53 (BP Location: Left arm, Patient Position: Sitting, BP Cuff Size: Adult)   Pulse 50   Temp 36.4 °C (97.5 °F) (Temporal)   Resp 17   Wt 88.5 kg (195 lb)   SpO2 97% Comment: 2 LPM of O2    ASSESSMENT:  1.  AODM type II  2.  Obesity  3.  Stage III CKD  4.  Chronic low back pain secondary to lumbar spondylosis  5.Status post TAVR  6. OA Left Hip  PLAN:  Refill of tramadol 60 tablets of 50 mg was sent to her local pharmacy.  With 1 refill.  A follow-up visit scheduled on August 26, 2025.  Patient is scheduled to have her left total hip replacement this next month.      Otilio Bautista DO         [1]   Family History  Problem Relation Name Age of Onset    Gout Mother      Hypertension Mother      Arthritis Father      Other (cardiac disorder) Father      Cancer Father      Tongue cancer Brother      Other (jaw neoplasm) Brother      Cancer Daughter      Breast cancer Sibling      Pancreatic cancer Sibling

## 2025-06-10 NOTE — PROGRESS NOTES
Patient is here for post procedure visit.  Patient had a left hip injection on 5/13/2025.  Patient states she received no relief with the injection.  Chief complaint today is pain to her left hip that she rates 9/10 today.  Patient currently takes Tramadol for her pain and presented with a bottle containing 24 pills.       Date of the last Controlled Substance Agreement: 04/22/2025       Last urine drug screening date/ordered today:04/22/2025     Results of last screen:       Last opioid risk screening date/ordered today: 04/22/2025      Pain Scale Screening:   Pain Assessment and Documentation Tool (PADT)   Date of Assessment: 06/10/2025  Analgesia:   Patient reports her pain level on average during the past week is 9on a 0 - 10 scale.   Patient reports that her pain level at its worst during the past week was 10 on a 0 -10 scale.   75% of pain has been relieved during the past week per patient   Patient states that the amount of pain relief she is now obtaining from her current pain reliever(s) is enough to make a real difference in her life.   Query to clinician: Is the patient's pain relief clinically significant?   Activities of Daily Living:   Physical functioning: unchanged  Family relationships: unchanged  Social relationships: unchanged  Mood: unchanged  Sleep patterns: unchanged  Overall functioning: unchanged  Adverse Events: No, Saritha Cormier is not experiencing side effects from current pain reliever.  Patients overall severity of side effect:none  Specific Analgesic Plan: Continue present regimen.

## 2025-06-10 NOTE — TELEPHONE ENCOUNTER
I called and left a VM for the patient. We need cardiac clearance in order to get her a date for surgery. I wanted to talk to her about seeing her cardiologist in April and if they are able to clear her, or will she need another appointment?

## 2025-06-11 ENCOUNTER — HOSPITAL ENCOUNTER (OUTPATIENT)
Dept: CARDIOLOGY | Facility: HOSPITAL | Age: 75
Discharge: HOME | End: 2025-06-11
Payer: MEDICARE

## 2025-06-11 DIAGNOSIS — Z95.3 S/P TAVR (TRANSCATHETER AORTIC VALVE REPLACEMENT), BIOPROSTHETIC: ICD-10-CM

## 2025-06-11 PROCEDURE — 93306 TTE W/DOPPLER COMPLETE: CPT | Performed by: INTERNAL MEDICINE

## 2025-06-11 PROCEDURE — C8929 TTE W OR WO FOL WCON,DOPPLER: HCPCS

## 2025-06-11 PROCEDURE — 2500000004 HC RX 250 GENERAL PHARMACY W/ HCPCS (ALT 636 FOR OP/ED): Performed by: NURSE PRACTITIONER

## 2025-06-11 RX ADMIN — PERFLUTREN 1 ML OF DILUTION: 6.52 INJECTION, SUSPENSION INTRAVENOUS at 11:11

## 2025-06-12 LAB
AORTIC VALVE MEAN GRADIENT: 9 MMHG
AORTIC VALVE PEAK VELOCITY: 2.44 M/S
AV PEAK GRADIENT: 24 MMHG
AVA (PEAK VEL): 1.43 CM2
AVA (VTI): 1.34 CM2
EJECTION FRACTION APICAL 4 CHAMBER: 63.4
EJECTION FRACTION: 63 %
LEFT ATRIUM VOLUME AREA LENGTH INDEX BSA: 17.9 ML/M2
LEFT VENTRICLE INTERNAL DIMENSION DIASTOLE: 4.27 CM (ref 3.5–6)
LEFT VENTRICULAR OUTFLOW TRACT DIAMETER: 1.9 CM
MITRAL VALVE E/A RATIO: 0.88
RIGHT VENTRICLE FREE WALL PEAK S': 11.4 CM/S
TRICUSPID ANNULAR PLANE SYSTOLIC EXCURSION: 2.1 CM

## 2025-06-16 ENCOUNTER — APPOINTMENT (OUTPATIENT)
Dept: NEPHROLOGY | Facility: CLINIC | Age: 75
End: 2025-06-16
Payer: MEDICARE

## 2025-06-17 ENCOUNTER — APPOINTMENT (OUTPATIENT)
Dept: PAIN MEDICINE | Facility: HOSPITAL | Age: 75
End: 2025-06-17
Payer: MEDICARE

## 2025-06-23 ENCOUNTER — APPOINTMENT (OUTPATIENT)
Dept: CARDIOLOGY | Facility: CLINIC | Age: 75
End: 2025-06-23
Payer: MEDICARE

## 2025-06-23 ENCOUNTER — HOSPITAL ENCOUNTER (OUTPATIENT)
Dept: CARDIOLOGY | Facility: HOSPITAL | Age: 75
Discharge: HOME | End: 2025-06-23
Payer: MEDICARE

## 2025-06-23 VITALS
HEART RATE: 48 BPM | SYSTOLIC BLOOD PRESSURE: 119 MMHG | BODY MASS INDEX: 35.88 KG/M2 | OXYGEN SATURATION: 99 % | WEIGHT: 195 LBS | DIASTOLIC BLOOD PRESSURE: 61 MMHG | HEIGHT: 62 IN

## 2025-06-23 DIAGNOSIS — I10 BENIGN ESSENTIAL HYPERTENSION: Primary | ICD-10-CM

## 2025-06-23 DIAGNOSIS — I10 BENIGN ESSENTIAL HYPERTENSION: ICD-10-CM

## 2025-06-23 PROCEDURE — 3074F SYST BP LT 130 MM HG: CPT | Performed by: INTERNAL MEDICINE

## 2025-06-23 PROCEDURE — 3008F BODY MASS INDEX DOCD: CPT | Performed by: INTERNAL MEDICINE

## 2025-06-23 PROCEDURE — 1159F MED LIST DOCD IN RCRD: CPT | Performed by: INTERNAL MEDICINE

## 2025-06-23 PROCEDURE — 99215 OFFICE O/P EST HI 40 MIN: CPT | Performed by: INTERNAL MEDICINE

## 2025-06-23 PROCEDURE — 3078F DIAST BP <80 MM HG: CPT | Performed by: INTERNAL MEDICINE

## 2025-06-23 PROCEDURE — 1036F TOBACCO NON-USER: CPT | Performed by: INTERNAL MEDICINE

## 2025-06-23 PROCEDURE — 93005 ELECTROCARDIOGRAM TRACING: CPT

## 2025-06-23 NOTE — PROGRESS NOTES
Primary Care Physician: Alva Prince MD  Orthopedic Surgeon: Aleksandar Angulo MD    Date of Visit: 06/23/2025 10:40 AM EDT  Location of visit:  [facility]   Type of Visit: Follow up        Dear Dr Angulo,     DIAGNOSES: Preop cardiac evaluation (Hip replacement). AS S/p TAVR (26 Evolut FX 4/30/2024). Nonobstructive coronary atherosclerosis. HFpEF Euvolemic state. NYHA Class II. NSAID induced GI ulcer with bleed and anemia. HTN. DM. DLP. Obesity. ALBERTO. 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 CKD. S/p B/L mastectomy/Oophorectomy for CA breast BRCA+ DJD S/p Hip replacement surgery.       Chief Complaint   Patient presents with    Valve Disorder     Here 3 month follow up, having left hip surgery  needs cardiac clearance        HPI / Summary:   Saritha Cormier is a 74 y.o. female who returns for follow up and preop cardiac evaluation before her forthcoming hip surgery.  She feels well from a cardiac perspective without any worsening shortness of breath, palpitations, dizziness or syncope.  She is stable on continuous 2 L of oxygen.  She does not have any swelling of feet.      12 system review is negative except as noted above        Medical History:   Medical History[1]    Social History:   Tobacco Use: Low Risk  (6/23/2025)    Patient History     Smoking Tobacco Use: Never     Smokeless Tobacco Use: Never     Passive Exposure: Not on file         MEDICATIONS:   Current Outpatient Medications   Medication Instructions    aspirin 81 mg, oral, Daily    bumetanide (BUMEX) 1 mg, oral, Daily    ferrous gluconate (FERGON) 324 mg, oral, 2 times daily    furosemide (Lasix) 40 mg tablet 1 tablet, Daily (0630)    gabapentin (Neurontin) 300 mg capsule TAKE 2 CAPSULES (600 MG) BY MOUTH ONCE DAILY AT BEDTIME.    metoprolol tartrate (LOPRESSOR) 25 mg, oral, 2 times daily    oxygen (O2) gas therapy 1 each, inhalation, Continuous    pantoprazole (PROTONIX) 40 mg, oral, Daily    rosuvastatin (CRESTOR) 40 mg, oral, Daily  "   traMADol (ULTRAM) 50 mg, oral, 2 times daily           LABS:    CBC with Differential:    Lab Results   Component Value Date    WBC 11.3 (H) 05/20/2025    RBC 4.38 05/20/2025    HGB 11.9 05/20/2025    HCT 38.7 05/20/2025     05/20/2025    MCV 88.4 05/20/2025    MCH 27.2 05/20/2025    MCHC 30.7 (L) 05/20/2025    RDW 13.1 05/20/2025    NRBC 0.0 12/04/2024    BANDSPCT 8.0 03/06/2021    LYMPHOPCT 4.5 10/27/2024    MONOPCT 8.4 10/27/2024    EOSPCT 0.5 10/27/2024    BASOPCT 0.1 10/27/2024    MONOSABS 0.85 (H) 10/27/2024    LYMPHSABS 0.46 (L) 10/27/2024    EOSABS 0.05 10/27/2024    BASOSABS 0.01 10/27/2024     CMP:    Lab Results   Component Value Date     05/20/2025    K 4.7 05/20/2025     05/20/2025    CO2 28 05/20/2025    BUN 42 (H) 05/20/2025    CREATININE 1.58 (H) 05/20/2025    GLUCOSE 124 (H) 05/20/2025    PROT 8.0 10/27/2024    CALCIUM 9.2 05/20/2025    BILITOT 0.8 10/27/2024    ALKPHOS 91 10/27/2024    AST 12 10/27/2024    ALT 6 (L) 10/27/2024     BMP:    Lab Results   Component Value Date     05/20/2025    K 4.7 05/20/2025     05/20/2025    CO2 28 05/20/2025    BUN 42 (H) 05/20/2025    CREATININE 1.58 (H) 05/20/2025    CALCIUM 9.2 05/20/2025    GLUCOSE 124 (H) 05/20/2025     Magnesium:  Lab Results   Component Value Date    MG 2.49 (H) 11/02/2024     Troponin:    Lab Results   Component Value Date    TROPHS 29 (H) 10/28/2024    TROPHS 30 (H) 10/27/2024    TROPHS 19 (H) 10/27/2024     BNP:   Lab Results   Component Value Date     (H) 10/29/2024         Lipid Panel:  Lab Results   Component Value Date    HDL 33.7 06/17/2024    CHHDL 4.2 06/17/2024    VLDL 24 06/17/2024    TRIG 120 06/17/2024    NHDL 106 06/17/2024        Lab work and imaging results independently reviewed by me     Visit Vitals  /61   Pulse (!) 48   Ht 1.575 m (5' 2\")   Wt 88.5 kg (195 lb)   SpO2 99%   BMI 35.67 kg/m²   OB Status Hysterectomy   Smoking Status Never   BSA 1.97 m²         Physical " Exam  On examination, she was comfortably sitting.  HEENT: normal, Neck: supple, Carotids: brisk upstroke, JVP: normal, CVS: Rate and rhythm regular, S1S2, Chest: CTAB, Abdomen: soft nontender, no organomegaly appreciated, Extremities: without any edema, CNS: HMF normal, no focal neurological deficit.    DIAGNOSES: Preop cardiac evaluation (Hip replacement). AS S/p TAVR (26 Evolut FX 4/30/2024). Nonobstructive coronary atherosclerosis. HFpEF Euvolemic state. NYHA Class II. NSAID induced GI ulcer with bleed and anemia. HTN. DM. DLP. Obesity. ALBERTO. 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 CKD. S/p B/L mastectomy/Oophorectomy for CA breast BRCA+ DJD S/p Hip replacement surgery.     I am pleased to see that Saritha has been remaining stable from a cardiac perspective, maintaining euvolemic state.  She may be taken up for orthopedic surgery with acceptable risk of cardiovascular complications.    I advised her to continue with medications as prescribed and regular follow-up with Dr. Prince and cardiology as scheduled.    Thank you so much for the opportunity to participate in the care of this very pleasant lady. Please do not hesitate to contact me if I could be of any assistance in her future care.    Sincerely        Frank Reilly M.D.          06/23/25 at 11:12 AM - Javier Reilly MD      Orders:  Orders Placed This Encounter   Procedures    ECG 12 Lead         Followup Appts:  Future Appointments   Date Time Provider Department Center   6/26/2025 10:30 AM Alva Prince MD [id number] [facility]   6/30/2025  9:20 AM Samara Mendez, PharmD [id number] [facility]   8/26/2025  2:00 PM Otilio Bautista DO [id number] [facility]   10/14/2025  3:20 PM Mary Mercedes MD [id number] [facility]   1/7/2026 10:20 AM Nelly Little MD [id number] [facility]           [1]   Past Medical History:  Diagnosis Date    Acquired absence of bilateral breasts and nipples 01/31/2022    History of bilateral mastectomy     Acute kidney injury     Anemia     Arthritis     Cancer (Multi)     CHF (congestive heart failure)     Chronic respiratory failure     CKD (chronic kidney disease)     stage 3    COPD (chronic obstructive pulmonary disease) (Multi)     Coronary artery disease     Epistaxis not due to trauma 10/27/2024    Fall     GERD (gastroesophageal reflux disease)     H/O chronic sinusitis     Heart valve disease     History of bilateral mastectomy     History of total hip replacement, right     History of transcatheter aortic valve replacement (TAVR) 04/2024    Hyperlipidemia     Hypertension     Morbid obesity (Multi)     Neoplasm of unspecified behavior of bone, soft tissue, 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/2015    Skin growth    Other bursitis of hip, right hip     Bursitis of right hip, unspecified bursa    Oxygen dependent     2LPM with sleep    Pneumonia     Shortness of breath     Thrombocytopenia     Trigger finger, unspecified finger 06/22/2018    Acquired trigger finger    Type 2 diabetes mellitus     Unilateral primary osteoarthritis, right knee 02/19/2021    Arthritis of knee, right    Vitamin D deficiency     Weakness

## 2025-06-25 LAB
ATRIAL RATE: 50 BPM
P AXIS: -2 DEGREES
P OFFSET: 170 MS
P ONSET: 123 MS
PR INTERVAL: 206 MS
Q ONSET: 226 MS
QRS COUNT: 8 BEATS
QRS DURATION: 82 MS
QT INTERVAL: 430 MS
QTC CALCULATION(BAZETT): 392 MS
QTC FREDERICIA: 404 MS
R AXIS: 18 DEGREES
T AXIS: 7 DEGREES
T OFFSET: 441 MS
VENTRICULAR RATE: 50 BPM

## 2025-06-26 ENCOUNTER — TELEPHONE (OUTPATIENT)
Dept: ORTHOPEDIC SURGERY | Facility: CLINIC | Age: 75
End: 2025-06-26

## 2025-06-26 ENCOUNTER — APPOINTMENT (OUTPATIENT)
Dept: PRIMARY CARE | Facility: CLINIC | Age: 75
End: 2025-06-26
Payer: MEDICARE

## 2025-06-26 VITALS
HEIGHT: 62 IN | WEIGHT: 199 LBS | HEART RATE: 53 BPM | TEMPERATURE: 96.5 F | OXYGEN SATURATION: 93 % | DIASTOLIC BLOOD PRESSURE: 66 MMHG | SYSTOLIC BLOOD PRESSURE: 100 MMHG | BODY MASS INDEX: 36.62 KG/M2

## 2025-06-26 DIAGNOSIS — I25.10 CORONARY ARTERY CALCIFICATION: ICD-10-CM

## 2025-06-26 DIAGNOSIS — E78.00 PURE HYPERCHOLESTEROLEMIA, UNSPECIFIED: ICD-10-CM

## 2025-06-26 DIAGNOSIS — Z00.00 ROUTINE GENERAL MEDICAL EXAMINATION AT HEALTH CARE FACILITY: Primary | ICD-10-CM

## 2025-06-26 DIAGNOSIS — E55.9 VITAMIN D DEFICIENCY, UNSPECIFIED: ICD-10-CM

## 2025-06-26 DIAGNOSIS — R73.09 ABNORMAL BLOOD SUGAR: ICD-10-CM

## 2025-06-26 DIAGNOSIS — E78.00 HYPERCHOLESTEREMIA: ICD-10-CM

## 2025-06-26 DIAGNOSIS — R53.83 OTHER FATIGUE: ICD-10-CM

## 2025-06-26 DIAGNOSIS — Z79.899 HIGH RISK MEDICATION USE: ICD-10-CM

## 2025-06-26 DIAGNOSIS — I50.32 CHRONIC DIASTOLIC HEART FAILURE: ICD-10-CM

## 2025-06-26 DIAGNOSIS — E78.00 ELEVATED LDL CHOLESTEROL LEVEL: ICD-10-CM

## 2025-06-26 DIAGNOSIS — M25.552 PAIN OF LEFT HIP: ICD-10-CM

## 2025-06-26 DIAGNOSIS — E53.8 VITAMIN B12 DEFICIENCY: ICD-10-CM

## 2025-06-26 DIAGNOSIS — N18.31 TYPE 2 DIABETES MELLITUS WITH STAGE 3A CHRONIC KIDNEY DISEASE, WITHOUT LONG-TERM CURRENT USE OF INSULIN (MULTI): ICD-10-CM

## 2025-06-26 DIAGNOSIS — I10 HYPERTENSION, UNSPECIFIED TYPE: ICD-10-CM

## 2025-06-26 DIAGNOSIS — I10 BENIGN ESSENTIAL HYPERTENSION: ICD-10-CM

## 2025-06-26 DIAGNOSIS — E11.22 TYPE 2 DIABETES MELLITUS WITH STAGE 3A CHRONIC KIDNEY DISEASE, WITHOUT LONG-TERM CURRENT USE OF INSULIN (MULTI): ICD-10-CM

## 2025-06-26 PROCEDURE — 3078F DIAST BP <80 MM HG: CPT | Performed by: INTERNAL MEDICINE

## 2025-06-26 PROCEDURE — 99214 OFFICE O/P EST MOD 30 MIN: CPT | Performed by: INTERNAL MEDICINE

## 2025-06-26 PROCEDURE — 3008F BODY MASS INDEX DOCD: CPT | Performed by: INTERNAL MEDICINE

## 2025-06-26 PROCEDURE — 1036F TOBACCO NON-USER: CPT | Performed by: INTERNAL MEDICINE

## 2025-06-26 PROCEDURE — 1170F FXNL STATUS ASSESSED: CPT | Performed by: INTERNAL MEDICINE

## 2025-06-26 PROCEDURE — 3074F SYST BP LT 130 MM HG: CPT | Performed by: INTERNAL MEDICINE

## 2025-06-26 PROCEDURE — G0439 PPPS, SUBSEQ VISIT: HCPCS | Performed by: INTERNAL MEDICINE

## 2025-06-26 PROCEDURE — 1159F MED LIST DOCD IN RCRD: CPT | Performed by: INTERNAL MEDICINE

## 2025-06-26 PROCEDURE — 1160F RVW MEDS BY RX/DR IN RCRD: CPT | Performed by: INTERNAL MEDICINE

## 2025-06-26 PROCEDURE — 1124F ACP DISCUSS-NO DSCNMKR DOCD: CPT | Performed by: INTERNAL MEDICINE

## 2025-06-26 RX ORDER — ROSUVASTATIN CALCIUM 40 MG/1
40 TABLET, COATED ORAL DAILY
Qty: 90 TABLET | Refills: 1 | Status: SHIPPED | OUTPATIENT
Start: 2025-06-26

## 2025-06-26 ASSESSMENT — ENCOUNTER SYMPTOMS
DIARRHEA: 0
BRUISES/BLEEDS EASILY: 0
COUGH: 0
WHEEZING: 0
FATIGUE: 0
BLOOD IN STOOL: 0
SORE THROAT: 0
DIFFICULTY URINATING: 0
FEVER: 0
PALPITATIONS: 0
UNEXPECTED WEIGHT CHANGE: 0
SINUS PAIN: 0
ABDOMINAL PAIN: 0
HEADACHES: 0
DIZZINESS: 0
ARTHRALGIAS: 0

## 2025-06-26 ASSESSMENT — ACTIVITIES OF DAILY LIVING (ADL)
GROCERY_SHOPPING: NEEDS ASSISTANCE
DRESSING: INDEPENDENT
MANAGING_FINANCES: INDEPENDENT
TAKING_MEDICATION: INDEPENDENT
BATHING: INDEPENDENT
DOING_HOUSEWORK: INDEPENDENT

## 2025-06-26 NOTE — PATIENT INSTRUCTIONS

## 2025-06-26 NOTE — PROGRESS NOTES
Subjective   Reason for Visit: Saritha Cormier is an 74 y.o. female here for a Medicare Wellness visit.     Past Medical, Surgical, and Family History reviewed and updated in chart.    Reviewed all medications by prescribing practitioner or clinical pharmacist (such as prescriptions, OTCs, herbal therapies and supplements) and documented in the medical record.    Annual Medicare physical and preventive visit  - Vaccinations need to proceed with pneumonia vaccine  -Screening for colon cancer up-to-date  - Screening for breast cancer up to date bilateral mastectomy  Testing for depression negative  - Advance of directive reviewed      Follow-up  -Left hip pain patient may proceed with surgery patient cleared from cardiology and nephrology  Proceed with complete blood work today for further recommendation  -Chronic kidney disease very well compensated continue with Bumex as recommended continue low-salt diet  -CHF continue low-salt diet continue with Bumex as recommended  --Patient not able to use Jardiance due to expense not covered with insurance continue monitoring  --Respiratory failure continues 2 L oxygen follow-up pulmonary as scheduled  --Status post TAVR continue with aspirin Plavix follow-up CBC  -Pain control continue with gabapentin and tramadol from pain management.  - History of bilateral mastectomy compensated.  - Diabetes continue with metformin..  - Chronic gout compensated continue with allopurinol.  Counseled about low-salt diet.  - Coronary artery disease compensated follow-up cardiology follow-up closely as needed follow-up cardiology  Follow-up 3 months          Patient Care Team:  Alva Prince MD as PCP - General  Alva Prince MD as PCP - [facility] Attributed Provider     Review of Systems   Constitutional:  Negative for fatigue, fever and unexpected weight change.   HENT:  Negative for congestion, ear discharge, ear pain, mouth sores, sinus pain and sore throat.    Eyes:  Negative  "for visual disturbance.   Respiratory:  Negative for cough and wheezing.    Cardiovascular:  Negative for chest pain, palpitations and leg swelling.   Gastrointestinal:  Negative for abdominal pain, blood in stool and diarrhea.   Genitourinary:  Negative for difficulty urinating.   Musculoskeletal:  Negative for arthralgias.   Skin:  Negative for rash.   Neurological:  Negative for dizziness and headaches.   Hematological:  Does not bruise/bleed easily.   Psychiatric/Behavioral:  Negative for behavioral problems.    All other systems reviewed and are negative.      Objective   Vitals:  /66   Pulse 53   Temp 35.8 °C (96.5 °F)   Ht 1.575 m (5' 2\")   Wt 90.3 kg (199 lb)   SpO2 93%   BMI 36.40 kg/m²     Lab Results   Component Value Date    WBC 11.3 (H) 05/20/2025    HGB 11.9 05/20/2025    HCT 38.7 05/20/2025     05/20/2025    CHOL 140 06/17/2024    TRIG 120 06/17/2024    HDL 33.7 06/17/2024    ALT 6 (L) 10/27/2024    AST 12 10/27/2024     05/20/2025    K 4.7 05/20/2025     05/20/2025    CREATININE 1.58 (H) 05/20/2025    BUN 42 (H) 05/20/2025    CO2 28 05/20/2025    TSH 3.28 06/17/2024    INR 1.2 (H) 03/16/2021    HGBA1C 5.8 (H) 10/28/2024     par   Physical Exam  Vitals and nursing note reviewed.   Constitutional:       Appearance: Normal appearance.   HENT:      Head: Normocephalic.      Nose: Nose normal.   Eyes:      Conjunctiva/sclera: Conjunctivae normal.      Pupils: Pupils are equal, round, and reactive to light.   Cardiovascular:      Rate and Rhythm: Regular rhythm.   Pulmonary:      Effort: Pulmonary effort is normal.      Breath sounds: Normal breath sounds.   Abdominal:      General: Abdomen is flat.      Palpations: Abdomen is soft.   Musculoskeletal:      Cervical back: Neck supple.   Skin:     General: Skin is warm.   Neurological:      General: No focal deficit present.      Mental Status: She is oriented to person, place, and time.   Psychiatric:         Mood and Affect: " Mood normal.         Assessment & Plan  Pure hypercholesterolemia, unspecified    Orders:    rosuvastatin (Crestor) 40 mg tablet; Take 1 tablet (40 mg) by mouth once daily.    Routine general medical examination at health care facility    Orders:    1 Year Follow Up In Primary Care - Wellness Exam; Future    pneumoc 20-yari conj-dip cr,PF, (Prevnar) 0.5 mL vaccine; Inject 0.5 mL into the muscle 1 time for 1 dose. Inject intramuscular x 1    High risk medication use    Orders:    CBC and Auto Differential; Future    Hypertension, unspecified type    Orders:    Comprehensive Metabolic Panel; Future    Abnormal blood sugar    Orders:    Hemoglobin A1C; Future    Hypercholesteremia    Orders:    Lipid Panel; Future    Other fatigue    Orders:    TSH with reflex to Free T4 if abnormal; Future    Vitamin B12 deficiency    Orders:    Vitamin B12; Future    Vitamin D deficiency, unspecified    Orders:    Vitamin D 25-Hydroxy,Total (for eval of Vitamin D levels); Future    Benign essential hypertension         Coronary artery calcification         Elevated LDL cholesterol level         Chronic diastolic heart failure         Type 2 diabetes mellitus with stage 3a chronic kidney disease, without long-term current use of insulin (Multi)         Pain of left hip          Annual Medicare physical and preventive visit  - Vaccinations need to proceed with pneumonia vaccine  -Screening for colon cancer up-to-date  - Screening for breast cancer up to date bilateral mastectomy  Testing for depression negative  - Advance of directive reviewed      Follow-up  -Left hip pain patient may proceed with surgery patient cleared from cardiology and nephrology  Proceed with complete blood work today for further recommendation  -Chronic kidney disease very well compensated continue with Bumex as recommended continue low-salt diet  -CHF continue low-salt diet continue with Bumex as recommended  --Patient not able to use Jardiance due to expense  not covered with insurance continue monitoring  --Respiratory failure continues 2 L oxygen follow-up pulmonary as scheduled  --Status post TAVR continue with aspirin Plavix follow-up CBC  -Pain control continue with gabapentin and tramadol from pain management.  - History of bilateral mastectomy compensated.  - Diabetes continue with metformin..  - Chronic gout compensated continue with allopurinol.  Counseled about low-salt diet.  - Coronary artery disease compensated follow-up cardiology follow-up closely as needed follow-up cardiology  Follow-up 3 months  Patient medically cleared for surgery         Patient was identified as a fall risk. Risk prevention instructions provided.

## 2025-06-26 NOTE — TELEPHONE ENCOUNTER
Patient called back and stated she went to the Dr today to get one of her clearances done, I let her know you will be in contact tomorrow to speak about the date and clearance.    [phone]

## 2025-06-27 NOTE — PROGRESS NOTES
Clinical Pharmacy Appointment    Patient ID: Saritha Cormier is a 74 y.o. female who presents for Congestive Heart Failure    Pt is here for Follow Up appointment.     Referring Provider: Javier Reilly,*  PCP: Alva Prince MD   Last visit with PCP: 6/26/25   Next visit with PCP: 9/29/25    Subjective     HPI  CONGESTIVE HEART FAILURE ASSESSMENT  Does patient follow with Cardiology: Yes    Date: 6/23/25    Staging  Ejection Fraction: 60-65% (6/11/25)  NYHA Class: Class II - Slight limitation to physical activity  ACC/AHA Stage: C - Disease with symptoms    Symptom Assessment  Weight changes/edema?: No - checks weight daily.  Dyspnea?: With exertion; on oxygen. No dyspnea with ADLs.   Dizziness/syncope/palpitations?: No    Medication Therapy  Current Regimen (GDMT):  ARNI/ACEi/ARB: No  Beta Blocker: Yes - Metoprolol Tartrate 25 mg BID   MRA: No  SGLT2i: No - too expensive     Other therapy:  Bumetanide 1 mg daily     Adverse Effects: None      Secondary Prevention  The ASCVD Risk score (Humberto CASE, et al., 2019) failed to calculate for the following reasons:    Risk score cannot be calculated because patient has a medical history suggesting prior/existing ASCVD  Aspirin 81mg? yes  Statin?: Yes - Rosuvastatin 40 mg daily   HTN?: Yes - slightly above goal      Medication Reconciliation:  No changes     Drug Interactions  No relevant drug interactions were noted.    Medication System Management  Patient's preferred pharmacy: [facility]  Adherence/Organization: No issues   Affordability/Accessibility: Unable to afford Jardiance - $500 per month   PAP: Screening completed  Patient is ineligible due to income   Inpefa $55 per month through [employer]    Objective   No Known Allergies  Social History     Social History Narrative    Not on file      Medication Review  Current Outpatient Medications   Medication Instructions    aspirin 81 mg, oral, Daily    bumetanide (BUMEX) 1 mg, oral, Daily    ferrous  gluconate (FERGON) 324 mg, oral, 2 times daily    gabapentin (Neurontin) 300 mg capsule TAKE 2 CAPSULES (600 MG) BY MOUTH ONCE DAILY AT BEDTIME.    metoprolol tartrate (LOPRESSOR) 25 mg, oral, 2 times daily    oxygen (O2) gas therapy 1 each, inhalation, Continuous    pantoprazole (PROTONIX) 40 mg, oral, Daily    rosuvastatin (CRESTOR) 40 mg, oral, Daily    traMADol (ULTRAM) 50 mg, oral, 2 times daily      Vitals  BP Readings from Last 2 Encounters:   06/26/25 100/66   06/23/25 119/61     BMI Readings from Last 1 Encounters:   06/26/25 36.40 kg/m²      Labs  A1C  Lab Results   Component Value Date    HGBA1C 6.9 (H) 06/27/2025    HGBA1C 5.8 (H) 10/28/2024    HGBA1C 5.8 06/25/2024     BMP  Lab Results   Component Value Date    CALCIUM 9.3 06/27/2025     06/27/2025    K 4.7 06/27/2025    CO2 28 06/27/2025     06/27/2025    BUN 30 (H) 06/27/2025    CREATININE 1.42 (H) 06/27/2025    EGFR 39 (L) 06/27/2025     LFTs  Lab Results   Component Value Date    ALT 12 06/27/2025    AST 14 06/27/2025    ALKPHOS 69 06/27/2025    BILITOT 0.5 06/27/2025     FLP  Lab Results   Component Value Date    TRIG 134 06/27/2025    CHOL 154 06/27/2025    LDLF 90 08/08/2022    LDLCALC 87 06/27/2025    HDL 44 (L) 06/27/2025     Urine Microalbumin  Lab Results   Component Value Date    MICROALBCREA  12/04/2024      Comment:      One or more analytes used in this calculation is outside of the analytical measurement range. Calculation cannot be performed.     Weight Management  Wt Readings from Last 3 Encounters:   06/26/25 90.3 kg (199 lb)   06/23/25 88.5 kg (195 lb)   06/10/25 88.5 kg (195 lb)      There is no height or weight on file to calculate BMI.     Assessment/Plan   Problem List Items Addressed This Visit       Chronic diastolic heart failure    Relevant Orders    Referral to Clinical Pharmacy     Patient has Stage C Class II HFpEF with most recent EF 60-65%. Patient is not on complete GDMT due to affordability issues with  SGLT2 therapy.     Rationale for plan: No signs or symptoms of worsening HF reported by patient. Weight and BP are stable at this time. Updated echocardiogram on 6/11/25 - EF improved to 60-65% Patient's lab work last completed on 12/4/24. No dose adjustments needed at this time. Patient denies any side effects or concerns with current HF regimen. Patient screened for medication cost assistance. At this time, patient in not eligible for [employer] PAP due to income exceeding 400% [employer]. As patient is on Medicare, she is also ineligible for  copay cards and income exceeds limit for  PAPs. Jardiance and Farxiga copays are around $150-$200 per month. Inpefa (sotagliflozin) through [employer] with cash price of $55 per month. Discussed recommendations for SGLT2 therapy for HFpEF. Patient reports that she is feeling good and does not want to start additional therapy although notes that Inpefa copay would be affordable if needed. Will continue to follow. Patient requests pharmacy follow up after next cardilogy visit in December.     Medication Changes: None  CONTINUE:   Metoprolol Tartrate 25 mg BID   Bumetanide 1 mg daily     Monitoring and Education:  Weigh yourself without clothing daily after using the bathroom first thing in the morning before breakfast   Contact your physician/seek help immediately if you notice the following with symptoms of shortness of breath or swelling in your extremities:   Weight gain of 3+ lbs overnight   Weight gain of 5+ lbs in a week   Physical limitations to your normal physical activity level   Limit fluid intake as instructed by your doctor and follow a heart friendly diet low in salt, fat, and focused in lean meats   Aim to exercise for 30 minutes anywhere between 3 to 5 times a week or more, depending on your physical limitations   Keep a log of your daily BP, HR and weight to share with providers   If you are a smoker or drink alcohol, consider cessation to improve your  [facility]     Clinical Pharmacist follow-up: 1/5/26 @ 0920, Telehealth Visit    Continue all meds under the continuation of care with the referring provider and clinical pharmacy team.    Thank you,  Samara Mendez, PharmD  Clinical Pharmacist  [phone]    Verbal consent to manage patient's drug therapy was obtained from the patient. They were informed they may decline to participate or withdraw from participation in pharmacy services at any time.

## 2025-06-28 LAB
25(OH)D3+25(OH)D2 SERPL-MCNC: 46 NG/ML (ref 30–100)
ALBUMIN SERPL-MCNC: 4.1 G/DL (ref 3.6–5.1)
ALP SERPL-CCNC: 69 U/L (ref 37–153)
ALT SERPL-CCNC: 12 U/L (ref 6–29)
ANION GAP SERPL CALCULATED.4IONS-SCNC: 11 MMOL/L (CALC) (ref 7–17)
AST SERPL-CCNC: 14 U/L (ref 10–35)
BASOPHILS # BLD AUTO: 18 CELLS/UL (ref 0–200)
BASOPHILS NFR BLD AUTO: 0.2 %
BILIRUB SERPL-MCNC: 0.5 MG/DL (ref 0.2–1.2)
BUN SERPL-MCNC: 30 MG/DL (ref 7–25)
CALCIUM SERPL-MCNC: 9.3 MG/DL (ref 8.6–10.4)
CHLORIDE SERPL-SCNC: 104 MMOL/L (ref 98–110)
CHOLEST SERPL-MCNC: 154 MG/DL
CHOLEST/HDLC SERPL: 3.5 (CALC)
CO2 SERPL-SCNC: 28 MMOL/L (ref 20–32)
CREAT SERPL-MCNC: 1.42 MG/DL (ref 0.6–1)
EGFRCR SERPLBLD CKD-EPI 2021: 39 ML/MIN/1.73M2
EOSINOPHIL # BLD AUTO: 171 CELLS/UL (ref 15–500)
EOSINOPHIL NFR BLD AUTO: 1.9 %
ERYTHROCYTE [DISTWIDTH] IN BLOOD BY AUTOMATED COUNT: 13.9 % (ref 11–15)
EST. AVERAGE GLUCOSE BLD GHB EST-MCNC: 151 MG/DL
EST. AVERAGE GLUCOSE BLD GHB EST-SCNC: 8.4 MMOL/L
GLUCOSE SERPL-MCNC: 116 MG/DL (ref 65–99)
HBA1C MFR BLD: 6.9 %
HCT VFR BLD AUTO: 35 % (ref 35–45)
HDLC SERPL-MCNC: 44 MG/DL
HGB BLD-MCNC: 10.7 G/DL (ref 11.7–15.5)
LDLC SERPL CALC-MCNC: 87 MG/DL (CALC)
LYMPHOCYTES # BLD AUTO: 1035 CELLS/UL (ref 850–3900)
LYMPHOCYTES NFR BLD AUTO: 11.5 %
MCH RBC QN AUTO: 27.6 PG (ref 27–33)
MCHC RBC AUTO-ENTMCNC: 30.6 G/DL (ref 32–36)
MCV RBC AUTO: 90.4 FL (ref 80–100)
MONOCYTES # BLD AUTO: 756 CELLS/UL (ref 200–950)
MONOCYTES NFR BLD AUTO: 8.4 %
NEUTROPHILS # BLD AUTO: 7020 CELLS/UL (ref 1500–7800)
NEUTROPHILS NFR BLD AUTO: 78 %
NONHDLC SERPL-MCNC: 110 MG/DL (CALC)
PLATELET # BLD AUTO: 137 THOUSAND/UL (ref 140–400)
PMV BLD REES-ECKER: 9.7 FL (ref 7.5–12.5)
POTASSIUM SERPL-SCNC: 4.7 MMOL/L (ref 3.5–5.3)
PROT SERPL-MCNC: 7.2 G/DL (ref 6.1–8.1)
RBC # BLD AUTO: 3.87 MILLION/UL (ref 3.8–5.1)
SODIUM SERPL-SCNC: 143 MMOL/L (ref 135–146)
TRIGL SERPL-MCNC: 134 MG/DL
TSH SERPL-ACNC: 1.65 MIU/L (ref 0.4–4.5)
VIT B12 SERPL-MCNC: 297 PG/ML (ref 200–1100)
WBC # BLD AUTO: 9 THOUSAND/UL (ref 3.8–10.8)

## 2025-06-30 ENCOUNTER — APPOINTMENT (OUTPATIENT)
Dept: PHARMACY | Facility: HOSPITAL | Age: 75
End: 2025-06-30
Payer: MEDICARE

## 2025-06-30 DIAGNOSIS — I50.32 CHRONIC DIASTOLIC HEART FAILURE: ICD-10-CM

## 2025-06-30 NOTE — Clinical Note
Lopez Reilly, Mrs. Cormier is doing well on her current regimen, although she has not yet started an SGLT2. We have discussed several options. Both Jardiance and Farxiga would be too costly since she is not eligible for copay assistance. Inpefa would be affordable at $55 per month but she remains apprehensive about additional therapy. She wanted to discuss more with you at her next visit in December.  Please let me know if you have any questions! Samara

## 2025-07-01 RX ORDER — SOTAGLIFLOZIN 200 MG/1
200 TABLET ORAL DAILY
Qty: 30 TABLET | Refills: 11 | Status: SHIPPED | OUTPATIENT
Start: 2025-07-01

## 2025-07-03 ENCOUNTER — TELEPHONE (OUTPATIENT)
Dept: ORTHOPEDIC SURGERY | Facility: CLINIC | Age: 75
End: 2025-07-03
Payer: MEDICARE

## 2025-07-03 DIAGNOSIS — M16.12 ARTHRITIS OF LEFT HIP: Primary | ICD-10-CM

## 2025-07-03 NOTE — TELEPHONE ENCOUNTER
With Dr. Hernandez ok we have placed Saritha on the schedule for 8/25. She is concerned about her oxygen/needing oxygen. I will be reaching out to PAT to make sure they are aware of her clearances etc. Saritha may need to reschedule this for September, we will be in touch next to finalize dates and I will schedule her joint class then.

## 2025-08-05 ENCOUNTER — ANESTHESIA EVENT (OUTPATIENT)
Dept: OPERATING ROOM | Facility: HOSPITAL | Age: 75
End: 2025-08-05
Payer: MEDICARE

## 2025-08-05 ENCOUNTER — HOSPITAL ENCOUNTER (OUTPATIENT)
Dept: RADIOLOGY | Facility: HOSPITAL | Age: 75
Discharge: HOME | End: 2025-08-05
Payer: MEDICARE

## 2025-08-05 ENCOUNTER — PRE-ADMISSION TESTING (OUTPATIENT)
Dept: PREADMISSION TESTING | Facility: HOSPITAL | Age: 75
End: 2025-08-05
Payer: MEDICARE

## 2025-08-05 VITALS
BODY MASS INDEX: 37.89 KG/M2 | HEART RATE: 45 BPM | TEMPERATURE: 97.3 F | OXYGEN SATURATION: 96 % | WEIGHT: 205.91 LBS | RESPIRATION RATE: 18 BRPM | DIASTOLIC BLOOD PRESSURE: 76 MMHG | SYSTOLIC BLOOD PRESSURE: 116 MMHG | HEIGHT: 62 IN

## 2025-08-05 DIAGNOSIS — Z01.818 ENCOUNTER FOR OTHER PREPROCEDURAL EXAMINATION: ICD-10-CM

## 2025-08-05 DIAGNOSIS — M25.552 LEFT HIP PAIN: ICD-10-CM

## 2025-08-05 DIAGNOSIS — Z01.818 PREOPERATIVE EXAMINATION: Primary | ICD-10-CM

## 2025-08-05 DIAGNOSIS — M16.11 PRIMARY OSTEOARTHRITIS OF RIGHT HIP: ICD-10-CM

## 2025-08-05 LAB
ALBUMIN SERPL BCP-MCNC: 3.9 G/DL (ref 3.4–5)
ALP SERPL-CCNC: 60 U/L (ref 33–136)
ALT SERPL W P-5'-P-CCNC: 10 U/L (ref 7–45)
ANION GAP SERPL CALC-SCNC: 9 MMOL/L (ref 10–20)
AST SERPL W P-5'-P-CCNC: 13 U/L (ref 9–39)
BASOPHILS # BLD AUTO: 0.01 X10*3/UL (ref 0–0.1)
BASOPHILS NFR BLD AUTO: 0.1 %
BILIRUB SERPL-MCNC: 0.6 MG/DL (ref 0–1.2)
BUN SERPL-MCNC: 34 MG/DL (ref 6–23)
CALCIUM SERPL-MCNC: 9.5 MG/DL (ref 8.6–10.3)
CHLORIDE SERPL-SCNC: 100 MMOL/L (ref 98–107)
CO2 SERPL-SCNC: 36 MMOL/L (ref 21–32)
CREAT SERPL-MCNC: 1.68 MG/DL (ref 0.5–1.05)
EGFRCR SERPLBLD CKD-EPI 2021: 32 ML/MIN/1.73M*2
EOSINOPHIL # BLD AUTO: 0.2 X10*3/UL (ref 0–0.4)
EOSINOPHIL NFR BLD AUTO: 2.4 %
ERYTHROCYTE [DISTWIDTH] IN BLOOD BY AUTOMATED COUNT: 13.8 % (ref 11.5–14.5)
GLUCOSE SERPL-MCNC: 119 MG/DL (ref 74–99)
HCT VFR BLD AUTO: 35.1 % (ref 36–46)
HGB BLD-MCNC: 11 G/DL (ref 12–16)
IMM GRANULOCYTES # BLD AUTO: 0.02 X10*3/UL (ref 0–0.5)
IMM GRANULOCYTES NFR BLD AUTO: 0.2 % (ref 0–0.9)
LYMPHOCYTES # BLD AUTO: 0.89 X10*3/UL (ref 0.8–3)
LYMPHOCYTES NFR BLD AUTO: 10.6 %
MCH RBC QN AUTO: 28.9 PG (ref 26–34)
MCHC RBC AUTO-ENTMCNC: 31.3 G/DL (ref 32–36)
MCV RBC AUTO: 92 FL (ref 80–100)
MONOCYTES # BLD AUTO: 0.68 X10*3/UL (ref 0.05–0.8)
MONOCYTES NFR BLD AUTO: 8.1 %
NEUTROPHILS # BLD AUTO: 6.6 X10*3/UL (ref 1.6–5.5)
NEUTROPHILS NFR BLD AUTO: 78.6 %
NRBC BLD-RTO: 0 /100 WBCS (ref 0–0)
PLATELET # BLD AUTO: 114 X10*3/UL (ref 150–450)
POTASSIUM SERPL-SCNC: 4.9 MMOL/L (ref 3.5–5.3)
PROT SERPL-MCNC: 7.3 G/DL (ref 6.4–8.2)
RBC # BLD AUTO: 3.8 X10*6/UL (ref 4–5.2)
SODIUM SERPL-SCNC: 140 MMOL/L (ref 136–145)
WBC # BLD AUTO: 8.4 X10*3/UL (ref 4.4–11.3)

## 2025-08-05 PROCEDURE — 85025 COMPLETE CBC W/AUTO DIFF WBC: CPT

## 2025-08-05 PROCEDURE — 99205 OFFICE O/P NEW HI 60 MIN: CPT | Performed by: NURSE PRACTITIONER

## 2025-08-05 PROCEDURE — 72100 X-RAY EXAM L-S SPINE 2/3 VWS: CPT | Performed by: RADIOLOGY

## 2025-08-05 PROCEDURE — 87081 CULTURE SCREEN ONLY: CPT | Mod: GEALAB

## 2025-08-05 PROCEDURE — 80053 COMPREHEN METABOLIC PANEL: CPT

## 2025-08-05 PROCEDURE — 36415 COLL VENOUS BLD VENIPUNCTURE: CPT

## 2025-08-05 PROCEDURE — 72100 X-RAY EXAM L-S SPINE 2/3 VWS: CPT

## 2025-08-05 RX ORDER — CHLORHEXIDINE GLUCONATE 40 MG/ML
1 SOLUTION TOPICAL DAILY
Start: 2025-08-05 | End: 2025-08-10

## 2025-08-05 RX ORDER — CHLORHEXIDINE GLUCONATE ORAL RINSE 1.2 MG/ML
15 SOLUTION DENTAL DAILY
Qty: 30 ML | Refills: 0 | Status: SHIPPED | OUTPATIENT
Start: 2025-08-05 | End: 2025-08-07

## 2025-08-05 ASSESSMENT — DUKE ACTIVITY SCORE INDEX (DASI)
DASI METS SCORE: 4.3
CAN YOU CLIMB A FLIGHT OF STAIRS OR WALK UP A HILL: YES
CAN YOU RUN A SHORT DISTANCE: NO
TOTAL_SCORE: 12.7
CAN YOU WALK INDOORS, SUCH AS AROUND YOUR HOUSE: YES
CAN YOU PARTICIPATE IN MODERATE RECREATIONAL ACTIVITIES LIKE GOLF, BOWLING, DANCING, DOUBLES TENNIS OR THROWING A BASEBALL OR FOOTBALL: NO
CAN YOU PARTICIPATE IN STRENOUS SPORTS LIKE SWIMMING, SINGLES TENNIS, FOOTBALL, BASKETBALL, OR SKIING: NO
CAN YOU TAKE CARE OF YOURSELF (EAT, DRESS, BATHE, OR USE TOILET): YES
CAN YOU DO HEAVY WORK AROUND THE HOUSE LIKE SCRUBBING FLOORS OR LIFTING AND MOVING HEAVY FURNITURE: NO
CAN YOU WALK A BLOCK OR TWO ON LEVEL GROUND: NO
CAN YOU DO LIGHT WORK AROUND THE HOUSE LIKE DUSTING OR WASHING DISHES: YES
CAN YOU HAVE SEXUAL RELATIONS: NO
CAN YOU DO YARD WORK LIKE RAKING LEAVES, WEEDING OR PUSHING A MOWER: NO
CAN YOU DO MODERATE WORK AROUND THE HOUSE LIKE VACUUMING, SWEEPING FLOORS OR CARRYING GROCERIES: NO

## 2025-08-05 ASSESSMENT — ENCOUNTER SYMPTOMS
CONSTITUTIONAL NEGATIVE: 1
EYES NEGATIVE: 1
CARDIOVASCULAR NEGATIVE: 1
GASTROINTESTINAL NEGATIVE: 1
RESPIRATORY NEGATIVE: 1
NEUROLOGICAL NEGATIVE: 1

## 2025-08-05 ASSESSMENT — PAIN SCALES - GENERAL: PAINLEVEL_OUTOF10: 6

## 2025-08-05 ASSESSMENT — PAIN - FUNCTIONAL ASSESSMENT: PAIN_FUNCTIONAL_ASSESSMENT: 0-10

## 2025-08-05 ASSESSMENT — LIFESTYLE VARIABLES: SMOKING_STATUS: NONSMOKER

## 2025-08-05 NOTE — PREPROCEDURE INSTRUCTIONS
Thank you for visiting [facility]) today for your pre-procedure evaluation, you were seen by     Kelsie Rosenberg CNP  Pre Admission Testing  [facility]  [phone]    This summary includes instructions and information to aid you during your perioperative period.  Please read carefully. If you have any questions about your visit today, please call the number listed above.  If you become ill or have any changes to your health before your surgery, please contact your primary care provider and alert your surgeon.      Preparing for your Surgery       Exercises  Preoperative Deep Breathing Exercises  Why it is important to do deep breathing exercises before my surgery?  Deep breathing exercises strengthen your breathing muscles.  This helps you to recover after your surgery and decreases the chance of breathing complications.  How are the deep breathing exercises done?  Sit straight with your back supported.  Breathe in deeply and slowly through your nose. Your lower rib cage should expand and your abdomen may move forward.  Hold that breath for 3 to 5 seconds.  Breathe out through pursed lips, slowly and completely.  Rest and repeat 10 times every hour while awake.  Rest longer if you become dizzy or lightheaded.       Preoperative Brain Exercises    What are brain exercises?  A brain exercise is any activity that engages your thinking (cognitive) skills.    What types of activities are considered brain exercises?  Jigsaw puzzles, crossword puzzles, word jumble, memory games, word search, and many more.  Many can be found free online or on your phone via a mobile willian.    Why should I do brain exercises before my surgery?  More recent research has shown brain exercise before surgery can lower the risk of postoperative delirium (confusion) which can be especially important for older adults.  Patients who did brain exercises for 5 to 10 hours the days before surgery, cut their risk  of postoperative delirium in half up to 1 week after surgery.    Sit-to-Stand Exercise    What is the sit-to-stand exercise?  The sit-to-stand exercise strengthens the muscles of your lower body and muscles in the center of your body (core muscles for stability) helping to maintain and improve your strength and mobility.  How do I do the sit-to-stand exercise?  The goal is to do this exercise without using your arms or hands.  If this is too difficult, use your arms and hands or a chair with armrests to help slowly push yourself to the standing position and lower yourself back to the sitting position. As the movement becomes easier use your arms and hands less.    Steps to the sit-to-stand exercise  Sit up tall in a sturdy chair, knees bent, feet flat on the floor shoulder-width apart.  Shift your hips/pelvis forward in the chair to correctly position yourself for the next movement.  Lean forward at your hips.  Stand up straight putting equal weight on both feet.  Check to be sure you are properly aligned with the chair, in a slow controlled movement sit back down.  Repeat this exercise 10-15 times.  If needed you can do it fewer times until your strength improves.  Rest for 1 minute.  Do another 10-15 sit-to-stand exercises.  Try to do this in the morning and evening.        Instructions    Preoperative Fasting Guidelines    Why must I stop eating and drinking near surgery time?  With sedation, food or liquid in your stomach can enter your lungs causing serious complications  Food can increase nausea and vomiting  When do I need to stop eating and drinking before my surgery?      Do not eat any food after midnight the night before your surgery/procedure. You may have up to 13.5 ounces of clear liquid until TWO hours before your instructed arrival time to the hospital.  This includes water, black tea/coffee, (no milk or cream) apple juice, and electrolyte drinks (Gatorade). You may chew gum until TWO hours before  your surgery/procedure            Simple things you can do to help prevent blood clots     Blood clots are blockages that can form in the body's veins. When a blood clot forms in your deep veins, it may be called a deep vein thrombosis, or DVT for short. Blood clots can happen in any part of the body where blood flows, but they are most common in the arms and legs. If a piece of a blood clot breaks free and travels to the lungs, it is called a pulmonary embolus (PE). A PE can be a very serious problem.         Being in the hospital or having surgery can raise your chances of getting a blood clot because you may not be well enough to move around as much as you normally do.         Ways you can help prevent blood clots in the hospital       Wearing SCDs  SCDs stands for Sequential Compression Devices.   SCDs are special sleeves that wrap around your legs. They attach to a pump that fills them with air to gently squeeze your legs every few minutes.  This helps return the blood in your legs to your heart.   SCDs should only be taken off when walking or bathing. SCDs may not be comfortable, but they can help save your life.              Pump SCD leg sleeves  Wearing compression stockings - if your doctor orders them. These special snug-fitting stockings gently squeeze your legs to help blood flow.       Walking. Walking helps move the blood in your legs.   If your doctor says it is ok, try walking the halls at least   5 times a day. Ask us to help you get up, so you don't fall.      Taking any blood-thinning medicines your doctor orders.              Ways you can help prevent blood clots at home         Wearing compression stockings - if your doctor orders them.   Walking - to help move the blood in your legs.    Taking any blood-thinning medicines your doctor orders.      Signs of a blood clot or PE    Tell your doctor or nurse right away if you have any of the problems listed below.         If you are at home, seek  "medical care right away. Call 911 for chest pain or problems breathing.            Signs of a blood clot (DVT) - such as pain, swelling, redness, or warmth in your arm or legs.  Signs of a pulmonary embolism (PE) - such as chest pain or feeling short of breath      Tobacco and Alcohol;  Do not drink alcohol or smoke within 24 hours of surgery.  It is best to quit smoking for as long as possible before any surgery or procedure.      Other Instructions  Why did I have my nose, under my arms, and groin swabbed? The purpose of the swab is to identify Staphylococcus aureus inside your nose or on your skin.  The swab was sent to the laboratory for culture.  A positive swab/culture for Staphylococcus aureus is called colonization or carriage.     What is Staphylococcus aureus? Staphylococcus aureus, also known as \"staph\", is a germ found on the skin or in the nose of healthy people.  Sometimes Staphylococcus aureus can get into the body and cause an infection.  This can be minor (such as pimples, boils, or other skin problems).  It might also be serious (such as a blood infection, pneumonia, or a surgical site infection).     What is Staphylococcus aureus colonization or carriage? Colonization or carriage means that a person has the germ but is not sick from it.  These bacteria can be spread on the hands or when breathing or sneezing.   How is Staphylococcus aureus spread? It is most often spread by close contact with a person or item that carries it.   What happens if my culture is positive for Staphylococcus aureus? Your doctor/medical team will use this information to guide any antibiotic treatment which may be necessary.  Regardless of the culture results, we will clean the inside of your nose with a betadine swab just before you have your surgery.   Will I get an infection if I have Staphylococcus aureus in my nose or on my skin? Anyone can get an infection with Staphylococcus aureus.  However, the best way to reduce " your risk of infection is to follow the instructions provided to you for the use of your CHG soap and dental rinse.      Body Wash:     What is a home preoperative antibacterial shower? This shower is a way of cleaning the skin with a germ-killing solution before surgery.  The solution contains chlorhexidine, commonly known as CHG.  CHG is a skin cleanser with germ-killing ability.  Let your doctor know if you are allergic to chlorhexidine.   Why do I need to take a preoperative antibacterial shower? Skin is not sterile.  It is best to try to make your skin as free of germs as possible before surgery.  Proper cleansing with a germ-killing soap before surgery can lower the number of germs on your skin.  This helps to reduce the risk of infection at the surgical site.    Following the instructions listed below will help you prepare your skin for surgery.   How do I use the solution?  If you plan to wash your hair, use your regular shampoo; then rinse your hair and body thoroughly to remove any shampoo residue  Wash your face with your regular soap or water only  Thoroughly rinse your body with water from the neck down  Apply Hibiclens directly on your skin or on a wet washcloth and wash gently; move away from the shower stream when applying Hibiclens to avoid rinsing it off too soon  Rinse thoroughly with warm water and keep out of eyes, ears and mouth; if Hibiclens comes in contact with these areas, rinse out promptly  Dry your skin with a towel  Do not use your regular soap after applying and rinsing with Hibiclens  Do not apply lotions or deodorants to the cleaned body area      Oral/Dental Rinse:     What is oral/dental rinse?  It is a mouthwash. It is a way of cleaning the mouth with a germ-killing solution before your surgery.  The solution contains chlorhexidine, commonly known as CHG. It is used inside the mouth to kill a bacteria known as Staphylococcus aureus.  Let your doctor know if you are allergic to  Chlorhexidine.   Why do I need to use CHG oral/dental rinse? The CHG oral/dental rinse helps to kill a bacteria in your mouth known as Staphylococcus aureus.  This reduces the risk of infection at the surgical site.    Using your CHG oral/dental rinse STEPS: Use your CHG oral/dental rinse after you brush your teeth the night before (at bedtime) and the morning of your surgery.  Follow all directions on your prescription label.    Use the cap on the container to measure 15 ml.  Swish (gargle if you can) the mouthwash in your mouth for at least 30 seconds, (do not swallow) and spit out.  After you use your CHG rinse, do not rinse your mouth with water, drink or eat.    Please refer to the prescription label for the appropriate time to resume oral intake   What side effects might I have using the CHG oral/dental rinse? CHG rinse will stick to plaque on the teeth.  Brush and floss just before use.  Teeth brushing will help avoid staining of plaque during use.        The Week before Surgery        Seven days before Surgery  Check your [facility] medication instructions  Do the exercises provided to you by [facility]  Arrange for a responsible, adult licensed  to take you home after surgery and stay with you for 24 hours.  You will not be permitted to drive yourself home if you have received any anesthetic/sedation  Six days before surgery  Check your PAT medication instructions  Do the exercises provided to you by PAT  Start using Chlorhexidene (CHG) body wash if prescribed  Five days before surgery  Check your PAT medication instructions  Do the exercises provided to you by PAT  Continue to use CHG body wash if prescribed  Three days before surgery  Check your PAT medication instructions  Do the exercises provided to you by PAT  Continue to use CHG body wash if prescribed  Two days before surgery  Check your [facility] medication instructions  Do the exercises provided to you by [facility]  Continue to use CHG body wash if prescribed    The  Day before Surgery       Check your PAT medication and all other PAT instructions including when to stop eating and drinking  You will be called with your arrival time for surgery in the late afternoon.  If you do not receive a call please reach out to [facility]. [phone]  Do not smoke or drink 24 hours before surgery  Prepare items to bring with you to the hospital  Shower with your chlorhexidine wash if prescribed  Brush your teeth and use your chlorhexidine dental rinse if prescribed    The Day of Surgery       Check your PAT medication instructions  Ensure you follow the instructions for when to stop eating and drinking  Shower, if prescribed use CHG.  Do not apply any lotions, creams, moisturizers, perfume or deodorant  Brush your teeth and use your CHG dental rinse if prescribed  Wear loose comfortable clothing  Avoid make-up  Remove  jewelry and piercings, consider professional piercing removal with a plastic spacer if needed  Bring photo ID and Insurance card  Bring an accurate medication list that includes medication dose, frequency and allergies  Bring a copy of your advanced directives (will, health care power of )  Bring any devices and controllers as well as medical devices you have been provided with for surgery (CPAP, slings, braces, etc.)  Dentures, eyeglasses, and contacts will be removed before surgery, please bring cases for contacts or glasses

## 2025-08-05 NOTE — CPM/PAT H&P
CPM/PAT Evaluation       Name: Saritha Cormier (Saritha Cormier)  /Age: 1950/74 y.o.     Visit Type:   In-Person       Chief Complaint: Arthritis of left hip    HPI 75 y/o female scheduled for left total hip arthroplasty on 2025 with  Dr. Angulo secondary to arthritis of left hip.  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  PAT is consulted today for perioperative risk stratification and optimization.      Medical History[1]    Surgical History[2]    Patient Sexual activity questions deferred to the physician.    Family History[3]    Allergies[4]    Current Medications[5]     PAT ROS:   Constitutional:   neg    Neuro/Psych:   neg    Eyes:   neg    Ears:   neg    Nose:   Mouth:   Throat:   Neck:   Cardio:   neg    Respiratory:    2L O2  neg    Endocrine:   GI:   neg    :   neg    Musculoskeletal:    Walker or cane for ambulation  Hematologic:   Skin:      Physical Exam  Vitals reviewed.   Constitutional:       Appearance: Normal appearance.   HENT:      Head: Normocephalic and atraumatic.      Mouth/Throat:      Mouth: Mucous membranes are moist.     Eyes:      Extraocular Movements: Extraocular movements intact.       Cardiovascular:      Rate and Rhythm: Normal rate and regular rhythm.   Pulmonary:      Effort: Pulmonary effort is normal.      Breath sounds: Normal breath sounds.     Musculoskeletal:         General: Normal range of motion.      Cervical back: Normal range of motion.     Skin:     General: Skin is warm and dry.     Neurological:      General: No focal deficit present.      Mental Status: She is alert and oriented to person, place, and time.     Psychiatric:         Mood and Affect: Mood normal.         Behavior: Behavior normal.          Airway    Testing/Diagnostic:     Patient Specialist/PCP:     Visit Vitals  /76   Pulse (!) 45   Temp 36.3 °C (97.3 °F)  "(Temporal)   Resp 18   Ht 1.575 m (5' 2\")   Wt 93.4 kg (205 lb 14.6 oz)   SpO2 96%   BMI 37.66 kg/m²   OB Status Hysterectomy   Smoking Status Never   BSA 2.02 m²       DASI Risk Score      Flowsheet Row Pre-Admission Testing from 8/5/2025 in [facility] Pre-Admission Testing from 8/26/2024 in [facility]   Can you take care of yourself (eat, dress, bathe, or use toilet)?  2.75 filed at 08/05/2025 1044 2.75 filed at 08/26/2024 1000   Can you walk indoors, such as around your house? 1.75 filed at 08/05/2025 1044 1.75 filed at 08/26/2024 1000   Can you walk a block or two on level ground?  0 filed at 08/05/2025 1044 2.75 filed at 08/26/2024 1000   Can you climb a flight of stairs or walk up a hill? 5.5 filed at 08/05/2025 1044 5.5 filed at 08/26/2024 1000   Can you run a short distance? 0 filed at 08/05/2025 1044 8 filed at 08/26/2024 1000   Can you do light work around the house like dusting or washing dishes? 2.7 filed at 08/05/2025 1044 2.7 filed at 08/26/2024 1000   Can you do moderate work around the house like vacuuming, sweeping floors or carrying groceries? 0 filed at 08/05/2025 1044 3.5 filed at 08/26/2024 1000   Can you do heavy work around the house like scrubbing floors or lifting and moving heavy furniture?  0 filed at 08/05/2025 1044 8 filed at 08/26/2024 1000   Can you do yard work like raking leaves, weeding or pushing a mower? 0 filed at 08/05/2025 1044 4.5 filed at 08/26/2024 1000   Can you have sexual relations? 0 filed at 08/05/2025 1044 5.25 filed at 08/26/2024 1000   Can you participate in moderate recreational activities like golf, bowling, dancing, doubles tennis or throwing a baseball or football? 0 filed at 08/05/2025 1044 0 filed at 08/26/2024 1000   Can you participate in strenous sports like swimming, singles tennis, football, basketball, or skiing? 0 filed at 08/05/2025 1044 0 filed at 08/26/2024 1000   DASI SCORE 12.7 filed at 08/05/2025 1044 44.7 filed at " 08/26/2024 1000   METS Score (Will be calculated only when all the questions are answered) 4.3 filed at 08/05/2025 1044 8.2 filed at 08/26/2024 1000     Caprini DVT Assessment      Flowsheet Row Pre-Admission Testing from 8/5/2025 in [facility] ED to Hosp-Admission (Discharged) from 10/27/2024 in [facility] Intensive Care with Yuliana Rolon MD and Víctor Dang MD   DVT Score (IF A SCORE IS NOT CALCULATING, MUST SELECT A BMI TO COMPLETE) 12 filed at 08/05/2025 1129 5 filed at 10/28/2024 1555   Surgical Factors Major surgery planned, including arthroscopic and laproscopic (1-2 hours), Elective major lower extremity arthroplasty filed at 08/05/2025 1129 --   BMI (BMI MUST BE CHOSEN) 31-40 (Obesity) filed at 08/05/2025 1129 41-50 (Morbid obesity) filed at 10/28/2024 1555     Modified Frailty Index    No data to display  [id number] Stroke Risk Points  Current as of just now        N/A 0 to 9 Points:      Last Change: N/A          The [id number] risk score (Lip GH, et al. 2009. © 2010 [employer]) quantifies the risk of stroke for a patient with atrial fibrillation. For patients without atrial fibrillation or under the age of 18 this score appears as N/A. Higher score values generally indicate higher risk of stroke.        This score is not applicable to this patient. Components are not calculated.          Revised Cardiac Risk Index      Flowsheet Row Pre-Admission Testing from 8/5/2025 in [facility] Pre-Admission Testing from 8/26/2024 in [facility]   High-Risk Surgery (Intraperitoneal, Intrathoracic,Suprainguinal vascular) 0 filed at 08/05/2025 1013 0 filed at 08/26/2024 1155   History of ischemic heart disease (History of MI, History of positive exercuse test, Current chest paint considered due to myocardial ischemia, Use of nitrate therapy, ECG with pathological Q Waves) 0 filed at 08/05/2025 1013 0 filed at 08/26/2024 1155    History of congestive heart failure (pulmonary edemia, bilateral rales or S3 gallop, Paroxysmal nocturnal dyspnea, CXR showing pulmonary vascular redistribution) 0 filed at 08/05/2025 1013 1 filed at 08/26/2024 1155   History of cerebrovascular disease (Prior TIA or stroke) 0 filed at 08/05/2025 1013 0 filed at 08/26/2024 1155   Pre-operative insulin treatment 0 filed at 08/05/2025 1013 0 filed at 08/26/2024 1155   Pre-operative creatinine>2 mg/dl 0 filed at 08/05/2025 1013 0 filed at 08/26/2024 1155   Revised Cardiac Risk Calculator 0 filed at 08/05/2025 1013 1 filed at 08/26/2024 1155     Apfel Simplified Score      Flowsheet Row Pre-Admission Testing from 8/5/2025 in [facility] Pre-Admission Testing from 8/26/2024 in [facility]   Smoking status 1 filed at 08/05/2025 1013 1 filed at 08/26/2024 1155   History of motion sickness or PONV  0 filed at 08/05/2025 1013 0 filed at 08/26/2024 1155   Use of postoperative opioids 1 filed at 08/05/2025 1013 1 filed at 08/26/2024 1155   Gender - Female 1=Yes filed at 08/05/2025 1013 1=Yes filed at 08/26/2024 1155   Apfel Simplified Score Calculator 3 filed at 08/05/2025 1013 3 filed at 08/26/2024 1155     Risk Analysis Index Results This Encounter    No data found in the last 10 encounters.       Stop Bang Score      Flowsheet Row Pre-Admission Testing from 8/5/2025 in [facility] Pain Mgmt Injection w/Fluoro from 5/13/2025 in [facility] with Otilio Bautista, DO   Do you snore loudly? 1 filed at 08/05/2025 1043 1 filed at 05/13/2025 0929   Do you often feel tired or fatigued after your sleep? 1 filed at 08/05/2025 1043 1 filed at 05/13/2025 0929   Has anyone ever observed you stop breathing in your sleep? 0 filed at 08/05/2025 1043 0 filed at 05/13/2025 0929   Do you have or are you being treated for high blood pressure? 1 filed at 08/05/2025 1043 1  [patient wears oxygen 2L continuously] filed at 05/13/2025 0929    Recent BMI (Calculated) 36.4 filed at 08/05/2025 1043 38.8 filed at 05/13/2025 0929   Is BMI greater than 35 kg/m2? 1=Yes filed at 08/05/2025 1043 1=Yes filed at 05/13/2025 0929   Age older than 50 years old? 1=Yes filed at 08/05/2025 1043 1=Yes filed at 05/13/2025 0929   Is your neck circumference greater than 17 inches (Male) or 16 inches (Female)? 0 filed at 08/05/2025 1043 0 filed at 05/13/2025 0929   Gender - Male 0=No filed at 08/05/2025 1043 0=No filed at 05/13/2025 0929   STOP-BANG Total Score 5 filed at 08/05/2025 1043 5 filed at 05/13/2025 0929     Prodigy: High Risk  Total Score: 22              Prodigy Age Score      Prodigy Previous Opioid Use Score      Prodigy CHF score          ARISCAT Score for Postoperative Pulmonary Complications      Flowsheet Row Pre-Admission Testing from 8/5/2025 in [facility]   Age Calculated Score 3 filed at 08/05/2025 1013   Preoperative SpO2 0 filed at 08/05/2025 1013   Respiratory infection in the last month Either upper or lower (i.e., URI, bronchitis, pneumonia), with fever and antibiotic treatment 0 filed at 08/05/2025 1013   Preoperative anemia (Hgb less than 10 g/dl) 0 filed at 08/05/2025 1013   Surgical incision  0 filed at 08/05/2025 1013   Duration of surgery  0 filed at 08/05/2025 1013   Emergency Procedure  0 filed at 08/05/2025 1013   ARISCAT Total Score  3 filed at 08/05/2025 1013     Ferrara Perioperative Risk for Myocardial Infarction or Cardiac Arrest (RANDI)      Flowsheet Row Pre-Admission Testing from 8/5/2025 in [facility]   Calculated Age Score 1.44 filed at 08/05/2025 1013   Functional Status  0 filed at 08/05/2025 1013   ASA Class  -1.92 filed at 08/05/2025 1013   Creatinine 0 filed at 08/05/2025 1013   Type of Procedure  0.80 filed at 08/05/2025 1013   RANDI Total Score  -4.93 filed at 08/05/2025 1013   RANDI % 0.72 filed at 08/05/2025 1013           Assessment and Plan:     HPI 73 y/o female scheduled for left total hip  arthroplasty on 8/25/2025 with  Dr. Angulo secondary to arthritis of left hip.  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  PAT is consulted today for perioperative risk stratification and optimization.      Pt was scheduled to has this surgery in 9/2024 - In preop her O2 was 89%, she was instructed to take large breaths and O2 came up 92/93%.  BL O2 was 97%.  Chest xray was done and showed R lung with higher diaphragm and increased opacity - different compared to prior.  It was a colloaboritive agreement to have pt see Pulmonary prior proceeding with hip surgery    Pt has received clearances from; Cardio; PCP, Pulmonary and Nephro    Neuro:  No neurologic diagnosis, however, the patient is at increased risk for perioperative delirium secondary to  age, decreased functional status, polypharmacy  Patient is at increased risk for perioperative CVA secondary to  HTN, increased age    HEENT:  No HEENT diagnosis or significant findings on chart review or clinical presentation and evaluation. No further preoperative testing/intervention indicated at this time.    Cardiovascular:  Seen by Dr. Reilly on 6/23/2025 for pre op clearance  ECHO 6/11/2025:  EF 60-65%, IDD, AV bioprosthesis - normal AV prosthesis structure and function  Continue ASA; Metoprolol and bumex  Per Note:  She may be taken up for orthopedic surgery with acceptable risk of cardiovascular complications.   METS: 4.3  RCRI: 0 points, 3.9%  risk for postoperative MACE       Pulmonary:  Follows with Dr. Mercedes and was seen on 4/14/2025 for CRF  PFT: no obstruction; severly reduced FEV1  Paralyzed right diaphragm  Clearance in 7/9/2025 - MEDIA  Stop Bang score is 5 placing patient at high risk for ALBERTO  PRODIGY: High risk for opioid induced respiratory depression  Pumonary education discussed, patient also provided deep breathing exerciseswith  educational handout    Renal:   Follows with Dr. Little and was seen on 5/28/2025 for CKD 3b  Bl Cr: 1.4-1.7    Endocrine:  T2DM - Managed by PCP  Holding Metformin day of procedure   Continue Gabapentin    Hematologic:  No hematologic diagnosis, however patient is at an increased risk for DVT  Caprini Score 12, patient at High risk for perioperative DVT.  Patient provided with VTE education/handout.    Gastrointestinal:   No GI diagnosis or significant findings on chart review or clinical presentation and evaluation.   Apfel 3    Orthopedic surgery  Seen by Dr. Angulo on 6/5/2025 for left hip pain  A1c 6/2025:  6.9  Plan for left total hip    Infectious disease:   No infectious diagnosis or significant findings on chart review or clinical presentation and evaluation.   Prescription provided for CHG body wash and dental rinse. CHG use instructions reviewed and provided to patient.  Staph screen collected    Musculoskeletal:   No diagnosis or significant findings on chart review or clinical presentation and evaluation.     Anesthesia/Airway:  No anesthesia complications      Medication instructions and NPO guidelines reviewed with the patient.  All questions or concerns discussed and addressed.      Labs ordered                    [1]   Past Medical History:  Diagnosis Date    Acquired absence of bilateral breasts and nipples 01/31/2022    History of bilateral mastectomy    Acute kidney injury     Anemia     Aortic valve stenosis     s/p TAVR 4/30/24    Arthritis     Arthritis of left hip     Cancer (Multi)     hx right breast cancer    CHF (congestive heart failure)     Chronic respiratory failure     CKD (chronic kidney disease)     stage 3    Class 2 severe obesity due to excess calories with serious comorbidity and body mass index (BMI) of 37.0 to 37.9 in adult     COPD (chronic obstructive pulmonary disease) (Multi)     Right diaphragm paralysis    Coronary artery disease     Epistaxis not due to trauma  10/27/2024    Facet arthropathy, lumbosacral     Fall     GERD (gastroesophageal reflux disease)     GI (gastrointestinal bleed)     Gout     H/O chronic sinusitis     Heart valve disease     History of bilateral mastectomy     History of total hip replacement, right     History of transcatheter aortic valve replacement (TAVR) 04/2024    Hyperlipidemia     Hypertension     Lumbar radiculitis     Morbid obesity (Multi)     Neoplasm of unspecified behavior of bone, soft tissue, 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/2015    Skin growth    OA (osteoarthritis)     Other bursitis of hip, right hip     Bursitis of right hip, unspecified bursa    Oxygen dependent     2LPM with sleep,    Peptic ulcer     Pneumonia     Shortness of breath     Sleep apnea     Spinal stenosis     Spinal stenosis of lumbar region with neurogenic claudication     Stage 3b chronic kidney disease (CKD) (Multi)     Thrombocytopenia     Trigger finger, unspecified finger 06/22/2018    Acquired trigger finger    Type 2 diabetes mellitus     Type 2 diabetes mellitus with stage 3a chronic kidney disease, without long-term current use of insulin (Multi)     Unilateral primary osteoarthritis, right knee 02/19/2021    Arthritis of knee, right    Vitamin D deficiency     Weakness    [2]   Past Surgical History:  Procedure Laterality Date    AORTIC VALVE REPLACEMENT  04/30/2024    TAVR    BREAST SURGERY  09/21/2015    Breast Surgery Reconstruction    CARDIAC CATHETERIZATION N/A 03/29/2024    Procedure: Left And Right Heart Cath, With LV;  Surgeon: Javier Reilly MD;  Location: [place] [address] Cardiac Cath Lab;  Service: Cardiovascular;  Laterality: N/A;    CARDIAC CATHETERIZATION N/A 04/30/2024    Procedure: TAVR (Transcatheter AV Replacement);  Surgeon: Jessica Stallworth MD;  Location: [place] [address] Cardiac Cath Lab;  Service: Cardiovascular;  Laterality: N/A;  4/30 0730    CARDIAC CATHETERIZATION N/A 04/30/2024    Procedure: TVP for TAVR;  Surgeon: Jessica Stallworth MD;  Location:  [facility] Cardiac Cath Lab;  Service: Cardiovascular;  Laterality: N/A;    HYSTERECTOMY  09/21/2015    Hysterectomy    JOINT REPLACEMENT Right     Right total hip    MASTECTOMY Bilateral     Breast Surgery Mastectomy   [3]   Family History  Problem Relation Name Age of Onset    Gout Mother      Hypertension Mother      Arthritis Father      Other (cardiac disorder) Father      Cancer Father      Tongue cancer Brother      Other (jaw neoplasm) Brother      Cancer Daughter      Breast cancer Sibling      Pancreatic cancer Sibling     [4] No Known Allergies  [5]   Current Outpatient Medications:     aspirin 81 mg EC tablet, TAKE 1 TABLET BY MOUTH EVERY DAY, Disp: 90 tablet, Rfl: 1    bumetanide (Bumex) 1 mg tablet, Take 1 tablet (1 mg) by mouth once daily., Disp: 30 tablet, Rfl: 11    ferrous gluconate 324 (38 Fe) MG tablet, TAKE 1 TABLET (324 MG) BY MOUTH 2 TIMES A DAY., Disp: 180 tablet, Rfl: 1    gabapentin (Neurontin) 300 mg capsule, TAKE 2 CAPSULES (600 MG) BY MOUTH ONCE DAILY AT BEDTIME., Disp: 60 capsule, Rfl: 0    metoprolol tartrate (Lopressor) 25 mg tablet, TAKE 1 TABLET BY MOUTH TWICE A DAY, Disp: 180 tablet, Rfl: 1    oxygen (O2) gas therapy, Inhale 1 each continuously., Disp: , Rfl:     pantoprazole (ProtoNix) 40 mg EC tablet, TAKE 1 TABLET BY MOUTH EVERY DAY, Disp: 90 tablet, Rfl: 1    rosuvastatin (Crestor) 40 mg tablet, Take 1 tablet (40 mg) by mouth once daily., Disp: 90 tablet, Rfl: 1    traMADol (Ultram) 50 mg tablet, Take 1 tablet (50 mg) by mouth 2 times a day., Disp: 60 tablet, Rfl: 1    chlorhexidine (Hibiclens) 4 % external liquid, Apply 1 Application topically once daily for 5 days. Use per CPM/PAT provided instructions, Disp: , Rfl:     chlorhexidine (Peridex) 0.12 % solution, Use 15 mL in the mouth or throat once daily for 2 doses., Disp: 30 mL, Rfl: 0

## 2025-08-07 LAB — STAPHYLOCOCCUS SPEC CULT: NORMAL

## 2025-08-18 ENCOUNTER — TELEPHONE (OUTPATIENT)
Dept: PHARMACY | Facility: HOSPITAL | Age: 75
End: 2025-08-18
Payer: MEDICARE

## 2025-08-18 DIAGNOSIS — I50.32 CHRONIC DIASTOLIC HEART FAILURE: Primary | ICD-10-CM

## 2025-08-19 RX ORDER — SOTAGLIFLOZIN 200 MG/1
200 TABLET ORAL DAILY
Qty: 90 TABLET | Refills: 3 | Status: SHIPPED | OUTPATIENT
Start: 2025-08-19

## 2025-08-21 ENCOUNTER — TELEPHONE (OUTPATIENT)
Dept: ORTHOPEDIC SURGERY | Facility: CLINIC | Age: 75
End: 2025-08-21
Payer: MEDICARE

## 2025-08-21 DIAGNOSIS — Z00.00 ENCOUNTER FOR GENERAL ADULT MEDICAL EXAMINATION WITHOUT ABNORMAL FINDINGS: ICD-10-CM

## 2025-08-21 DIAGNOSIS — D64.89 OTHER SPECIFIED ANEMIAS: ICD-10-CM

## 2025-08-21 PROCEDURE — RXMED WILLOW AMBULATORY MEDICATION CHARGE

## 2025-08-21 RX ORDER — FERROUS GLUCONATE 324(38)MG
1 TABLET ORAL 2 TIMES DAILY
Qty: 180 TABLET | Refills: 0 | Status: SHIPPED | OUTPATIENT
Start: 2025-08-21

## 2025-08-21 RX ORDER — ASPIRIN 81 MG/1
81 TABLET ORAL DAILY
Qty: 90 TABLET | Refills: 0 | Status: SHIPPED | OUTPATIENT
Start: 2025-08-21

## 2025-08-24 ENCOUNTER — HOME HEALTH ADMISSION (OUTPATIENT)
Dept: HOME HEALTH SERVICES | Facility: HOME HEALTH | Age: 75
End: 2025-08-24
Payer: MEDICARE

## 2025-08-25 ENCOUNTER — TELEPHONE (OUTPATIENT)
Dept: INPATIENT UNIT | Facility: HOSPITAL | Age: 75
End: 2025-08-25
Payer: MEDICARE

## 2025-08-25 ENCOUNTER — ANESTHESIA (OUTPATIENT)
Dept: OPERATING ROOM | Facility: HOSPITAL | Age: 75
End: 2025-08-25
Payer: MEDICARE

## 2025-08-26 ENCOUNTER — APPOINTMENT (OUTPATIENT)
Dept: RADIOLOGY | Facility: HOSPITAL | Age: 75
End: 2025-08-26
Payer: MEDICARE

## 2025-08-26 ENCOUNTER — APPOINTMENT (OUTPATIENT)
Dept: PAIN MEDICINE | Facility: HOSPITAL | Age: 75
End: 2025-08-26
Payer: MEDICARE

## 2025-08-26 ENCOUNTER — DOCUMENTATION (OUTPATIENT)
Dept: HOME HEALTH SERVICES | Facility: HOME HEALTH | Age: 75
End: 2025-08-26
Payer: MEDICARE

## 2025-08-26 ENCOUNTER — HOSPITAL ENCOUNTER (OUTPATIENT)
Facility: HOSPITAL | Age: 75
Discharge: HOME HEALTH | End: 2025-08-27
Attending: ORTHOPAEDIC SURGERY | Admitting: ORTHOPAEDIC SURGERY
Payer: MEDICARE

## 2025-08-26 PROBLEM — M16.12 OSTEOARTHRITIS OF LEFT HIP, UNSPECIFIED OSTEOARTHRITIS TYPE: Status: ACTIVE | Noted: 2025-08-26

## 2025-08-26 PROCEDURE — 2500000004 HC RX 250 GENERAL PHARMACY W/ HCPCS (ALT 636 FOR OP/ED): Performed by: NURSE ANESTHETIST, CERTIFIED REGISTERED

## 2025-08-26 PROCEDURE — 2500000005 HC RX 250 GENERAL PHARMACY W/O HCPCS: Performed by: NURSE ANESTHETIST, CERTIFIED REGISTERED

## 2025-08-26 RX ORDER — DEXMEDETOMIDINE IN 0.9 % NACL 20 MCG/5ML
SYRINGE (ML) INTRAVENOUS AS NEEDED
Status: DISCONTINUED | OUTPATIENT
Start: 2025-08-26 | End: 2025-08-26

## 2025-08-26 RX ORDER — FENTANYL CITRATE 50 UG/ML
INJECTION, SOLUTION INTRAMUSCULAR; INTRAVENOUS AS NEEDED
Status: DISCONTINUED | OUTPATIENT
Start: 2025-08-26 | End: 2025-08-26

## 2025-08-26 RX ORDER — PROPOFOL 10 MG/ML
INJECTION, EMULSION INTRAVENOUS AS NEEDED
Status: DISCONTINUED | OUTPATIENT
Start: 2025-08-26 | End: 2025-08-26

## 2025-08-26 RX ORDER — BUPIVACAINE HYDROCHLORIDE 7.5 MG/ML
INJECTION, SOLUTION EPIDURAL; RETROBULBAR AS NEEDED
Status: DISCONTINUED | OUTPATIENT
Start: 2025-08-26 | End: 2025-08-26

## 2025-08-26 RX ORDER — LIDOCAINE HCL/PF 100 MG/5ML
SYRINGE (ML) INTRAVENOUS AS NEEDED
Status: DISCONTINUED | OUTPATIENT
Start: 2025-08-26 | End: 2025-08-26

## 2025-08-26 RX ORDER — CEFAZOLIN 1 G/1
INJECTION, POWDER, FOR SOLUTION INTRAVENOUS AS NEEDED
Status: DISCONTINUED | OUTPATIENT
Start: 2025-08-26 | End: 2025-08-26

## 2025-08-26 RX ORDER — ONDANSETRON HYDROCHLORIDE 2 MG/ML
INJECTION, SOLUTION INTRAVENOUS AS NEEDED
Status: DISCONTINUED | OUTPATIENT
Start: 2025-08-26 | End: 2025-08-26

## 2025-08-26 RX ORDER — GLYCOPYRROLATE 0.2 MG/ML
INJECTION INTRAMUSCULAR; INTRAVENOUS AS NEEDED
Status: DISCONTINUED | OUTPATIENT
Start: 2025-08-26 | End: 2025-08-26

## 2025-08-26 RX ORDER — TRANEXAMIC ACID 1 G/10ML
INJECTION, SOLUTION INTRAVENOUS AS NEEDED
Status: DISCONTINUED | OUTPATIENT
Start: 2025-08-26 | End: 2025-08-26

## 2025-08-26 RX ORDER — NORETHINDRONE AND ETHINYL ESTRADIOL 0.5-0.035
KIT ORAL AS NEEDED
Status: DISCONTINUED | OUTPATIENT
Start: 2025-08-26 | End: 2025-08-26

## 2025-08-26 RX ORDER — PHENYLEPHRINE HCL IN 0.9% NACL 0.4MG/10ML
SYRINGE (ML) INTRAVENOUS AS NEEDED
Status: DISCONTINUED | OUTPATIENT
Start: 2025-08-26 | End: 2025-08-26

## 2025-08-26 RX ADMIN — GLYCOPYRROLATE 0.2 MG: 0.2 INJECTION INTRAMUSCULAR; INTRAVENOUS at 11:57

## 2025-08-26 RX ADMIN — EPHEDRINE SULFATE 15 MG: 50 INJECTION, SOLUTION INTRAVENOUS at 12:15

## 2025-08-26 RX ADMIN — PROPOFOL 50 MG: 10 INJECTION, EMULSION INTRAVENOUS at 11:44

## 2025-08-26 RX ADMIN — EPHEDRINE SULFATE 15 MG: 50 INJECTION, SOLUTION INTRAVENOUS at 12:26

## 2025-08-26 RX ADMIN — PROPOFOL 0.36 MG: 10 INJECTION, EMULSION INTRAVENOUS at 13:22

## 2025-08-26 RX ADMIN — Medication 80 MCG: at 12:26

## 2025-08-26 RX ADMIN — Medication 80 MCG: at 12:15

## 2025-08-26 RX ADMIN — LIDOCAINE HYDROCHLORIDE 100 MG: 20 INJECTION INTRAVENOUS at 12:26

## 2025-08-26 RX ADMIN — PROPOFOL 120 MCG/KG/MIN: 10 INJECTION, EMULSION INTRAVENOUS at 11:44

## 2025-08-26 RX ADMIN — FENTANYL CITRATE 12.5 MCG: 50 INJECTION, SOLUTION INTRAMUSCULAR; INTRAVENOUS at 12:26

## 2025-08-26 RX ADMIN — Medication 4 MCG: at 13:04

## 2025-08-26 RX ADMIN — ONDANSETRON 4 MG: 2 INJECTION, SOLUTION INTRAMUSCULAR; INTRAVENOUS at 11:24

## 2025-08-26 RX ADMIN — TRANEXAMIC ACID 1000 MG: 100 INJECTION, SOLUTION INTRAVENOUS at 12:53

## 2025-08-26 RX ADMIN — CEFAZOLIN 2 G: 330 INJECTION, POWDER, FOR SOLUTION INTRAMUSCULAR; INTRAVENOUS at 11:46

## 2025-08-26 RX ADMIN — PROPOFOL 20 MG: 10 INJECTION, EMULSION INTRAVENOUS at 11:50

## 2025-08-26 RX ADMIN — Medication 80 MCG: at 12:50

## 2025-08-26 RX ADMIN — BUPIVACAINE HYDROCHLORIDE 1.4 ML: 7.5 INJECTION, SOLUTION EPIDURAL; RETROBULBAR at 11:34

## 2025-08-26 RX ADMIN — EPHEDRINE SULFATE 10 MG: 50 INJECTION, SOLUTION INTRAVENOUS at 12:10

## 2025-08-26 RX ADMIN — EPHEDRINE SULFATE 10 MG: 50 INJECTION, SOLUTION INTRAVENOUS at 12:07

## 2025-08-26 RX ADMIN — FENTANYL CITRATE 12.5 MCG: 50 INJECTION, SOLUTION INTRAMUSCULAR; INTRAVENOUS at 12:21

## 2025-08-26 RX ADMIN — Medication 8 MCG: at 11:52

## 2025-08-26 RX ADMIN — DEXAMETHASONE SODIUM PHOSPHATE 8 MG: 4 INJECTION INTRA-ARTICULAR; INTRALESIONAL; INTRAMUSCULAR; INTRAVENOUS; SOFT TISSUE at 11:46

## 2025-08-26 RX ADMIN — TRANEXAMIC ACID 1000 MG: 100 INJECTION, SOLUTION INTRAVENOUS at 11:46

## 2025-08-26 SDOH — ECONOMIC STABILITY: HOUSING INSECURITY: IN THE LAST 12 MONTHS, WAS THERE A TIME WHEN YOU WERE NOT ABLE TO PAY THE MORTGAGE OR RENT ON TIME?: NO

## 2025-08-26 SDOH — ECONOMIC STABILITY: TRANSPORTATION INSECURITY: IN THE PAST 12 MONTHS, HAS LACK OF TRANSPORTATION KEPT YOU FROM MEDICAL APPOINTMENTS OR FROM GETTING MEDICATIONS?: NO

## 2025-08-26 SDOH — SOCIAL STABILITY: SOCIAL INSECURITY: WITHIN THE LAST YEAR, HAVE YOU BEEN AFRAID OF YOUR PARTNER OR EX-PARTNER?: NO

## 2025-08-26 SDOH — SOCIAL STABILITY: SOCIAL INSECURITY: WERE YOU ABLE TO COMPLETE ALL THE BEHAVIORAL HEALTH SCREENINGS?: YES

## 2025-08-26 SDOH — ECONOMIC STABILITY: HOUSING INSECURITY: IN THE PAST 12 MONTHS, HOW MANY TIMES HAVE YOU MOVED WHERE YOU WERE LIVING?: 0

## 2025-08-26 SDOH — SOCIAL STABILITY: SOCIAL INSECURITY: HAVE YOU HAD ANY THOUGHTS OF HARMING ANYONE ELSE?: NO

## 2025-08-26 SDOH — ECONOMIC STABILITY: INCOME INSECURITY: IN THE PAST 12 MONTHS HAS THE ELECTRIC, GAS, OIL, OR WATER COMPANY THREATENED TO SHUT OFF SERVICES IN YOUR HOME?: NO

## 2025-08-26 SDOH — ECONOMIC STABILITY: FOOD INSECURITY: WITHIN THE PAST 12 MONTHS, YOU WORRIED THAT YOUR FOOD WOULD RUN OUT BEFORE YOU GOT THE MONEY TO BUY MORE.: NEVER TRUE

## 2025-08-26 SDOH — ECONOMIC STABILITY: FOOD INSECURITY: HOW HARD IS IT FOR YOU TO PAY FOR THE VERY BASICS LIKE FOOD, HOUSING, MEDICAL CARE, AND HEATING?: NOT HARD AT ALL

## 2025-08-26 SDOH — SOCIAL STABILITY: SOCIAL INSECURITY: ARE THERE ANY APPARENT SIGNS OF INJURIES/BEHAVIORS THAT COULD BE RELATED TO ABUSE/NEGLECT?: NO

## 2025-08-26 SDOH — SOCIAL STABILITY: SOCIAL INSECURITY: ARE YOU OR HAVE YOU BEEN THREATENED OR ABUSED PHYSICALLY, EMOTIONALLY, OR SEXUALLY BY ANYONE?: NO

## 2025-08-26 SDOH — ECONOMIC STABILITY: HOUSING INSECURITY: AT ANY TIME IN THE PAST 12 MONTHS, WERE YOU HOMELESS OR LIVING IN A SHELTER (INCLUDING NOW)?: NO

## 2025-08-26 SDOH — SOCIAL STABILITY: SOCIAL INSECURITY: DO YOU FEEL ANYONE HAS EXPLOITED OR TAKEN ADVANTAGE OF YOU FINANCIALLY OR OF YOUR PERSONAL PROPERTY?: NO

## 2025-08-26 SDOH — SOCIAL STABILITY: SOCIAL INSECURITY: WITHIN THE LAST YEAR, HAVE YOU BEEN HUMILIATED OR EMOTIONALLY ABUSED IN OTHER WAYS BY YOUR PARTNER OR EX-PARTNER?: NO

## 2025-08-26 SDOH — ECONOMIC STABILITY: FOOD INSECURITY: WITHIN THE PAST 12 MONTHS, THE FOOD YOU BOUGHT JUST DIDN'T LAST AND YOU DIDN'T HAVE MONEY TO GET MORE.: NEVER TRUE

## 2025-08-26 SDOH — SOCIAL STABILITY: SOCIAL INSECURITY: HAVE YOU HAD THOUGHTS OF HARMING ANYONE ELSE?: NO

## 2025-08-26 SDOH — SOCIAL STABILITY: SOCIAL INSECURITY: HAS ANYONE EVER THREATENED TO HURT YOUR FAMILY OR YOUR PETS?: NO

## 2025-08-26 SDOH — SOCIAL STABILITY: SOCIAL INSECURITY: ABUSE: ADULT

## 2025-08-26 SDOH — SOCIAL STABILITY: SOCIAL INSECURITY: DOES ANYONE TRY TO KEEP YOU FROM HAVING/CONTACTING OTHER FRIENDS OR DOING THINGS OUTSIDE YOUR HOME?: NO

## 2025-08-26 SDOH — HEALTH STABILITY: MENTAL HEALTH: CURRENT SMOKER: 0

## 2025-08-26 SDOH — SOCIAL STABILITY: SOCIAL INSECURITY: DO YOU FEEL UNSAFE GOING BACK TO THE PLACE WHERE YOU ARE LIVING?: NO

## 2025-08-26 ASSESSMENT — ACTIVITIES OF DAILY LIVING (ADL)
FEEDING YOURSELF: INDEPENDENT
PATIENT'S MEMORY ADEQUATE TO SAFELY COMPLETE DAILY ACTIVITIES?: YES
LACK_OF_TRANSPORTATION: NO
HEARING - RIGHT EAR: FUNCTIONAL
TOILETING: INDEPENDENT
ASSISTIVE_DEVICE: WALKER
JUDGMENT_ADEQUATE_SAFELY_COMPLETE_DAILY_ACTIVITIES: YES
DRESSING YOURSELF: INDEPENDENT
BATHING: INDEPENDENT
ADEQUATE_TO_COMPLETE_ADL: YES
LACK_OF_TRANSPORTATION: NO
WALKS IN HOME: INDEPENDENT
HEARING - LEFT EAR: FUNCTIONAL
GROOMING: INDEPENDENT

## 2025-08-26 ASSESSMENT — COGNITIVE AND FUNCTIONAL STATUS - GENERAL
MOVING TO AND FROM BED TO CHAIR: A LITTLE
DAILY ACTIVITIY SCORE: 22
MOBILITY SCORE: 20
MOVING TO AND FROM BED TO CHAIR: A LITTLE
DAILY ACTIVITIY SCORE: 23
CLIMB 3 TO 5 STEPS WITH RAILING: A LITTLE
HELP NEEDED FOR BATHING: A LITTLE
DRESSING REGULAR LOWER BODY CLOTHING: A LITTLE
TURNING FROM BACK TO SIDE WHILE IN FLAT BAD: A LITTLE
MOBILITY SCORE: 18
PATIENT BASELINE BEDBOUND: NO
CLIMB 3 TO 5 STEPS WITH RAILING: A LITTLE
MOVING FROM LYING ON BACK TO SITTING ON SIDE OF FLAT BED WITH BEDRAILS: A LITTLE
DRESSING REGULAR LOWER BODY CLOTHING: A LITTLE
WALKING IN HOSPITAL ROOM: A LITTLE
STANDING UP FROM CHAIR USING ARMS: A LITTLE
WALKING IN HOSPITAL ROOM: A LITTLE
STANDING UP FROM CHAIR USING ARMS: A LITTLE

## 2025-08-26 ASSESSMENT — PAIN SCALES - GENERAL
PAINLEVEL_OUTOF10: 0 - NO PAIN
PAIN_LEVEL: 2
PAINLEVEL_OUTOF10: 0 - NO PAIN

## 2025-08-26 ASSESSMENT — ENCOUNTER SYMPTOMS
RESPIRATORY NEGATIVE: 1
ALLERGIC/IMMUNOLOGIC NEGATIVE: 1
PSYCHIATRIC NEGATIVE: 1
GASTROINTESTINAL NEGATIVE: 1
NEUROLOGICAL NEGATIVE: 1
CONSTITUTIONAL NEGATIVE: 1
MYALGIAS: 1

## 2025-08-26 ASSESSMENT — PAIN - FUNCTIONAL ASSESSMENT
PAIN_FUNCTIONAL_ASSESSMENT: 0-10

## 2025-08-26 ASSESSMENT — LIFESTYLE VARIABLES
PRESCIPTION_ABUSE_PAST_12_MONTHS: NO
HOW MANY STANDARD DRINKS CONTAINING ALCOHOL DO YOU HAVE ON A TYPICAL DAY: PATIENT DOES NOT DRINK
HOW OFTEN DO YOU HAVE A DRINK CONTAINING ALCOHOL: NEVER
AUDIT-C TOTAL SCORE: 0
AUDIT-C TOTAL SCORE: 0
SKIP TO QUESTIONS 9-10: 1
SUBSTANCE_ABUSE_PAST_12_MONTHS: NO
HOW OFTEN DO YOU HAVE 6 OR MORE DRINKS ON ONE OCCASION: NEVER

## 2025-08-27 ENCOUNTER — PHARMACY VISIT (OUTPATIENT)
Dept: PHARMACY | Facility: CLINIC | Age: 75
End: 2025-08-27
Payer: COMMERCIAL

## 2025-08-27 ASSESSMENT — COGNITIVE AND FUNCTIONAL STATUS - GENERAL
CLIMB 3 TO 5 STEPS WITH RAILING: A LITTLE
MOVING FROM LYING ON BACK TO SITTING ON SIDE OF FLAT BED WITH BEDRAILS: A LITTLE
MOVING FROM LYING ON BACK TO SITTING ON SIDE OF FLAT BED WITH BEDRAILS: A LITTLE
HELP NEEDED FOR BATHING: A LITTLE
TURNING FROM BACK TO SIDE WHILE IN FLAT BAD: A LITTLE
MOBILITY SCORE: 18
CLIMB 3 TO 5 STEPS WITH RAILING: A LITTLE
MOBILITY SCORE: 18
TURNING FROM BACK TO SIDE WHILE IN FLAT BAD: A LITTLE
MOVING TO AND FROM BED TO CHAIR: A LITTLE
STANDING UP FROM CHAIR USING ARMS: A LITTLE
STANDING UP FROM CHAIR USING ARMS: A LITTLE
WALKING IN HOSPITAL ROOM: A LITTLE
DAILY ACTIVITIY SCORE: 22
MOVING TO AND FROM BED TO CHAIR: A LITTLE
WALKING IN HOSPITAL ROOM: A LITTLE
DRESSING REGULAR LOWER BODY CLOTHING: A LITTLE

## 2025-08-27 ASSESSMENT — PAIN - FUNCTIONAL ASSESSMENT
PAIN_FUNCTIONAL_ASSESSMENT: 0-10

## 2025-08-27 ASSESSMENT — PAIN DESCRIPTION - DESCRIPTORS: DESCRIPTORS: SORE

## 2025-08-27 ASSESSMENT — PAIN DESCRIPTION - LOCATION: LOCATION: HIP

## 2025-08-27 ASSESSMENT — PAIN SCALES - GENERAL
PAINLEVEL_OUTOF10: 7
PAINLEVEL_OUTOF10: 7
PAINLEVEL_OUTOF10: 0 - NO PAIN

## 2025-08-27 ASSESSMENT — ACTIVITIES OF DAILY LIVING (ADL)
ADL_ASSISTANCE: INDEPENDENT
ADLS_ADDRESSED: YES
LACK_OF_TRANSPORTATION: NO

## 2025-08-27 ASSESSMENT — PAIN DESCRIPTION - ORIENTATION: ORIENTATION: LEFT

## 2025-08-28 ENCOUNTER — HOME CARE VISIT (OUTPATIENT)
Dept: HOME HEALTH SERVICES | Facility: HOME HEALTH | Age: 75
End: 2025-08-28
Payer: MEDICARE

## 2025-08-28 VITALS
RESPIRATION RATE: 20 BRPM | HEART RATE: 88 BPM | SYSTOLIC BLOOD PRESSURE: 100 MMHG | DIASTOLIC BLOOD PRESSURE: 52 MMHG | OXYGEN SATURATION: 99 %

## 2025-08-28 PROCEDURE — G0151 HHCP-SERV OF PT,EA 15 MIN: HCPCS | Mod: HHH

## 2025-08-28 PROCEDURE — 169592 NO-PAY CLAIM PROCEDURE

## 2025-08-28 SDOH — ECONOMIC STABILITY: HOUSING INSECURITY: EVIDENCE OF SMOKING MATERIAL: 0

## 2025-08-28 SDOH — HEALTH STABILITY: MENTAL HEALTH: SMOKING IN HOME: 0

## 2025-08-28 SDOH — HEALTH STABILITY: PHYSICAL HEALTH: EXERCISE COMMENTS: THA PROTOCOL: X 10  -ANKLE PUMPS  -QUAD SETS  -GLUTE SETS  -HEEL SLIDES  -HIP ABD/ADD  -SAQ  -LAQ

## 2025-08-28 ASSESSMENT — ENCOUNTER SYMPTOMS
PAIN LOCATION - PAIN FREQUENCY: CONSTANT
PAIN: 1
AGGRESSION WITHIN DEFINED LIMITS: 1
MUSCLE WEAKNESS: 1
HIGHEST PAIN SEVERITY IN PAST 24 HOURS: 10/10
PAIN LOCATION - EXACERBATING FACTORS: MOVEMENT
PAIN LOCATION - RELIEVING FACTORS: PAIN MEDS
PAIN LOCATION - PAIN SEVERITY: 8/10
PERSON REPORTING PAIN: PATIENT
ANGER WITHIN DEFINED LIMITS: 1
SLEEP QUALITY: ADEQUATE
PAIN LOCATION: LEFT HIP
ARTHRALGIAS: 1

## 2025-08-28 ASSESSMENT — ACTIVITIES OF DAILY LIVING (ADL)
ENTERING_EXITING_HOME: MINIMUM ASSIST
OASIS_M1830: 03
AMBULATION ASSISTANCE ON FLAT SURFACES: 1

## 2025-08-29 ENCOUNTER — HOME CARE VISIT (OUTPATIENT)
Dept: HOME HEALTH SERVICES | Facility: HOME HEALTH | Age: 75
End: 2025-08-29
Payer: MEDICARE

## 2025-08-29 VITALS
TEMPERATURE: 98.7 F | SYSTOLIC BLOOD PRESSURE: 100 MMHG | OXYGEN SATURATION: 99 % | HEART RATE: 82 BPM | DIASTOLIC BLOOD PRESSURE: 55 MMHG

## 2025-08-29 PROCEDURE — G0152 HHCP-SERV OF OT,EA 15 MIN: HCPCS | Mod: HHH

## 2025-08-29 SDOH — HEALTH STABILITY: MENTAL HEALTH: SMOKING IN HOME: 0

## 2025-08-29 SDOH — ECONOMIC STABILITY: HOUSING INSECURITY: EVIDENCE OF SMOKING MATERIAL: 0

## 2025-08-29 ASSESSMENT — ACTIVITIES OF DAILY LIVING (ADL)
PHYSICAL TRANSFERS ASSESSED: 1
AMBULATION ASSISTANCE: MINIMUM ASSIST
GROOMING_CURRENT_FUNCTION: SUPERVISION
TOILETING: SUPERVISION
DRESSING_UB_CURRENT_FUNCTION: SUPERVISION
AMBULATION ASSISTANCE: 1
BATHING_CURRENT_FUNCTION: MODERATE ASSIST
GROOMING ASSESSED: 1
TOILETING: 1
BATHING ASSESSED: 1
DRESSING_LB_CURRENT_FUNCTION: MAXIMUM ASSIST

## 2025-08-29 ASSESSMENT — ENCOUNTER SYMPTOMS
PERSON REPORTING PAIN: PATIENT
PAIN LOCATION - PAIN FREQUENCY: CONSTANT
PAIN LOCATION - EXACERBATING FACTORS: SURGERY
PAIN LOCATION - RELIEVING FACTORS: PAIN MEDS
PAIN: 1
PAIN SEVERITY GOAL: 0/10
PAIN LOCATION - PAIN SEVERITY: 1/10
LOWEST PAIN SEVERITY IN PAST 24 HOURS: 1/10
HIGHEST PAIN SEVERITY IN PAST 24 HOURS: 6/10
PAIN LOCATION: LEFT HIP
PAIN LOCATION - PAIN QUALITY: ACHY
SUBJECTIVE PAIN PROGRESSION: UNCHANGED

## 2025-09-03 ENCOUNTER — HOME CARE VISIT (OUTPATIENT)
Dept: HOME HEALTH SERVICES | Facility: HOME HEALTH | Age: 75
End: 2025-09-03
Payer: MEDICARE

## 2025-09-03 VITALS
SYSTOLIC BLOOD PRESSURE: 108 MMHG | TEMPERATURE: 98 F | DIASTOLIC BLOOD PRESSURE: 64 MMHG | OXYGEN SATURATION: 98 % | RESPIRATION RATE: 18 BRPM | HEART RATE: 80 BPM

## 2025-09-03 VITALS
OXYGEN SATURATION: 98 % | SYSTOLIC BLOOD PRESSURE: 110 MMHG | DIASTOLIC BLOOD PRESSURE: 70 MMHG | HEART RATE: 90 BPM | RESPIRATION RATE: 18 BRPM | TEMPERATURE: 98.4 F

## 2025-09-03 PROCEDURE — G0157 HHC PT ASSISTANT EA 15: HCPCS | Mod: CQ,HHH

## 2025-09-03 PROCEDURE — G0158 HHC OT ASSISTANT EA 15: HCPCS | Mod: CO,HHH

## 2025-09-03 SDOH — HEALTH STABILITY: MENTAL HEALTH: SMOKING IN HOME: 0

## 2025-09-03 SDOH — ECONOMIC STABILITY: HOUSING INSECURITY: EVIDENCE OF SMOKING MATERIAL: 0

## 2025-09-03 ASSESSMENT — ENCOUNTER SYMPTOMS
PAIN LOCATION - EXACERBATING FACTORS: RECENT SURGERY
HIGHEST PAIN SEVERITY IN PAST 24 HOURS: 7/10
PAIN LOCATION - PAIN SEVERITY: 6/10
PAIN: 1
SUBJECTIVE PAIN PROGRESSION: GRADUALLY IMPROVING
PAIN LOCATION - PAIN QUALITY: ACHING
PAIN LOCATION: LEFT HIP
PAIN LOCATION - RELIEVING FACTORS: REST ICE
PERSON REPORTING PAIN: PATIENT
PAIN SEVERITY GOAL: 0/10
PAIN LOCATION - PAIN FREQUENCY: WITH ACTIVITY
LOWEST PAIN SEVERITY IN PAST 24 HOURS: 4/10

## 2025-09-04 ASSESSMENT — ENCOUNTER SYMPTOMS
PAIN LOCATION: LEFT HIP
LOWEST PAIN SEVERITY IN PAST 24 HOURS: 4/10
SUBJECTIVE PAIN PROGRESSION: GRADUALLY IMPROVING
PAIN: 1
PAIN LOCATION - PAIN QUALITY: SORE, ACHE
PAIN SEVERITY GOAL: 0/10
PAIN LOCATION - PAIN FREQUENCY: FREQUENT
HIGHEST PAIN SEVERITY IN PAST 24 HOURS: 7/10
PERSON REPORTING PAIN: PATIENT
PAIN LOCATION - PAIN SEVERITY: 4/10

## 2025-09-05 ENCOUNTER — HOME CARE VISIT (OUTPATIENT)
Dept: HOME HEALTH SERVICES | Facility: HOME HEALTH | Age: 75
End: 2025-09-05
Payer: MEDICARE

## 2025-09-10 ENCOUNTER — APPOINTMENT (OUTPATIENT)
Dept: ORTHOPEDIC SURGERY | Facility: CLINIC | Age: 75
End: 2025-09-10
Payer: MEDICARE

## 2025-09-29 ENCOUNTER — APPOINTMENT (OUTPATIENT)
Dept: PRIMARY CARE | Facility: CLINIC | Age: 75
End: 2025-09-29
Payer: MEDICARE

## 2025-12-22 ENCOUNTER — APPOINTMENT (OUTPATIENT)
Dept: CARDIOLOGY | Facility: CLINIC | Age: 75
End: 2025-12-22
Payer: MEDICARE

## 2026-01-05 ENCOUNTER — APPOINTMENT (OUTPATIENT)
Dept: PHARMACY | Facility: HOSPITAL | Age: 76
End: 2026-01-05
Payer: MEDICARE

## 2026-01-07 ENCOUNTER — APPOINTMENT (OUTPATIENT)
Dept: NEPHROLOGY | Facility: CLINIC | Age: 76
End: 2026-01-07
Payer: MEDICARE

## (undated) DEVICE — DEVICE, CLOSURE, PERCLOSE, PROSTYLE

## (undated) DEVICE — DRAPE, C-ARM IMAGE

## (undated) DEVICE — SYRINGE, 30 CC, LUER LOCK

## (undated) DEVICE — SHEATH, PINNACLE, 10 CM,  7FR INTRODUCER, 7FR DIA, 2.5 CM DIALATOR

## (undated) DEVICE — SUTURE, QUILL, BARBED, PDO, 2, 24 X 24CM, T8 36MM TAPER POINT, 1/2 CIRCLE

## (undated) DEVICE — CATHETER, ANGIO, IMPULSE, AL1, 6 FR X 100 CM

## (undated) DEVICE — GUIDEWIRE, INQWIRE, 3MM J, .035 X 210CM, FIXED

## (undated) DEVICE — INTRODUCER SHEATH, SENTRANT ENSURE SEAL 14FR 28CM

## (undated) DEVICE — CABLE, PACING PATIENT BIPOLAR 8'

## (undated) DEVICE — SUTURE, VICRYL, 1, 24 IN, CTD, UNDYED

## (undated) DEVICE — COVER, TABLE, 44X90

## (undated) DEVICE — TR BAND, RADIAL COMPRESSION, STANDARD, 24CM

## (undated) DEVICE — NEEDLE, SPINAL, QUINCKE, 18 G X 3.5 IN, PINK HUB

## (undated) DEVICE — 6FR X 110CM, PIG 145 VENTRICULAR PIGTAIL CURVE, EXPO ANGIO CATH (EA)

## (undated) DEVICE — INTRODUCER SET, MICROPUNCTURE PED, 4FR 10CM, NITINOL WIRE W/ECHOTIP 21/4

## (undated) DEVICE — CATHETER, BALLOON, VALVULOPLASTY, TRUE, 110 X 18 X 4.5

## (undated) DEVICE — SUTURE, CTD, VICRYL, 2-0, UND, BR, CT-2

## (undated) DEVICE — GUIDEWIRE, INQWIRE, .035, 150CM, STRT TIP

## (undated) DEVICE — INTRODUCER, SHEATH, FAST-CATH, 7 FR X 23 CM

## (undated) DEVICE — CATHETER TRAY, SURESTEP, 14FR, PRECONNECTED DRAIN BAG

## (undated) DEVICE — TIP, SUCTION, YANKAUER, FLEXIBLE

## (undated) DEVICE — ACCESS KIT, S-MAK MINI, 4FR 10CM 0.018IN 40CM, NT/PT, ECHO ENHANCE NEEDLE

## (undated) DEVICE — INTRODUCER SET, MICROPUNCTURE, REG, 4FR 10CM W/NITINOL GUIDEWIRE

## (undated) DEVICE — SLEEVE, REPOSITIONING, W/C-LOCK ADAPTER, 60 CM

## (undated) DEVICE — GUIDEWIRE, INQWIRE, 3MM J, .035, 150

## (undated) DEVICE — CATHETER, GUIDING, HEARTRAIL III, 6 FR IKARI LEFT 3.5

## (undated) DEVICE — DRAPE, SHEET, UTILITY, NON ABSORBENT, 18 X 26 IN, LF

## (undated) DEVICE — CATHETER, ANGIO, IMPULSE, PIGTAIL, 6 FR X 110 CM

## (undated) DEVICE — INTRODUCER, PINNACLE, 6 FR, 10 CM

## (undated) DEVICE — GUIDEWIRE, INQWIRE, 3MM J, .035, 260

## (undated) DEVICE — DRESSING, MEPILEX BORDER, POST-OP AG, 4 X 10 IN

## (undated) DEVICE — GLIDESHEATH, SLENDER 6FR, 10CM, NITINOL KIT

## (undated) DEVICE — BLADE, OSCILLATOR 19.5 X 86 X 1.27MM

## (undated) DEVICE — KIT, MINOR, DOUBLE BASIN

## (undated) DEVICE — SUTURE, V-LOC, 3-0, 23IN, P-12, 90 ABS

## (undated) DEVICE — DRAPE PACK, TOTAL HIP, CUSTOM, GEAUGA

## (undated) DEVICE — HOOD, SURGICAL, FLYTE, T7 PLUS, PEEL AWAY SHIELD

## (undated) DEVICE — CATHETER, PACING, PACEL, FLOW DIRECTED, 5 FR X 110 CM

## (undated) DEVICE — GUIDE WIRE, 035/190CM, HI-TORGUE SUPRA CORE

## (undated) DEVICE — SHEATH, BRITE TIP 6 X 23

## (undated) DEVICE — GUIDEWIRE, SAFARI 2, .035 X 275CM, EXTRA SMALL CURVE, PRE-SHAPED

## (undated) DEVICE — CATHETER, OPTITORQUE, 5FR, TIG, 1H/100CM

## (undated) DEVICE — APPLICATOR, CHLORAPREP, W/ORANGE TINT, 26ML

## (undated) DEVICE — DELIVERY SYSTEM, EVOLUT FX, 23-29MM

## (undated) DEVICE — DRAPE, ISOLATION, ANTIMICROBIAL, IOBAN, W/FILM & POUCH, LARGE, 32 X 70 CM